# Patient Record
Sex: MALE | Race: WHITE | NOT HISPANIC OR LATINO | Employment: OTHER | URBAN - METROPOLITAN AREA
[De-identification: names, ages, dates, MRNs, and addresses within clinical notes are randomized per-mention and may not be internally consistent; named-entity substitution may affect disease eponyms.]

---

## 2018-02-26 PROBLEM — E78.2 MIXED HYPERLIPIDEMIA: Status: ACTIVE | Noted: 2018-02-26

## 2018-02-26 PROBLEM — K21.00 GASTROESOPHAGEAL REFLUX DISEASE WITH ESOPHAGITIS: Status: ACTIVE | Noted: 2018-02-26

## 2018-02-26 PROBLEM — R06.83 SNORING: Status: ACTIVE | Noted: 2018-02-26

## 2018-02-26 PROBLEM — M48.02 CERVICAL STENOSIS OF SPINE: Status: ACTIVE | Noted: 2018-02-26

## 2018-02-27 ENCOUNTER — OFFICE VISIT (OUTPATIENT)
Dept: FAMILY MEDICINE CLINIC | Facility: CLINIC | Age: 63
End: 2018-02-27
Payer: COMMERCIAL

## 2018-02-27 VITALS
RESPIRATION RATE: 16 BRPM | HEIGHT: 74 IN | HEART RATE: 72 BPM | SYSTOLIC BLOOD PRESSURE: 130 MMHG | TEMPERATURE: 98.6 F | WEIGHT: 234 LBS | DIASTOLIC BLOOD PRESSURE: 84 MMHG | BODY MASS INDEX: 30.03 KG/M2

## 2018-02-27 DIAGNOSIS — Z00.00 ENCOUNTER FOR ANNUAL PHYSICAL EXAM: Primary | ICD-10-CM

## 2018-02-27 DIAGNOSIS — Z12.11 COLON CANCER SCREENING: ICD-10-CM

## 2018-02-27 DIAGNOSIS — E78.2 MIXED HYPERLIPIDEMIA: Primary | ICD-10-CM

## 2018-02-27 DIAGNOSIS — Z12.5 PROSTATE CANCER SCREENING: ICD-10-CM

## 2018-02-27 PROBLEM — M65.20 CALCIFIC TENDINITIS: Status: ACTIVE | Noted: 2018-02-27

## 2018-02-27 PROCEDURE — 99386 PREV VISIT NEW AGE 40-64: CPT | Performed by: FAMILY MEDICINE

## 2018-02-27 RX ORDER — OMEPRAZOLE 20 MG/1
20 CAPSULE, DELAYED RELEASE ORAL EVERY MORNING
COMMUNITY
End: 2022-05-27 | Stop reason: ALTCHOICE

## 2018-02-27 RX ORDER — MULTIVITAMIN
1 TABLET ORAL EVERY MORNING
COMMUNITY

## 2018-02-27 NOTE — PROGRESS NOTES
Subjective:           Augusta Campos was seen today for physical exam     Diagnoses and all orders for this visit:    Encounter for annual physical exam            No orders of the defined types were placed in this encounter  Patient Instructions   Labs as ordered  Low salt High protein diet  Compression   Cardiology evaluation as discussed  Fabricio Dobson      HPI   Physical exam  Labs PHM    Vitals:    02/27/18 1115   BP: 130/84   Pulse: 72   Resp: 16   Temp: 98 6 °F (37 °C)       No Known Allergies    No current outpatient prescriptions on file prior to visit  No current facility-administered medications on file prior to visit  Past Medical History:   Diagnosis Date    Calcific tendinitis 2/27/2018       Past Surgical History:   Procedure Laterality Date    ULNAR NERVE TRANSPOSITION Right           reports that he has never smoked  He has never used smokeless tobacco  He reports that he drinks alcohol  He reports that he does not use drugs  reports that he has never smoked  He has never used smokeless tobacco     The following portions of the patient's history were reviewed and updated as appropriate: allergies, current medications, past family history, past medical history, past social history, past surgical history and problem list     Review of Systems   Constitutional: Negative for appetite change, diaphoresis and fever  HENT: Negative for congestion, ear pain, postnasal drip, sinus pressure, tinnitus and voice change  Eyes: Negative for discharge and itching  Respiratory: Negative for cough, chest tightness, shortness of breath and stridor  Snores   Cardiovascular: Negative for chest pain and palpitations  Gastrointestinal: Negative for abdominal distention, blood in stool, diarrhea and vomiting  Some GERD sx's   Endocrine: Negative for cold intolerance  Genitourinary: Negative for flank pain, genital sores and testicular pain     Musculoskeletal: Positive for arthralgias  Negative for joint swelling and myalgias  R shoulder comes and goes   Skin: Negative for rash  Neurological: Negative for tremors, speech difficulty and headaches  Hematological: Negative for adenopathy  Psychiatric/Behavioral: Negative for behavioral problems, dysphoric mood, hallucinations and suicidal ideas  Physical Exam   Constitutional: He is oriented to person, place, and time  He appears well-developed and well-nourished  HENT:   Head: Normocephalic and atraumatic  Right Ear: External ear normal    Left Ear: External ear normal    Mouth/Throat: No oropharyngeal exudate  Eyes: Conjunctivae and EOM are normal  Pupils are equal, round, and reactive to light  Right eye exhibits no discharge  Left eye exhibits no discharge  No scleral icterus  Neck: Normal range of motion  Neck supple  No tracheal deviation present  Cardiovascular: Normal rate and regular rhythm  Exam reveals no friction rub  No murmur heard  Trace sock line edema  Varicosities mild   Pulmonary/Chest: Effort normal and breath sounds normal  No stridor  No respiratory distress  He has no wheezes  He has no rales  Abdominal: Soft  Bowel sounds are normal  He exhibits no distension  There is no rebound and no guarding  A hernia is present  3cm reducible umb hernia  Genitourinary: Rectum normal and prostate normal    Musculoskeletal: Normal range of motion  He exhibits edema  He exhibits no deformity  Lymphadenopathy:     He has no cervical adenopathy  Neurological: He is alert and oriented to person, place, and time  No cranial nerve deficit  He exhibits normal muscle tone  Coordination normal    Skin: Skin is warm and dry  Capillary refill takes less than 2 seconds  No rash noted  Nevi back  Scars back R arm elbow  Psychiatric: He has a normal mood and affect  His behavior is normal  Judgment and thought content normal    Nursing note and vitals reviewed

## 2018-02-28 ENCOUNTER — TELEPHONE (OUTPATIENT)
Dept: GASTROENTEROLOGY | Facility: CLINIC | Age: 63
End: 2018-02-28

## 2018-02-28 ENCOUNTER — TELEPHONE (OUTPATIENT)
Dept: GASTROENTEROLOGY | Facility: MEDICAL CENTER | Age: 63
End: 2018-02-28

## 2018-02-28 PROBLEM — Z12.11 SCREEN FOR COLON CANCER: Status: ACTIVE | Noted: 2018-02-28

## 2018-02-28 NOTE — TELEPHONE ENCOUNTER
Reyna Brannon  1955  [address] [facility]  [phone]  Cell Phone     Screened by: Zulma Sage  ]    Referring Dr María Das:   Has patient been referred for a routine screening Colonoscopy? yes  Is the patient over 48years of age? yes    If the answer is YES to both questions, proceed to the medical questions  Do you have any of the following symptoms? Have you had a coronary or vascular stent within the last year? no    Have you had a heart attack or stroke in the last 6 months? no    Have you had intestinal surgery in the last 3 months? no    Do you have problems with:anesthesia  no    Do you use:  Oxygen no  CPAP/BiPAP no    Have you been hospitalized in the last Month? no    Have you been diagnosed with a bleeding disorder or anemia? no    Have you had chest pain (angina) or breathing problems  (COPD) in the last 3 months? no     Do you have any difficulty walking up a flight of stairs? no    Have you had Kidney failure or insufficiency? no    Have you had heart valve surgery? no    Are you confined to a wheelchair? Do you take Coumadin  no    Do you take insulin for Diabetes no  Name of medication:    : If patient answers NO to medical questions, then schedule procedure  If patient answers YES to medical questions, then schedule office appointment  Previous Colonoscopy yes   (if yes) Date and Facility of last colonoscopy? ABOUT 10 YRS    Patient scheduled for procedure:   Scheduled by:     Time:   Provider:   Location:     Insurance:   Referral Required? Were instructions Mailed? Were instructions sent to [employer]:   Was the prep sent to Pharmacy?      Comments: [PT PASSED OA AND CAN BE REACHED [phone] ]

## 2018-02-28 NOTE — TELEPHONE ENCOUNTER
I SCHEDULED PT FOR OPEN ACCESS COLONOSCOPY W/ DR Blanco Camargo AT [facility] 3/20/2018 INSTRUCTIONS MAILED  BOWEL PREP TASKED TO [facility]

## 2018-03-01 DIAGNOSIS — Z12.11 COLON CANCER SCREENING: Primary | ICD-10-CM

## 2018-03-05 ENCOUNTER — OFFICE VISIT (OUTPATIENT)
Dept: FAMILY MEDICINE CLINIC | Facility: CLINIC | Age: 63
End: 2018-03-05
Payer: COMMERCIAL

## 2018-03-05 ENCOUNTER — TELEPHONE (OUTPATIENT)
Dept: FAMILY MEDICINE CLINIC | Facility: CLINIC | Age: 63
End: 2018-03-05

## 2018-03-05 VITALS
WEIGHT: 238 LBS | HEART RATE: 72 BPM | HEIGHT: 74 IN | BODY MASS INDEX: 30.54 KG/M2 | TEMPERATURE: 97.3 F | DIASTOLIC BLOOD PRESSURE: 72 MMHG | RESPIRATION RATE: 18 BRPM | SYSTOLIC BLOOD PRESSURE: 124 MMHG

## 2018-03-05 DIAGNOSIS — M79.89 SWELLING OF LEFT LOWER EXTREMITY: Primary | ICD-10-CM

## 2018-03-05 DIAGNOSIS — S86.811A STRAIN OF CALF MUSCLE, RIGHT, INITIAL ENCOUNTER: ICD-10-CM

## 2018-03-05 PROCEDURE — 99214 OFFICE O/P EST MOD 30 MIN: CPT | Performed by: NURSE PRACTITIONER

## 2018-03-05 RX ORDER — MELOXICAM 15 MG/1
15 TABLET ORAL DAILY
Qty: 30 TABLET | Refills: 0 | Status: SHIPPED | OUTPATIENT
Start: 2018-03-05 | End: 2018-03-08 | Stop reason: HOSPADM

## 2018-03-05 NOTE — TELEPHONE ENCOUNTER
Pt called c/o worsening pain in his calf/back of knee  I spoke with Dr Stephania Bence and she advised he be seen today  Appt made with Rebeca

## 2018-03-05 NOTE — PROGRESS NOTES
Assessment/Plan:    Will rule out DVT with stat doppler to be done 3/6/18  If negative, recommend ortho eval      Diagnoses and all orders for this visit:    Swelling of left lower extremity  -     VAS lower limb venous duplex study, unilateral/limited; Future    Strain of calf muscle, right, initial encounter  -     meloxicam (MOBIC) 15 mg tablet; Take 1 tablet (15 mg total) by mouth daily  -     Ambulatory referral to Orthopedic Surgery; Future          Patient Instructions   Ultrasound to rule out DVT in the left leg  If this is negative, ortho eval recommended  Return after venous duplex  Subjective:      Patient ID: Jennifer Moon is a 63 y o  male  Chief Complaint   Patient presents with    Leg Pain     Left calf       Here today for evaluation of LLE pain and swelling that has been ongoing for the past 2 weeks  Started after he believed he pulled his  calf muscle  [place]  He has been icing and stretching, pain is getting worse  Swelling also worsening  Denies redness or warmth of skin  Has pain with flexion of the ankle  Taking Ibuprofen which isn't helping  History thrombus in RUE after underhoing rotator cuff repair, was given a blood thinner, unsure how long he was anticoagulated  Denies chest pain, SOB, or VAZQUEZ        The following portions of the patient's history were reviewed and updated as appropriate: allergies, current medications, past family history, past medical history, past social history, past surgical history and problem list     Review of Systems   Constitutional: Negative  Respiratory: Negative for cough, chest tightness and shortness of breath  Cardiovascular: Positive for leg swelling  Negative for chest pain and palpitations  Neurological: Negative for weakness and numbness           Current Outpatient Prescriptions   Medication Sig Dispense Refill    Multiple Vitamin (MULTIVITAMIN) tablet Take 1 tablet by mouth daily      omeprazole (PriLOSEC) 20 mg delayed release capsule Take 20 mg by mouth daily      meloxicam (MOBIC) 15 mg tablet Take 1 tablet (15 mg total) by mouth daily 30 tablet 0    Na Sulfate-K Sulfate-Mg Sulf 17 5-3 13-1 6 GM/180ML SOLN Take 1 kit by mouth once for 1 dose 2 Bottle 0     No current facility-administered medications for this visit  Objective:    /72   Pulse 72   Temp (!) 97 3 °F (36 3 °C)   Resp 18   Ht 6' 2" (1 88 m)   Wt 108 kg (238 lb)   BMI 30 56 kg/m²        Physical Exam   Constitutional: He appears well-developed and well-nourished  HENT:   Right Ear: Tympanic membrane, external ear and ear canal normal    Left Ear: Tympanic membrane, external ear and ear canal normal    Nose: No mucosal edema  Mouth/Throat: Oropharynx is clear and moist and mucous membranes are normal    Eyes: Conjunctivae are normal    Cardiovascular: Normal rate, regular rhythm, normal heart sounds and normal pulses  LUE w/ 1+ pitting edema  No redness or warmth  Fullness palpated in popliteal space left knee  Discomfort with full flexion of knee   Pulmonary/Chest: Effort normal and breath sounds normal    Abdominal: Bowel sounds are normal  He exhibits no distension  There is no splenomegaly or hepatomegaly  There is no tenderness  Lymphadenopathy:        Right cervical: No superficial cervical adenopathy present  Left cervical: No superficial cervical adenopathy present  Skin: No rash noted  Psychiatric: He has a normal mood and affect  Nursing note and vitals reviewed               Elena Bonilla

## 2018-03-06 ENCOUNTER — HOSPITAL ENCOUNTER (OUTPATIENT)
Dept: RADIOLOGY | Facility: HOSPITAL | Age: 63
Discharge: HOME | End: 2018-03-06
Payer: COMMERCIAL

## 2018-03-06 ENCOUNTER — TELEPHONE (OUTPATIENT)
Dept: FAMILY MEDICINE CLINIC | Facility: CLINIC | Age: 63
End: 2018-03-06

## 2018-03-06 ENCOUNTER — APPOINTMENT (EMERGENCY)
Dept: RADIOLOGY | Facility: HOSPITAL | Age: 63
DRG: 280 | End: 2018-03-06
Payer: COMMERCIAL

## 2018-03-06 ENCOUNTER — HOSPITAL ENCOUNTER (INPATIENT)
Facility: HOSPITAL | Age: 63
LOS: 2 days | Discharge: HOME | DRG: 280 | End: 2018-03-08
Attending: EMERGENCY MEDICINE | Admitting: INTERNAL MEDICINE
Payer: COMMERCIAL

## 2018-03-06 ENCOUNTER — APPOINTMENT (INPATIENT)
Dept: NON INVASIVE DIAGNOSTICS | Facility: HOSPITAL | Age: 63
DRG: 280 | End: 2018-03-06
Payer: COMMERCIAL

## 2018-03-06 DIAGNOSIS — M79.89 SWELLING OF LEFT LOWER EXTREMITY: ICD-10-CM

## 2018-03-06 DIAGNOSIS — I26.99 PE (PULMONARY THROMBOEMBOLISM) (HCC): Primary | ICD-10-CM

## 2018-03-06 DIAGNOSIS — I82.409 DVT (DEEP VENOUS THROMBOSIS) (HCC): ICD-10-CM

## 2018-03-06 DIAGNOSIS — I26.09 OTHER ACUTE PULMONARY EMBOLISM WITH ACUTE COR PULMONALE (HCC): ICD-10-CM

## 2018-03-06 DIAGNOSIS — I82.412 ACUTE DEEP VEIN THROMBOSIS (DVT) OF FEMORAL VEIN OF LEFT LOWER EXTREMITY (HCC): ICD-10-CM

## 2018-03-06 PROBLEM — I21.4 NSTEMI (NON-ST ELEVATED MYOCARDIAL INFARCTION) (HCC): Status: ACTIVE | Noted: 2018-03-06

## 2018-03-06 LAB
ALBUMIN SERPL BCP-MCNC: 3.4 G/DL (ref 3.5–5)
ALP SERPL-CCNC: 84 U/L (ref 46–116)
ALT SERPL W P-5'-P-CCNC: 25 U/L (ref 12–78)
ANION GAP SERPL CALCULATED.3IONS-SCNC: 8 MMOL/L (ref 4–13)
APTT PPP: 24 SECONDS (ref 23–35)
AST SERPL W P-5'-P-CCNC: 14 U/L (ref 5–45)
BASOPHILS # BLD AUTO: 0 THOUSANDS/ΜL (ref 0–0.1)
BASOPHILS NFR BLD AUTO: 1 % (ref 0–1)
BILIRUB SERPL-MCNC: 0.3 MG/DL (ref 0.2–1)
BUN SERPL-MCNC: 22 MG/DL (ref 5–25)
CALCIUM SERPL-MCNC: 8.8 MG/DL (ref 8.3–10.1)
CHLORIDE SERPL-SCNC: 105 MMOL/L (ref 100–108)
CO2 SERPL-SCNC: 29 MMOL/L (ref 21–32)
CREAT SERPL-MCNC: 0.98 MG/DL (ref 0.6–1.3)
EOSINOPHIL # BLD AUTO: 0.2 THOUSAND/ΜL (ref 0–0.61)
EOSINOPHIL NFR BLD AUTO: 3 % (ref 0–6)
ERYTHROCYTE [DISTWIDTH] IN BLOOD BY AUTOMATED COUNT: 15.9 % (ref 11.6–15.1)
GFR SERPL CREATININE-BSD FRML MDRD: 83 ML/MIN/1.73SQ M
GLUCOSE SERPL-MCNC: 113 MG/DL (ref 65–140)
HCT VFR BLD AUTO: 35.8 % (ref 42–52)
HGB BLD-MCNC: 11.5 G/DL (ref 14–18)
INR PPP: 1.05 (ref 0.86–1.16)
LYMPHOCYTES # BLD AUTO: 1.5 THOUSANDS/ΜL (ref 0.6–4.47)
LYMPHOCYTES NFR BLD AUTO: 22 % (ref 14–44)
MCH RBC QN AUTO: 25.9 PG (ref 27–31)
MCHC RBC AUTO-ENTMCNC: 32.2 G/DL (ref 31.4–37.4)
MCV RBC AUTO: 81 FL (ref 82–98)
MONOCYTES # BLD AUTO: 0.6 THOUSAND/ΜL (ref 0.17–1.22)
MONOCYTES NFR BLD AUTO: 9 % (ref 4–12)
NEUTROPHILS # BLD AUTO: 4.7 THOUSANDS/ΜL (ref 1.85–7.62)
NEUTS SEG NFR BLD AUTO: 66 % (ref 43–75)
NRBC BLD AUTO-RTO: 0 /100 WBCS
PLATELET # BLD AUTO: 236 THOUSANDS/UL (ref 130–400)
PMV BLD AUTO: 7.7 FL (ref 8.9–12.7)
POTASSIUM SERPL-SCNC: 3.7 MMOL/L (ref 3.5–5.3)
PROT SERPL-MCNC: 7.1 G/DL (ref 6.4–8.2)
PROTHROMBIN TIME: 11 SECONDS (ref 9.4–11.7)
RBC # BLD AUTO: 4.43 MILLION/UL (ref 4.7–6.1)
SODIUM SERPL-SCNC: 142 MMOL/L (ref 136–145)
TROPONIN I SERPL-MCNC: 0.08 NG/ML
TROPONIN I SERPL-MCNC: 0.1 NG/ML
WBC # BLD AUTO: 7.1 THOUSAND/UL (ref 4.8–10.8)

## 2018-03-06 PROCEDURE — 80053 COMPREHEN METABOLIC PANEL: CPT | Performed by: EMERGENCY MEDICINE

## 2018-03-06 PROCEDURE — 96360 HYDRATION IV INFUSION INIT: CPT

## 2018-03-06 PROCEDURE — 82728 ASSAY OF FERRITIN: CPT | Performed by: PHYSICIAN ASSISTANT

## 2018-03-06 PROCEDURE — 83550 IRON BINDING TEST: CPT | Performed by: PHYSICIAN ASSISTANT

## 2018-03-06 PROCEDURE — 83540 ASSAY OF IRON: CPT | Performed by: PHYSICIAN ASSISTANT

## 2018-03-06 PROCEDURE — 87081 CULTURE SCREEN ONLY: CPT | Performed by: INTERNAL MEDICINE

## 2018-03-06 PROCEDURE — 85025 COMPLETE CBC W/AUTO DIFF WBC: CPT | Performed by: EMERGENCY MEDICINE

## 2018-03-06 PROCEDURE — 85730 THROMBOPLASTIN TIME PARTIAL: CPT | Performed by: EMERGENCY MEDICINE

## 2018-03-06 PROCEDURE — 96361 HYDRATE IV INFUSION ADD-ON: CPT

## 2018-03-06 PROCEDURE — 71275 CT ANGIOGRAPHY CHEST: CPT

## 2018-03-06 PROCEDURE — 85610 PROTHROMBIN TIME: CPT | Performed by: EMERGENCY MEDICINE

## 2018-03-06 PROCEDURE — 93306 TTE W/DOPPLER COMPLETE: CPT

## 2018-03-06 PROCEDURE — 93970 EXTREMITY STUDY: CPT | Performed by: SURGERY

## 2018-03-06 PROCEDURE — 36415 COLL VENOUS BLD VENIPUNCTURE: CPT | Performed by: EMERGENCY MEDICINE

## 2018-03-06 PROCEDURE — 93970 EXTREMITY STUDY: CPT

## 2018-03-06 PROCEDURE — 93306 TTE W/DOPPLER COMPLETE: CPT | Performed by: INTERNAL MEDICINE

## 2018-03-06 PROCEDURE — 84484 ASSAY OF TROPONIN QUANT: CPT | Performed by: EMERGENCY MEDICINE

## 2018-03-06 PROCEDURE — 93005 ELECTROCARDIOGRAM TRACING: CPT | Performed by: EMERGENCY MEDICINE

## 2018-03-06 PROCEDURE — 84484 ASSAY OF TROPONIN QUANT: CPT | Performed by: NURSE PRACTITIONER

## 2018-03-06 PROCEDURE — 99285 EMERGENCY DEPT VISIT HI MDM: CPT

## 2018-03-06 PROCEDURE — 99223 1ST HOSP IP/OBS HIGH 75: CPT | Performed by: INTERNAL MEDICINE

## 2018-03-06 RX ORDER — HEPARIN SODIUM 1000 [USP'U]/ML
4200 INJECTION, SOLUTION INTRAVENOUS; SUBCUTANEOUS AS NEEDED
Status: DISCONTINUED | OUTPATIENT
Start: 2018-03-06 | End: 2018-03-08

## 2018-03-06 RX ORDER — CHLORHEXIDINE GLUCONATE 0.12 MG/ML
15 RINSE ORAL EVERY 12 HOURS SCHEDULED
Status: DISCONTINUED | OUTPATIENT
Start: 2018-03-06 | End: 2018-03-07

## 2018-03-06 RX ORDER — HEPARIN SODIUM 1000 [USP'U]/ML
8400 INJECTION, SOLUTION INTRAVENOUS; SUBCUTANEOUS ONCE
Status: COMPLETED | OUTPATIENT
Start: 2018-03-06 | End: 2018-03-06

## 2018-03-06 RX ORDER — PANTOPRAZOLE SODIUM 40 MG/1
40 TABLET, DELAYED RELEASE ORAL
Status: DISCONTINUED | OUTPATIENT
Start: 2018-03-07 | End: 2018-03-08 | Stop reason: HOSPADM

## 2018-03-06 RX ORDER — ASPIRIN 81 MG/1
324 TABLET, CHEWABLE ORAL ONCE
Status: COMPLETED | OUTPATIENT
Start: 2018-03-06 | End: 2018-03-06

## 2018-03-06 RX ORDER — HEPARIN SODIUM 1000 [USP'U]/ML
8400 INJECTION, SOLUTION INTRAVENOUS; SUBCUTANEOUS AS NEEDED
Status: DISCONTINUED | OUTPATIENT
Start: 2018-03-06 | End: 2018-03-08

## 2018-03-06 RX ORDER — HEPARIN SODIUM 10000 [USP'U]/100ML
3-30 INJECTION, SOLUTION INTRAVENOUS
Status: DISCONTINUED | OUTPATIENT
Start: 2018-03-06 | End: 2018-03-08

## 2018-03-06 RX ADMIN — HEPARIN SODIUM 8400 UNITS: 1000 INJECTION, SOLUTION INTRAVENOUS; SUBCUTANEOUS at 18:02

## 2018-03-06 RX ADMIN — SODIUM CHLORIDE 1000 ML: 0.9 INJECTION, SOLUTION INTRAVENOUS at 18:19

## 2018-03-06 RX ADMIN — HEPARIN SODIUM AND DEXTROSE 18 UNITS/KG/HR: 10000; 5 INJECTION INTRAVENOUS at 18:05

## 2018-03-06 RX ADMIN — ASPIRIN 81 MG 324 MG: 81 TABLET ORAL at 18:01

## 2018-03-06 RX ADMIN — SODIUM CHLORIDE 1000 ML: 0.9 INJECTION, SOLUTION INTRAVENOUS at 15:22

## 2018-03-06 RX ADMIN — IOHEXOL 85 ML: 350 INJECTION, SOLUTION INTRAVENOUS at 16:48

## 2018-03-06 NOTE — ED PROCEDURE NOTE
PROCEDURE  ECG 12 Lead Documentation  Date/Time: 3/6/2018 3:31 PM  Performed by: Jozef Grimm  Authorized by: Varun MARIE     ECG reviewed by me, the ED Provider: yes    Patient location:  ED  Interpretation:     Interpretation: normal    Rate:     ECG rate:  102    ECG rate assessment: tachycardic    Rhythm:     Rhythm: sinus rhythm    Ectopy:     Ectopy: none    QRS:     QRS axis:  Normal    QRS intervals:  Normal  Conduction:     Conduction: normal    ST segments:     ST segments:  Normal  T waves:     T waves: inverted      Inverted:  V2, V3 and V4         Harvey Hurtado DO  03/06/18 1532

## 2018-03-06 NOTE — TELEPHONE ENCOUNTER
Extensive occlusive DVT involving LLE  This is unprovoked  Spoke w/ pt, he is starting to feel more SOB  Denies chest pain  Advised pt he would need to be started on anticoagulation therapy and seen by hematology outpatient, but given SOB, recommend he go to the ED to r/o PE  He is agreeable  Triage RN notified

## 2018-03-07 PROBLEM — D50.9 MICROCYTIC ANEMIA: Status: ACTIVE | Noted: 2018-03-07

## 2018-03-07 LAB
ALBUMIN SERPL-MCNC: 4.2 G/DL (ref 3.6–4.8)
ALBUMIN/GLOB SERPL: 1.5 {RATIO} (ref 1.2–2.2)
ALP SERPL-CCNC: 85 IU/L (ref 39–117)
ALT SERPL-CCNC: 16 IU/L (ref 0–44)
AMBIG ABBREV DEFAULT: NORMAL
AMBIG ABBREV DEFAULT: NORMAL
ANION GAP SERPL CALCULATED.3IONS-SCNC: 10 MMOL/L (ref 4–13)
APTT PPP: 67 SECONDS (ref 24–33)
APTT PPP: 74 SECONDS (ref 23–35)
AST SERPL-CCNC: 17 IU/L (ref 0–40)
ATRIAL RATE: 102 BPM
BASOPHILS # BLD AUTO: 0 THOUSANDS/ΜL (ref 0–0.1)
BASOPHILS NFR BLD AUTO: 1 % (ref 0–1)
BILIRUB SERPL-MCNC: 0.3 MG/DL (ref 0–1.2)
BUN SERPL-MCNC: 19 MG/DL (ref 5–25)
BUN SERPL-MCNC: 20 MG/DL (ref 8–27)
BUN/CREAT SERPL: 20 (ref 10–24)
CALCIUM SERPL-MCNC: 8.1 MG/DL (ref 8.3–10.1)
CALCIUM SERPL-MCNC: 9.1 MG/DL (ref 8.6–10.2)
CHLORIDE SERPL-SCNC: 101 MMOL/L (ref 96–106)
CHLORIDE SERPL-SCNC: 108 MMOL/L (ref 100–108)
CHOLEST SERPL-MCNC: 191 MG/DL (ref 100–199)
CO2 SERPL-SCNC: 24 MMOL/L (ref 18–29)
CO2 SERPL-SCNC: 24 MMOL/L (ref 21–32)
CREAT SERPL-MCNC: 0.77 MG/DL (ref 0.6–1.3)
CREAT SERPL-MCNC: 1.02 MG/DL (ref 0.76–1.27)
DEPRECATED AT III PPP: 82 % OF NORMAL (ref 92–136)
EOSINOPHIL # BLD AUTO: 0.3 THOUSAND/ΜL (ref 0–0.61)
EOSINOPHIL NFR BLD AUTO: 5 % (ref 0–6)
ERYTHROCYTE [DISTWIDTH] IN BLOOD BY AUTOMATED COUNT: 15.6 % (ref 11.6–15.1)
GFR SERPL CREATININE-BSD FRML MDRD: 99 ML/MIN/1.73SQ M
GLOBULIN SER-MCNC: 2.8 G/DL (ref 1.5–4.5)
GLUCOSE SERPL-MCNC: 111 MG/DL (ref 65–140)
GLUCOSE SERPL-MCNC: 98 MG/DL (ref 65–99)
HCT VFR BLD AUTO: 32.1 % (ref 42–52)
HDLC SERPL-MCNC: 43 MG/DL
HGB BLD-MCNC: 10.4 G/DL (ref 14–18)
INR PPP: 1.07 (ref 0.86–1.16)
LDLC SERPL CALC-MCNC: 131 MG/DL (ref 0–99)
LYMPHOCYTES # BLD AUTO: 1.7 THOUSANDS/ΜL (ref 0.6–4.47)
LYMPHOCYTES NFR BLD AUTO: 31 % (ref 14–44)
MAGNESIUM SERPL-MCNC: 2.1 MG/DL (ref 1.6–2.6)
MCH RBC QN AUTO: 26.1 PG (ref 27–31)
MCHC RBC AUTO-ENTMCNC: 32.5 G/DL (ref 31.4–37.4)
MCV RBC AUTO: 80 FL (ref 82–98)
MONOCYTES # BLD AUTO: 0.5 THOUSAND/ΜL (ref 0.17–1.22)
MONOCYTES NFR BLD AUTO: 9 % (ref 4–12)
NEUTROPHILS # BLD AUTO: 2.9 THOUSANDS/ΜL (ref 1.85–7.62)
NEUTS SEG NFR BLD AUTO: 54 % (ref 43–75)
NRBC BLD AUTO-RTO: 0 /100 WBCS
NT-PROBNP SERPL-MCNC: 844 PG/ML
P AXIS: 71 DEGREES
PHOSPHATE SERPL-MCNC: 3.4 MG/DL (ref 2.3–4.1)
PLATELET # BLD AUTO: 176 THOUSANDS/UL (ref 130–400)
PMV BLD AUTO: 7.7 FL (ref 8.9–12.7)
POTASSIUM SERPL-SCNC: 3.9 MMOL/L (ref 3.5–5.3)
POTASSIUM SERPL-SCNC: 4.6 MMOL/L (ref 3.5–5.2)
PR INTERVAL: 174 MS
PROT SERPL-MCNC: 7 G/DL (ref 6–8.5)
PROTHROMBIN TIME: 11.2 SECONDS (ref 9.4–11.7)
PSA SERPL-MCNC: 0.7 NG/ML (ref 0–4)
QRS AXIS: 12 DEGREES
QRSD INTERVAL: 82 MS
QT INTERVAL: 338 MS
QTC INTERVAL: 440 MS
RBC # BLD AUTO: 3.99 MILLION/UL (ref 4.7–6.1)
SL AMB EGFR AFRICAN AMERICAN: 92 ML/MIN/1.73
SL AMB EGFR NON AFRICAN AMERICAN: 80 ML/MIN/1.73
SL AMB VLDL CHOLESTEROL CALC: 17 MG/DL (ref 5–40)
SODIUM SERPL-SCNC: 139 MMOL/L (ref 134–144)
SODIUM SERPL-SCNC: 142 MMOL/L (ref 136–145)
T WAVE AXIS: 29 DEGREES
TRIGL SERPL-MCNC: 86 MG/DL (ref 0–149)
VENTRICULAR RATE: 102 BPM
WBC # BLD AUTO: 5.4 THOUSAND/UL (ref 4.8–10.8)

## 2018-03-07 PROCEDURE — 85705 THROMBOPLASTIN INHIBITION: CPT | Performed by: PHYSICIAN ASSISTANT

## 2018-03-07 PROCEDURE — 85670 THROMBIN TIME PLASMA: CPT | Performed by: PHYSICIAN ASSISTANT

## 2018-03-07 PROCEDURE — 83880 ASSAY OF NATRIURETIC PEPTIDE: CPT | Performed by: INTERNAL MEDICINE

## 2018-03-07 PROCEDURE — 85613 RUSSELL VIPER VENOM DILUTED: CPT | Performed by: PHYSICIAN ASSISTANT

## 2018-03-07 PROCEDURE — 85730 THROMBOPLASTIN TIME PARTIAL: CPT | Performed by: INTERNAL MEDICINE

## 2018-03-07 PROCEDURE — 86146 BETA-2 GLYCOPROTEIN ANTIBODY: CPT | Performed by: PHYSICIAN ASSISTANT

## 2018-03-07 PROCEDURE — 99255 IP/OBS CONSLTJ NEW/EST HI 80: CPT | Performed by: PHYSICIAN ASSISTANT

## 2018-03-07 PROCEDURE — 85305 CLOT INHIBIT PROT S TOTAL: CPT | Performed by: PHYSICIAN ASSISTANT

## 2018-03-07 PROCEDURE — 82272 OCCULT BLD FECES 1-3 TESTS: CPT | Performed by: PHYSICIAN ASSISTANT

## 2018-03-07 PROCEDURE — 99233 SBSQ HOSP IP/OBS HIGH 50: CPT | Performed by: INTERNAL MEDICINE

## 2018-03-07 PROCEDURE — 81240 F2 GENE: CPT | Performed by: PHYSICIAN ASSISTANT

## 2018-03-07 PROCEDURE — 93010 ELECTROCARDIOGRAM REPORT: CPT | Performed by: INTERNAL MEDICINE

## 2018-03-07 PROCEDURE — 84100 ASSAY OF PHOSPHORUS: CPT | Performed by: NURSE PRACTITIONER

## 2018-03-07 PROCEDURE — 81241 F5 GENE: CPT | Performed by: PHYSICIAN ASSISTANT

## 2018-03-07 PROCEDURE — 85730 THROMBOPLASTIN TIME PARTIAL: CPT | Performed by: EMERGENCY MEDICINE

## 2018-03-07 PROCEDURE — 80048 BASIC METABOLIC PNL TOTAL CA: CPT | Performed by: NURSE PRACTITIONER

## 2018-03-07 PROCEDURE — 85025 COMPLETE CBC W/AUTO DIFF WBC: CPT | Performed by: NURSE PRACTITIONER

## 2018-03-07 PROCEDURE — 85306 CLOT INHIBIT PROT S FREE: CPT | Performed by: PHYSICIAN ASSISTANT

## 2018-03-07 PROCEDURE — 85732 THROMBOPLASTIN TIME PARTIAL: CPT | Performed by: PHYSICIAN ASSISTANT

## 2018-03-07 PROCEDURE — 86147 CARDIOLIPIN ANTIBODY EA IG: CPT | Performed by: PHYSICIAN ASSISTANT

## 2018-03-07 PROCEDURE — 85300 ANTITHROMBIN III ACTIVITY: CPT | Performed by: PHYSICIAN ASSISTANT

## 2018-03-07 PROCEDURE — 85610 PROTHROMBIN TIME: CPT | Performed by: NURSE PRACTITIONER

## 2018-03-07 PROCEDURE — 85303 CLOT INHIBIT PROT C ACTIVITY: CPT | Performed by: PHYSICIAN ASSISTANT

## 2018-03-07 PROCEDURE — 83735 ASSAY OF MAGNESIUM: CPT | Performed by: NURSE PRACTITIONER

## 2018-03-07 RX ADMIN — PANTOPRAZOLE SODIUM 40 MG: 40 TABLET, DELAYED RELEASE ORAL at 06:39

## 2018-03-07 RX ADMIN — HEPARIN SODIUM AND DEXTROSE 18 UNITS/KG/HR: 10000; 5 INJECTION INTRAVENOUS at 05:57

## 2018-03-07 RX ADMIN — CHLORHEXIDINE GLUCONATE 15 ML: 1.2 RINSE ORAL at 08:27

## 2018-03-07 RX ADMIN — HEPARIN SODIUM AND DEXTROSE 18 UNITS/KG/HR: 10000; 5 INJECTION INTRAVENOUS at 19:21

## 2018-03-07 NOTE — CONSULTS
Consult Note - Vascular Surgery   Mitch Herrera 63 y o  male MRN: [id number]  Unit/Bed#: ICU 10 Encounter: [id number]  Primary Care Provider: Bhavna Oakley MD   Date and time admitted to hospital: 3/6/2018  2:57 PM      Acute deep vein thrombosis (DVT) of femoral vein of left lower extremity (HCC)   Assessment & Plan    Extensive acute LLE DVT (over 2 weeks) with extensive bilateral pulmonary emboli / right heart strain and HX of upper extremity DVT after shoulder surgery about 6 years ago  Acute non-occlusive DVT involving proximal femoral, mid femoral, gastrocnemius vein, posterior tibial vein and peroneal vein  Acute occluded DVT distal femoral vein and popliteal vein  Evidence of superficial thrombophlebitis noted in the small saphenous vein  62M history of what sounds like axillary vein DVT after shoulder surgery about 6 years ago with a short course of Xarelto  The patient reports that 2 1/2 ago while golfing, he "strained" his left calf muscle  He noticed discomfort while walking [place]  Since then he has had mild to moderate swelling to the left leg with some difficulty in ambulating  He has continued to work but at times he needed to walk on his left heel due to pain  He has had varying degrees of swelling for which he has been trying heat/ice in the evenings after work  A couple of days ago, he developed mild dyspnea  Yesterday, he was sent for an outpatient venous duplex which was significant for extensive Left leg DVT for which he was sent to the Emergency department where he was found to have extensive bilateral pulmonary emboli with right heart strain  He did not undergo lytic therapy for pulmonary emboli  He was placed on a heparin continuous infusion  Vascular surgery is now asked to evaluate patient for treatment options for DVT  The patient feels better today  He has no chest pain or shortness of breath  His hemodynamics appear stable    He is lying flat in bed comfortable and he is not on oxygen  The the left lower extremity is mild to moderately larger than the right leg; perhaps up to 20% larger  The the patient expresses that he has enjoyed good health  He has had GERD and hypercholesterolemia  He is very active without any chest limiting symptoms at baseline  No history of myocardial infarction or stroke  No personal history of malignancy or bleeding  No known  family history of hypercoagulability, blood disorders or sudden death  - agree with heparin continuous infusion for PEs/deep vein thrombosis   - continue with close clinical/hemodynamic dynamic monitoring   - suggest hypercoagulable workup   - we will continue to monitor the patient with you and reassess in 24 hours for indications of venous lytic therapy  We had a detailed discussion regarding the indications, risks and benefits of venous lysis intervention  A directed procedure would be considered, if he should develop significant symptoms  At this point, with bilateral pulmonary emboli right heart strain, and in the absence of severe symptoms in the left leg, there would be no urgency to proceed with an intervention  Now on anticoagulation, as he starts to ambulate, should he develop moderate to severe symptoms (increased pain or swelling of LLE, or difficulty ambulating) we could reassess the risk/ benefit ratio and consider directed venous thrombolytic therapy  Case / thoughts and recommendations were discussed with critical care team              * Pulmonary embolism with acute cor pulmonale ([address] )   Assessment & Plan    Extensive bilateral pulmonary emboli with right heart strain  Moderately dilated RV size with moderately decreased RV function and mildly elevated Troponin       - heparin continuous infusion   - Followed by critical care           NSTEMI (non-ST elevated myocardial infarction) [facility])   Assessment & Plan    Secondary to PEs/RV dysfunction; normal LV function   - mgt per critical care          Requesting Service: Critical care  Chief Complaint:  Extensive bilateral pulmonary emboli and left lower extremity extensive deep vein thrombosis    HPI: Susan Sheehan is a 63 y o  male who presents with left lower extremity swelling x 2 1/2 weeks  62M history of what sounds like axillary vein DVT after shoulder surgery about 6 years ago with a short course of Xarelto  The patient reports that 2 1/2 ago while golfing, he "strained" his left calf muscle  He noticed discomfort while walking [place]  Since then he has had mild to moderate swelling to the left leg with some difficulty in ambulating  He has continued to work but at times he needed to walk on his left heel due to pain  He has had varying degrees of swelling for which he has been trying heat/ice in the evenings after work  A couple of days ago, he developed mild dyspnea  Yesterday, he was sent for an outpatient venous duplex which was significant for extensive Left leg DVT for which he was sent to the Emergency department where he was found to have extensive bilateral pulmonary emboli with right heart strain  He did not undergo lytic therapy for pulmonary emboli  He was placed on a heparin continuous infusion  Vascular surgery is now asked to evaluate patient for treatment options for DVT  The patient feels better today  He has no chest pain or shortness of breath  His hemodynamics appear stable  He is lying flat in bed comfortable and he is not on oxygen  The the left lower extremity is mild to moderately larger than the right leg; perhaps up to 20% larger  The patient expresses that he has enjoyed good health  He has had GERD and hypercholesterolemia  He is very active without any chest limiting symptoms at baseline  He was a cigarette smoker as a teen  No history of myocardial infarction or stroke  No personal history of malignancy or bleeding    No known  family history of hypercoagulability, blood disorders or sudden death  Imaging studies and case were reviewed with Dr Bruce Coffman  Review of Systems:  General: negative - no fevers, chills    Cardiovascular: no chest pain suggestive of angina  Respiratory: positive for - a sudden dyspnea  Gastrointestinal: no abdominal pain, change in bowel habits, or black or bloody stools  Genitourinary ROS: no dysuria, trouble voiding, or hematuria  Musculoskeletal ROS: negative  Neurological ROS: no TIA or stroke symptoms  Hematological and Lymphatic ROS: positive for - blood clots  Dermatological ROS: negative  Psychological ROS: negative  Ophthalmic ROS: negative  ENT ROS: negative    Past Medical History:  Past Medical History:   Diagnosis Date    Calcific tendinitis 2/27/2018       Past Surgical History:  Past Surgical History:   Procedure Laterality Date    ULNAR NERVE TRANSPOSITION Right        Social History:  History   Alcohol Use    Yes     Comment: social     History   Drug Use No     History   Smoking Status    Never Smoker   Smokeless Tobacco    Never Used       Family History:  Family History   Problem Relation Age of Onset    Diabetes Father     Cancer Maternal Grandmother     Cancer Maternal Grandfather        Allergies:  No Known Allergies    Medications:  Current Facility-Administered Medications   Medication Dose Route Frequency    chlorhexidine (PERIDEX) 0 12 % oral rinse 15 mL  15 mL Swish & Spit Q12H [address]    heparin (porcine) 25,000 units in 250 mL infusion (premix)  3-30 Units/kg/hr (Order-Specific) Intravenous Titrated    heparin (porcine) injection 4,200 Units  4,200 Units Intravenous PRN    heparin (porcine) injection 8,400 Units  8,400 Units Intravenous PRN    pantoprazole (PROTONIX) EC tablet 40 mg  40 mg Oral Early Morning       Vitals:  /79 (BP Location: Right arm)   Pulse 71   Temp 97 6 °F (36 4 °C) (Temporal)   Resp 16   Ht 6' 2" (1 88 m)   Wt 109 kg (239 lb 10 2 oz)   SpO2 95% BMI 30 77 kg/m²     I/Os:  I/O last 24 hours: In: 2524 3 [P O :300; I V :224 3; IV Piggyback:2000]  Out: 750 [Urine:750]    Lab Results and Cultures:   Lab Results   Component Value Date    WBC 5 40 03/07/2018    HGB 10 4 (L) 03/07/2018    HCT 32 1 (L) 03/07/2018    MCV 80 (L) 03/07/2018     03/07/2018     Lab Results   Component Value Date    GLUCOSE 111 03/07/2018    CALCIUM 8 1 (L) 03/07/2018     03/07/2018    K 3 9 03/07/2018    CO2 24 03/07/2018     03/07/2018    BUN 19 03/07/2018    CREATININE 0 77 03/07/2018     Lab Results   Component Value Date    INR 1 07 03/07/2018    INR 1 05 03/06/2018    PROTIME 11 2 03/07/2018    PROTIME 11 0 03/06/2018       Physical Exam:    General appearance: alert and oriented, in no acute distress  Skin: Skin color, texture, turgor normal  No rashes or lesions  Neurologic: Grossly normal  Head: Normocephalic, without obvious abnormality, atraumatic  Eyes: conjunctivae/corneas clear  PERRL, EOM's intact  Fundi benign  Throat: lips, mucosa, and tongue normal; teeth and gums normal  Neck: no adenopathy, no carotid bruit, no JVD, supple, symmetrical, trachea midline and thyroid not enlarged, symmetric, no tenderness/mass/nodules  Back: symmetric, no curvature  ROM normal  No CVA tenderness  Lungs: overall ctab  Chest wall: no tenderness  Heart: regular rate and rhythm, S1, S2 normal, no murmur, click, rub or gallop  Abdomen: soft, non-tender; bowel sounds normal; no masses,  no organomegaly  Extremities: Left calf swelling - increased size c/w right calf  + left calf discomfort on active motion  Motor-sensory intact  No ankle edema  Extremities all well perfused  Pulse exam:  Radial: Right: 2+ Left[de-identified] 2+  Femoral: Right: 2+ Left: 2+  DP: Right: 2+ Left: 2+  PT: Right: 2+ Left: 2+     Imaging:    Venous Du: 3/6/2018    [facility] REPORT  CLINICAL:  Indications: Other specified soft tissue disorders [M79 89]  Patient presents  with left lower extremity edema x 14 days  The patient has history of DVT in right arm  Clinical:     FINDINGS:     Segment         Right            Left                              Impression       Impression                GSV Prox Thigh                   Normal (Patent)           CFV             Normal (Patent)  Normal (Patent)           FV Prox                          Non Occlusive Thrombus    FV Mid                           Non Occlusive Thrombus    FV Dist                          Occlusive Subsegmental    PFV - Stent                      Non Occlusive Thrombus    Popliteal                        Occlusive Subsegmental    SSV Knee                         Occlusive Subsegmental    PostTibial                       Non Occlusive Thrombus    Peroneal                         Non Occlusive Thrombus    Gastrocnemius                    Non Occlusive Thrombus             CONCLUSION:  Impression:  RIGHT LOWER LIMB: Normal  No evidence of acute or chronic deep vein thrombosis  No evidence of superficial thrombophlebitis noted  Doppler evaluation shows a normal response to augmentation maneuvers  Popliteal, posterior tibial and anterior tibial arterial Doppler waveforms are  triphasic  LEFT LOWER LIMB: Abnormal  Acute non-occlusive deep vein thrombosis in the proximal femoral, mid femoral,  gastrocnemius vein, posterior tibial vein, and peroneal vein  Acute occluded deep vein thrombosis in the distal femoral vein, and popliteal  vein  Evidence of superficial thrombophlebitis noted in the small saphenous vein  Doppler evaluation shows a normal response to augmentation maneuvers  Popliteal, posterior tibial and anterior tibial arterial Doppler waveforms are  triphasic  Technical findings were given to [address] 116 14:36 Patient sent to ER       SIGNATURE:  Electronically Signed by: Lindajo Alpers, MD, RPVI on 2018-03-06 04:02:11 PM      CTA - CHEST WITH IV CONTRAST - PULMONARY ANGIOGRAM 3/6/2018     INDICATION: Chest pain, acute, PE suspected, high pretest prob     COMPARISON: None      TECHNIQUE: CTA examination of the chest was performed using angiographic technique according to a protocol specifically tailored to evaluate for pulmonary embolism  Axial, sagittal, and coronal 2D reformatted images were created from the source data and   submitted for interpretation  In addition, coronal 3D MIP postprocessing was performed on the acquisition scanner        Radiation dose length product (DLP) for this visit:  772 53 mGy-cm   This examination, like all CT scans performed in the [facility], was performed utilizing techniques to minimize radiation dose exposure, including the use of iterative   reconstruction and automated exposure control      IV Contrast:  85 mL of iohexol (OMNIPAQUE)  PE   FINDINGS:     PULMONARY ARTERIAL TREE:  Extensive left greater than right central pulmonary arterial filling defects extending distally consistent with     LUNGS:  Lungs are clear  There is no tracheal or endobronchial lesion      PLEURA:  Unremarkable      HEART/AORTA:  Cardiomegaly noted  Right ventricle measures 5 8 cm in left ventricle measures 4 7 cm corresponding to a ventricular ratio of approximately 1 2 and concerning for right ventricular strain      MEDIASTINUM AND ASHLEY:  Unremarkable      CHEST WALL AND LOWER NECK:   Unremarkable      VISUALIZED STRUCTURES IN THE UPPER ABDOMEN:  Hepatomegaly noted      OSSEOUS STRUCTURES:  No acute fracture or destructive osseous lesion      IMPRESSION:     1  Extensive bilateral pulmonary arterial filling defects consistent with PE      The calculated ratio of right ventricular to left ventricular diameter (RV/LV ratio) is 1 2  This is greater than 0 9, which is abnormal and indicates right heart strain  An abnormal RV/LV ratio has been shown to be associated with an increased risk of 30 day mortality in the setting of acute pulmonary embolism      2  Hepatomegaly          Daphnie Schneider PA-C  3/7/2018  [facility]

## 2018-03-07 NOTE — ED PROVIDER NOTES
History  Chief Complaint   Patient presents with    Leg Pain     Patient states that he went to see his PCP Dr Charles Garcia yesterday for left calf pain  She sent him to the vascular leb this morning, who sent him to the ER now, with reports of DVT to the left lower extremity  Patient presents for evaluation after outpatient US showed DVT in the left leg  Patient states he thought he pulled a muscle golfing 2 weeks ago but the swelling and pain were getting worse not better  Went to PMD and US ordered  No chest pain but does report dyspnea with exertion  History provided by:  Patient   used: No    Leg Pain       Prior to Admission Medications   Prescriptions Last Dose Informant Patient Reported? Taking? Multiple Vitamin (MULTIVITAMIN) tablet  Self Yes No   Sig: Take 1 tablet by mouth daily   Na Sulfate-K Sulfate-Mg Sulf 17 5-3 13-1 6 GM/180ML SOLN   No No   Sig: Take 1 kit by mouth once for 1 dose   meloxicam (MOBIC) 15 mg tablet   No No   Sig: Take 1 tablet (15 mg total) by mouth daily   omeprazole (PriLOSEC) 20 mg delayed release capsule  Self Yes No   Sig: Take 20 mg by mouth daily      Facility-Administered Medications: None       Past Medical History:   Diagnosis Date    Calcific tendinitis 2/27/2018       Past Surgical History:   Procedure Laterality Date    ULNAR NERVE TRANSPOSITION Right        Family History   Problem Relation Age of Onset    Diabetes Father     Cancer Maternal Grandmother     Cancer Maternal Grandfather      I have reviewed and agree with the history as documented  Social History   Substance Use Topics    Smoking status: Never Smoker    Smokeless tobacco: Never Used    Alcohol use Yes      Comment: social        Review of Systems   Respiratory: Positive for shortness of breath  Cardiovascular: Positive for leg swelling  All other systems reviewed and are negative        Physical Exam  ED Triage Vitals [03/06/18 1504]   Temperature Pulse Respirations Blood Pressure SpO2   98 2 °F (36 8 °C) 105 20 146/81 98 %      Temp Source Heart Rate Source Patient Position - Orthostatic VS BP Location FiO2 (%)   Oral Monitor Lying Left arm --      Pain Score       3           Orthostatic Vital Signs  Vitals:    03/06/18 2100 03/06/18 2200 03/06/18 2300 03/07/18 0000   BP: 117/75 112/68 109/66 95/64   Pulse: 90 83 81 76   Patient Position - Orthostatic VS: Lying          Physical Exam   Constitutional: He is oriented to person, place, and time  No distress  HENT:   Mouth/Throat: Oropharynx is clear and moist    Eyes: Pupils are equal, round, and reactive to light  Neck: Normal range of motion  Cardiovascular: Normal rate, regular rhythm and intact distal pulses  Pulmonary/Chest: Effort normal and breath sounds normal  No respiratory distress  Abdominal: Soft  There is no tenderness  Musculoskeletal: Normal range of motion  He exhibits edema  Pitting edema left leg distal neurovascularly intact   Neurological: He is alert and oriented to person, place, and time  Skin: Capillary refill takes less than 2 seconds  He is not diaphoretic  Nursing note and vitals reviewed        ED Medications  Medications   heparin (porcine) 25,000 units in 250 mL infusion (premix) (18 Units/kg/hr × 105 kg (Order-Specific) Intravenous New Bag 3/6/18 1805)   heparin (porcine) injection 8,400 Units (not administered)   heparin (porcine) injection 4,200 Units (not administered)   pantoprazole (PROTONIX) EC tablet 40 mg (not administered)   chlorhexidine (PERIDEX) 0 12 % oral rinse 15 mL (15 mL Swish & Spit Not Given 3/6/18 2256)   sodium chloride 0 9 % bolus 1,000 mL (0 mL Intravenous Stopped 3/6/18 1736)   iohexol (OMNIPAQUE) 350 MG/ML injection (MULTI-DOSE) 85 mL (85 mL Intravenous Given 3/6/18 1648)   aspirin chewable tablet 324 mg (324 mg Oral Given 3/6/18 1801)   sodium chloride 0 9 % bolus 1,000 mL (0 mL Intravenous Stopped 3/1957)   heparin (porcine) injection 8,400 Units (8,400 Units Intravenous Given 3/6/18 1802)       Diagnostic Studies  Results Reviewed     Procedure Component Value Units Date/Time    APTT [[id number]] Collected:  03/07/18 0009    Lab Status: In process Specimen:  Blood from Arm, Left Updated:  03/07/18 0011    Protime-INR [[id number]]  (Normal) Collected:  03/06/18 1523    Lab Status:  Final result Specimen:  Blood from Arm, Left Updated:  03/06/18 1557     Protime 11 0 seconds      INR 1 05    APTT [[id number]]  (Normal) Collected:  03/06/18 1523    Lab Status:  Final result Specimen:  Blood from Arm, Left Updated:  03/06/18 1557     PTT 24 seconds     Narrative: Therapeutic Heparin Range = 60-90 seconds    Troponin I [[id number]]  (Abnormal) Collected:  03/06/18 1523    Lab Status:  Final result Specimen:  Blood from Arm, Left Updated:  03/06/18 1557     Troponin I 0 10 (H) ng/mL     Narrative:         [employer] Chemistry analyzer 99% cutoff is > 0 04 ng/mL in network labs    o cTnI 99% cutoff is useful only when applied to patients in the clinical setting of myocardial ischemia  o cTnI 99% cutoff should be interpreted in the context of clinical history, ECG findings and possibly cardiac imaging to establish correct diagnosis  o cTnI 99% cutoff may be suggestive but clearly not indicative of a coronary event without the clinical setting of myocardial ischemia      Comprehensive metabolic panel [[id number]]  (Abnormal) Collected:  03/06/18 1523    Lab Status:  Final result Specimen:  Blood from Arm, Left Updated:  03/06/18 1552     Sodium 142 mmol/L      Potassium 3 7 mmol/L      Chloride 105 mmol/L      CO2 29 mmol/L      Anion Gap 8 mmol/L      BUN 22 mg/dL      Creatinine 0 98 mg/dL      Glucose 113 mg/dL      Calcium 8 8 mg/dL      AST 14 U/L      ALT 25 U/L      Alkaline Phosphatase 84 U/L      Total Protein 7 1 g/dL      Albumin 3 4 (L) g/dL      Total Bilirubin 0 30 mg/dL      eGFR 83 ml/min/1 73sq m     Narrative:         National Kidney Disease Education Program recommendations are as follows:  GFR calculation is accurate only with a steady state creatinine  Chronic Kidney disease less than 60 ml/min/1 73 sq  meters  Kidney failure less than 15 ml/min/1 73 sq  meters  CBC and differential [[id number]]  (Abnormal) Collected:  03/06/18 1523    Lab Status:  Final result Specimen:  Blood from Arm, Left Updated:  03/06/18 1537     WBC 7 10 Thousand/uL      RBC 4 43 (L) Million/uL      Hemoglobin 11 5 (L) g/dL      Hematocrit 35 8 (L) %      MCV 81 (L) fL      MCH 25 9 (L) pg      MCHC 32 2 g/dL      RDW 15 9 (H) %      MPV 7 7 (L) fL      Platelets 455 Thousands/uL      nRBC 0 /100 WBCs      Neutrophils Relative 66 %      Lymphocytes Relative 22 %      Monocytes Relative 9 %      Eosinophils Relative 3 %      Basophils Relative 1 %      Neutrophils Absolute 4 70 Thousands/µL      Lymphocytes Absolute 1 50 Thousands/µL      Monocytes Absolute 0 60 Thousand/µL      Eosinophils Absolute 0 20 Thousand/µL      Basophils Absolute 0 00 Thousands/µL                  CTA ED chest PE study   Final Result by Katya Wagner MD (03/06 1719)      1  Extensive bilateral pulmonary arterial filling defects consistent with PE  The calculated ratio of right ventricular to left ventricular diameter (RV/LV ratio) is 1 2  This is greater than 0 9, which is abnormal and indicates right heart strain  An abnormal RV/LV ratio has been shown to be associated with an increased risk of 30 day mortality in the setting of acute pulmonary embolism  2   Hepatomegaly               I personally discussed this study with Aric Sorenson on 3/6/2018 at 5:19 PM                Workstation performed: [id number]                    Procedures  Procedures       Phone Contacts  ED Phone Contact    ED Course  ED Course                                MDM  Number of Diagnoses or Management Options  DVT (deep venous thrombosis) ([facility] [address] ):   PE (pulmonary thromboembolism) ([facility] [address] ):   Diagnosis management comments: Pulse ox 94% on RA indicating adequate oxygenation    CT results discussed with patient  Dr Halie Rene contacted and case dicussed  TPa considered for the patient given the evidence of heart strain  Dr Halie Rene felt after reviewing the data the risks did not out weight the benefits  The patient was started on Heparin and Dr Frank Claudio contacted and case discussed for stat ECHO  ECHO showed RV strain mild  Amount and/or Complexity of Data Reviewed  Clinical lab tests: ordered and reviewed  Tests in the radiology section of CPT®: ordered and reviewed  Decide to obtain previous medical records or to obtain history from someone other than the patient: yes  Review and summarize past medical records: yes  Discuss the patient with other providers: yes  Independent visualization of images, tracings, or specimens: yes    Patient Progress  Patient progress: stable    The patient presented with a condition in which there was a high probability of imminent or life-threatening deterioration, and critical care services (excluding separately billable procedures) totalled 30-74 minutes  Disposition  Final diagnoses:   PE (pulmonary thromboembolism) ([facility] [address] )   DVT (deep venous thrombosis) ([facility] )     Time reflects when diagnosis was documented in both MDM as applicable and the Disposition within this note     Time User Action Codes Description Comment    3/6/2018  5:46 PM Kaelyn Books [I26 [id number]] PE (pulmonary thromboembolism) ([facility] [address] )     3/6/2018  5:46 PM Seger, Criselda Eisenmenger Add [I82 [id number]] DVT (deep venous thrombosis) [facility])       ED Disposition     ED Disposition Mary Rene contacted for admission to her service        Follow-up Information    None       Current Discharge Medication List      CONTINUE these medications which have NOT CHANGED    Details   meloxicam (MOBIC) 15 mg tablet Take 1 tablet (15 mg total) by mouth daily  Qty: 30 tablet, Refills: 0    Associated Diagnoses: Strain of calf muscle, right, initial encounter      Multiple Vitamin (MULTIVITAMIN) tablet Take 1 tablet by mouth daily      Na Sulfate-K Sulfate-Mg Sulf 17 5-3 13-1 6 GM/180ML SOLN Take 1 kit by mouth once for 1 dose  Qty: 2 Bottle, Refills: 0    Comments: suprep  Associated Diagnoses: Colon cancer screening      omeprazole (PriLOSEC) 20 mg delayed release capsule Take 20 mg by mouth daily           No discharge procedures on file      ED Provider  Electronically Signed by           Roberto Coulter DO  03/07/18 4373

## 2018-03-07 NOTE — ASSESSMENT & PLAN NOTE
Extensive bilateral pulmonary emboli with right heart strain  Moderately dilated RV size with moderately decreased RV function and mildly elevated Troponin       - heparin continuous infusion   - Followed by critical care

## 2018-03-07 NOTE — ASSESSMENT & PLAN NOTE
-Hg has been stable in the 10-11 range  -pending iron panel studies  -FOBT negative  -hematology follow up as outpt

## 2018-03-07 NOTE — PLAN OF CARE
Problem: DISCHARGE PLANNING - CARE MANAGEMENT  Goal: Discharge to post-acute care or home with appropriate resources  INTERVENTIONS:  - Conduct assessment to determine patient/family and health care team treatment goals, and need for post-acute services based on payer coverage, community resources, and patient preferences, and barriers to discharge  - Address psychosocial, clinical, and financial barriers to discharge as identified in assessment in conjunction with the patient/family and health care team  - Arrange appropriate level of post-acute services according to patient's   needs and preference and payer coverage in collaboration with the physician and health care team  - Communicate with and update the patient/family, physician, and health care team regarding progress on the discharge plan  - Arrange appropriate transportation to post-acute venues  [facility]  Outcome: Progressing

## 2018-03-07 NOTE — PROGRESS NOTES
Transfer/Progress Note - Critical Care   Rossy Rodríguez 63 y o  male MRN: [id number]  Unit/Bed#: ICU 10 Encounter: [id number]    Impression:  Principal Problem:    Pulmonary embolism with acute cor pulmonale (HCC)  Active Problems:    Mixed hyperlipidemia    Gastroesophageal reflux disease with esophagitis    Acute deep vein thrombosis (DVT) of femoral vein of left lower extremity (HCC)    NSTEMI (non-ST elevated myocardial infarction) ([facility])    NSTEMI (non-ST elevated myocardial infarction) ([facility] [address] )   Assessment & Plan    Likely secondary to RV strain from PE  Trend  Echo pending         Acute deep vein thrombosis (DVT) of femoral vein of left lower extremity (HCC)   Assessment & Plan    Extensive femoral/popliteal DVT  Heparin infusion as above  Avoid SCDs on that affected leg        Gastroesophageal reflux disease with esophagitis   Assessment & Plan    Continue Omeprazole        * Pulmonary embolism with acute cor pulmonale (HCC)   Assessment & Plan    CTPE with extensive bilateral pulmonary arterial filling defects consistent with PE with the calculated ratio of right ventricular to left ventricular diameter (RV/LV ratio) is 1 2  Heparin infusion currently  Had DVT of upper extremity? in past related to a shoulder surgery - patient believes he had a short course of Xarelto and stopped  This event is seemingly unprovoked - some recent mild inactivity due to what he thought was a calf strain, but given extensive DVT/PE, may need further coagulability studies to determine length of treatment  Echo pending to evaluate RV strain            Assessment / Plan :    Rossy Rodríguez is a 63 y o  male with PMHx of GERD, ?upper extremity DVT after shoulder surgery who presents from outpatient testing for LLE DVT  Patient states he felt he strained his calf and was recently somewhat less active  For the past few days was feeling more short of breath    Was seen by PCP who ordered ultrasound of lower extremity which was positive for extensive DVT  CTPE in ER was positive for PE with signs of RV strain  Started on heparin infusion and admitted to ICU for closer monitoring  No acute hemodynamic issues overnight  Vascular consulted  No acute intervention required at this time for LLE DVT given minimal symptomatology  Similarly, pt w/ R heart strain on echo but w/o hemodynamic instability or hypoxia therefore pt has not been otherwise managed with TPA  Vascular surgery is w/o indications for venous thrombolysis at this time  Pt presented initially w/ a microcytic anemia for which work up is being initiated and FOBTs being evaluated  No obvious bleeding  Pt is currently heparinized on heparin infusion with therapeutic PTTs  Hematology / Oncology was consulted  Neuro:  -CAM ICU negative:  Delirium Precautions  -Pain /Sedation:  None required  -Neuro Checks:  Routine  CV/Heme/VTE PPx:  -Hemoglobin :  Down trended to 10 4 /  Platelets:  530  / Coagulation:  Therapeutic on heparin  - Chemical VTE PPX :  Heparin  / SCDs  Pulm:  -I/S v   Flutter Valve / Pulm Toilet  GI/Endo:    -Glycemic Control:  Accuchecks :  SSI Alg  If  needed   -Bowel Reg:  Senna  / PRN Laxatives if required  -Stress Ulcer PPx :  None required  -Nutrition:  Advance diet to regular diet   /FEN:  -IVF :  Net Bal:  +1 7 / Goal :  Even to slightly positive  -Electrolytes:  goal K/M/PH :  4, 2, 3 :  Replete as necessary   -Rubio:  No /  day 0  ID:  -Trend Fever and WBC curve  -Current role for abx:   None current /  Abx Day 0  /  -Cultures:  0  MSK/Mobility/ PT OT/ Lines:  -MSK:  Routine Turning, Offloading, Skin care  -OOB / Ambulatory  as early as possible  -PTOT / CM and Rehab Assessment   -Access:  PIV:  Yes /  Central Access:  No /  day 0  /  arterial catheter:  Day 0  Consultants:  Vascular, Hematology  Dispo/Family:   / Patient and Family Updated:  Patient updated bedside  -pt stable for transfer and the case has been reviewed w/ medicine team   Elvira Hutchins  -discussed need for ongoing heparin a/c until definitive source for low Hb   -pt remains TPA able if necessary if acute changes occur; in that event RRT would need to intervene  -ambulate as able  -transition to definitive anticoagulation prior to d/c  -Possible d/c as early as the weekend    Treatment Team:  Critical care    Reason for Admission:  Leg swelling    HPI/24hr events:  No acute events overnight, placed on heparin infusion, hemodynamically stable, not hypoxic    Physical Exam:  General Appearance:  healthy, well developed, well nourished, cooperative and in no apparent distress    HENT: Normocephalic, without obvious abnormality, atraumatic    Eyes: No icterus    Cardiac: RRR 0 MRG, no cardiac heave    Pulmonary: CTAB 0 WRR / no secretions    Gastrointestinal: Soft, NT/ND + BS / satya PO /     : Rubio present: no             Musculoskeletal: +2 edema on L   +  DP and RAD pulses bilaterally  Neuro/Psych:  normal without focal findings, mental status, speech normal, alert and oriented x3 and SARAH    Skin: no rashes or jaundice    Vitals:   Vitals:    18 0500 18 0600 18 0700 18 0800   BP: 111/73 127/80 126/75 112/79   BP Location:    Right arm   Pulse: 73 77 77 71   Resp: 21 17 21 16   Temp: 97 9 °F (36 6 °C)  97 6 °F (36 4 °C)    TempSrc:   Temporal    SpO2: 94% 99% 99% 95%   Weight: 109 kg (239 lb 10 2 oz)      Height:                 Temperature: Temp (24hrs), Av 9 °F (36 6 °C), Min:97 2 °F (36 2 °C), Max:98 5 °F (36 9 °C)  Current: Temperature: 97 6 °F (36 4 °C)    Weights: IBW: 82 2 kg  Body mass index is 30 77 kg/m²  Respiratory:  RA    Intake and Outputs:  Intake/Output Summary (Last 24 hours) at 18 0848  Last data filed at 18 0601   Gross per 24 hour   Intake          2524 28 ml   Output              750 ml   Net          1774 28 ml     I/O last 24 hours: In: 2524 3 [P O :300;  I V :224 3; IV Piggyback:2000]  Out: 750 [Urine:750]    Stool: Nutrition:        Diet Orders            Start     Ordered    03/07/18 0800  Diet NPO  Diet effective now     Question Answer Comment   Diet Type NPO    RD to adjust diet per protocol? Yes        03/07/18 0759    03/07/18 0747  Room Service  Once     Question:  Type of Service  Answer:  Room Service - Appropriate with Assistance    03/07/18 0746          Labs:     Results from last 7 days  Lab Units 03/07/18  0551 03/06/18  1523   WBC Thousand/uL 5 40 7 10   HEMOGLOBIN g/dL 10 4* 11 5*   HEMATOCRIT % 32 1* 35 8*   PLATELETS Thousands/uL 176 236   NEUTROS PCT % 54 66   MONOS PCT % 9 9       Results from last 7 days  Lab Units 03/07/18  0551 03/06/18  1523   SODIUM mmol/L 142 142   POTASSIUM mmol/L 3 9 3 7   CHLORIDE mmol/L 108 105   CO2 mmol/L 24 29   BUN mg/dL 19 22   CREATININE mg/dL 0 77 0 98   CALCIUM mg/dL 8 1* 8 8   TOTAL PROTEIN g/dL  --  7 1   BILIRUBIN TOTAL mg/dL  --  0 30   ALK PHOS U/L  --  84   ALT U/L  --  25   AST U/L  --  14   GLUCOSE RANDOM mg/dL 111 113       Results from last 7 days  Lab Units 03/07/18  0551   MAGNESIUM mg/dL 2 1       Results from last 7 days  Lab Units 03/07/18  0551   PHOSPHORUS mg/dL 3 4        Results from last 7 days  Lab Units 03/07/18  0554 03/07/18  0009 03/06/18  1523   INR  1 07  --  1 05   PTT seconds 67* 74* 24           Results from last 7 days  Lab Units 03/06/18  2112 03/06/18  1523   TROPONIN I ng/mL 0 08* 0 10*                   Imaging:   3/6 ECHO: LEFT VENTRICLE:  Systolic function was normal  Ejection fraction was estimated in the range of 55 % to 60 %  There were no regional wall motion abnormalities  Wall thickness was mildly increased      VENTRICULAR SEPTUM:  No evidence of signifciant septal flattening to suggest severe RV pressure or volume overload  There was paradoxical motion  There was mild systolic flattening  These changes are consistent with RV pressure overload      RIGHT VENTRICLE:  The ventricle was moderately dilated    Systolic function was moderately reduced mid free wall and base with sparing of the apex  Wall thickness was increased      IVC, HEPATIC VEINS:  The inferior vena cava was dilated      HISTORY: PRIOR HISTORY: DVT     PROCEDURE: The procedure was performed in the emergency room  This was a stat study  The transthoracic approach was used  The study included complete 2D imaging, M-mode, complete spectral Doppler, and color Doppler  The heart rate was 81  bpm, at the start of the study      LEFT VENTRICLE: Size was normal  Systolic function was normal  Ejection fraction was estimated in the range of 55 % to 60 %  There were no regional wall motion abnormalities  Wall thickness was mildly increased  No evidence of apical  thrombus  DOPPLER: Left ventricular diastolic function parameters were normal for the patient's age      VENTRICULAR SEPTUM: No evidence of signifciant septal flattening to suggest severe RV pressure or volume overload There was paradoxical motion  There was mild systolic flattening  These changes are consistent with RV pressure overload      RIGHT VENTRICLE: The ventricle was moderately dilated  Systolic function was moderately reduced mid free wall and base with sparing of the apex  Wall thickness was increased      LEFT ATRIUM: Size was normal      RIGHT ATRIUM: Size was normal      MITRAL VALVE: Valve structure was normal  There was normal leaflet separation  DOPPLER: The transmitral velocity was within the normal range  There was no evidence for stenosis  There was no significant regurgitation      AORTIC VALVE: The valve was trileaflet  Leaflets exhibited normal thickness and normal cuspal separation  DOPPLER: Transaortic velocity was within the normal range  There was no evidence for stenosis  There was no significant  regurgitation      TRICUSPID VALVE: The valve structure was normal  There was normal leaflet separation  DOPPLER: The transtricuspid velocity was within the normal range   There was no evidence for stenosis  There was no significant regurgitation  The  tricuspid jet envelope definition was inadequate for estimation of RV systolic pressure      PULMONIC VALVE: Leaflets exhibited normal thickness, no calcification, and normal cuspal separation  DOPPLER: The transpulmonic velocity was within the normal range  There was no significant regurgitation      PERICARDIUM: There was no pericardial effusion  The pericardium was normal in appearance  I have personally reviewed pertinent reports  and I have personally reviewed pertinent films in PACS  3/6 CT: PULMONARY ARTERIAL TREE:  Extensive left greater than right central pulmonary arterial filling defects extending distally consistent with     LUNGS:  Lungs are clear  There is no tracheal or endobronchial lesion      PLEURA:  Unremarkable      HEART/AORTA:  Cardiomegaly noted  Right ventricle measures 5 8 cm in left ventricle measures 4 7 cm corresponding to a ventricular ratio of approximately 1 2 and concerning for right ventricular strain      MEDIASTINUM AND ASHLEY:  Unremarkable      CHEST WALL AND LOWER NECK:   Unremarkable      VISUALIZED STRUCTURES IN THE UPPER ABDOMEN:  Hepatomegaly noted      OSSEOUS STRUCTURES:  No acute fracture or destructive osseous lesion      IMPRESSION:     1  Extensive bilateral pulmonary arterial filling defects consistent with PE      The calculated ratio of right ventricular to left ventricular diameter (RV/LV ratio) is 1 2  This is greater than 0 9, which is abnormal and indicates right heart strain  An abnormal RV/LV ratio has been shown to be associated with an increased risk of 30 day mortality in the setting of acute pulmonary embolism      2  Hepatomegaly  I have personally reviewed pertinent reports     and I have personally reviewed pertinent films in PACS    Micro:   Blood Culture: No results found for: BLOODCX  Urine Culture: No results found for: URINECX  Sputum Culture: No components found for: SPUTUMCX  Wound Culure: No results found for: WOUNDCULT        Allergies: No Known Allergies    Medications:   Scheduled Meds:  Current Facility-Administered Medications:  chlorhexidine 15 mL Swish & Spit Q12H [facility] Nicholette Chesilhurst, CRNP    heparin (porcine) 3-30 Units/kg/hr (Order-Specific) Intravenous Titrated Selestine Coulter, DO Last Rate: 18 Units/kg/hr (03/07/18 0801)   heparin (porcine) 4,200 Units Intravenous PRN Selestine Coulter, DO    heparin (porcine) 8,400 Units Intravenous PRN Selestine Coulter, DO    pantoprazole 40 mg Oral Early Morning Nicholette Chesilhurst, CRNP      Continuous Infusions:  heparin (porcine) 3-30 Units/kg/hr (Order-Specific) Last Rate: 18 Units/kg/hr (03/07/18 0801)     PRN Meds:    heparin (porcine) 4,200 Units PRN   heparin (porcine) 8,400 Units PRN       Invasive lines and devices: Invasive Devices     Peripheral Intravenous Line            Peripheral IV 03/06/18 Left Antecubital less than 1 day    Peripheral IV 03/06/18 Left Wrist less than 1 day                Code Status: Level 1 - Full Code    Counseling / Coordination of Care  Total time spent today 35 minutes  Greater than 50% of total time was spent with the patient and / or family counseling and / or coordination of care  A description of the counseling / coordination of care: exam / data / plan /coordination    Portions of the record may have been created with voice recognition software  Occasional wrong word or "sound a like" substitutions may have occurred due to the inherent limitations of voice recognition software  Read the chart carefully and recognize, using context, where substitutions have occurred      SIGNATURE: Aidee Romeo PA-C  DATE: March 7, 2018  TIME: 8:48 AM

## 2018-03-07 NOTE — H&P
H&P- Katy Polanco 1955, 63 y o  male MRN: [id number]    Unit/Bed#: ED 01 Encounter: [id number]    Primary Care Provider: Krista Velazquez MD   Date and time admitted to hospital: 3/6/2018  2:57 PM        * Pulmonary embolism with acute cor pulmonale ([facility] [address] )   Assessment & Plan    CTPE with extensive bilateral pulmonary arterial filling defects consistent with PE with the calculated ratio of right ventricular to left ventricular diameter (RV/LV ratio) is 1 2  Heparin infusion currently  Had DVT of upper extremity? in past related to a shoulder surgery - patient believes he had a short course of Xarelto and stopped  This event is seemingly unprovoked - some recent mild inactivity due to what he thought was a calf strain, but given extensive DVT/PE, may need further coagulability studies to determine length of treatment  Echo pending to evaluate RV strain        Acute deep vein thrombosis (DVT) of femoral vein of left lower extremity ([facility] [address] )   Assessment & Plan    Extensive femoral/popliteal DVT  Heparin infusion as above  Avoid SCDs on that affected leg        NSTEMI (non-ST elevated myocardial infarction) [facility])   Assessment & Plan    Likely secondary to RV strain from PE  Trend  Echo pending         Gastroesophageal reflux disease with esophagitis   Assessment & Plan    Continue Omeprazole             Reason for Admission / Chief Complaint: leg pain and shortness of breath     History of Present Illness:  Katy Polanco is a 63 y o  male with PMHx of GERD, ?upper extremity DVT after shoulder surgery who presents from outpatient testing for LLE DVT  Patient states he felt he strained his calf and was recently somewhat less active  For the past few days was feeling more short of breath  Was seen by PCP who ordered ultrasound of lower extremity which was positive for extensive DVT  CTPE in ER was positive for PE with signs of RV strain  Started on heparin infusion and admitted to ICU for closer monitoring  History obtained from chart review and the patient  Past Medical History:  Past Medical History:   Diagnosis Date    Calcific tendinitis 2/27/2018        Past Surgical History:  Past Surgical History:   Procedure Laterality Date    ULNAR NERVE TRANSPOSITION Right         Past Family History:  Family History   Problem Relation Age of Onset    Diabetes Father     Cancer Maternal Grandmother     Cancer Maternal Grandfather         Social History:  History   Smoking Status    Never Smoker   Smokeless Tobacco    Never Used     History   Alcohol Use    Yes     Comment: social     History   Drug Use No     Marital Status: Unknown       Medications:  Current Facility-Administered Medications   Medication Dose Route Frequency    heparin (porcine) 25,000 units in 250 mL infusion (premix)  3-30 Units/kg/hr (Order-Specific) Intravenous Titrated    heparin (porcine) injection 4,200 Units  4,200 Units Intravenous PRN    heparin (porcine) injection 8,400 Units  8,400 Units Intravenous PRN     Home medications:  Prior to Admission medications    Medication Sig Start Date End Date Taking? Authorizing Provider   meloxicam (MOBIC) 15 mg tablet Take 1 tablet (15 mg total) by mouth daily 3/5/18   MARIA L Mitchell   Multiple Vitamin (MULTIVITAMIN) tablet Take 1 tablet by mouth daily    Historical Provider, MD   Na Sulfate-K Sulfate-Mg Sulf 17 5-3 13-1 6 GM/180ML SOLN Take 1 kit by mouth once for 1 dose 3/1/18 3/1/18  Blaine Lopez PA-C   omeprazole (PriLOSEC) 20 mg delayed release capsule Take 20 mg by mouth daily    Historical Provider, MD     Allergies:  No Known Allergies     ROS:   Review of Systems   Respiratory: Positive for shortness of breath  Negative for wheezing  Cardiovascular: Positive for leg swelling  Negative for chest pain and palpitations  All other systems reviewed and are negative         Vitals:  Vitals:    03/06/18 1730 03/06/18 1745 03/06/18 1800 03/06/18 1815   BP: 135/75 136/73 127/72 129/68   BP Location:       Pulse: 96 94 90 92   Resp:  (!) 27 (!) 25 21   Temp:       TempSrc:       SpO2: 98% 98% 97% 98%   Weight:       Height:         Temperature:   Temp (24hrs), Av 2 °F (36 8 °C), Min:98 2 °F (36 8 °C), Max:98 2 °F (36 8 °C)    Current: Temperature: 98 2 °F (36 8 °C)     Weights:   IBW: 82 2 kg  Body mass index is 30 17 kg/m²  Hemodynamic Monitoring:  N/A     Non-Invasive/Invasive Ventilation Settings:  Respiratory    Lab Data (Last 4 hours)    None         O2/Vent Data (Last 4 hours)    None              No results found for: PHART, CBV4KPN, PO2ART, [id number], [id number], BEART, SOURCE  SpO2: SpO2: 98 %, SpO2 Activity: SpO2 Activity: At Rest, SpO2 Device: O2 Device: None (Room air), Capnography:       Physical Exam:  Physical Exam   Constitutional: He is oriented to person, place, and time  He appears well-developed and well-nourished  No distress  Neck: No JVD present  Cardiovascular: Normal rate and regular rhythm  No murmur heard  Pulmonary/Chest: Effort normal and breath sounds normal  No respiratory distress  Abdominal: Soft  Bowel sounds are normal  He exhibits no distension  Musculoskeletal: He exhibits edema  Neurological: He is alert and oriented to person, place, and time  No cranial nerve deficit  Skin: Skin is warm and dry  Nursing note and vitals reviewed         Labs:    Results from last 7 days  Lab Units 18  1523   WBC Thousand/uL 7 10   HEMOGLOBIN g/dL 11 5*   HEMATOCRIT % 35 8*   PLATELETS Thousands/uL 236   NEUTROS PCT % 66   MONOS PCT % 9      Results from last 7 days  Lab Units 18  1523   SODIUM mmol/L 142   POTASSIUM mmol/L 3 7   CHLORIDE mmol/L 105   CO2 mmol/L 29   BUN mg/dL 22   CREATININE mg/dL 0 98   CALCIUM mg/dL 8 8   TOTAL PROTEIN g/dL 7 1   BILIRUBIN TOTAL mg/dL 0 30   ALK PHOS U/L 84   ALT U/L 25   AST U/L 14   GLUCOSE RANDOM mg/dL 113                Results from last 7 days  Lab Units 18  1523   INR  1 05   PTT seconds 24           0  Lab Value Date/Time   TROPONINI 0 10 (H) 03/06/2018 1523        Imaging: CTPE I have personally reviewed pertinent reports  and I have personally reviewed pertinent films in PACS  EKG: NSR This was personally reviewed by myself  Micro:  No results found for: Pillo Chandana, SPUTUMCULTUR       VTE Pharmacologic Prophylaxis: Sequential compression device (Venodyne)  and Heparin  VTE Mechanical Prophylaxis: sequential compression device     Invasive lines and devices: Invasive Devices     Peripheral Intravenous Line            Peripheral IV 03/06/18 Left Antecubital less than 1 day    Peripheral IV 03/06/18 Left Wrist less than 1 day                 Code Status: No Order  POA:    POLST:       Given critical illness, patient length of stay will require greater than two midnights  Counseling / Coordination of Care  Total time spent today 43 minutes  Greater than 50% of total time was spent with the patient and / or family counseling and / or coordination of care  A description of the counseling / coordination of care: reviewing the chart, discussing the care/treatment plan with patient and family members, reviewing medications     Portions of the record may have been created with voice recognition software  Occasional wrong word or "sound a like" substitutions may have occurred due to the inherent limitations of voice recognition software  Read the chart carefully and recognize, using context, where substitutions have occurred          River Pablo PA-C

## 2018-03-07 NOTE — CASE MANAGEMENT
Continued Stay Review    Date: 3/7/18    Vital Signs: /79   Pulse 82   Temp 97 6 °F (36 4 °C) (Temporal)   Resp (!) 23   Ht 6' 2" (1 88 m)   Wt 109 kg (239 lb 10 2 oz)   SpO2 98%   BMI 30 77 kg/m²         ICU  IRON PANEL THROBOSIS PANEL  CONSULT HEMATOLOGY  VASCULAR FOLLOWING  Medications:   Scheduled Meds:   Current Facility-Administered Medications:  heparin (porcine) 3-30 Units/kg/hr (Order-Specific) Intravenous Titrated Vilinda Gemma, DO Last Rate: 18 Units/kg/hr (03/07/18 0801)   heparin (porcine) 4,200 Units Intravenous PRN Vilinda Gemma, DO    heparin (porcine) 8,400 Units Intravenous PRN Vilinda Gemma, DO    pantoprazole 40 mg Oral Early Morning MARIA L Ascencio      Continuous Infusions:   heparin (porcine) 3-30 Units/kg/hr (Order-Specific) Last Rate: 18 Units/kg/hr (03/07/18 0801)     PRN Meds: heparin (porcine)    heparin (porcine)    Abnormal Labs/Diagnostic Results:   HGB 10 4 (L) 03/07/2018      HCT 32 1 (L) 03/07/2018     MCV 80 (L) 03/07/2018   DUPLEX LOWER EXTREMITY   LEFT LOWER LIMB: Abnormal  Acute non-occlusive deep vein thrombosis in the proximal femoral, mid femoral,  gastrocnemius vein, posterior tibial vein, and peroneal vein  Acute occluded deep vein thrombosis in the distal femoral vein, and popliteal  vein  Evidence of superficial thrombophlebitis noted in the small saphenous vein  Doppler evaluation shows a normal response to augmentation maneuvers  Popliteal, posterior tibial and anterior tibial arterial Doppler waveforms are  triphasic  Age/Sex: 63 y o  male     VASCULAR CONSULT  Extremities: Left calf swelling - increased size c/w right calf  + left calf discomfort on active motion  Motor-sensory intact    Acute deep vein thrombosis (DVT) of femoral vein of left lower extremity (HCC)   Assessment & Plan     Extensive acute LLE DVT (over 2 weeks) with extensive bilateral pulmonary emboli / right heart strain and HX of upper extremity DVT after shoulder surgery about 6 years ago                    Acute non-occlusive DVT involving proximal femoral, mid femoral, gastrocnemius vein, posterior tibial vein and peroneal vein  Acute occluded DVT distal femoral vein and popliteal vein  Evidence of superficial thrombophlebitis noted in the small saphenous vein                   62M history of what sounds like axillary vein DVT after shoulder surgery about 6 years ago with a short course of Xarelto  The patient reports that 2 1/2 ago while golfing, he "strained" his left calf muscle  He noticed discomfort while walking [place]  Since then he has had mild to moderate swelling to the left leg with some difficulty in ambulating  He has continued to work but at times he needed to walk on his left heel due to pain  He has had varying degrees of swelling for which he has been trying heat/ice in the evenings after work  A couple of days ago, he developed mild dyspnea  Yesterday, he was sent for an outpatient venous duplex which was significant for extensive Left leg DVT for which he was sent to the Emergency department where he was found to have extensive bilateral pulmonary emboli with right heart strain  He did not undergo lytic therapy for pulmonary emboli  He was placed on a heparin continuous infusion  Vascular surgery is now asked to evaluate patient for treatment options for DVT                    The the left lower extremity is mild to moderately larger than the right leg; perhaps up to 20% larger  The the patient expresses that he has enjoyed good health  He has had GERD and hypercholesterolemia  He is very active without any chest limiting symptoms at baseline  No history of myocardial infarction or stroke  No personal history of malignancy or bleeding    No known  family history of hypercoagulability, blood disorders or sudden death                   - agree with heparin continuous infusion for PEs/deep vein thrombosis - continue with close clinical/hemodynamic dynamic monitoring               - suggest hypercoagulable workup               - we will continue to monitor the patient with you and reassess in 24 hours for indications of venous lytic therapy  We had a detailed discussion regarding the indications, risks and benefits of venous lysis intervention  A directed procedure would be considered, if he should develop significant symptoms  At this point, with bilateral pulmonary emboli right heart strain, and in the absence of severe symptoms in the left leg, there would be no urgency to proceed with an intervention   Now on anticoagulation, as he starts to ambulate, should he develop moderate to severe symptoms (increased pain or swelling of LLE, or difficulty ambulating) we could reassess the risk/ benefit ratio and consider directed venous thrombolytic therapy          ICU ATTENDING  NSTEMI (non-ST elevated myocardial infarction) [facility])   Assessment & Plan     Likely secondary to RV strain from PE  Trend  Echo pending           Acute deep vein thrombosis (DVT) of femoral vein of left lower extremity (HCC)   Assessment & Plan     Extensive femoral/popliteal DVT  Heparin infusion as above  Avoid SCDs on that affected leg          Gastroesophageal reflux disease with esophagitis   Assessment & Plan     Continue Omeprazole          * Pulmonary embolism with acute cor pulmonale (HCC)   Assessment & Plan     CTPE with extensive bilateral pulmonary arterial filling defects consistent with PE with the calculated ratio of right ventricular to left ventricular diameter (RV/LV ratio) is 1 2  Heparin infusion currently  Had DVT of upper extremity? in past related to a shoulder surgery - patient believes he had a short course of Xarelto and stopped  This event is seemingly unprovoked - some recent mild inactivity due to what he thought was a calf strain, but given extensive DVT/PE, may need further coagulability studies to determine length of treatment  Echo pending to evaluate RV strain                Assessment / Plan :     Louis Thornton O'Such is a 63 y  o  male with PMHx of GERD, ?upper extremity DVT after shoulder surgery who presents from outpatient testing for LLE DVT   Patient states he felt he strained his calf and was recently somewhat less active   For the past few days was feeling more short of breath   Was seen by PCP who ordered ultrasound of lower extremity which was positive for extensive DVT   CTPE in ER was positive for PE with signs of RV strain   Started on heparin infusion and admitted to ICU for closer monitoring      No acute hemodynamic issues overnight  Vascular consulted  No acute intervention required at this time for LLE DVT given minimal symptomatology  Similarly, pt w/ R heart strain on echo but w/o hemodynamic instability or hypoxia therefore pt has not been otherwise managed with TPA  Vascular surgery is w/o indications for venous thrombolysis at this time  Pt presented initially w/ a microcytic anemia for which work up is being initiated and FOBTs being evaluated  No obvious bleeding  Pt is currently heparinized on heparin infusion with therapeutic PTTs  Hematology / Oncology was consulted  Neuro:  -CAM ICU negative:  Delirium Precautions  -Pain /Sedation:  None required  -Neuro Checks:  Routine  CV/Heme/VTE PPx:  -Hemoglobin :  Down trended to 10 4 /  Platelets:  342  / Coagulation:  Therapeutic on heparin  - Chemical VTE PPX :  Heparin  / SCDs  Pulm:  -I/S v   Flutter Valve / Pulm Toilet  GI/Endo:    -Glycemic Control:  Accuchecks :  SSI Alg  If  needed   -Bowel Reg:  Senna  / PRN Laxatives if required  -Stress Ulcer PPx :  None required  -Nutrition:  Advance diet to regular diet        /FEN:  -IVF :  Net Bal:  +1 7   / Goal :  Even to slightly positive  -Electrolytes:  goal K/M/PH :  4, 2, 3 :  Replete as necessary   -Rubio:  No /  day 0  ID:  -Trend Fever and WBC curve  -Current role for abx: None current /  Abx Day 0  /  -Cultures:  0  MSK/Mobility/ PT OT/ Lines:  -MSK:  Routine Turning, Offloading, Skin care  -Marilee Wade / Ambulatory  as early as possible  -PTOT / CM and Rehab Assessment   -Access:  PIV:  Yes /  Central Access:  No /  day 0  /  arterial catheter:  Day 0  Consultants:  Vascular, Hematology  Dispo/Family:   / Patient and Family Updated:  Patient updated bedside  -pt stable for transfer and the case has been reviewed w/ medicine team Dr Jake Morton  -discussed need for ongoing heparin a/c until definitive source for low Hb   -pt remains TPA able if necessary if acute changes occur; in that event RRT would need to intervene  -ambulate as able  -transition to definitive anticoagulation prior to d/c  -Possible d/c as early as the weekend  Treatment Team:  Critical care  Reason for Admission:  Leg swelling   HPI/24hr events:  No acute events overnight, placed on heparin infusion, hemodynamically stable, not hypoxic

## 2018-03-07 NOTE — ASSESSMENT & PLAN NOTE
CTPE with extensive bilateral pulmonary arterial filling defects consistent with PE with the calculated ratio of right ventricular to left ventricular diameter (RV/LV ratio) is 1 2  Heparin infusion currently  Had DVT of upper extremity? in past related to a shoulder surgery - patient believes he had a short course of Xarelto and stopped  This event is seemingly unprovoked - some recent mild inactivity due to what he thought was a calf strain, but given extensive DVT/PE, may need further coagulability studies to determine length of treatment  Echo pending to evaluate RV strain

## 2018-03-07 NOTE — ASSESSMENT & PLAN NOTE
Extensive acute LLE DVT (over 2 weeks) with extensive bilateral pulmonary emboli / right heart strain and HX of upper extremity DVT after shoulder surgery about 6 years ago  Acute non-occlusive DVT involving proximal femoral, mid femoral, gastrocnemius vein, posterior tibial vein and peroneal vein  Acute occluded DVT distal femoral vein and popliteal vein  Evidence of superficial thrombophlebitis noted in the small saphenous vein  62M history of what sounds like axillary vein DVT after shoulder surgery about 6 years ago with a short course of Xarelto  The patient reports that 2 1/2 ago while golfing, he "strained" his left calf muscle  He noticed discomfort while walking [place]  Since then he has had mild to moderate swelling to the left leg with some difficulty in ambulating  He has continued to work but at times he needed to walk on his left heel due to pain  He has had varying degrees of swelling for which he has been trying heat/ice in the evenings after work  A couple of days ago, he developed mild dyspnea  Yesterday, he was sent for an outpatient venous duplex which was significant for extensive Left leg DVT for which he was sent to the Emergency department where he was found to have extensive bilateral pulmonary emboli with right heart strain  He did not undergo lytic therapy for pulmonary emboli  He was placed on a heparin continuous infusion  Vascular surgery is now asked to evaluate patient for treatment options for DVT  The patient feels better today  He has no chest pain or shortness of breath  His hemodynamics appear stable  He is lying flat in bed comfortable and he is not on oxygen  The the left lower extremity is mild to moderately larger than the right leg; perhaps up to 20% larger  The the patient expresses that he has enjoyed good health  He has had GERD and hypercholesterolemia  He is very active without any chest limiting symptoms at baseline  No history of myocardial infarction or stroke  No personal history of malignancy or bleeding  No known  family history of hypercoagulability, blood disorders or sudden death  - agree with heparin continuous infusion for PEs/deep vein thrombosis   - continue with close clinical/hemodynamic dynamic monitoring   - suggest hypercoagulable workup   - we will continue to monitor the patient with you and reassess in 24 hours for indications of venous lytic therapy  We had a detailed discussion regarding the indications, risks and benefits of venous lysis intervention  A directed procedure would be considered, if he should develop significant symptoms  At this point, with bilateral pulmonary emboli right heart strain, and in the absence of severe symptoms in the left leg, there would be no urgency to proceed with an intervention  Now on anticoagulation, as he starts to ambulate, should he develop moderate to severe symptoms (increased pain or swelling of LLE, or difficulty ambulating) we could reassess the risk/ benefit ratio and consider directed venous thrombolytic therapy        Case / thoughts and recommendations were discussed with critical care team

## 2018-03-07 NOTE — SOCIAL WORK
DASH discussion completed  Discussed goals of making sure pt's  needs are met upon discharge, pt's preferences are taken into account, pt understands health condition, medications and symptoms to watch for after returning home and pt is aware of any follow up appointments recommended by hospital physician  PT LIVES INDEPENDENTLY WITH SPOUSE, NO DME OR HHC NEEDS, DCP IS TO HOME WHEN STABLE

## 2018-03-07 NOTE — ASSESSMENT & PLAN NOTE
-CTPE with extensive bilateral pulmonary arterial filling defects consistent with PE with the calculated ratio of right ventricular to left ventricular diameter (RV/LV ratio) is 1 2  -Heparin infusion currently / holding A/C until Heme Onc and FOBT evals finalized for reversibility purposes  -Hx of UE DVT s/p surgery   -considered unprovoked  -Echo w/ preserved LV SF but w/ severe RV strain

## 2018-03-07 NOTE — PLAN OF CARE
Problem: PAIN - ADULT  Goal: Verbalizes/displays adequate comfort level or baseline comfort level  Interventions:  - Encourage patient to monitor pain and request assistance  - Assess pain using appropriate pain scale  - Administer analgesics based on type and severity of pain and evaluate response  - Implement non-pharmacological measures as appropriate and evaluate response  - Consider cultural and social influences on pain and pain management  - Notify physician/advanced practitioner if interventions unsuccessful or patient reports new pain  Outcome: Progressing      Problem: SAFETY ADULT  Goal: Patient will remain free of falls  INTERVENTIONS:  - Assess patient frequently for physical needs  -  Identify cognitive and physical deficits and behaviors that affect risk of falls    -  [place] fall precautions as indicated by assessment   - Educate patient/family on patient safety including physical limitations  - Instruct patient to call for assistance with activity based on assessment  - Modify environment to reduce risk of injury  - Consider OT/PT consult to assist with strengthening/mobility  Outcome: Progressing    Goal: Maintain or return to baseline ADL function  INTERVENTIONS:  -  Assess patient's ability to carry out ADLs; assess patient's baseline for ADL function and identify physical deficits which impact ability to perform ADLs (bathing, care of mouth/teeth, toileting, grooming, dressing, etc )  - Assess/evaluate cause of self-care deficits   - Assess range of motion  - Assess patient's mobility; develop plan if impaired  - Assess patient's need for assistive devices and provide as appropriate  - Encourage maximum independence but intervene and supervise when necessary  ¯ Involve family in performance of ADLs  ¯ Assess for home care needs following discharge   ¯ Request OT consult to assist with ADL evaluation and planning for discharge  ¯ Provide patient education as appropriate  Outcome: Progressing    Goal: Maintain or return mobility status to optimal level  INTERVENTIONS:  - Assess patient's baseline mobility status (ambulation, transfers, stairs, etc )    - Identify cognitive and physical deficits and behaviors that affect mobility  - Identify mobility aids required to assist with transfers and/or ambulation (gait belt, sit-to-stand, lift, walker, cane, etc )  - [place] fall precautions as indicated by assessment  - Record patient progress and toleration of activity level on Mobility SBAR; progress patient to next Phase/Stage  - Instruct patient to call for assistance with activity based on assessment  - Request Rehabilitation consult to assist with strengthening/weightbearing, etc   Outcome: Progressing      Problem: DISCHARGE PLANNING  Goal: Discharge to home or other facility with appropriate resources  INTERVENTIONS:  - Identify barriers to discharge w/patient and caregiver  - Arrange for needed discharge resources and transportation as appropriate  - Identify discharge learning needs (meds, wound care, etc )  - Arrange for interpretive services to assist at discharge as needed  - Refer to Case Management Department for coordinating discharge planning if the patient needs post-hospital services based on physician/advanced practitioner order or complex needs related to functional status, cognitive ability, or social support system  Outcome: Progressing      Problem: Knowledge Deficit  Goal: Patient/family/caregiver demonstrates understanding of disease process, treatment plan, medications, and discharge instructions  Complete learning assessment and assess knowledge base    Interventions:  - Provide teaching at level of understanding  - Provide teaching via preferred learning methods  Outcome: Progressing      Problem: RESPIRATORY - ADULT  Goal: Achieves optimal ventilation and oxygenation  INTERVENTIONS:  - Assess for changes in respiratory status  - Assess for changes in mentation and behavior  - Position to facilitate oxygenation and minimize respiratory effort  - Oxygen administration by appropriate delivery method based on oxygen saturation (per order) or ABGs  - Initiate smoking cessation education as indicated  - Encourage broncho-pulmonary hygiene including cough, deep breathe, Incentive Spirometry  - Assess the need for suctioning and aspirate as needed  - Assess and instruct to report SOB or any respiratory difficulty  - Respiratory Therapy support as indicated  Outcome: Progressing

## 2018-03-07 NOTE — CONSULTS
Consult was performed earlier today and seen under consults  The consult is duplicated here for convenience as for unknown reason it did not link to request  Consult for DVT/PE  Joey Sanchez PA-C Physician Assistant [facility]eded Vascular Surgery  Consults Date of Service: 3/7/2018  9:23 AM      Expand All Collapse All    []Hide copied text        Consult Note - Vascular Surgery   Diana Dickinson 63 y o  male MRN: [id number]  Unit/Bed#: ICU 10 Encounter: [id number]  Primary Care Provider: Elizabeth Stewart MD   Date and time admitted to hospital: 3/6/2018  2:57 PM        Acute deep vein thrombosis (DVT) of femoral vein of left lower extremity (HCC)   Assessment & Plan     Extensive acute LLE DVT (over 2 weeks) with extensive bilateral pulmonary emboli / right heart strain and HX of upper extremity DVT after shoulder surgery about 6 years ago                    Acute non-occlusive DVT involving proximal femoral, mid femoral, gastrocnemius vein, posterior tibial vein and peroneal vein  Acute occluded DVT distal femoral vein and popliteal vein  Evidence of superficial thrombophlebitis noted in the small saphenous vein                   62M history of what sounds like axillary vein DVT after shoulder surgery about 6 years ago with a short course of Xarelto  The patient reports that 2 1/2 ago while golfing, he "strained" his left calf muscle  He noticed discomfort while walking [place]  Since then he has had mild to moderate swelling to the left leg with some difficulty in ambulating  He has continued to work but at times he needed to walk on his left heel due to pain  He has had varying degrees of swelling for which he has been trying heat/ice in the evenings after work  A couple of days ago, he developed mild dyspnea   Yesterday, he was sent for an outpatient venous duplex which was significant for extensive Left leg DVT for which he was sent to the Emergency department where he was found to have extensive bilateral pulmonary emboli with right heart strain  He did not undergo lytic therapy for pulmonary emboli  He was placed on a heparin continuous infusion  Vascular surgery is now asked to evaluate patient for treatment options for DVT                  The patient feels better today  He has no chest pain or shortness of breath  His hemodynamics appear stable  He is lying flat in bed comfortable and he is not on oxygen  The the left lower extremity is mild to moderately larger than the right leg; perhaps up to 20% larger  The the patient expresses that he has enjoyed good health  He has had GERD and hypercholesterolemia  He is very active without any chest limiting symptoms at baseline  No history of myocardial infarction or stroke  No personal history of malignancy or bleeding  No known  family history of hypercoagulability, blood disorders or sudden death                   - agree with heparin continuous infusion for PEs/deep vein thrombosis               - continue with close clinical/hemodynamic dynamic monitoring               - suggest hypercoagulable workup               - we will continue to monitor the patient with you and reassess in 24 hours for indications of venous lytic therapy  We had a detailed discussion regarding the indications, risks and benefits of venous lysis intervention  A directed procedure would be considered, if he should develop significant symptoms  At this point, with bilateral pulmonary emboli right heart strain, and in the absence of severe symptoms in the left leg, there would be no urgency to proceed with an intervention   Now on anticoagulation, as he starts to ambulate, should he develop moderate to severe symptoms (increased pain or swelling of LLE, or difficulty ambulating) we could reassess the risk/ benefit ratio and consider directed venous thrombolytic therapy                    Case / thoughts and recommendations were discussed with critical care team                  * Pulmonary embolism with acute cor pulmonale (HCC)   Assessment & Plan     Extensive bilateral pulmonary emboli with right heart strain  Moderately dilated RV size with moderately decreased RV function and mildly elevated Troponin                    - heparin continuous infusion               - Followed by critical care            NSTEMI (non-ST elevated myocardial infarction) ([facility] [address] )   Assessment & Plan     Secondary to PEs/RV dysfunction; normal LV function               - mgt per critical care             Requesting Service: Critical care  Chief Complaint:  Extensive bilateral pulmonary emboli and left lower extremity extensive deep vein thrombosis     HPI: Pranav Reyes is a 63 y o  male who presents with left lower extremity swelling x 2 1/2 weeks                  62M history of what sounds like axillary vein DVT after shoulder surgery about 6 years ago with a short course of Xarelto  The patient reports that 2 1/2 ago while golfing, he "strained" his left calf muscle  He noticed discomfort while walking [place] the [place]  Since then he has had mild to moderate swelling to the left leg with some difficulty in ambulating  He has continued to work but at times he needed to walk on his left heel due to pain  He has had varying degrees of swelling for which he has been trying heat/ice in the evenings after work  A couple of days ago, he developed mild dyspnea  Yesterday, he was sent for an outpatient venous duplex which was significant for extensive Left leg DVT for which he was sent to the Emergency department where he was found to have extensive bilateral pulmonary emboli with right heart strain  He did not undergo lytic therapy for pulmonary emboli  He was placed on a heparin continuous infusion  Vascular surgery is now asked to evaluate patient for treatment options for DVT                The patient feels better today    He has no chest pain or shortness of breath  His hemodynamics appear stable  He is lying flat in bed comfortable and he is not on oxygen  The the left lower extremity is mild to moderately larger than the right leg; perhaps up to 20% larger  The patient expresses that he has enjoyed good health  He has had GERD and hypercholesterolemia  He is very active without any chest limiting symptoms at baseline  He was a cigarette smoker as a teen  No history of myocardial infarction or stroke  No personal history of malignancy or bleeding  No known  family history of hypercoagulability, blood disorders or sudden death                 Imaging studies and case were reviewed with Dr Ciro Rivera       Review of Systems:  General: negative - no fevers, chills    Cardiovascular: no chest pain suggestive of angina  Respiratory: positive for - a sudden dyspnea  Gastrointestinal: no abdominal pain, change in bowel habits, or black or bloody stools  Genitourinary ROS: no dysuria, trouble voiding, or hematuria  Musculoskeletal ROS: negative  Neurological ROS: no TIA or stroke symptoms  Hematological and Lymphatic ROS: positive for - blood clots  Dermatological ROS: negative  Psychological ROS: negative  Ophthalmic ROS: negative  ENT ROS: negative     Past Medical History:  Medical History        Past Medical History:   Diagnosis Date    Calcific tendinitis 2/27/2018            Past Surgical History:  Surgical History         Past Surgical History:   Procedure Laterality Date    ULNAR NERVE TRANSPOSITION Right              Social History:       History   Alcohol Use    Yes       Comment: social          History   Drug Use No          History   Smoking Status    Never Smoker   Smokeless Tobacco    Never Used         Family History:        Family History   Problem Relation Age of Onset    Diabetes Father      Cancer Maternal Grandmother      Cancer Maternal Grandfather           Allergies:  No Known Allergies     Medications:  Current Medications Current Facility-Administered Medications   Medication Dose Route Frequency    chlorhexidine (PERIDEX) 0 12 % oral rinse 15 mL  15 mL Swish & Spit Q12H [facility]    heparin (porcine) 25,000 units in 250 mL infusion (premix)  3-30 Units/kg/hr (Order-Specific) Intravenous Titrated    heparin (porcine) injection 4,200 Units  4,200 Units Intravenous PRN    heparin (porcine) injection 8,400 Units  8,400 Units Intravenous PRN    pantoprazole (PROTONIX) EC tablet 40 mg  40 mg Oral Early Morning            Vitals:  /79 (BP Location: Right arm)   Pulse 71   Temp 97 6 °F (36 4 °C) (Temporal)   Resp 16   Ht 6' 2" (1 88 m)   Wt 109 kg (239 lb 10 2 oz)   SpO2 95%   BMI 30 77 kg/m²      I/Os:  I/O last 24 hours: In: 2524 3 [P O :300; I V :224 3; IV Piggyback:2000]  Out: 750 [Urine:750]     Lab Results and Cultures:         Lab Results   Component Value Date     WBC 5 40 03/07/2018     HGB 10 4 (L) 03/07/2018     HCT 32 1 (L) 03/07/2018     MCV 80 (L) 03/07/2018      03/07/2018            Lab Results   Component Value Date     GLUCOSE 111 03/07/2018     CALCIUM 8 1 (L) 03/07/2018      03/07/2018     K 3 9 03/07/2018     CO2 24 03/07/2018      03/07/2018     BUN 19 03/07/2018     CREATININE 0 77 03/07/2018            Lab Results   Component Value Date     INR 1 07 03/07/2018     INR 1 05 03/06/2018     PROTIME 11 2 03/07/2018     PROTIME 11 0 03/06/2018         Physical Exam:     General appearance: alert and oriented, in no acute distress  Skin: Skin color, texture, turgor normal  No rashes or lesions  Neurologic: Grossly normal  Head: Normocephalic, without obvious abnormality, atraumatic  Eyes: conjunctivae/corneas clear  PERRL, EOM's intact  Fundi benign    Throat: lips, mucosa, and tongue normal; teeth and gums normal  Neck: no adenopathy, no carotid bruit, no JVD, supple, symmetrical, trachea midline and thyroid not enlarged, symmetric, no tenderness/mass/nodules  Back: symmetric, no curvature  ROM normal  No CVA tenderness  Lungs: overall ctab  Chest wall: no tenderness  Heart: regular rate and rhythm, S1, S2 normal, no murmur, click, rub or gallop  Abdomen: soft, non-tender; bowel sounds normal; no masses,  no organomegaly  Extremities: Left calf swelling - increased size c/w right calf  + left calf discomfort on active motion  Motor-sensory intact  No ankle edema  Extremities all well perfused         Pulse exam:  Radial: Right: 2+ Left[de-identified] 2+  Femoral: Right: 2+ Left: 2+  DP: Right: 2+ Left: 2+  PT: Right: 2+ Left: 2+      Imaging:     Venous Du: 3/6/2018     [facility] REPORT  CLINICAL:  Indications: Other specified soft tissue disorders [M79 89]  Patient presents  with left lower extremity edema x 14 days  The patient has history of DVT in right arm  Clinical:     FINDINGS:     Segment         Right            Left                              Impression       Impression                GSV Prox Thigh                   Normal (Patent)           CFV             Normal (Patent)  Normal (Patent)           FV Prox                          GLF Occlusive Thrombus    FV Mid                           Non Occlusive Thrombus    FV Dist                          Occlusive Subsegmental    PFV - Stent                      Non Occlusive Thrombus    Popliteal                        Occlusive Subsegmental    SSV Knee                         Occlusive Subsegmental    PostTibial                       Non Occlusive Thrombus    Peroneal                         Non Occlusive Thrombus    Gastrocnemius                    Non Occlusive Thrombus             CONCLUSION:  Impression:  RIGHT LOWER LIMB: Normal  No evidence of acute or chronic deep vein thrombosis  No evidence of superficial thrombophlebitis noted  Doppler evaluation shows a normal response to augmentation maneuvers    Popliteal, posterior tibial and anterior tibial arterial Doppler waveforms are  triphasic      LEFT LOWER LIMB: Abnormal  Acute non-occlusive deep vein thrombosis in the proximal femoral, mid femoral,  gastrocnemius vein, posterior tibial vein, and peroneal vein  Acute occluded deep vein thrombosis in the distal femoral vein, and popliteal  vein  Evidence of superficial thrombophlebitis noted in the small saphenous vein  Doppler evaluation shows a normal response to augmentation maneuvers    Popliteal, posterior tibial and anterior tibial arterial Doppler waveforms are  triphasic      Technical findings were given to [place] 116 14:36 Patient sent to ER      SIGNATURE:  Electronically Signed by: Cl Ojeda MD, RPVI on 2018-03-06 04:02:11 PM        CTA - CHEST WITH IV CONTRAST - PULMONARY ANGIOGRAM 3/6/2018     INDICATION: Chest pain, acute, PE suspected, high pretest prob     COMPARISON: None      TECHNIQUE: CTA examination of the chest was performed using angiographic technique according to a protocol specifically tailored to evaluate for pulmonary embolism   Axial, sagittal, and coronal 2D reformatted images were created from the source data and   submitted for interpretation   In addition, coronal 3D MIP postprocessing was performed on the acquisition scanner        Radiation dose length product (DLP) for this visit:  772 53 mGy-cm    This examination, like all CT scans performed in the [facility], was performed utilizing techniques to minimize radiation dose exposure, including the use of iterative   reconstruction and automated exposure control      IV Contrast:  85 mL of iohexol (OMNIPAQUE)  PE   FINDINGS:     PULMONARY ARTERIAL TREE:  Extensive left greater than right central pulmonary arterial filling defects extending distally consistent with     LUNGS:  Lungs are clear   There is no tracheal or endobronchial lesion      PLEURA:  Unremarkable      HEART/AORTA:  Cardiomegaly noted   Right ventricle measures 5 8 cm in left ventricle measures 4 7 cm corresponding to a ventricular ratio of approximately 1 2 and concerning for right ventricular strain      MEDIASTINUM AND ASHLEY:  Unremarkable      CHEST WALL AND LOWER NECK:   Unremarkable      VISUALIZED STRUCTURES IN THE UPPER ABDOMEN:  Hepatomegaly noted      OSSEOUS STRUCTURES:  No acute fracture or destructive osseous lesion      IMPRESSION:     1   Extensive bilateral pulmonary arterial filling defects consistent with PE      The calculated ratio of right ventricular to left ventricular diameter (RV/LV ratio) is 1 2  This is greater than 0 9, which is abnormal and indicates right heart strain   An abnormal RV/LV ratio has been shown to be associated with an increased risk of 30 day mortality in the setting of acute pulmonary embolism      2   Hepatomegaly            Jolene Stokes PA-C  3/7/2018  [facility]

## 2018-03-08 VITALS
WEIGHT: 235.89 LBS | RESPIRATION RATE: 20 BRPM | HEART RATE: 74 BPM | OXYGEN SATURATION: 96 % | TEMPERATURE: 98 F | HEIGHT: 74 IN | SYSTOLIC BLOOD PRESSURE: 139 MMHG | BODY MASS INDEX: 30.27 KG/M2 | DIASTOLIC BLOOD PRESSURE: 74 MMHG

## 2018-03-08 DIAGNOSIS — I82.412 ACUTE DEEP VEIN THROMBOSIS (DVT) OF FEMORAL VEIN OF LEFT LOWER EXTREMITY (HCC): Primary | ICD-10-CM

## 2018-03-08 PROBLEM — Z00.00 ENCOUNTER FOR ANNUAL PHYSICAL EXAM: Status: RESOLVED | Noted: 2018-02-27 | Resolved: 2018-03-08

## 2018-03-08 PROBLEM — Z12.11 SCREEN FOR COLON CANCER: Status: RESOLVED | Noted: 2018-02-28 | Resolved: 2018-03-08

## 2018-03-08 LAB
ANION GAP SERPL CALCULATED.3IONS-SCNC: 7 MMOL/L (ref 4–13)
APTT PPP: 63 SECONDS (ref 24–33)
BASOPHILS # BLD AUTO: 0 THOUSANDS/ΜL (ref 0–0.1)
BASOPHILS NFR BLD AUTO: 1 % (ref 0–1)
BUN SERPL-MCNC: 14 MG/DL (ref 5–25)
CALCIUM SERPL-MCNC: 8.4 MG/DL (ref 8.3–10.1)
CHLORIDE SERPL-SCNC: 106 MMOL/L (ref 100–108)
CO2 SERPL-SCNC: 26 MMOL/L (ref 21–32)
CREAT SERPL-MCNC: 0.93 MG/DL (ref 0.6–1.3)
EOSINOPHIL # BLD AUTO: 0.3 THOUSAND/ΜL (ref 0–0.61)
EOSINOPHIL NFR BLD AUTO: 5 % (ref 0–6)
ERYTHROCYTE [DISTWIDTH] IN BLOOD BY AUTOMATED COUNT: 14.1 % (ref 11.6–15.1)
FERRITIN SERPL-MCNC: 17 NG/ML (ref 8–388)
GFR SERPL CREATININE-BSD FRML MDRD: 89 ML/MIN/1.73SQ M
GLUCOSE SERPL-MCNC: 109 MG/DL (ref 65–140)
HCT VFR BLD AUTO: 31.4 % (ref 42–52)
HGB BLD-MCNC: 10.3 G/DL (ref 14–18)
IRON SATN MFR SERPL: 6 %
IRON SERPL-MCNC: 22 UG/DL (ref 65–175)
LYMPHOCYTES # BLD AUTO: 1.9 THOUSANDS/ΜL (ref 0.6–4.47)
LYMPHOCYTES NFR BLD AUTO: 30 % (ref 14–44)
MCH RBC QN AUTO: 26.4 PG (ref 27–31)
MCHC RBC AUTO-ENTMCNC: 32.7 G/DL (ref 31.4–37.4)
MCV RBC AUTO: 81 FL (ref 82–98)
MONOCYTES # BLD AUTO: 0.6 THOUSAND/ΜL (ref 0.17–1.22)
MONOCYTES NFR BLD AUTO: 10 % (ref 4–12)
MRSA NOSE QL CULT: NORMAL
NEUTROPHILS # BLD AUTO: 3.4 THOUSANDS/ΜL (ref 1.85–7.62)
NEUTS SEG NFR BLD AUTO: 55 % (ref 43–75)
PLATELET # BLD AUTO: 221 THOUSANDS/UL (ref 130–400)
PMV BLD AUTO: 7.3 FL (ref 8.9–12.7)
POTASSIUM SERPL-SCNC: 3.8 MMOL/L (ref 3.5–5.3)
PROT C AG ACT/NOR PPP IA: 118 % OF NORMAL (ref 60–150)
RBC # BLD AUTO: 3.91 MILLION/UL (ref 4.7–6.1)
RETICS # AUTO: NORMAL 10*3/UL (ref 14356–105094)
RETICS # CALC: 1.27 % (ref 0.37–1.87)
SODIUM SERPL-SCNC: 139 MMOL/L (ref 136–145)
TIBC SERPL-MCNC: 359 UG/DL (ref 250–450)
WBC # BLD AUTO: 6.2 THOUSAND/UL (ref 4.8–10.8)

## 2018-03-08 PROCEDURE — 85045 AUTOMATED RETICULOCYTE COUNT: CPT | Performed by: PHYSICIAN ASSISTANT

## 2018-03-08 PROCEDURE — 99232 SBSQ HOSP IP/OBS MODERATE 35: CPT | Performed by: PHYSICIAN ASSISTANT

## 2018-03-08 PROCEDURE — 99239 HOSP IP/OBS DSCHRG MGMT >30: CPT | Performed by: STUDENT IN AN ORGANIZED HEALTH CARE EDUCATION/TRAINING PROGRAM

## 2018-03-08 PROCEDURE — 85730 THROMBOPLASTIN TIME PARTIAL: CPT | Performed by: PHYSICIAN ASSISTANT

## 2018-03-08 PROCEDURE — 85025 COMPLETE CBC W/AUTO DIFF WBC: CPT | Performed by: PHYSICIAN ASSISTANT

## 2018-03-08 PROCEDURE — 99223 1ST HOSP IP/OBS HIGH 75: CPT | Performed by: INTERNAL MEDICINE

## 2018-03-08 PROCEDURE — 80048 BASIC METABOLIC PNL TOTAL CA: CPT | Performed by: PHYSICIAN ASSISTANT

## 2018-03-08 RX ADMIN — HEPARIN SODIUM AND DEXTROSE 18 UNITS/KG/HR: 10000; 5 INJECTION INTRAVENOUS at 10:17

## 2018-03-08 RX ADMIN — APIXABAN 5 MG: 5 TABLET, FILM COATED ORAL at 13:47

## 2018-03-08 RX ADMIN — PANTOPRAZOLE SODIUM 40 MG: 40 TABLET, DELAYED RELEASE ORAL at 05:45

## 2018-03-08 NOTE — NURSING NOTE
Patient discharged via walking accompanied by Rn  IV's D/C and intact  Discharge instructions given  No further questions at this time  Patient did not meet criteria for pneumonia vaccine  Non-smoker

## 2018-03-08 NOTE — PROGRESS NOTES
Progress Note - Luis Confer 1955, 63 y o  male MRN: [id number]    Unit/Bed#: [facility] Encounter: [id number]    Primary Care Provider: Erma Kong MD   Date and time admitted to hospital: 3/6/2018  2:57 PM        Acute deep vein thrombosis (DVT) of femoral vein of left lower extremity ([facility] [address] )   Assessment & Plan    -extensive LLE femoral/popliteal DVT, possibly unprovoked or may be 2/2 golf injury  -currently on heparin gtt, plan to transition to NOAC pending check on incurance coverage   -vascular surgery consulted, recommended compression and LLE elevation for edema control and outpatient follow-up in 3 months with repeat LEVD  -hematology consulted, will need outpt workup per their recs        * Pulmonary embolism with acute cor pulmonale ([facility] )   Assessment & Plan    -CTPE with extensive bilateral pulmonary arterial filling defects consistent with PE with the calculated ratio of right ventricular to left ventricular diameter (RV/LV ratio) is 1 2  -admitted to the ICU and started on heparin gtt  -Had DVT of upper extremity? in past related to a shoulder surgery - patient believes he had a short course of Xarelto and stopped  -This event is seemingly unprovoked - some recent mild inactivity due to what he thought was a calf strain, but given extensive DVT/PE, may need further coagulability studies to determine length of treatment  -hematology consulted, will need hypercoagulable workup up as outpt           NSTEMI (non-ST elevated myocardial infarction) ([facility] )   Assessment & Plan    -type II from RV strain from massive PE  -serial troponin trend peaked at 0 10  -2D echo shows EF 55-60% with no severe RV pressure or volume overload         Gastroesophageal reflux disease with esophagitis   Assessment & Plan    -cont with PPI  -avoid NSAIDS given esophagitis and now being on anticoagulation              VTE Pharmacologic Prophylaxis:   Pharmacologic: Heparin Drip  Mechanical VTE Prophylaxis in Place: Yes    Patient Centered Rounds: I have performed bedside rounds with nursing staff today  Discussions with Specialists or Other Care Team Provider: Yes vascular surgery and Hematology    Education and Discussions with Family / Patient:Yes    Time Spent for Care: 45 minutes  More than 50% of total time spent on counseling and coordination of care as described above  Current Length of Stay: 2 day(s)    Current Patient Status: Inpatient     Discharge Plan:  Home once medically stable    Code Status: Level 1 - Full Code      Subjective:   Francis Darby denies any shortness of breath, palpitations, or chest pain  He still has some mild tenderness in the left calf but improved since arrival   Left leg still appears swollen compared to the right to him  Objective:       Vitals:   Temp (24hrs), Av 9 °F (36 6 °C), Min:96 8 °F (36 °C), Max:98 9 °F (37 2 °C)    HR:  [74-87] 74  Resp:  [18-22] 20  BP: (117-149)/(68-74) 139/74  SpO2:  [95 %-100 %] 96 %  Body mass index is 30 29 kg/m²  Input and Output Summary (last 24 hours): Intake/Output Summary (Last 24 hours) at 18 1310  Last data filed at 18 0708   Gross per 24 hour   Intake           783 66 ml   Output              600 ml   Net           183 66 ml       Physical Exam:     Physical Exam   Constitutional: He is oriented to person, place, and time  He appears well-developed  No distress  HENT:   Head: Normocephalic and atraumatic  Cardiovascular: Normal rate, regular rhythm and normal heart sounds  No murmur heard  Pulmonary/Chest: Effort normal and breath sounds normal  No respiratory distress  He has no wheezes  He has no rales  Abdominal: Soft  Bowel sounds are normal  He exhibits no distension  There is no tenderness  There is no rebound and no guarding     Musculoskeletal: He exhibits edema (Nonpitting left lower extremity edema from ankle to knee with increased circumference of the calf compared to the right) and tenderness (Left calf)  He exhibits no deformity  Neurological: He is alert and oriented to person, place, and time  Skin: Skin is warm and dry  Psychiatric: He has a normal mood and affect  His behavior is normal    Nursing note and vitals reviewed  Additional Data:     Labs:      Results from last 7 days  Lab Units 03/08/18  0518   WBC Thousand/uL 6 20   HEMOGLOBIN g/dL 10 3*   HEMATOCRIT % 31 4*   PLATELETS Thousands/uL 221   NEUTROS PCT % 55   LYMPHS PCT % 30   MONOS PCT % 10   EOS PCT % 5       Results from last 7 days  Lab Units 03/08/18  0518  03/06/18  1523   SODIUM mmol/L 139  < > 142   POTASSIUM mmol/L 3 8  < > 3 7   CHLORIDE mmol/L 106  < > 105   CO2 mmol/L 26  < > 29   BUN mg/dL 14  < > 22   CREATININE mg/dL 0 93  < > 0 98   CALCIUM mg/dL 8 4  < > 8 8   TOTAL PROTEIN g/dL  --   --  7 1   BILIRUBIN TOTAL mg/dL  --   --  0 30   ALK PHOS U/L  --   --  84   ALT U/L  --   --  25   AST U/L  --   --  14   GLUCOSE RANDOM mg/dL 109  < > 113   < > = values in this interval not displayed  Results from last 7 days  Lab Units 03/07/18  0554   INR  1 07       * I Have Reviewed All Lab Data Listed Above  * Additional Pertinent Lab Tests Reviewed: Jayson [facility] Admission Reviewed    Imaging:  Cta Ed Chest Pe Study    Result Date: 3/6/2018  Narrative: CTA - CHEST WITH IV CONTRAST - PULMONARY ANGIOGRAM INDICATION: Chest pain, acute, PE suspected, high pretest prob COMPARISON: None  TECHNIQUE: CTA examination of the chest was performed using angiographic technique according to a protocol specifically tailored to evaluate for pulmonary embolism  Axial, sagittal, and coronal 2D reformatted images were created from the source data and  submitted for interpretation  In addition, coronal 3D MIP postprocessing was performed on the acquisition scanner  Radiation dose length product (DLP) for this visit:  772 53 mGy-cm     This examination, like all CT scans performed in the [facility], was performed utilizing techniques to minimize radiation dose exposure, including the use of iterative  reconstruction and automated exposure control  IV Contrast:  85 mL of iohexol (OMNIPAQUE) PE  FINDINGS: PULMONARY ARTERIAL TREE:  Extensive left greater than right central pulmonary arterial filling defects extending distally consistent with LUNGS:  Lungs are clear  There is no tracheal or endobronchial lesion  PLEURA:  Unremarkable  HEART/AORTA:  Cardiomegaly noted  Right ventricle measures 5 8 cm in left ventricle measures 4 7 cm corresponding to a ventricular ratio of approximately 1 2 and concerning for right ventricular strain  MEDIASTINUM AND ASHLEY:  Unremarkable  CHEST WALL AND LOWER NECK:   Unremarkable  VISUALIZED STRUCTURES IN THE UPPER ABDOMEN:  Hepatomegaly noted  OSSEOUS STRUCTURES:  No acute fracture or destructive osseous lesion  Impression: 1  Extensive bilateral pulmonary arterial filling defects consistent with PE  The calculated ratio of right ventricular to left ventricular diameter (RV/LV ratio) is 1 2  This is greater than 0 9, which is abnormal and indicates right heart strain  An abnormal RV/LV ratio has been shown to be associated with an increased risk of 30 day mortality in the setting of acute pulmonary embolism  2   Hepatomegaly  I personally discussed this study with Jayashree Sanchez on 3/6/2018 at 5:19 PM  Workstation performed: [id number]     Vas Lower Limb Venous Duplex Study, Complete Bilateral    Result Date: 3/6/2018  Narrative:  [facility] REPORT CLINICAL: Indications: Other specified soft tissue disorders [M79 89]  Patient presents with left lower extremity edema x 14 days  The patient has history of DVT in right arm   Clinical:  FINDINGS:  Segment         Right            Left                            Impression       Impression              GSV Prox Thigh                   Normal (Patent)         CFV             Normal (Patent)  Normal (Patent)         FV Prox Non Occlusive Thrombus  FV Mid                           Non Occlusive Thrombus  FV Dist                          Occlusive Subsegmental  PFV - Stent                      Non Occlusive Thrombus  Popliteal                        Occlusive Subsegmental  SSV Knee                         Occlusive Subsegmental  PostTibial                       Non Occlusive Thrombus  Peroneal                         Non Occlusive Thrombus  Gastrocnemius                    Non Occlusive Thrombus     CONCLUSION: Impression: RIGHT LOWER LIMB: Normal No evidence of acute or chronic deep vein thrombosis  No evidence of superficial thrombophlebitis noted  Doppler evaluation shows a normal response to augmentation maneuvers  Popliteal, posterior tibial and anterior tibial arterial Doppler waveforms are triphasic  LEFT LOWER LIMB: Abnormal Acute non-occlusive deep vein thrombosis in the proximal femoral, mid femoral, gastrocnemius vein, posterior tibial vein, and peroneal vein  Acute occluded deep vein thrombosis in the distal femoral vein, and popliteal vein  Evidence of superficial thrombophlebitis noted in the small saphenous vein  Doppler evaluation shows a normal response to augmentation maneuvers  Popliteal, posterior tibial and anterior tibial arterial Doppler waveforms are triphasic  Technical findings were given to Delta 116 14:36 Patient sent to ER    SIGNATURE: Electronically Signed by: Aneta Hong MD, [address] on 2018-03-06 04:02:11 PM    Imaging Reports Reviewed by myself    Cultures:   Blood Culture: No results found for: Melisa Kinney  Urine Culture: No results found for: URINECX  Sputum Culture: No components found for: SPUTUMCX  Wound Culture: No results found for: WOUNDCULT    Last 24 Hours Medication List:     Current Facility-Administered Medications:  heparin (porcine) 3-30 Units/kg/hr (Order-Specific) Intravenous Titrated Bennett Everett DO Last Rate: 18 Units/kg/hr (03/08/18 1017)   heparin (porcine) 4,200 Units Intravenous PRN Bonilla , DO    heparin (porcine) 8,400 Units Intravenous PRN Bonilla , DO    pantoprazole 40 mg Oral Early Morning MARIA L Willingham         Today, Patient Was Seen By: Memory Canavan, MD    ** Please Note: Dragon 360 Dictation voice to text software may have been used in the creation of this document   **

## 2018-03-08 NOTE — PROGRESS NOTES
Progress Note King Hardy O'Such 1955, 63 y o  male MRN: [id number]    Unit/Bed#: [facility] Encounter: [id number]    Primary Care Provider: Torrie Cuello MD   Date and time admitted to hospital: 3/6/2018  2:57 PM        Acute deep vein thrombosis (DVT) of femoral vein of left lower extremity (HCC)   Assessment & Plan    Unprovoked acute extensive nonocclusive/segmental occlusive LLE DVT (proximal FV-->gastroc) w/bilateral large central PEs  --no evidence of phlegmasia or severe LLE edema  --no pain with ambulation  --no vascular intervention recommended  --continue anticoagulation with heparin drip with conversion to oral agent  --hematology consult appreciated  hypercoagulable workup per Hematology  --oral anticoagulation agent at the recommendation of Hematology  --compression and LLE elevation for edema control  --outpatient follow-up in 3 months with repeat LEVD  --will d/w Dr Halley Duarte  --d/w primary service             Mixed hyperlipidemia   Assessment & Plan    --continue statin therapy        * Pulmonary embolism with acute cor pulmonale (HCC)   Assessment & Plan    Large bilateral pulmonary emboli with right heart strain  --hemodynamics stable and presently asymptomatic  --continue heparin drip  --management per primary service                Subjective:  Patient awake, alert without complaints  Denies shortness of breath, chest pain, orthopnea, PND  Ambulating in room without dyspnea or lower extremity pain  Mild left lower extremity edema without paresthesias, numbness, pallor or pain  Hemodynamics stable  Remains on heparin drip    Vitals:  /73 (BP Location: Left arm)   Pulse 86   Temp 98 9 °F (37 2 °C) (Oral)   Resp 18   Ht 6' 2" (1 88 m)   Wt 107 kg (235 lb 14 3 oz)   SpO2 95%   BMI 30 29 kg/m²     I/Os:  I/O last 3 completed shifts: In: 2547 9 [P O :1020; I V :527 9;  IV Piggyback:1000]  Out: 2100 [Urine:2100]  I/O this shift:  In: 240 [P O :240]  Out: -     Lab Results and Cultures:   Lab Results   Component Value Date    WBC 6 20 03/08/2018    HGB 10 3 (L) 03/08/2018    HCT 31 4 (L) 03/08/2018    MCV 81 (L) 03/08/2018     03/08/2018     Lab Results   Component Value Date    GLUCOSE 109 03/08/2018    CALCIUM 8 4 03/08/2018     03/08/2018    K 3 8 03/08/2018    CO2 26 03/08/2018     03/08/2018    BUN 14 03/08/2018    CREATININE 0 93 03/08/2018     Lab Results   Component Value Date    INR 1 07 03/07/2018    INR 1 05 03/06/2018    PROTIME 11 2 03/07/2018    PROTIME 11 0 03/06/2018        Blood Culture: No results found for: BLOODCX,   Urinalysis: No results found for: COLORU, CLARITYU, SPECGRAV, PHUR, LEUKOCYTESUR, NITRITE, PROTEINUA, GLUCOSEU, KETONESU, BILIRUBINUR, BLOODU,   Urine Culture: No results found for: URINECX,   Wound Culure: No results found for: WOUNDCULT    Medications:  Current Facility-Administered Medications   Medication Dose Route Frequency    heparin (porcine) 25,000 units in 250 mL infusion (premix)  3-30 Units/kg/hr (Order-Specific) Intravenous Titrated    heparin (porcine) injection 4,200 Units  4,200 Units Intravenous PRN    heparin (porcine) injection 8,400 Units  8,400 Units Intravenous PRN    pantoprazole (PROTONIX) EC tablet 40 mg  40 mg Oral Early Morning       Imaging:  No new imaging studies for review    Physical Exam:    General appearance: alert and oriented, in no acute distress  Neurologic: Grossly normal  Neck: no adenopathy, no carotid bruit, no JVD, supple, symmetrical, trachea midline and thyroid not enlarged, symmetric, no tenderness/mass/nodules  Lungs: clear to auscultation bilaterally  Heart: regular rate and rhythm, S1, S2 normal, no murmur, click, rub or gallop  Abdomen: soft, non-tender; bowel sounds normal; no masses,  no organomegaly and Nondistended  No abdominal bruits  Extremities: no ulcers, gangrene or trophic changes and Bilateral lower extremities warm, pink and well perfused    Bilateral lower extremities motor and sensory intact  Moderate edema left calf only  No pallor, cyanosis or tissue loss        Pulse exam:  Radial: Right: 2+ Left[de-identified] 2+  Femoral: Right: 2+ Left: 2+  Popliteal: Right: 1+ Left: 1+  DP: Right: 2+ Left: 2+  PT: Right: 2+ Left: 2+      Kathy Saavedra PA-C  3/8/2018  [facility], [phone]

## 2018-03-08 NOTE — PROGRESS NOTES
Pt  left unit via wheelchair, in no apparent distress   Verified heparin drip along with receiving RN

## 2018-03-08 NOTE — ASSESSMENT & PLAN NOTE
-type II from RV strain from massive PE  -serial troponin trend peaked at 0 10  -2D echo shows EF 55-60% with no severe RV pressure or volume overload

## 2018-03-08 NOTE — CASE MANAGEMENT
Notification of Discharge  This is a Notification of Discharge from our facility [address]  Please be advised that this patient has been discharge from our facility  Below you will find the admission and discharge date and time including the patients disposition  PRESENTATION DATE: 3/6/2018  2:57 PM  IP ADMISSION DATE: 3/6/18 1747  DISCHARGE DATE: No discharge date for patient encounter    DISPOSITION: Final discharge disposition not confirmed    ATTENDING  Acute deep vein thrombosis (DVT) of femoral vein of left lower extremity (HCC)   Assessment & Plan     -extensive LLE femoral/popliteal DVT, possibly unprovoked or may be 2/2 golf injury  -currently on heparin gtt, plan to transition to NOAC pending check on incurance coverage   -vascular surgery consulted, recommended compression and LLE elevation for edema control and outpatient follow-up in 3 months with repeat LEVD  -hematology consulted, will need outpt workup per their recs          * Pulmonary embolism with acute cor pulmonale ([facility] [address] )   Assessment & Plan     -CTPE with extensive bilateral pulmonary arterial filling defects consistent with PE with the calculated ratio of right ventricular to left ventricular diameter (RV/LV ratio) is 1 2  -admitted to the ICU and started on heparin gtt  -Had DVT of upper extremity? in past related to a shoulder surgery - patient believes he had a short course of Xarelto and stopped  -This event is seemingly unprovoked - some recent mild inactivity due to what he thought was a calf strain, but given extensive DVT/PE, may need further coagulability studies to determine length of treatment  -hematology consulted, will need hypercoagulable workup up as outpt             NSTEMI (non-ST elevated myocardial infarction) ([facility] [address] )   Assessment & Plan     -type II from RV strain from massive PE  -serial troponin trend peaked at 0 10  -2D echo shows EF 55-60% with no severe RV pressure or volume overload         Gastroesophageal reflux disease with esophagitis   Assessment & Plan     -cont with PPI  -avoid NSAIDS given esophagitis and now being on anticoagulation            DISCHARGED HOME F/U OUTPATIENT   [address] [facility] in the [facility] by Vicente Walsh for 2017  Network Utilization Review Department  Phone: [phone]; Fax [phone]  ATTENTION: The Network Utilization Review Department is now centralized for our 7 Facilities  Make a note that we have a new phone and fax numbers for our Department  Please call with any questions or concerns to [phone] and carefully follow the prompts so that you are directed to the right person  All voicemails are confidential  Fax any determinations, approvals, denials, and requests for initial or continue stay review clinical to [phone]  Due to HIGH CALL volume, it would be easier if you could please send faxed requests to expedite your requests and in part, help us provide discharge notifications faster

## 2018-03-08 NOTE — ASSESSMENT & PLAN NOTE
Unprovoked acute extensive nonocclusive/segmental occlusive LLE DVT (proximal FV-->gastroc) w/bilateral large central PEs  --no evidence of phlegmasia or severe LLE edema  --no pain with ambulation  --no vascular intervention recommended  --continue anticoagulation with heparin drip with conversion to oral agent  --hematology consult appreciated   hypercoagulable workup per Hematology  --oral anticoagulation agent at the recommendation of Hematology  --compression and LLE elevation for edema control  --outpatient follow-up in 3 months with repeat LEVD  --will d/w Dr Dianna Valentin  --d/w primary service

## 2018-03-08 NOTE — CONSULTS
Hematology Oncology Consult Report    Alverto Corey, 1955, [id number]  [address]    Impression and plan:    3  46-year-old male recently found to have a left lower extremity DVT and bilateral PE  Presently patient states feeling well and clinically there are no concerning signs  From a hematology standpoint, heparin can be switched to 1 of the oral anticoagulation agents  Patient can then be discharged  I discussed with Mr Brannon what to monitor for in regard to excessive bruising/bleeding, worsening lower extremity swelling or pain, acute pulmonary problems etc     This is the 2nd incident; does not seem to be any provoking reason for the DVT and PE  The 1st DVT was likely associated with immobility after shoulder surgery  Likely patient will need to continue with anticoagulation for life unless he develops contraindications/serious side effects  Patient will be brought back to the office in a few weeks; at that time, a thrombophilia evaluation will be performed  2   Non STEMI  This may be associated with the RV strain from the PE  Patient is to follow up with Pulmonary; at this moment is not clear if patient has been seen by Cardiology  3   Anemia with a low normal MCV and elevated RDW (? iron deficiency anemia?)  Etiology for the anemia is presently not clear  Anemia workup will before performed as an outpatient  The above was discussed with the patient; all questions were answered  Please do not hesitate to contact the Hematology service if you have any questions or concerns  Thank you for this consult     ______________________________________________________________________________________________________________________________________________________________________________    Chief complaint/reason for admission:  Left calf pain, Shortness of breath    History of present illness: This is a 46-year-old male referred for the above    Mr Lonny Elam states having progressive shortness of breath and dyspnea on exertion for a few days prior to admission  Patient also had left calf tightness  Activities have been slightly decreased  No fevers, chills or sweats  No cough, sputum or hemoptysis  Appetite has been okay, no GI or  issues  No problems with excessive bruising or bleeding  Patient had shoulder surgery approximately 6 years ago and subsequently developed an upper extremity DVT (same side)  Patient was treated with anticoagulation for approximately 3 months; medication was subsequently discontinued  Presently patient states feeling okay, better than before  No shortness of breath or dyspnea on exertion  No problems with excessive bruising or bleeding  No chest pain or pressure  No GI issues  Patient states that his activities have improved  Mr Yasemin Lewis would like to be discharged  Past medical history:   Past Medical History:   Diagnosis Date    Calcific tendinitis 2/27/2018     Past surgical history:   Past Surgical History:   Procedure Laterality Date    ULNAR NERVE TRANSPOSITION Right      Allergies: No Known Allergies    Home medications:   No prescriptions prior to admission  Hospital medications: No current facility-administered medications for this encounter       Current Outpatient Prescriptions:     apixaban (ELIQUIS) 5 mg, Take 1 tablet (5 mg total) by mouth 2 (two) times a day for 90 days, Disp: 180 tablet, Rfl: 0    Multiple Vitamin (MULTIVITAMIN) tablet, Take 1 tablet by mouth daily, Disp: , Rfl:     Na Sulfate-K Sulfate-Mg Sulf 17 5-3 13-1 6 GM/180ML SOLN, Take 1 kit by mouth once for 1 dose, Disp: 2 Bottle, Rfl: 0    omeprazole (PriLOSEC) 20 mg delayed release capsule, Take 20 mg by mouth daily, Disp: , Rfl:     Social history:  No tobacco or drug abuse, social alcohol, no toxic exposure,     Family history:   Family History   Problem Relation Age of Onset    Diabetes Father     Cancer Maternal Grandmother     Cancer Maternal Grandfather      Review of systems: No blurred vision or double vision, no tinnitus or trouble hearing, no headaches, dizziness or body aches, no chest pain or pressure, no shortness of breath or dyspnea on exertion, no cough, sputum or hemoptysis, no nausea, vomiting, diarrhea or constipation, no abdominal pain, no blood in the stools, no weight loss, no urinary incontinence, frequency or dribbling, no hematuria, no yellowing of the skin, no problems with excessive bruising or bleeding from the skin, no numbness, tingling, seizures or syncopal episodes    Physical exam  Vitals:    03/08/18 1132   BP: 139/74   Pulse: 74   Resp: 20   Temp: 98 °F (36 7 °C)   SpO2: 96%   Constitutional:  Middle-aged male, no respiratory distress  Eyes:  PERRL, conjunctiva pink, anicteric   HENT:  Atraumatic, external ears normal, nose normal,   Oropharynx: moist, no pharyngeal exudates, no thrush, pink  Neck: No adenopathy, good range of motion  Respiratory:  Distant breath sounds bilaterally, clear  Cardiovascular:  Normal rate, normal rhythm, no murmurs, no gallops, no rubs   GI:  Soft, obese, cannot palpate liver or spleen, nontender, no rigidity or rebound, +bowel sounds  :  No costovertebral angle tenderness, normal reproductive organs, no discharge  Musculoskeletal: No pain or tenderness with palpation of joints, muscles or bones  Integument:  Good color, no petechiae, few ecchymoses, no active bleeding  Lymphatic:  No lymphadenopathy in the neck, supraclavicular region, infraclavicular region, axilla and groin bilaterally  Extremities:  No right lower extremity edema, 0-1 +left lower extremity edema, no cords, pulses are 1+ bilaterally  Neurologic:  Alert & oriented x 3, CN 2-12 normal, normal motor function, normal sensory function, no focal deficits noted  Psychiatric:  Speech and behavior appropriate, response time appropriate  Rectal: Deferred    Laboratory test results    Results for Kristofer Jason (MRN [id number]) as of 3/8/2018 17:53   Ref  Range 3/7/2018 05:51   WBC Latest Ref Range: 4 80 - 10 80 Thousand/uL 5 40   RBC Latest Ref Range: 4 70 - 6 10 Million/uL 3 99 (L)   Hemoglobin Latest Ref Range: 14 0 - 18 0 g/dL 10 4 (L)   Hematocrit Latest Ref Range: 42 0 - 52 0 % 32 1 (L)   MCV Latest Ref Range: 82 - 98 fL 80 (L)   MCH Latest Ref Range: 27 0 - 31 0 pg 26 1 (L)   MCHC Latest Ref Range: 31 4 - 37 4 g/dL 32 5   RDW Latest Ref Range: 11 6 - 15 1 % 15 6 (H)   Platelets Latest Ref Range: 130 - 400 Thousands/uL 176   MPV Latest Ref Range: 8 9 - 12 7 fL 7 7 (L)   nRBC Latest Units: /100 WBCs 0   Neutrophils Relative Latest Ref Range: 43 - 75 % 54   Lymphocytes Relative Latest Ref Range: 14 - 44 % 31   Monocytes Relative Latest Ref Range: 4 - 12 % 9   Eosinophils Relative Latest Ref Range: 0 - 6 % 5     Results for Todd Arthur (MRN [id number]) as of 3/8/2018 17:53   Ref  Range 3/7/2018 05:51   Sodium Latest Ref Range: 136 - 145 mmol/L 142   Potassium Latest Ref Range: 3 5 - 5 3 mmol/L 3 9   Chloride Latest Ref Range: 100 - 108 mmol/L 108   CO2 Latest Ref Range: 21 - 32 mmol/L 24   Anion Gap Latest Ref Range: 4 - 13 mmol/L 10   BUN Latest Ref Range: 5 - 25 mg/dL 19   Creatinine Latest Ref Range: 0 60 - 1 30 mg/dL 0 77   Glucose Latest Ref Range: 65 - 140 mg/dL 111   Calcium Latest Ref Range: 8 3 - 10 1 mg/dL 8 1 (L)   eGFR Latest Units: ml/min/1 73sq m 99   Phosphorus Latest Ref Range: 2 3 - 4 1 mg/dL 3 4   Magnesium Latest Ref Range: 1 6 - 2 6 mg/dL 2 1       Radiology reports    03/06/2018 CTA chest PE study    1  Extensive bilateral pulmonary arterial filling defects consistent with PE  The calculated ratio of right ventricular to left ventricular diameter (RV/LV ratio) is 1 2  This is greater than 0 9, which is abnormal and indicates right heart strain  An abnormal RV/LV ratio has been shown to be associated with an increased risk of 30 day mortality in the setting of acute pulmonary embolism    2  Hepatomegaly      03/06/2018 vascular bilateral lower limb duplex study    RIGHT LOWER LIMB: Normal  No evidence of acute or chronic deep vein thrombosis  No evidence of superficial thrombophlebitis noted  Doppler evaluation shows a normal response to augmentation maneuvers  Popliteal, posterior tibial and anterior tibial arterial Doppler waveforms are  triphasic  LEFT LOWER LIMB: Abnormal  Acute non-occlusive deep vein thrombosis in the proximal femoral, mid femoral,  gastrocnemius vein, posterior tibial vein, and peroneal vein  Acute occluded deep vein thrombosis in the distal femoral vein, and popliteal  vein  Evidence of superficial thrombophlebitis noted in the small saphenous vein  Doppler evaluation shows a normal response to augmentation maneuvers  Popliteal, posterior tibial and anterior tibial arterial Doppler waveforms are  triphasic

## 2018-03-08 NOTE — DISCHARGE INSTRUCTIONS
Deep Venous Thrombosis   WHAT YOU NEED TO KNOW:   Deep venous thrombosis (DVT) is a blood clot that forms in a deep vein of the body  The deep veins in the legs, thighs, and hips are the most common sites for DVT  DVT can also occur in a deep vein within your arms  The clot prevents the normal flow of blood in the vein  The blood backs up and causes pain and swelling  The DVT can break into smaller pieces and travel to your lungs and cause a blockage called a pulmonary embolism  A pulmonary embolism can become life-threatening  DISCHARGE INSTRUCTIONS:   Call 911 for any of the following:   · You feel lightheaded, short of breath, and have chest pain  · You cough up blood  Seek care immediately if:   · Your symptoms get worse  · You develop new DVT symptoms in another leg or arm  Contact your healthcare provider if:   · Your gums or nose bleed  · You see blood in your urine or bowel movements  · Your bowel movements are black or darker than normal     · You have questions or concerns about your conditions or care  Medicines:   · Blood thinners  help prevent the DVT from getting bigger and prevent new clots from forming  Examples of blood thinners include heparin, rivaroxaban, apixiban, and warfarin  The following are general safety guidelines to follow while you are taking a blood thinner:     ¨ Watch for bleeding and bruising  Watch for bleeding from your gums or nose  Watch for blood in your urine and bowel movements  Use a soft washcloth on your skin, and a soft toothbrush to brush your teeth  This can keep your skin and gums from bleeding  If you shave, use an electric shaver  Do not play contact sports  ¨ Tell your dentist and other healthcare providers that you take a blood thinner  Wear a bracelet or necklace that says you take this medicine  ¨ Do not start or stop any medicines unless your healthcare provider tells you to   Many medicines cannot be used with blood thinners  ¨ Tell your healthcare provider right away if you forget to take the blood thinner , or if you take too much  ¨ Warfarin  is a blood thinner that you may need to take  The following are additional things you should be aware of if you take warfarin:    § Foods and medicines can affect the amount of warfarin in your blood  Do not make major changes to your diet  Warfarin works best when you eat about the same amount of vitamin K every day  Vitamin K is found in green leafy vegetables and certain other foods  Ask for more information about what to eat or not to eat  § You will need to see your healthcare provider for follow-up visits  You will need regular blood tests to decide how much warfarin you need  · Take your medicine as directed  Contact your healthcare provider if you think your medicine is not helping or if you have side effects  Tell him or her if you are allergic to any medicine  Keep a list of the medicines, vitamins, and herbs you take  Include the amounts, and when and why you take them  Bring the list or the pill bottles to follow-up visits  Carry your medicine list with you in case of an emergency  Manage your DVT:   · Wear pressure stockings  The stockings are tight and put pressure on your legs  This improves blood flow and helps prevent clots  Wear the stockings during the day  Do not wear them when you sleep  · Elevate your legs  above the level of your heart as often as you can  This will help decrease swelling and pain  Prop your legs on pillows or blankets to keep them elevated comfortably  · Exercise regularly  to help increase your blood flow  Walking is a good low-impact exercise  Talk to your healthcare provider about the best exercise plan for you  · Change body positions often  If you travel by car or work at a desk, move and stretch in your seat several times each hour  In an airplane, get up and walk every hour   If you are bedridden, ask for help to change your position every 1 to 2 hours  · Maintain a healthy weight  Ask your healthcare provider how much you should weigh  Ask him to help you create a weight loss plan if you are overweight  · Do not smoke  Nicotine and other chemicals in cigarettes and cigars can damage blood vessels and make it more difficult to manage your DVT  Ask your healthcare provider for information if you currently smoke and need help to quit  E-cigarettes or smokeless tobacco still contain nicotine  Talk to your healthcare provider before you use these products  Follow up with your healthcare provider as directed:  Write down your questions so you remember to ask them during your visits  © 2017 [employer] Information is for End User's use only and may not be sold, redistributed or otherwise used for commercial purposes  All illustrations and images included in CareNotes® are the copyrighted property of [employer]  or [employer]  The above information is an  only  It is not intended as medical advice for individual conditions or treatments  Talk to your doctor, nurse or pharmacist before following any medical regimen to see if it is safe and effective for you  Pulmonary Embolism   WHAT YOU NEED TO KNOW:   A pulmonary embolism (PE) is the sudden blockage of a blood vessel in the lungs by an embolus  An embolus is a small piece of blood clot, fat, air, or tumor cells  The embolus cuts off the blood supply to your lungs  A pulmonary embolism can become life-threatening  DISCHARGE INSTRUCTIONS:   Call 911 for any of the following:   · You feel lightheaded, short of breath, and have chest pain  · You cough up blood  · You have a seizure  · You have slurred speech, increased sleepiness, or problems seeing, talking, or thinking  · You have weakness or cannot move your arm or leg on one side of your body    Seek care immediately if:   · You feel faint     · You have a severe headache  · Your heart is beating faster than normal   Contact your healthcare provider if:   · The skin on any part of your legs or hips turns purple  · Your gums or nose bleed  · You see blood in your urine or bowel movements  · Your bowel movements are black or darker than normal     · You have questions or concerns about your condition or care  Medicines:   · Blood thinners  help treat the PE and prevent new clots from forming  Examples of blood thinners include heparin, rivaroxaban, apixiban, and warfarin  The following are general safety guidelines to follow while you are taking a blood thinner:     ¨ Watch for bleeding and bruising  Watch for bleeding from your gums or nose  Watch for blood in your urine and bowel movements  Use a soft washcloth on your skin, and a soft toothbrush to brush your teeth  This can keep your skin and gums from bleeding  If you shave, use an electric shaver  Do not play contact sports  ¨ Tell your dentist and other healthcare providers that you take a blood thinner  Wear a bracelet or necklace that says you take this medicine  ¨ Do not start or stop any medicines unless your healthcare provider tells you to  Many medicines cannot be used with blood thinners  ¨ Tell your healthcare provider right away if you forget to take the blood thinner , or if you take too much  ¨ Warfarin  is a blood thinner that you may need to take  The following are additional things you should be aware of if you take warfarin:    § Foods and medicines can affect the amount of warfarin in your blood  Do not make major changes to your diet  Warfarin works best when you eat about the same amount of vitamin K every day  Vitamin K is found in green leafy vegetables and certain other foods  Ask for more information about what to eat or not to eat  § You will need to see your healthcare provider for follow-up visits   You will need regular blood tests to decide how much warfarin you need  · Take your medicine as directed  Contact your healthcare provider if you think your medicine is not helping or if you have side effects  Tell him or her if you are allergic to any medicine  Keep a list of the medicines, vitamins, and herbs you take  Include the amounts, and when and why you take them  Bring the list or the pill bottles to follow-up visits  Carry your medicine list with you in case of an emergency  Prevent another PE:   · Wear pressure stockings  The stockings are tight and put pressure on your legs  This improves blood flow and helps prevent clots  Wear the stockings during the day  Do not wear them when you sleep  · Exercise regularly  Ask about the best exercise plan for you  When you travel by car or work at a desk, take breaks to stand up and move around as much as possible  Rotate your feet in circles often if you sit for a long period of time  · Maintain a healthy weight  Ask your healthcare provider how much you should weigh  Ask him to help you create a weight loss plan if you are overweight  · Do not smoke  Nicotine and other chemicals in cigarettes and cigars can damage blood vessels and increase your risk for another PE  Ask your healthcare provider for information if you currently smoke and need help to quit  E-cigarettes or smokeless tobacco still contain nicotine  Talk to your healthcare provider before you use these products  Follow up with your healthcare provider as directed:  Write down your questions so you remember to ask them during your visits  © 2017 [employer] Information is for End User's use only and may not be sold, redistributed or otherwise used for commercial purposes  All illustrations and images included in CareNotes® are the copyrighted property of [employer]  or [employer]  The above information is an  only   It is not intended as medical advice for individual conditions or treatments  Talk to your doctor, nurse or pharmacist before following any medical regimen to see if it is safe and effective for you  Hypercoagulation   WHAT YOU NEED TO KNOW:   Hypercoagulation is a condition that causes your blood to clot more easily than normal  Hypercoagulation can be an acquired or inherited condition  Acquired hypercoagulation is caused by a disease or other condition  Examples include obesity, pregnancy, use of birth control pills, or cancer  Inherited coagulation is caused by genes that have been passed to you from a parent  These genes cause problems with how your blood clots  You may have no signs or symptoms of hypercoagulation until you get a blood clot  DISCHARGE INSTRUCTIONS:   Call 911 or have someone else call for any of the following:   · You have any of the following signs of a stroke:      ¨ Numbness or drooping on one side of your face     ¨ Weakness in an arm or leg    ¨ Confusion or difficulty speaking    ¨ Dizziness, a severe headache, or vision loss    · You have any of the following signs of a heart attack:      ¨ Squeezing, pressure, or pain in your chest that lasts longer than 5 minutes or returns    ¨ Discomfort or pain in your back, neck, jaw, stomach, or arm     ¨ Trouble breathing    ¨ Nausea or vomiting    ¨ Lightheadedness or a sudden cold sweat, especially with chest pain or trouble breathing  Seek care immediately if:   · Your arm or leg feels warm, tender, and painful  It may look swollen and red  Contact your healthcare provider if:   · You have a fever or chills  · You have hard, bulging veins on your arms or legs  · You have skin changes, such as wounds or reddish blue spots  · You have questions or concerns about your condition or care  Medicines: You may  need any of the following:  · Blood thinners    help prevent blood clots  Examples of blood thinners include heparin and warfarin   Clots can cause strokes, heart attacks, and death  The following are general safety guidelines to follow while you are taking a blood thinner:    ¨ Watch for bleeding and bruising while you take blood thinners  Watch for bleeding from your gums or nose  Watch for blood in your urine and bowel movements  Use a soft washcloth on your skin, and a soft toothbrush to brush your teeth  This can keep your skin and gums from bleeding  If you shave, use an electric shaver  Do not play contact sports  ¨ Tell your dentist and other healthcare providers that you take anticoagulants  Wear a bracelet or necklace that says you take this medicine  ¨ Do not start or stop any medicines unless your healthcare provider tells you to  Many medicines cannot be used with blood thinners  ¨ Tell your healthcare provider right away if you forget to take the medicine, or if you take too much  ¨ Warfarin  is a blood thinner that you may need to take  The following are things you should be aware of if you take warfarin  § Foods and medicines can affect the amount of warfarin in your blood  Do not make major changes to your diet while you take warfarin  Warfarin works best when you eat about the same amount of vitamin K every day  Vitamin K is found in green leafy vegetables and certain other foods  Ask for more information about what to eat when you are taking warfarin  § You will need to see your healthcare provider for follow-up visits when you are on warfarin  You will need regular blood tests  These tests are used to decide how much medicine you need  · Antiplatelets , such as aspirin, help prevent blood clots  Take your antiplatelet medicine exactly as directed  These medicines make it more likely for you to bleed or bruise  If you are told to take aspirin, do not take acetaminophen or ibuprofen instead  · Take your medicine as directed    Contact your healthcare provider if you think your medicine is not helping or if you have side effects  Tell him or her if you are allergic to any medicine  Keep a list of the medicines, vitamins, and herbs you take  Include the amounts, and when and why you take them  Bring the list or the pill bottles to follow-up visits  Carry your medicine list with you in case of an emergency  Prevent a blood clot:   · Change your body position often during travel and at work  If you travel by airplane, get up and walk around once every hour  Move and stretch in your seat several times each hour  Do leg exercises at work and during travel to increase blood flow through your legs    ¨ Point your toes toward your shin  Then point them toward the ground  Do this 10 times every hour  ¨ Pull each knee to your chest and hold it for 15 seconds  Then straighten your leg  Do this 10 times every hour  · Change your position every 2 hours in bed  If you need help to move, ask other family members to help you change your position  · Eat a variety of healthy foods  Healthy foods can help you manage your weight and other health problems  Examples include fruits, vegetables, whole-grain breads, low-fat dairy products, beans, lean meats, and fish  Ask if you need to be on a special diet  · Drink plenty of liquids  Liquids can help prevent dehydration  Ask how much liquid to drink each day and which liquids are best for you  You should drink extra liquids before plane rides and long car rides  · Get plenty of exercise  Talk to your healthcare provider about the best exercise plan for you  Exercise can help you lose weight, keep your heart healthy, and help manage other conditions  · Do not smoke  Nicotine and other chemicals in cigarettes and cigars can heart and lung damage  Ask your healthcare provider for information if you currently smoke and need help to quit  E-cigarettes or smokeless tobacco still contain nicotine  Talk to your healthcare provider before you use these products       · Wear pressure stockings as directed  These are tight elastic stockings that squeeze the lower part of your legs to improve circulation  This helps keep blood clots from forming  Ask your healthcare provider where to buy these products  · Talk to your healthcare provider before you get pregnant  You may need to take medicine to prevent blood clots during your pregnancy  Follow up with your healthcare provider as directed:  Write down your questions so you remember to ask them during your visits  © 2017 [facility] [facility] Information is for End User's use only and may not be sold, redistributed or otherwise used for commercial purposes  All illustrations and images included in CareNotes® are the copyrighted property of [employer]  or Vicente Walsh  The above information is an  only  It is not intended as medical advice for individual conditions or treatments  Talk to your doctor, nurse or pharmacist before following any medical regimen to see if it is safe and effective for you

## 2018-03-08 NOTE — ASSESSMENT & PLAN NOTE
-extensive LLE femoral/popliteal DVT, possibly unprovoked or may be 2/2 golf injury  -currently on heparin gtt, plan to transition to NOAC pending check on incurance coverage   -vascular surgery consulted, recommended compression and LLE elevation for edema control and outpatient follow-up in 3 months with repeat LEVD  -hematology consulted, will need outpt workup per their recs

## 2018-03-08 NOTE — PLAN OF CARE
DISCHARGE PLANNING     Discharge to home or other facility with appropriate resources Progressing        DISCHARGE PLANNING - CARE MANAGEMENT     Discharge to post-acute care or home with appropriate resources Progressing        Knowledge Deficit     Patient/family/caregiver demonstrates understanding of disease process, treatment plan, medications, and discharge instructions Progressing        PAIN - ADULT     Verbalizes/displays adequate comfort level or baseline comfort level Progressing        Potential for Falls     Patient will remain free of falls Progressing        RESPIRATORY - ADULT     Achieves optimal ventilation and oxygenation Progressing        SAFETY ADULT     Patient will remain free of falls Progressing     Maintain or return to baseline ADL function Progressing     Maintain or return mobility status to optimal level Progressing

## 2018-03-08 NOTE — ASSESSMENT & PLAN NOTE
Large bilateral pulmonary emboli with right heart strain  --hemodynamics stable and presently asymptomatic  --continue heparin drip  --management per primary service

## 2018-03-08 NOTE — ASSESSMENT & PLAN NOTE
-CTPE with extensive bilateral pulmonary arterial filling defects consistent with PE with the calculated ratio of right ventricular to left ventricular diameter (RV/LV ratio) is 1 2  -admitted to the ICU and started on heparin gtt  -Had DVT of upper extremity? in past related to a shoulder surgery - patient believes he had a short course of Xarelto and stopped  -This event is seemingly unprovoked - some recent mild inactivity due to what he thought was a calf strain, but given extensive DVT/PE, may need further coagulability studies to determine length of treatment  -hematology consulted, will need hypercoagulable workup up as outpt

## 2018-03-09 LAB
CARDIOLIPIN IGA SER IA-ACNC: <9 APL U/ML (ref 0–11)
CARDIOLIPIN IGG SER IA-ACNC: <9 GPL U/ML (ref 0–14)
CARDIOLIPIN IGM SER IA-ACNC: <9 MPL U/ML (ref 0–12)
PROT S ACT/NOR PPP: 118 % (ref 63–140)
PROT S ACT/NOR PPP: 145 % (ref 57–157)
PROT S PPP-ACNC: 84 % (ref 60–150)

## 2018-03-09 NOTE — DISCHARGE SUMMARY
Discharge- Kimmy Tenorio 1955, 63 y o  male MRN: [id number]    Unit/Bed#: 207 Jani Kirkpatrick Encounter: [id number]    Primary Care Provider: Tami Weinstein MD   Date and time admitted to hospital: 3/6/2018  2:57 PM        Acute deep vein thrombosis (DVT) of femoral vein of left lower extremity ([facility] )   Assessment & Plan    -extensive LLE femoral/popliteal DVT, possibly unprovoked or may be 2/2 golf injury  -placed on heparin drip and transitioned to Eliquis  -vascular surgery consulted, recommended compression and LLE elevation for edema control and outpatient follow-up in 3 months with repeat LEVD  -hematology consulted, will need outpt workup per their recs        * Pulmonary embolism with acute cor pulmonale ([facility] )   Assessment & Plan    -CTPE with extensive bilateral pulmonary arterial filling defects consistent with PE with the calculated ratio of right ventricular to left ventricular diameter (RV/LV ratio) is 1 2  -admitted to the ICU and started on heparin gtt  -Had DVT of upper extremity? in past related to a shoulder surgery - patient believes he had a short course of Xarelto and stopped  -This event is seemingly unprovoked - some recent mild inactivity due to what he thought was a calf strain, but given extensive DVT/PE, may need further coagulability studies to determine length of treatment  -hematology consulted, will need hypercoagulable workup up as outpt  -transitioned from heparin drip to Eliquis and discharged home with follow-up as an outpatient with Hematology           Microcytic anemia   Assessment & Plan    -patient has GI follow-up in the next week for colonoscopy  -given instructions to monitor closely for evidence of GI bleeding is now that he is on anticoagulation  -follow up with PCP        NSTEMI (non-ST elevated myocardial infarction) ([facility] )   Assessment & Plan    -type II from RV strain from massive PE  -serial troponin trend peaked at 0 10  -2D echo shows EF 55-60% with no severe RV pressure or volume overload         Gastroesophageal reflux disease with esophagitis   Assessment & Plan    -cont with PPI  -avoid NSAIDS given esophagitis and now being on anticoagulation  -patient has an upcoming GI follow-up for colonoscopy            Discharging Physician / Practitioner: Mariia Muniz MD  PCP: Starla Knight MD  Admission Date: 3/6/2018  Discharge Date: 03/08/18    Reason for Admission: Leg Pain (Patient states that he went to see his PCP Dr Anjel Galloway yesterday for left calf pain  She sent him to the vascular leb this morning, who sent him to the ER now, with reports of DVT to the left lower extremity  )        Resolved Problems  Date Reviewed: 3/8/2018    None          Consultations During Hospital Stay:  [facility] consult Hematology    Procedures Performed:     ·     Significant Findings / Test Results:     · Serial troponin:  0 10, 0 08  · Cholesterol 191, triglycerides 86  · Iron 22, ferritin 17, iron saturation 6, TIBC 359  · Alkaline phosphatase isoenzyme 85, serum albumin 4 2  · Total globulin 2 8  · Antithrombin 3 activity elevated at 82  · Protein C activity 118  · FOBT negative    Cta Ed Chest Pe Study  Result Date: 3/6/2018  Impression: 1  Extensive bilateral pulmonary arterial filling defects consistent with PE  The calculated ratio of right ventricular to left ventricular diameter (RV/LV ratio) is 1 2  This is greater than 0 9, which is abnormal and indicates right heart strain  An abnormal RV/LV ratio has been shown to be associated with an increased risk of 30 day mortality in the setting of acute pulmonary embolism  2   Hepatomegaly  I personally discussed this study with Eduardo Tolentino on 3/6/2018 at 5:19 PM  Workstation performed: [id number]     Vas Lower Limb Venous Duplex Study, Complete Bilateral  Result Date: 3/6/2018  CONCLUSION: Impression: RIGHT LOWER LIMB: Normal No evidence of acute or chronic deep vein thrombosis   No evidence of superficial thrombophlebitis noted  Doppler evaluation shows a normal response to augmentation maneuvers  Popliteal, posterior tibial and anterior tibial arterial Doppler waveforms are triphasic  LEFT LOWER LIMB: Abnormal Acute non-occlusive deep vein thrombosis in the proximal femoral, mid femoral, gastrocnemius vein, posterior tibial vein, and peroneal vein  Acute occluded deep vein thrombosis in the distal femoral vein, and popliteal vein  Evidence of superficial thrombophlebitis noted in the small saphenous vein  Doppler evaluation shows a normal response to augmentation maneuvers  Popliteal, posterior tibial and anterior tibial arterial Doppler waveforms are triphasic  Technical findings were given to Delta 116 14:36 Patient sent to ER  SIGNATURE: Electronically Signed by: Farhan Jean MD, RPVI on 2018-03-06 04:02:11 PM        Incidental Findings:   ·      Test Results Pending at Discharge (will require follow up):   ·      Outpatient Tests Requested:  ·     Complications:  none    Reason for Admission: +DVT on outpt US which was done for left pain and swelling    Hospital Course:     No Landers is a 63 y o  male patient with a PMH of GERD, UE DVT after shoulder surgery who originally presented to the hospital on 3/6/2018 from outpatient testing due to acute left lower extremity DVT  Patient thought he strained his left calf a week ago and for the past several days was also feeling short of breath  He was seen by his PCP who ordered an ultrasound of the lower extremity which was positive for extensive DVT  He was advised to present to the ER for further evaluation  CT PE protocol was positive for PE was signs of RV strain  He was started on heparin infusion admitted to the ICU for closer monitoring  Please see above list of diagnoses and related plan for additional information  Condition at Discharge: good     Discharge Day Visit / Exam:     Subjective:  Codhruv Watershortencia feels good    He no longer has shortness of breath  His left calf is still more swollen than his right naris still some tenderness in the mid calf but significantly improved from arrival  Wants to go home  Vitals: Blood Pressure: 139/74 (03/08/18 1132)  Pulse: 74 (03/08/18 1132)  Temperature: 98 °F (36 7 °C) (03/08/18 1132)  Temp Source: Oral (03/08/18 1132)  Respirations: 20 (03/08/18 1132)  Height: 6' 2" (188 cm) (03/06/18 2100)  Weight - Scale: 107 kg (235 lb 14 3 oz) (03/08/18 0600)  SpO2: 96 % (03/08/18 1132)  Exam:   Physical Exam  See progress note    Discharge instructions/Information to patient and family:   See after visit summary for information provided to patient and family  Provisions for Follow-Up Care:  See after visit summary for information related to follow-up care and any pertinent home health orders  Disposition:     Home    Planned Readmission: no     Discharge Statement:  I spent >30 minutes discharging the patient  This time was spent on the day of discharge  I had direct contact with the patient on the day of discharge  Greater than 50% of the total time was spent examining patient, answering all patient questions, arranging and discussing plan of care with patient as well as directly providing post-discharge instructions  Additional time then spent on discharge activities  Discharge Medications:  See after visit summary for reconciled discharge medications provided to patient and family        ** Please Note: This note has been constructed using a voice recognition system **

## 2018-03-09 NOTE — ASSESSMENT & PLAN NOTE
-cont with PPI  -avoid NSAIDS given esophagitis and now being on anticoagulation  -patient has an upcoming GI follow-up for colonoscopy

## 2018-03-09 NOTE — ASSESSMENT & PLAN NOTE
-extensive LLE femoral/popliteal DVT, possibly unprovoked or may be 2/2 golf injury  -placed on heparin drip and transitioned to Eliquis  -vascular surgery consulted, recommended compression and LLE elevation for edema control and outpatient follow-up in 3 months with repeat LEVD  -hematology consulted, will need outpt workup per their recs

## 2018-03-09 NOTE — ASSESSMENT & PLAN NOTE
-patient has GI follow-up in the next week for colonoscopy  -given instructions to monitor closely for evidence of GI bleeding is now that he is on anticoagulation  -follow up with PCP

## 2018-03-10 ENCOUNTER — TELEPHONE (OUTPATIENT)
Dept: FAMILY MEDICINE CLINIC | Facility: CLINIC | Age: 63
End: 2018-03-10

## 2018-03-10 ENCOUNTER — TRANSITIONAL CARE MANAGEMENT (OUTPATIENT)
Dept: FAMILY MEDICINE CLINIC | Facility: CLINIC | Age: 63
End: 2018-03-10

## 2018-03-10 LAB
APTT SCREEN TO CONFIRM RATIO: 0.89 RATIO (ref 0–1.4)
B2 GLYCOPROT1 IGA SER-ACNC: <9 GPI IGA UNITS (ref 0–25)
B2 GLYCOPROT1 IGG SER-ACNC: <9 GPI IGG UNITS (ref 0–20)
B2 GLYCOPROT1 IGM SER-ACNC: <9 GPI IGM UNITS (ref 0–32)
CONFIRM APTT/NORMAL: 45.4 SEC (ref 0–55)
HEMOCCULT STL QL: NEGATIVE
LA PPP-IMP: ABNORMAL
SCREEN APTT: 39.8 SEC (ref 0–51.9)
SCREEN DRVVT: 39.6 SEC (ref 0–47)
THROMBIN TIME: >150 SEC (ref 0–23)
TT IMM NP PPP: >150 SEC (ref 0–23)
TT P HPASE PPP: 22.2 SEC (ref 0–23)

## 2018-03-12 LAB
COMMENT: NORMAL
F5 GENE MUT ANL BLD/T: NORMAL

## 2018-03-13 ENCOUNTER — OFFICE VISIT (OUTPATIENT)
Dept: FAMILY MEDICINE CLINIC | Facility: CLINIC | Age: 63
End: 2018-03-13
Payer: COMMERCIAL

## 2018-03-13 VITALS
HEART RATE: 80 BPM | WEIGHT: 235 LBS | DIASTOLIC BLOOD PRESSURE: 70 MMHG | RESPIRATION RATE: 16 BRPM | TEMPERATURE: 98.8 F | SYSTOLIC BLOOD PRESSURE: 114 MMHG | BODY MASS INDEX: 30.17 KG/M2

## 2018-03-13 DIAGNOSIS — I82.412 ACUTE DEEP VEIN THROMBOSIS (DVT) OF FEMORAL VEIN OF LEFT LOWER EXTREMITY (HCC): Primary | ICD-10-CM

## 2018-03-13 DIAGNOSIS — I26.09 OTHER ACUTE PULMONARY EMBOLISM WITH ACUTE COR PULMONALE (HCC): ICD-10-CM

## 2018-03-13 DIAGNOSIS — D50.9 IRON DEFICIENCY ANEMIA, UNSPECIFIED IRON DEFICIENCY ANEMIA TYPE: ICD-10-CM

## 2018-03-13 LAB
F2 GENE MUT ANL BLD/T: NORMAL
Lab: NORMAL

## 2018-03-13 PROCEDURE — 99495 TRANSJ CARE MGMT MOD F2F 14D: CPT | Performed by: FAMILY MEDICINE

## 2018-03-13 NOTE — PATIENT INSTRUCTIONS
Continue medications  Hematology follow up , Vascular and GI for evaluation given low Fe anemia and need for colonoscopy  Report any new symptoms  May need repeat Echo     Call with any questions or change in status discussed

## 2018-03-13 NOTE — PROGRESS NOTES
Subjective:           Redd Ba was seen today for transition of care management  Diagnoses and all orders for this visit:    Acute deep vein thrombosis (DVT) of femoral vein of left lower extremity (HCC) continue anticoagulat    Other acute pulmonary embolism with acute cor pulmonale (HCC)   f/u Hematology/ Pulmonary opinion  Iron deficiency anemia, unspecified iron deficiency anemia type  F/U GI MVI iron  Orders Placed This Encounter   Procedures    Ambulatory referral to Pulmonology     Standing Status:   Future     Standing Expiration Date:   9/13/2018     Referral Priority:   Routine     Referral Type:   Consult - AMB     Referral Reason:   Specialty Services Required     Referred to Provider:   Todd Lovett MD     Requested Specialty:   Pulmonary Disease     Number of Visits Requested:   1     Expiration Date:   3/13/2019       Patient Instructions   Continue medications  Hematology follow up , Vascular and GI for evaluation given low Fe anemia and need for colonoscopy  Report any new symptoms  May need repeat Echo  Call with any questions or change in status discussed      Walter ESQUIVEL  Redd Ba is in for follow-up post hospital evaluation for left lower leg pain and dyspnea  Patient later stated that he had a history of DVT upper extremity after a PICC line  And had a potential twisting injury approximately 2 weeks prior while golfing to the lower extremity  He denied prolonged sitting or travel prior  He had a recent physical exam which showed some mild edema at the sock line on the left side and mild to the right with no significant calf pain or swelling at that time    He had called the office with c/o L lower leg pain and dyspnea and was sent to the emergency room for immediate evaluation after an office evaluation at [facility] where an ultrasound/Doppler was performed finding DVT and superficial phlebitis left lower extremity with a large clot burden and with  his dyspnea he was sent to the emergency room  At that time was evaluated by CT scan which showed bilateral pulmonary emboli  There was evidence of right heart strain which he was admitted and observed stabilized and discharged on oral anticoagulant  He has pending follow-up with Hematology and vascular  His initial labs showed a decreased antithrombin III level  He is feeling better with less dyspnea but still some with walking or ascending stairs, no chest pain and less leg discomfort  Vitals:    03/13/18 1521   BP: 114/70   Pulse: 80   Resp: 16   Temp: 98 8 °F (37 1 °C)       No Known Allergies    Current Outpatient Prescriptions on File Prior to Visit   Medication Sig Dispense Refill    apixaban (ELIQUIS) 5 mg Take 1 tablet (5 mg total) by mouth 2 (two) times a day for 90 days 180 tablet 0    Multiple Vitamin (MULTIVITAMIN) tablet Take 1 tablet by mouth daily      omeprazole (PriLOSEC) 20 mg delayed release capsule Take 20 mg by mouth daily      Na Sulfate-K Sulfate-Mg Sulf 17 5-3 13-1 6 GM/180ML SOLN Take 1 kit by mouth once for 1 dose 2 Bottle 0     No current facility-administered medications on file prior to visit  Past Medical History:   Diagnosis Date    Calcific tendinitis 2/27/2018    DVT of upper extremity (deep vein thrombosis) (HCC)        Past Surgical History:   Procedure Laterality Date    ULNAR NERVE TRANSPOSITION Right           reports that he has never smoked  He has never used smokeless tobacco  He reports that he drinks alcohol  He reports that he does not use drugs  reports that he has never smoked  He has never used smokeless tobacco     The following portions of the patient's history were reviewed and updated as appropriate: allergies, current medications, past family history, past medical history, past social history, past surgical history and problem list     Review of Systems   Constitutional: Positive for activity change and fatigue   Negative for appetite change, chills, diaphoresis and fever  HENT: Negative for congestion, drooling, postnasal drip, sore throat and trouble swallowing  Eyes: Negative for discharge  Respiratory: Positive for shortness of breath  Negative for chest tightness, wheezing and stridor  Cardiovascular: Positive for leg swelling  Negative for chest pain and palpitations  Gastrointestinal: Negative for abdominal distention  Endocrine: Negative for cold intolerance and heat intolerance  Musculoskeletal: Negative for arthralgias, joint swelling and neck stiffness  Skin: Negative for color change, pallor and rash  Neurological: Negative for dizziness and weakness  Psychiatric/Behavioral: Negative for agitation and behavioral problems  The patient is not nervous/anxious  Physical Exam   Constitutional: He is oriented to person, place, and time  He appears well-developed and well-nourished  HENT:   Head: Normocephalic  Eyes: EOM are normal  Pupils are equal, round, and reactive to light  Neck: Normal range of motion  Neck supple  Cardiovascular: Normal rate, regular rhythm and normal heart sounds  Pulmonary/Chest: Effort normal and breath sounds normal  No stridor  He has no rales  He exhibits no tenderness  Abdominal: Soft  Musculoskeletal: Normal range of motion  He exhibits edema  He exhibits no tenderness  Girth 1cm > L   mild discomfort to squeeze mid calf   Some discomfort dorsiflexion  "tight" homans   plus 2 edema  Visible fullness Varicosities L>R   Neurological: He is alert and oriented to person, place, and time  Skin: Skin is warm  Capillary refill takes less than 2 seconds  Psychiatric: He has a normal mood and affect   His behavior is normal  Judgment and thought content normal        Recent Results (from the past 1008 hour(s))   Comprehensive metabolic panel    Collection Time: 03/06/18 10:36 AM   Result Value Ref Range    SL AMB GLUCOSE 98 65 - 99 mg/dL    BUN 20 8 - 27 mg/dL    Creatinine, Serum 1 02 0 76 - 1 27 mg/dL    eGFR Non African American 80 >59 mL/min/1 73    SL AMB EGFR AFRICAN AMERICAN 92 >59 mL/min/1 73    SL AMB BUN/CREATININE RATIO 20 10 - 24    SL AMB SODIUM 139 134 - 144 mmol/L    SL AMB POTASSIUM 4 6 3 5 - 5 2 mmol/L    SL AMB CHLORIDE 101 96 - 106 mmol/L    SL AMB CARBON DIOXIDE 24 18 - 29 mmol/L    CALCIUM 9 1 8 6 - 10 2 mg/dL    SL AMB PROTEIN, TOTAL 7 0 6 0 - 8 5 g/dL    Serum Albumin 4 2 3 6 - 4 8 g/dL    Globulin, Total 2 8 1 5 - 4 5 g/dL    SL AMB ALBUMIN/GLOBULIN RATIO 1 5 1 2 - 2 2    SL AMB BILIRUBIN, TOTAL 0 3 0 0 - 1 2 mg/dL    Alk Phos Isoenzymes 85 39 - 117 IU/L    SL AMB AST 17 0 - 40 IU/L    SL AMB ALT 16 0 - 44 IU/L   Lipid panel    Collection Time: 03/06/18 10:36 AM   Result Value Ref Range    Cholesterol, Total 191 100 - 199 mg/dL    Triglycerides 86 0 - 149 mg/dL    SL AMB HDL CHOLESTEROL 43 >39 mg/dL    SL AMB VLDL CHOLESTEROL CALC 17 5 - 40 mg/dL    SL AMB LDL-CHOLESTEROL 131 (H) 0 - 99 mg/dL   PSA Total, Diagnostic    Collection Time: 03/06/18 10:36 AM   Result Value Ref Range    Prostate Specific Antigen Total 0 7 0 0 - 4 0 ng/mL   Ambig Abbrev Default    Collection Time: 03/06/18 10:36 AM   Result Value Ref Range    AMBIG ABBREV DEFAULT Comment    Ambig Abbrev Default    Collection Time: 03/06/18 10:36 AM   Result Value Ref Range    AMBIG ABBREV DEFAULT Comment    ECG 12 lead    Collection Time: 03/06/18  3:22 PM   Result Value Ref Range    Ventricular Rate 102 BPM    Atrial Rate 102 BPM    KS Interval 174 ms    QRSD Interval 82 ms    QT Interval 338 ms    QTC Interval 440 ms    P Axis 71 degrees    QRS Axis 12 degrees    T Wave Axis 29 degrees   CBC and differential    Collection Time: 03/06/18  3:23 PM   Result Value Ref Range    WBC 7 10 4 80 - 10 80 Thousand/uL    RBC 4 43 (L) 4 70 - 6 10 Million/uL    Hemoglobin 11 5 (L) 14 0 - 18 0 g/dL    Hematocrit 35 8 (L) 42 0 - 52 0 %    MCV 81 (L) 82 - 98 fL    MCH 25 9 (L) 27 0 - 31 0 pg    MCHC 32 2 31 4 - 37 4 g/dL    RDW 15 9 (H) 11 6 - 15 1 %    MPV 7 7 (L) 8 9 - 12 7 fL    Platelets 320 798 - 790 Thousands/uL    nRBC 0 /100 WBCs    Neutrophils Relative 66 43 - 75 %    Lymphocytes Relative 22 14 - 44 %    Monocytes Relative 9 4 - 12 %    Eosinophils Relative 3 0 - 6 %    Basophils Relative 1 0 - 1 %    Neutrophils Absolute 4 70 1 85 - 7 62 Thousands/µL    Lymphocytes Absolute 1 50 0 60 - 4 47 Thousands/µL    Monocytes Absolute 0 60 0 17 - 1 22 Thousand/µL    Eosinophils Absolute 0 20 0 00 - 0 61 Thousand/µL    Basophils Absolute 0 00 0 00 - 0 10 Thousands/µL   Protime-INR    Collection Time: 03/06/18  3:23 PM   Result Value Ref Range    Protime 11 0 9 4 - 11 7 seconds    INR 1 05 0 86 - 1 16   APTT    Collection Time: 03/06/18  3:23 PM   Result Value Ref Range    PTT 24 23 - 35 seconds   Comprehensive metabolic panel    Collection Time: 03/06/18  3:23 PM   Result Value Ref Range    Sodium 142 136 - 145 mmol/L    Potassium 3 7 3 5 - 5 3 mmol/L    Chloride 105 100 - 108 mmol/L    CO2 29 21 - 32 mmol/L    Anion Gap 8 4 - 13 mmol/L    BUN 22 5 - 25 mg/dL    Creatinine 0 98 0 60 - 1 30 mg/dL    Glucose 113 65 - 140 mg/dL    Calcium 8 8 8 3 - 10 1 mg/dL    AST 14 5 - 45 U/L    ALT 25 12 - 78 U/L    Alkaline Phosphatase 84 46 - 116 U/L    Total Protein 7 1 6 4 - 8 2 g/dL    Albumin 3 4 (L) 3 5 - 5 0 g/dL    Total Bilirubin 0 30 0 20 - 1 00 mg/dL    eGFR 83 ml/min/1 73sq m   Troponin I    Collection Time: 03/06/18  3:23 PM   Result Value Ref Range    Troponin I 0 10 (H) <=0 04 ng/mL   Iron Saturation %    Collection Time: 03/06/18  3:23 PM   Result Value Ref Range    Iron Saturation 6 %    TIBC 359 250 - 450 ug/dL    Iron 22 (L) 65 - 175 ug/dL   Ferritin    Collection Time: 03/06/18  3:23 PM   Result Value Ref Range    Ferritin 17 8 - 388 ng/mL   Troponin I    Collection Time: 03/06/18  9:12 PM   Result Value Ref Range    Troponin I 0 08 (H) <=0 04 ng/mL   MRSA culture    Collection Time: 03/06/18  9:49 PM   Result Value Ref Range    MRSA Culture Only       No Methicillin Resistant Staphlyococcus aureus (MRSA) isolated   APTT    Collection Time: 03/07/18 12:09 AM   Result Value Ref Range    PTT 74 (H) 23 - 35 seconds   CBC with differential    Collection Time: 03/07/18  5:51 AM   Result Value Ref Range    WBC 5 40 4 80 - 10 80 Thousand/uL    RBC 3 99 (L) 4 70 - 6 10 Million/uL    Hemoglobin 10 4 (L) 14 0 - 18 0 g/dL    Hematocrit 32 1 (L) 42 0 - 52 0 %    MCV 80 (L) 82 - 98 fL    MCH 26 1 (L) 27 0 - 31 0 pg    MCHC 32 5 31 4 - 37 4 g/dL    RDW 15 6 (H) 11 6 - 15 1 %    MPV 7 7 (L) 8 9 - 12 7 fL    Platelets 893 737 - 627 Thousands/uL    nRBC 0 /100 WBCs    Neutrophils Relative 54 43 - 75 %    Lymphocytes Relative 31 14 - 44 %    Monocytes Relative 9 4 - 12 %    Eosinophils Relative 5 0 - 6 %    Basophils Relative 1 0 - 1 %    Neutrophils Absolute 2 90 1 85 - 7 62 Thousands/µL    Lymphocytes Absolute 1 70 0 60 - 4 47 Thousands/µL    Monocytes Absolute 0 50 0 17 - 1 22 Thousand/µL    Eosinophils Absolute 0 30 0 00 - 0 61 Thousand/µL    Basophils Absolute 0 00 0 00 - 0 10 Thousands/µL   Basic metabolic panel    Collection Time: 03/07/18  5:51 AM   Result Value Ref Range    Sodium 142 136 - 145 mmol/L    Potassium 3 9 3 5 - 5 3 mmol/L    Chloride 108 100 - 108 mmol/L    CO2 24 21 - 32 mmol/L    Anion Gap 10 4 - 13 mmol/L    BUN 19 5 - 25 mg/dL    Creatinine 0 77 0 60 - 1 30 mg/dL    Glucose 111 65 - 140 mg/dL    Calcium 8 1 (L) 8 3 - 10 1 mg/dL    eGFR 99 ml/min/1 73sq m   Magnesium    Collection Time: 03/07/18  5:51 AM   Result Value Ref Range    Magnesium 2 1 1 6 - 2 6 mg/dL   Phosphorus    Collection Time: 03/07/18  5:51 AM   Result Value Ref Range    Phosphorus 3 4 2 3 - 4 1 mg/dL   NT-BNP PRO    Collection Time: 03/07/18  5:51 AM   Result Value Ref Range    NT-proBNP 844 (H) <125 pg/mL   Protime-INR    Collection Time: 03/07/18  5:54 AM   Result Value Ref Range    Protime 11 2 9 4 - 11 7 seconds    INR 1 07 0 86 - 1 16   APTT Collection Time: 03/07/18  5:54 AM   Result Value Ref Range    PTT 67 (H) 24 - 33 seconds   Antithrombin III Activity    Collection Time: 03/07/18 10:51 AM   Result Value Ref Range    AntiThrombIN III Activity 82 (L) 92 - 136 % of Normal   Cardiolipin antibody    Collection Time: 03/07/18 10:51 AM   Result Value Ref Range    Anticardiolipin IgA <9 0 - 11 APL U/mL    Anticardiolipin IgG <9 0 - 14 GPL U/mL    Anticardiolipin IgM <9 0 - 12 MPL U/mL   Factor 5 leiden    Collection Time: 03/07/18 10:51 AM   Result Value Ref Range    Factor V Leiden Comment     Comment Comment    Lupus anticoagulant    Collection Time: 03/07/18 10:51 AM   Result Value Ref Range    PTT Lupus Anticoagulant 39 8 0 0 - 51 9 sec    Dilute Viper Venom Time 39 6 0 0 - 47 0 sec    DILUTE PROTHROMBIN TIME(DPT) 45 4 0 0 - 55 0 sec    THROMBIN TIME (DRVW) >150 0 (H) 0 0 - 23 0 sec    DPT CONFIRM RATIO 0 89 0 00 - 1 40 Ratio    LUPUS REFLEX INTERPRETATION Comment:    Protein C activity    Collection Time: 03/07/18 10:51 AM   Result Value Ref Range    Protein C Activity 118 60 - 150 % of Normal   Protein S activity    Collection Time: 03/07/18 10:51 AM   Result Value Ref Range    Protein S Activity 118 63 - 140 %   Protein S Antigen, Total & Free    Collection Time: 03/07/18 10:51 AM   Result Value Ref Range    Protein S Ag, Total 84 60 - 150 %    Protein S Ag, Free 145 57 - 157 %   Beta-2 glycoprotein antibodies    Collection Time: 03/07/18 10:51 AM   Result Value Ref Range    Beta-2 Glyco 1 IgG <9 0 - 20 GPI IgG units    Beta-2 Glyco 1 IgA <9 0 - 25 GPI IgA units    Beta-2 Glyco 1 IgM <9 0 - 32 GPI IgM units   Thrombin Time Mix + TT Neutralization    Collection Time: 03/07/18 10:51 AM   Result Value Ref Range    THROMBIN TIME MIX-LA >150 0 (H) 0 0 - 23 0 sec    THROMBIN NEUTRALIZATION-LA 22 2 0 0 - 23 0 sec   Occult blood 1-3, stool    Collection Time: 03/07/18  9:06 PM   Result Value Ref Range    Fecal Occult Blood Diagnostic Negative Negative Fecal Occult Blood Diagnostic 2  Negative    Fecal Occult Blood Diagnostic 3  Negative   Basic metabolic panel    Collection Time: 03/08/18  5:18 AM   Result Value Ref Range    Sodium 139 136 - 145 mmol/L    Potassium 3 8 3 5 - 5 3 mmol/L    Chloride 106 100 - 108 mmol/L    CO2 26 21 - 32 mmol/L    Anion Gap 7 4 - 13 mmol/L    BUN 14 5 - 25 mg/dL    Creatinine 0 93 0 60 - 1 30 mg/dL    Glucose 109 65 - 140 mg/dL    Calcium 8 4 8 3 - 10 1 mg/dL    eGFR 89 ml/min/1 73sq m   CBC and differential    Collection Time: 03/08/18  5:18 AM   Result Value Ref Range    WBC 6 20 4 80 - 10 80 Thousand/uL    RBC 3 91 (L) 4 70 - 6 10 Million/uL    Hemoglobin 10 3 (L) 14 0 - 18 0 g/dL    Hematocrit 31 4 (L) 42 0 - 52 0 %    MCV 81 (L) 82 - 98 fL    MCH 26 4 (L) 27 0 - 31 0 pg    MCHC 32 7 31 4 - 37 4 g/dL    RDW 14 1 11 6 - 15 1 %    MPV 7 3 (L) 8 9 - 12 7 fL    Platelets 158 928 - 631 Thousands/uL    Neutrophils Relative 55 43 - 75 %    Lymphocytes Relative 30 14 - 44 %    Monocytes Relative 10 4 - 12 %    Eosinophils Relative 5 0 - 6 %    Basophils Relative 1 0 - 1 %    Neutrophils Absolute 3 40 1 85 - 7 62 Thousands/µL    Lymphocytes Absolute 1 90 0 60 - 4 47 Thousands/µL    Monocytes Absolute 0 60 0 17 - 1 22 Thousand/µL    Eosinophils Absolute 0 30 0 00 - 0 61 Thousand/µL    Basophils Absolute 0 00 0 00 - 0 10 Thousands/µL   Retic Count    Collection Time: 03/08/18  5:18 AM   Result Value Ref Range    Retic Ct Abs 49,600 14,356 - 105,094    Retic Ct Pct 1 27 0 37 - 1 87 %   APTT    Collection Time: 03/08/18  5:18 AM   Result Value Ref Range    PTT 63 (H) 24 - 33 seconds

## 2018-03-26 ENCOUNTER — OFFICE VISIT (OUTPATIENT)
Dept: HEMATOLOGY ONCOLOGY | Facility: MEDICAL CENTER | Age: 63
End: 2018-03-26
Payer: COMMERCIAL

## 2018-03-26 VITALS
RESPIRATION RATE: 18 BRPM | HEART RATE: 89 BPM | OXYGEN SATURATION: 97 % | WEIGHT: 234.6 LBS | HEIGHT: 74 IN | BODY MASS INDEX: 30.11 KG/M2 | TEMPERATURE: 99 F | SYSTOLIC BLOOD PRESSURE: 110 MMHG | DIASTOLIC BLOOD PRESSURE: 72 MMHG

## 2018-03-26 DIAGNOSIS — I82.412 ACUTE DEEP VEIN THROMBOSIS (DVT) OF FEMORAL VEIN OF LEFT LOWER EXTREMITY (HCC): Primary | ICD-10-CM

## 2018-03-26 PROCEDURE — 99214 OFFICE O/P EST MOD 30 MIN: CPT | Performed by: INTERNAL MEDICINE

## 2018-03-26 NOTE — PROGRESS NOTES
Louis Thornton [facility]  1955  [address] [facility] [place] [address] [id number]    DISCUSSION  SUMMARY:       68-year-old male recently found to have a left lower extremity DVT and bilateral PE  Presently patient states feeling well and clinically there are no concerning signs  Issues    1  DVT/ BL PE  Patient is to continue the eliquis and monitor for excessive bruising/bleeding  We also discussed what to monitor for in regard to acute pulmonary symptoms or lower extremity swelling  Patient is to continue with anticoagulation and undergo a thrombophilia evaluation in 6 weeks  [facility] will return here in 8 weeks with results      This is the 2nd incident; does not seem to be any provoking reason for the recent DVT and PE  The 1st DVT was likely associated with immobility after shoulder surgery  At this time, it is not clear if patient will need life long anticoagulation - the thrombophilia of results may help determine whether not patient requires lifelong anticoagulation      2  Non STEMI  This may have been associated with the RV strain from the PE  Patient is to follow up with Pulmonary and Cardiology      3  Anemia with a low normal MCV and elevated RDW while in the hospital   Patient routinely gives blood and had given blood approximately 3 weeks before the recent hospitalization  Clinically patient looks much better today    [facility] feels well and has no symptoms in regard to shortness of breath, dyspnea on exertion or fatigue etc   Part of the thrombophilia evaluation will include a CBC  At this time, and anemia workup is not necessary  This may change with the next CBC results  Patient is to return in 2 months  Patient knows to call the hematology/oncology office if there are any other questions or concerns  Carefully review your medication list and verify that the list is accurate and up-to-date   Please call the hematology/oncology office if there are medications missing from the list, medications on the list that you are not currently taking or if there is a dosage or instruction that is different from how you're taking that medication  Patient goals and areas of care: continue with anticoagulation  Patient is able to self-care   _____________________________________________________________________________________    Chief Complaint   Patient presents with    Follow-up     Shortness of breath, anemia, recent diagnosis of lower extremity DVT and PE, recent hospital patient here for follow-up     History of Present Illness:    82-year-old male recently seen in the hospital for the above  Mr Roman Ragland states having progressive shortness of breath and dyspnea on exertion for a few days prior to admission  Patient also had left calf tightness  Activities had been slightly decreased  Patient denied any fevers, chills or sweats, cough, sputum or hemoptysis  Appetite had been okay, no GI or  issues  workup demonstrated a left lower extremity DVT and PE   Mr Roman Ragland is presently on Eliquis      Patient had shoulder surgery approximately 6 years ago and subsequently developed an upper extremity DVT (same side)  Patient was treated with anticoagulation for approximately 3 months; medication was subsequently discontinued      Presently patient states feeling okay, better than before  No shortness of breath or dyspnea on exertion  No problems with excessive bruising or bleeding  No chest pain or pressure  No GI issues  Patient states that his activities have improved  Mr Roman Ragland states that the left lower extremity swelling is less/better but still not gone  Review of Systems   Constitutional: Negative  HENT: Negative  Eyes: Negative  Respiratory: Negative  Cardiovascular: Positive for leg swelling  Gastrointestinal: Negative  Endocrine: Negative  Genitourinary: Negative      Musculoskeletal: Negative  Skin: Negative  Allergic/Immunologic: Negative  Neurological: Negative  Hematological: Negative  Psychiatric/Behavioral: Negative  All other systems reviewed and are negative  Patient Active Problem List   Diagnosis    Mixed hyperlipidemia    Snoring    Gastroesophageal reflux disease with esophagitis    Cervical stenosis of spine    Calcific tendinitis    Pulmonary embolism with acute cor pulmonale (HCC)    Acute deep vein thrombosis (DVT) of femoral vein of left lower extremity (HCC)    NSTEMI (non-ST elevated myocardial infarction) (HCC)    Microcytic anemia    Iron deficiency anemia     Past Medical History:   Diagnosis Date    Calcific tendinitis 2/27/2018    [facility] upper extremity [facility] thrombosis[facility]      Past Surgical History:   Procedure Laterality Date    ULNAR NERVE TRANSPOSITION Right      Family History   Problem Relation Age of Onset    Diabetes Father     Cancer Maternal Grandmother     Cancer Maternal Grandfather      Social History     Social History    Marital status: Unknown     Spouse name: N/A    Number of children: N/A    Years of education: N/A     Occupational History    Not on file       Social History Main Topics    Smoking status: Never Smoker    Smokeless tobacco: Never Used    Alcohol use Yes      Comment: social    Drug use: No    Sexual activity: Yes     Other Topics Concern    Not on file     Social History Narrative    No narrative on file       Current Outpatient Prescriptions:     apixaban (ELIQUIS) 5 mg, Take 1 tablet (5 mg total) by mouth 2 (two) times a day for 90 days, Disp: 180 tablet, Rfl: 0    Multiple Vitamin (MULTIVITAMIN) tablet, Take 1 tablet by mouth daily, Disp: , Rfl:     omeprazole (PriLOSEC) 20 mg delayed release capsule, Take 20 mg by mouth daily, Disp: , Rfl:     No Known Allergies    Vitals:    03/26/18 1504   BP: 110/72   Pulse: 89   Resp: 18   Temp: 99 °F (37 2 °C)   SpO2: 97%     Physical Exam   Constitutional: He is oriented to person, place, and time  He appears well-developed and well-nourished  HENT:   Head: Normocephalic and atraumatic  Right Ear: External ear normal    Left Ear: External ear normal    Nose: Nose normal    Mouth/Throat: Oropharynx is clear and moist    Eyes: Conjunctivae and EOM are normal  Pupils are equal, round, and reactive to light  Neck: Normal range of motion  Neck supple  Cardiovascular: Normal rate, regular rhythm, normal heart sounds and intact distal pulses  Pulmonary/Chest: Effort normal and breath sounds normal    Good air entry bilaterally   Abdominal: Soft  Bowel sounds are normal    Musculoskeletal: Normal range of motion  Neurological: He is alert and oriented to person, place, and time  He has normal reflexes  Skin: Skin is warm  Skin with good color, good turgor, no petechiae or ecchymoses   Psychiatric: He has a normal mood and affect  His behavior is normal  Judgment and thought content normal    Extremities:  1+ left lower extremity edema (see below), no cords, pulses are 1+  Lymphatics:  No adenopathy in the neck, supraclavicular region, axilla and groin bilaterally          Labs:    03/08/2015 WBC = 6 2 hemoglobin = 10 3 hematocrit = 31 4 MCV = 81 platelet = 978 neutrophil = 55% reticulocyte = 1 27% BUN = 14 creatinine = 0 93  03/06/2018 calcium = 8 8 ALT = 25 AST = 14 alkaline phosphatase = 84 total protein = 7 1 total bilirubin = 0 30    Imaging     03/06/2018 CTA chest PE study     1   Extensive bilateral pulmonary arterial filling defects consistent with PE  The calculated ratio of right ventricular to left ventricular diameter (RV/LV ratio) is 1 2  This is greater than 0 9, which is abnormal and indicates right heart strain  An abnormal RV/LV ratio has been shown to be associated with an increased risk of 30 day mortality in the setting of acute pulmonary embolism    2   Hepatomegaly      03/06/2018 vascular bilateral lower limb duplex study     RIGHT LOWER LIMB: Normal  No evidence of acute or chronic deep vein thrombosis  No evidence of superficial thrombophlebitis noted  Doppler evaluation shows a normal response to augmentation maneuvers  Popliteal, posterior tibial and anterior tibial arterial Doppler waveforms are  triphasic      LEFT LOWER LIMB: Abnormal  Acute non-occlusive deep vein thrombosis in the proximal femoral, mid femoral,  gastrocnemius vein, posterior tibial vein, and peroneal vein  Acute occluded deep vein thrombosis in the distal femoral vein, and popliteal  vein  Evidence of superficial thrombophlebitis noted in the small saphenous vein  Doppler evaluation shows a normal response to augmentation maneuvers  Popliteal, posterior tibial and anterior tibial arterial Doppler waveforms are  triphasic      Pathology    03/07/2018 fecal occult blood negative x 3

## 2018-04-10 ENCOUNTER — OFFICE VISIT (OUTPATIENT)
Dept: GASTROENTEROLOGY | Facility: CLINIC | Age: 63
End: 2018-04-10
Payer: COMMERCIAL

## 2018-04-10 VITALS
DIASTOLIC BLOOD PRESSURE: 72 MMHG | BODY MASS INDEX: 30.16 KG/M2 | TEMPERATURE: 98.3 F | HEART RATE: 86 BPM | WEIGHT: 235 LBS | HEIGHT: 74 IN | SYSTOLIC BLOOD PRESSURE: 114 MMHG

## 2018-04-10 DIAGNOSIS — D50.0 IRON DEFICIENCY ANEMIA DUE TO CHRONIC BLOOD LOSS: Primary | ICD-10-CM

## 2018-04-10 DIAGNOSIS — Z12.11 COLON CANCER SCREENING: ICD-10-CM

## 2018-04-10 DIAGNOSIS — K21.00 GASTROESOPHAGEAL REFLUX DISEASE WITH ESOPHAGITIS: ICD-10-CM

## 2018-04-10 PROCEDURE — 99204 OFFICE O/P NEW MOD 45 MIN: CPT | Performed by: INTERNAL MEDICINE

## 2018-04-10 NOTE — LETTER
April 10, 2018     [address], [address] [facility] [address]    Patient: Colton Conroy   YOB: 1955   Date of Visit: 4/10/2018       Dear Dr Samuel Gaston:    Thank you for referring Colton Conroy to me for evaluation  Below are my notes for this consultation  If you have questions, please do not hesitate to call me  I look forward to following your patient along with you  Sincerely,        Unique Mckeon MD        CC: No Recipients  Unique Mckeon MD  4/10/2018  5:54 PM  Sign at close encounter  Consultation - [address] [facility]  Colton Conroy 66 y o  male MRN: [id number]          Assessment & Plan:    Of pleasant 30-year-old gentleman recently diagnosed with DVT/PE, this is his 2nd DVT, he was started on anticoagulation about 1 month ago  He is following with Hematology for evaluation of hypercoagulable workup  The patient is due for screening colonoscopy  He also has a longstanding history of acid reflux which has been well controlled with daily PPI therapy  1   Screening colonoscopy: The patient is due for screening examination however he has no significant GI symptoms  -we will defer endoscopic evaluation until his hematological evaluation has been completed and he can safely stop his anticoagulation therapy    2  Anemia: This was seen on his laboratory studies, patient reports frequent phlebotomy, this likely cause of his anemia  -would recommend repeating outpatient CBC, if he has any significant drop in his hemoglobin while on anticoagulation would expedite his endoscopic evaluation    3    Acid reflux[de-identified]  Currently well controlled on daily PPI therapy  -given his longstanding history of reflux  -we will plan to proceed with upper endoscopy the same time of his colonoscopy        _____________________________________________________________        CC: screening colonoscopy    HPI:  Colton Conroy is a 66 y o male who was referred for colon cancer surveillance colonoscopy  Patient reports that his last colonoscopy was about 10 years ago  He is asymptomatic at this time  Patient denies abdominal pain, change in bowel habits, change in stool caliber, melena, hematochezia, rectal bleeding, tenesmus, and weight loss  He also denies loss of appetite, nausea, vomiting, diarrhea, and dysphagia  He has been taking Omeprazole for 10 years for reflux  He had an EGD at the time of his last colonoscopy  He is taking Eliquis 5mg for his DVT and pulmonary embolism  The blood clots were diagnosed on 03/06/2018 during an ED visit  He had a blood clot in his shoulder after having surgery done about 5 years ago  He sees Dr Cintia Aguayo for this issue  His last blood work showed that he is slightly anemic  He donates blood regularly every 6 months, he takes an iron supplement  He previously complained of shortness of breath on exertion, which has improved significantly  He has no family history of blood clots or colon cancer  He has about 10 drinks a week  He worked at [employer] and he just recently retired  ROS:  The remainder of the ROS was negative except for the pertinent positives mentioned in HPI  Allergies: Patient has no known allergies      Medications:   Current Outpatient Prescriptions:     apixaban (ELIQUIS) 5 mg, Take 1 tablet (5 mg total) by mouth 2 (two) times a day for 90 days, Disp: 180 tablet, Rfl: 0    Multiple Vitamin (MULTIVITAMIN) tablet, Take 1 tablet by mouth daily, Disp: , Rfl:     omeprazole (PriLOSEC) 20 mg delayed release capsule, Take 20 mg by mouth daily, Disp: , Rfl: '    Past Medical History:   Diagnosis Date    Calcific tendinitis 2/27/2018    DVT of upper extremity (deep vein thrombosis) (HCC)        Past Surgical History:   Procedure Laterality Date    COLONOSCOPY      ULNAR NERVE TRANSPOSITION Right        Family History   Problem Relation Age of Onset    Diabetes Father     Cancer Maternal Grandmother     Cancer Maternal Grandfather     Colon cancer Neg Hx     Liver disease Neg Hx         reports that he has never smoked  He has never used smokeless tobacco  He reports that he drinks alcohol  He reports that he does not use drugs  Physical Exam:     /72 (BP Location: Left arm, Patient Position: Sitting, Cuff Size: Standard)   Pulse 86   Temp 98 3 °F (36 8 °C)   Ht 6' 2" (1 88 m)   Wt 107 kg (235 lb)   BMI 30 17 kg/m²      Gen: wn/wd, NAD  HEENT: anicteric, MMM, no cervical LAD  CVS: RRR, no m/r/g  CHEST: CTA b/l  ABD: +BS, soft, NT,ND, no hepatosplenomegaly  EXT:  1+ left lower extremity edema  NEURO: aaox3  SKIN: NO rashes    Attestation:   By signing my name below, Ellis Galloway attmontez that this documentation has been prepared under the direction and in the presence of Lorin Hart MD  Electronically Signed: Mariah Jones  04/10/2018  I, Lorin Hart, personally performed the services described in this documentation  All medical record entries made by the brentibtam were at my direction and in my presence  I have reviewed the chart and discharge instructions and agree that the record reflects my personal performance and is accurate and complete  Lorin Hart MD  04/10/2018

## 2018-05-15 ENCOUNTER — APPOINTMENT (OUTPATIENT)
Dept: LAB | Facility: HOSPITAL | Age: 63
End: 2018-05-15
Attending: INTERNAL MEDICINE
Payer: COMMERCIAL

## 2018-05-15 ENCOUNTER — TRANSCRIBE ORDERS (OUTPATIENT)
Dept: ADMINISTRATIVE | Facility: HOSPITAL | Age: 63
End: 2018-05-15

## 2018-05-15 DIAGNOSIS — I82.412 THROMBOSIS OF LEFT FEMORAL VEIN (HCC): ICD-10-CM

## 2018-05-15 DIAGNOSIS — I26.09 ACUTE COR PULMONALE (HCC): ICD-10-CM

## 2018-05-15 DIAGNOSIS — I82.412 THROMBOSIS OF LEFT FEMORAL VEIN (HCC): Primary | ICD-10-CM

## 2018-05-15 PROCEDURE — 36415 COLL VENOUS BLD VENIPUNCTURE: CPT

## 2018-05-29 ENCOUNTER — OFFICE VISIT (OUTPATIENT)
Dept: HEMATOLOGY ONCOLOGY | Facility: MEDICAL CENTER | Age: 63
End: 2018-05-29
Payer: COMMERCIAL

## 2018-05-29 VITALS
SYSTOLIC BLOOD PRESSURE: 118 MMHG | WEIGHT: 234 LBS | OXYGEN SATURATION: 99 % | TEMPERATURE: 97.8 F | BODY MASS INDEX: 30.03 KG/M2 | HEIGHT: 74 IN | DIASTOLIC BLOOD PRESSURE: 72 MMHG | HEART RATE: 78 BPM | RESPIRATION RATE: 18 BRPM

## 2018-05-29 DIAGNOSIS — I82.412 ACUTE DEEP VEIN THROMBOSIS (DVT) OF FEMORAL VEIN OF LEFT LOWER EXTREMITY (HCC): Primary | ICD-10-CM

## 2018-05-29 PROCEDURE — 99214 OFFICE O/P EST MOD 30 MIN: CPT | Performed by: INTERNAL MEDICINE

## 2018-05-29 NOTE — PROGRESS NOTES
[facility]  1955  [address] [facility] [place] [facility] [id number]    DISCUSSION  SUMMARY:       60-year-old male recently found to have a left lower extremity DVT and bilateral PE  Presently patient states feeling well and clinically there are no concerning signs  Issues    1  DVT/ BL PE  Patient is to continue the eliquis and monitor for excessive bruising/bleeding  We also discussed what to monitor for in regard to acute pulmonary symptoms or lower extremity swelling  Patient is to continue with anticoagulation for the time being      This is the 2nd incident; does not seem to be any provoking reason for the recent DVT and PE  The 1st DVT was likely associated with immobility after right shoulder surgery  The thrombophilia evaluation only demonstrated an elevated factor 8 activity level as a prothrombotic risk factor  That being said, patient has now suffered two episodes and the 2nd episode was complicated by bilateral PE and heart strain  Mr  [facility] has no issues/complications with the Eliquis  Patient is to continue for the time being  We discussed the fact that the most important hematology goal is to prevent any additional thromboses  That would mean continue with anticoagulation  Patient is concerned about the financial aspect  Keeping in mind the importance of the financial issue, I have counseled the patient to try in think about what is best for him medically      2  Non STEMI  This may have been associated with the RV strain from the PE  Patient is to follow up with his PCP      3  Anemia with a low normal MCV and elevated RDW while in the hospital   Patient routinely gives blood and had given blood approximately 3 weeks before the recent hospitalization  Color today is good/WNL  Patient has refrained from donating any additional blood recently    We discussed what to monitor for in regard to progressive anemia  CBC can eventually be rechecked  Patient is to return in 6 months  Patient knows to call the hematology/oncology office if there are any other questions or concerns  Carefully review your medication list and verify that the list is accurate and up-to-date  Please call the hematology/oncology office if there are medications missing from the list, medications on the list that you are not currently taking or if there is a dosage or instruction that is different from how you're taking that medication  Patient goals and areas of care: continue with anticoagulation  Patient is able to self-care   _____________________________________________________________________________________    Chief Complaint   Patient presents with    Follow-up     DVT, bilateral PE on Eliquis     History of Present Illness:    72-year-old male recently seen in the hospital for the above  Mr Mirtha Mills states having progressive shortness of breath and dyspnea on exertion for a few days prior to admission  Patient also had left calf tightness  Activities had been slightly decreased  Patient denied any fevers, chills or sweats, cough, sputum or hemoptysis  Appetite had been okay, no GI or  issues  workup demonstrated a left lower extremity DVT and PE   Mr Mirtha Mills is presently on Eliquis      Patient had shoulder surgery approximately 6 years ago and subsequently developed an upper extremity DVT (same side)  Patient was treated with anticoagulation for approximately 3 months; medication was subsequently discontinued      Presently patient states feeling okay, baseline  No shortness of breath or dyspnea on exertion  No problems with excessive bruising or bleeding  No chest pain or pressure  No GI issues  Patient states that his activities are back to baseline  Patient is playing slow pitch softball  Mr Mirtha Mills states that the left lower extremity swelling is gone  Review of Systems   Constitutional: Negative  HENT: Negative  Eyes: Negative  Respiratory: Negative  Cardiovascular: Negative for leg swelling  Gastrointestinal: Negative  Endocrine: Negative  Genitourinary: Negative  Musculoskeletal: Negative  Skin: Negative  Allergic/Immunologic: Negative  Neurological: Negative  Hematological: Negative  Psychiatric/Behavioral: Negative  All other systems reviewed and are negative  Patient Active Problem List   Diagnosis    Mixed hyperlipidemia    Snoring    Gastroesophageal reflux disease with esophagitis    Cervical stenosis of spine    Calcific tendinitis    Colon cancer screening    Pulmonary embolism with acute cor pulmonale (HCC)    Acute deep vein thrombosis (DVT) of femoral vein of left lower extremity (HCC)    NSTEMI (non-ST elevated myocardial infarction) (HCC)    Microcytic anemia    Iron deficiency anemia     Past Medical History:   Diagnosis Date    Calcific tendinitis 2/27/2018    DVT of upper extremity (deep vein thrombosis) (HCC)      Past Surgical History:   Procedure Laterality Date    COLONOSCOPY      ULNAR NERVE TRANSPOSITION Right      Family History   Problem Relation Age of Onset    Diabetes Father     Cancer Maternal Grandmother     Cancer Maternal Grandfather     Colon cancer Neg Hx     Liver disease Neg Hx      Social History     Social History    Marital status: Unknown     Spouse name: N/A    Number of children: N/A    Years of education: N/A     Occupational History    Not on file       Social History Main Topics    Smoking status: Never Smoker    Smokeless tobacco: Never Used    Alcohol use Yes      Comment: social    Drug use: No    Sexual activity: Yes     Other Topics Concern    Not on file     Social History Narrative    No narrative on file       Current Outpatient Prescriptions:     apixaban (ELIQUIS) 5 mg, Take 1 tablet (5 mg total) by mouth 2 (two) times a day for 90 days, Disp: 180 tablet, Rfl: 0    Multiple Vitamin (MULTIVITAMIN) tablet, Take 1 tablet by mouth daily, Disp: , Rfl:     omeprazole (PriLOSEC) 20 mg delayed release capsule, Take 20 mg by mouth daily, Disp: , Rfl:     No Known Allergies    Vitals:    05/29/18 0735   BP: 118/72   Pulse: 78   Resp: 18   Temp: 97 8 °F (36 6 °C)   SpO2: 99%     Physical Exam   Constitutional: He is oriented to person, place, and time  He appears well-developed and well-nourished  HENT:   Head: Normocephalic and atraumatic  Right Ear: External ear normal    Left Ear: External ear normal    Nose: Nose normal    Mouth/Throat: Oropharynx is clear and moist    Eyes: Conjunctivae and EOM are normal  Pupils are equal, round, and reactive to light  Neck: Normal range of motion  Neck supple  Cardiovascular: Normal rate, regular rhythm, normal heart sounds and intact distal pulses  Pulmonary/Chest: Effort normal and breath sounds normal    Good air entry bilaterally   Abdominal: Soft  Bowel sounds are normal    Musculoskeletal: Normal range of motion  Neurological: He is alert and oriented to person, place, and time  He has normal reflexes  Skin: Skin is warm  Skin with good color, good turgor, no petechiae or ecchymoses   Psychiatric: He has a normal mood and affect  His behavior is normal  Judgment and thought content normal    Extremities:  no lower extremity edema (see below), no cords, pulses are 1+  Lymphatics:  No adenopathy in the neck, supraclavicular region, axilla and groin bilaterally    Labs:    03/08/2015 WBC = 6 2 hemoglobin = 10 3 hematocrit = 31 4 MCV = 81 platelet = 432 neutrophil = 55% reticulocyte = 1 27% BUN = 14 creatinine = 0 93  03/06/2018 calcium = 8 8 ALT = 25 AST = 14 alkaline phosphatase = 84 total protein = 7 1 total bilirubin = 0 30    Imaging     03/06/2018 CTA chest PE study     1   Extensive bilateral pulmonary arterial filling defects consistent with PE   The calculated ratio of right ventricular to left ventricular diameter (RV/LV ratio) is 1 2  This is greater than 0 9, which is abnormal and indicates right heart strain  An abnormal RV/LV ratio has been shown to be associated with an increased risk of 30 day mortality in the setting of acute pulmonary embolism  2   Hepatomegaly      03/06/2018 vascular bilateral lower limb duplex study     RIGHT LOWER LIMB: Normal  No evidence of acute or chronic deep vein thrombosis  No evidence of superficial thrombophlebitis noted  Doppler evaluation shows a normal response to augmentation maneuvers  Popliteal, posterior tibial and anterior tibial arterial Doppler waveforms are  triphasic      LEFT LOWER LIMB: Abnormal  Acute non-occlusive deep vein thrombosis in the proximal femoral, mid femoral,  gastrocnemius vein, posterior tibial vein, and peroneal vein  Acute occluded deep vein thrombosis in the distal femoral vein, and popliteal  vein  Evidence of superficial thrombophlebitis noted in the small saphenous vein  Doppler evaluation shows a normal response to augmentation maneuvers  Popliteal, posterior tibial and anterior tibial arterial Doppler waveforms are  triphasic  Pathology    05/15/2018 GP hemostasis assessment stated the only prothrombotic risk factor was 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

## 2018-05-31 LAB — MISCELLANEOUS LAB TEST RESULT: NORMAL

## 2018-06-12 ENCOUNTER — HOSPITAL ENCOUNTER (OUTPATIENT)
Dept: RADIOLOGY | Facility: HOSPITAL | Age: 63
Discharge: HOME | End: 2018-06-12
Payer: COMMERCIAL

## 2018-06-12 DIAGNOSIS — I82.412 ACUTE DEEP VEIN THROMBOSIS (DVT) OF FEMORAL VEIN OF LEFT LOWER EXTREMITY (HCC): ICD-10-CM

## 2018-06-12 PROCEDURE — 93970 EXTREMITY STUDY: CPT | Performed by: SURGERY

## 2018-06-12 PROCEDURE — 93970 EXTREMITY STUDY: CPT

## 2018-06-13 DIAGNOSIS — I26.09 OTHER ACUTE PULMONARY EMBOLISM WITH ACUTE COR PULMONALE (HCC): ICD-10-CM

## 2018-06-13 DIAGNOSIS — I82.412 ACUTE DEEP VEIN THROMBOSIS (DVT) OF FEMORAL VEIN OF LEFT LOWER EXTREMITY (HCC): ICD-10-CM

## 2018-06-26 ENCOUNTER — OFFICE VISIT (OUTPATIENT)
Dept: VASCULAR SURGERY | Facility: CLINIC | Age: 63
End: 2018-06-26
Payer: COMMERCIAL

## 2018-06-26 VITALS
SYSTOLIC BLOOD PRESSURE: 122 MMHG | RESPIRATION RATE: 16 BRPM | HEART RATE: 72 BPM | TEMPERATURE: 97.2 F | WEIGHT: 241 LBS | DIASTOLIC BLOOD PRESSURE: 72 MMHG | HEIGHT: 74 IN | BODY MASS INDEX: 30.93 KG/M2

## 2018-06-26 DIAGNOSIS — I82.512 CHRONIC DEEP VEIN THROMBOSIS (DVT) OF LEFT FEMORAL VEIN (HCC): Primary | Chronic | ICD-10-CM

## 2018-06-26 DIAGNOSIS — I26.09 OTHER ACUTE PULMONARY EMBOLISM WITH ACUTE COR PULMONALE (HCC): ICD-10-CM

## 2018-06-26 PROBLEM — Z12.11 SCREEN FOR COLON CANCER: Status: ACTIVE | Noted: 2018-06-26

## 2018-06-26 PROCEDURE — 99204 OFFICE O/P NEW MOD 45 MIN: CPT | Performed by: SURGERY

## 2018-06-26 NOTE — PATIENT INSTRUCTIONS
Chronic deep vein thrombosis (DVT) of left femoral vein (HCC)  Extensive left lower extremity DVT and extensive pulmonary embolus diagnosed in February of 2008  This appears to have been an unprovoked DVT and in fact his 2nd event  We discussed the pathophysiology of venous thrombo embolus in specifically post phlebitic syndrome with its associated long-term sequela  We discussed the importance of long-term conservative management to include compressive therapy, elevation, exercise and maintenance of a healthy weight  He has been given a prescription for appropriate compressive stockings and will follow up on an as-needed basis    In regards to his thrombophilia he will continue follow-up with his hematologist

## 2018-06-26 NOTE — PROGRESS NOTES
Assessment/Plan:    Chronic deep vein thrombosis (DVT) of left femoral vein (HCC)  Extensive left lower extremity DVT and extensive pulmonary embolus diagnosed in February of 2008  This appears to have been an unprovoked DVT and in fact his 2nd event  We discussed the pathophysiology of venous thrombo embolus in specifically post phlebitic syndrome with its associated long-term sequela  We discussed the importance of long-term conservative management to include compressive therapy, elevation, exercise and maintenance of a healthy weight  He has been given a prescription for appropriate compressive stockings and will follow up on an as-needed basis  In regards to his thrombophilia he will continue follow-up with his hematologist        Diagnoses and all orders for this visit:    Chronic deep vein thrombosis (DVT) of left femoral vein (HCC)  -     Compression Stocking    Other acute pulmonary embolism with acute cor pulmonale (HCC)  -     Compression Stocking          Subjective:      Patient ID: Colton Conroy is a 66 y o  male  Patient is new to the practice  Patient is being treated for LLE DVT and bilat PE  He had LEV on 6/12  He is taking Eliquis without difficulty  He denies LLE pain  He has swelling in the LLE that he feels improved  He has compression stockings but is not using them  He denies SOB  66year-old noticed left calf discomfort and swelling in February of 2018  He states this was unprovoked without any obvious inciting event  He noticed the swelling continued to worsen and he also noticed some increasing shortness of breath in which he could not even walk up a flight of stairs  He subsequently was evaluated by his primary care physician who obtained a duplex evaluation diagnosing and extensive left lower extremity DVT  Upon hospitalization he was also found to have an extensive pulmonary embolus  He has been maintained on Eliquis therapy since that time      On evaluation today he has no discomfort in his left leg  He does have some residual swelling  He remains very active and in fact plays on a softball team   He denies residual shortness of breath  He does not utilize compressive stockings  He denies a family history of deep or superficial venous thrombosis of though his father does take blood thinners for an unknown reason  Duplex evaluation 06/12/2018 with left chronic deep venous thrombosis of the common femoral, femoral and popliteal veins with resolution of tibial and short saphenous vein thrombus as compared to previous study on 03/06/2018  The following portions of the patient's history were reviewed and updated as appropriate: allergies, current medications, past family history, past medical history, past social history, past surgical history and problem list     Review of Systems   Constitutional: Negative  HENT: Positive for congestion and sneezing  Eyes: Negative  Respiratory: Negative  Cardiovascular: Negative  Gastrointestinal: Negative  Endocrine: Negative  Genitourinary: Negative  Musculoskeletal: Negative  Skin: Negative  Allergic/Immunologic: Negative  Neurological: Negative  Hematological: Negative  Psychiatric/Behavioral: Negative  Objective:      /72 (BP Location: Left arm, Patient Position: Sitting, Cuff Size: Adult)   Pulse 72   Temp (!) 97 2 °F (36 2 °C) (Tympanic)   Resp 16   Ht 6' 2" (1 88 m)   Wt 109 kg (241 lb)   BMI 30 94 kg/m²          Physical Exam   Constitutional: He is oriented to person, place, and time  He appears well-developed and well-nourished  HENT:   Head: Normocephalic and atraumatic  Eyes: Pupils are equal, round, and reactive to light  Neck: Normal range of motion  Cardiovascular: Normal rate  Pulses:       Dorsalis pedis pulses are 2+ on the right side, and 2+ on the left side  Musculoskeletal: Normal range of motion   He exhibits edema (Bilateral lower extremity swelling more severe on the left as compared to the right)  He exhibits no tenderness  Neurological: He is alert and oriented to person, place, and time  Skin: Skin is warm  Psychiatric: He has a normal mood and affect

## 2018-06-26 NOTE — ASSESSMENT & PLAN NOTE
Extensive left lower extremity DVT and extensive pulmonary embolus diagnosed in February of 2008  This appears to have been an unprovoked DVT and in fact his 2nd event  We discussed the pathophysiology of venous thrombo embolus in specifically post phlebitic syndrome with its associated long-term sequela  We discussed the importance of long-term conservative management to include compressive therapy, elevation, exercise and maintenance of a healthy weight  He has been given a prescription for appropriate compressive stockings and will follow up on an as-needed basis    In regards to his thrombophilia he will continue follow-up with his hematologist

## 2018-06-26 NOTE — LETTER
June 26, 2018     Shine Martinez MD  [address]  [id number]    Patient: Gigi Leahy   YOB: 1955   Date of Visit: 6/26/2018       Dear Dr Higinio Antonio:    Thank you for referring Gigi Leahy to me for evaluation  Below are the relevant portions of my assessment and plan of care  Chronic deep vein thrombosis (DVT) of left femoral vein (HCC)  Extensive left lower extremity DVT and extensive pulmonary embolus diagnosed in February of 2008  This appears to have been an unprovoked DVT and in fact his 2nd event  We discussed the pathophysiology of venous thrombo embolus in specifically post phlebitic syndrome with its associated long-term sequela  We discussed the importance of long-term conservative management to include compressive therapy, elevation, exercise and maintenance of a healthy weight  He has been given a prescription for appropriate compressive stockings and will follow up on an as-needed basis  In regards to his thrombophilia he will continue follow-up with his hematologist       If you have questions, please do not hesitate to call me  I look forward to following Remigio Love along with you           Sincerely,        Pietro Hammond MD        CC: Alexandra Harris MD

## 2018-07-20 ENCOUNTER — OFFICE VISIT (OUTPATIENT)
Dept: GASTROENTEROLOGY | Facility: CLINIC | Age: 63
End: 2018-07-20
Payer: COMMERCIAL

## 2018-07-20 VITALS
DIASTOLIC BLOOD PRESSURE: 68 MMHG | SYSTOLIC BLOOD PRESSURE: 130 MMHG | TEMPERATURE: 96.5 F | HEIGHT: 74 IN | BODY MASS INDEX: 30.29 KG/M2 | HEART RATE: 75 BPM | WEIGHT: 236 LBS

## 2018-07-20 DIAGNOSIS — K21.00 GASTROESOPHAGEAL REFLUX DISEASE WITH ESOPHAGITIS: ICD-10-CM

## 2018-07-20 DIAGNOSIS — Z12.11 COLON CANCER SCREENING: Primary | ICD-10-CM

## 2018-07-20 DIAGNOSIS — D50.9 MICROCYTIC ANEMIA: ICD-10-CM

## 2018-07-20 PROCEDURE — 99213 OFFICE O/P EST LOW 20 MIN: CPT | Performed by: INTERNAL MEDICINE

## 2018-07-20 NOTE — LETTER
July 20, 2018     MD [address]  [address]  [id number]    Patient: Cassy Hylton   YOB: 1955   Date of Visit: 7/20/2018       Dear Dr Carlos Stone Recipients: Thank you for referring Cassy Hylton to me for evaluation  Below are my notes for this consultation  If you have questions, please do not hesitate to call me  I look forward to following your patient along with you  Sincerely,        Herbie Horne MD        CC: No Recipients  Herbie Horne MD  7/20/2018 12:15 PM  Sign at close encounter  [facility]  Cassy Hylton 66 y o  male MRN: [id number]            Assessment & Plan:    Pleasant 60-year-old gentleman recently diagnosed with DVT/PE in April, currently doing well on anticoagulation here for evaluation for screening colonoscopy  His last examination was 10 years ago  The patient also has a longstanding history of acid reflux and has been on PPI therapy for over 10 years  1   Screening colonoscopy:  -patient is overdue for his examination, especially in light of his recent anemia  -given that he has been doing well with DVT/PE, will get clearance from Oncology to hold his anticoagulation for the procedures    2  History of chronic reflux: Will proceed with an upper endoscopy the same time as colonoscopy to evaluate for Hall's esophagus    I discussed with him the risks of the procedures including bleeding, surgery, perforation, missed polyp detection rate  Will get clearance from Hematology/Oncology with regards to anticoagulation therapy and bridging       _____________________________________________________________        CC:  Evaluation for colonoscopy    HPI:  Cassy Hylton is a 66 y o male who is here for follow-up for colonoscopy    As you know this is a 60-year-old gentleman we had seen a few months ago when he presented for evaluation for colonoscopy, at that time he was just diagnosed with a new DVT and PE, is currently on anticoagulation therapy  We deferred his procedure due to his recent blood clot  Patient is not doing well with no further shortness of breath or lower extremity edema  Denies any GI symptoms  Was found to have anemia in the past with this is in the setting of recurrent phlebotomy  He has had genetic testing but unable to identify any hereditary causes of his coagulopathy  ROS:  The remainder of the ROS was negative except for the pertinent positives mentioned in HPI  Allergies: Patient has no known allergies  Medications:   Current Outpatient Prescriptions:     apixaban (ELIQUIS) 5 mg, Take 1 tablet (5 mg total) by mouth 2 (two) times a day for 90 days, Disp: 180 tablet, Rfl: 0    Multiple Vitamin (MULTIVITAMIN) tablet, Take 1 tablet by mouth daily, Disp: , Rfl:     omeprazole (PriLOSEC) 20 mg delayed release capsule, Take 20 mg by mouth daily, Disp: , Rfl:     Past Medical History:   Diagnosis Date    Calcific tendinitis 2/27/2018    DVT of upper extremity (deep vein thrombosis) (HCC)        Past Surgical History:   Procedure Laterality Date    COLONOSCOPY      ULNAR NERVE TRANSPOSITION Right        Family History   Problem Relation Age of Onset    Diabetes Father     Cancer Maternal Grandmother     Cancer Maternal Grandfather     Colon cancer Neg Hx     Liver disease Neg Hx         reports that he has never smoked  He has never used smokeless tobacco  He reports that he drinks alcohol  He reports that he does not use drugs        Physical Exam:    /68 (BP Location: Left arm, Patient Position: Sitting, Cuff Size: Standard)   Pulse 75   Temp (!) 96 5 °F (35 8 °C)   Ht 6' 2" (1 88 m)   Wt 107 kg (236 lb)   BMI 30 30 kg/m²      Gen: wn/wd, NAD  HEENT: anicteric, MMM, no cervical LAD  CVS: RRR, no m/r/g  CHEST: CTA b/l  ABD: +BS, soft, NT,ND, no hepatosplenomegaly  EXT:  Trace edema right greater than left  NEURO: aaox3  SKIN: NO rashes

## 2018-07-20 NOTE — PROGRESS NOTES
[facility]  Sadaf Oliva 66 y o  male MRN: [id number]            Assessment & Plan:    Pleasant 68-year-old gentleman recently diagnosed with DVT/PE in April, currently doing well on anticoagulation here for evaluation for screening colonoscopy  His last examination was 10 years ago  The patient also has a longstanding history of acid reflux and has been on PPI therapy for over 10 years  1   Screening colonoscopy:  -patient is overdue for his examination, especially in light of his recent anemia  -given that he has been doing well with DVT/PE, will get clearance from Oncology to hold his anticoagulation for the procedures    2  History of chronic reflux: Will proceed with an upper endoscopy the same time as colonoscopy to evaluate for Hall's esophagus    I discussed with him the risks of the procedures including bleeding, surgery, perforation, missed polyp detection rate  Will get clearance from Hematology/Oncology with regards to anticoagulation therapy and bridging       _____________________________________________________________        CC:  Evaluation for colonoscopy    HPI:  Sadaf Oliva is a 66 y o male who is here for follow-up for colonoscopy  As you know this is a 68-year-old gentleman we had seen a few months ago when he presented for evaluation for colonoscopy, at that time he was just diagnosed with a new DVT and PE, is currently on anticoagulation therapy  We deferred his procedure due to his recent blood clot  Patient is not doing well with no further shortness of breath or lower extremity edema  Denies any GI symptoms  Was found to have anemia in the past with this is in the setting of recurrent phlebotomy  He has had genetic testing but unable to identify any hereditary causes of his coagulopathy  ROS:  The remainder of the ROS was negative except for the pertinent positives mentioned in HPI        Allergies: Patient has no known allergies  Medications:   Current Outpatient Prescriptions:     apixaban (ELIQUIS) 5 mg, Take 1 tablet (5 mg total) by mouth 2 (two) times a day for 90 days, Disp: 180 tablet, Rfl: 0    Multiple Vitamin (MULTIVITAMIN) tablet, Take 1 tablet by mouth daily, Disp: , Rfl:     omeprazole (PriLOSEC) 20 mg delayed release capsule, Take 20 mg by mouth daily, Disp: , Rfl:     Past Medical History:   Diagnosis Date    Calcific tendinitis 2/27/2018    DVT of upper extremity (deep vein thrombosis) (HCC)        Past Surgical History:   Procedure Laterality Date    COLONOSCOPY      ULNAR NERVE TRANSPOSITION Right        Family History   Problem Relation Age of Onset    Diabetes Father     Cancer Maternal Grandmother     Cancer Maternal Grandfather     Colon cancer Neg Hx     Liver disease Neg Hx         reports that he has never smoked  He has never used smokeless tobacco  He reports that he drinks alcohol  He reports that he does not use drugs        Physical Exam:    /68 (BP Location: Left arm, Patient Position: Sitting, Cuff Size: Standard)   Pulse 75   Temp (!) 96 5 °F (35 8 °C)   Ht 6' 2" (1 88 m)   Wt 107 kg (236 lb)   BMI 30 30 kg/m²     Gen: wn/wd, NAD  HEENT: anicteric, MMM, no cervical LAD  CVS: RRR, no m/r/g  CHEST: CTA b/l  ABD: +BS, soft, NT,ND, no hepatosplenomegaly  EXT:  Trace edema right greater than left  NEURO: aaox3  SKIN: NO rashes

## 2018-07-27 ENCOUNTER — TELEPHONE (OUTPATIENT)
Dept: GASTROENTEROLOGY | Facility: CLINIC | Age: 63
End: 2018-07-27

## 2018-07-27 NOTE — TELEPHONE ENCOUNTER
Dr Pedro Cevallos pt    Pt is scheduled on 7/31 Pilar Deleon from Dr Prieto Carrillo office called to advise it is ok for the patient to hold his eliquis for 2 days, the clearance form was also faxed

## 2018-07-31 ENCOUNTER — ANESTHESIA (OUTPATIENT)
Dept: GASTROENTEROLOGY | Facility: AMBULARY SURGERY CENTER | Age: 63
End: 2018-07-31
Payer: COMMERCIAL

## 2018-07-31 ENCOUNTER — HOSPITAL ENCOUNTER (OUTPATIENT)
Facility: AMBULARY SURGERY CENTER | Age: 63
Setting detail: OUTPATIENT SURGERY
Discharge: HOME | End: 2018-07-31
Attending: INTERNAL MEDICINE | Admitting: INTERNAL MEDICINE
Payer: COMMERCIAL

## 2018-07-31 VITALS
SYSTOLIC BLOOD PRESSURE: 128 MMHG | TEMPERATURE: 97.3 F | OXYGEN SATURATION: 98 % | RESPIRATION RATE: 16 BRPM | DIASTOLIC BLOOD PRESSURE: 81 MMHG | HEART RATE: 59 BPM

## 2018-07-31 DIAGNOSIS — Z12.11 SCREEN FOR COLON CANCER: ICD-10-CM

## 2018-07-31 PROCEDURE — 88342 IMHCHEM/IMCYTCHM 1ST ANTB: CPT | Performed by: PATHOLOGY

## 2018-07-31 PROCEDURE — 88305 TISSUE EXAM BY PATHOLOGIST: CPT | Performed by: PATHOLOGY

## 2018-07-31 PROCEDURE — 43239 EGD BIOPSY SINGLE/MULTIPLE: CPT | Performed by: INTERNAL MEDICINE

## 2018-07-31 PROCEDURE — G0121 COLON CA SCRN NOT HI RSK IND: HCPCS | Performed by: INTERNAL MEDICINE

## 2018-07-31 RX ORDER — SODIUM CHLORIDE 9 MG/ML
75 INJECTION, SOLUTION INTRAVENOUS CONTINUOUS
Status: DISCONTINUED | OUTPATIENT
Start: 2018-07-31 | End: 2018-07-31 | Stop reason: HOSPADM

## 2018-07-31 RX ORDER — FENTANYL CITRATE 50 UG/ML
INJECTION, SOLUTION INTRAMUSCULAR; INTRAVENOUS AS NEEDED
Status: DISCONTINUED | OUTPATIENT
Start: 2018-07-31 | End: 2018-07-31 | Stop reason: SURG

## 2018-07-31 RX ORDER — PROPOFOL 10 MG/ML
INJECTION, EMULSION INTRAVENOUS AS NEEDED
Status: DISCONTINUED | OUTPATIENT
Start: 2018-07-31 | End: 2018-07-31 | Stop reason: SURG

## 2018-07-31 RX ORDER — PROPOFOL 10 MG/ML
INJECTION, EMULSION INTRAVENOUS CONTINUOUS PRN
Status: DISCONTINUED | OUTPATIENT
Start: 2018-07-31 | End: 2018-07-31 | Stop reason: SURG

## 2018-07-31 RX ADMIN — PROPOFOL 30 MG: 10 INJECTION, EMULSION INTRAVENOUS at 09:27

## 2018-07-31 RX ADMIN — FENTANYL CITRATE 50 MCG: 50 INJECTION, SOLUTION INTRAMUSCULAR; INTRAVENOUS at 09:22

## 2018-07-31 RX ADMIN — LIDOCAINE HYDROCHLORIDE 60 MG: 20 INJECTION, SOLUTION INTRAVENOUS at 09:24

## 2018-07-31 RX ADMIN — FENTANYL CITRATE 50 MCG: 50 INJECTION, SOLUTION INTRAMUSCULAR; INTRAVENOUS at 09:24

## 2018-07-31 RX ADMIN — PROPOFOL 120 MCG/KG/MIN: 10 INJECTION, EMULSION INTRAVENOUS at 09:32

## 2018-07-31 RX ADMIN — PROPOFOL 100 MG: 10 INJECTION, EMULSION INTRAVENOUS at 09:24

## 2018-07-31 RX ADMIN — SODIUM CHLORIDE 75 ML/HR: 0.9 INJECTION, SOLUTION INTRAVENOUS at 09:18

## 2018-07-31 NOTE — H&P
History and Physical -  [facility]  Dat Cordon 66 y o  male MRN: [id number]    HPI: Dat Cordon is a 66y o  year old male who presents with hx of chronic GERD, screening colonoscopy  Review of Systems    Historical Information   Past Medical History:   Diagnosis Date    Calcific tendinitis 2/27/2018    DVT of upper extremity (deep vein thrombosis) (HCC)      Past Surgical History:   Procedure Laterality Date    COLONOSCOPY      ULNAR NERVE TRANSPOSITION Right      Social History   History   Alcohol Use    Yes     Comment: social     History   Drug Use No     History   Smoking Status    Never Smoker   Smokeless Tobacco    Never Used     Family History   Problem Relation Age of Onset    Diabetes Father     Cancer Maternal Grandmother     Cancer Maternal Grandfather     Colon cancer Neg Hx     Liver disease Neg Hx        Meds/Allergies     Prescriptions Prior to Admission   Medication    omeprazole (PriLOSEC) 20 mg delayed release capsule    apixaban (ELIQUIS) 5 mg    Multiple Vitamin (MULTIVITAMIN) tablet       No Known Allergies    Objective     Blood pressure 138/74, pulse 67, temperature (!) 97 3 °F (36 3 °C), temperature source Tympanic, resp  rate 18, SpO2 99 %  PHYSICAL EXAM    Gen: NAD  CV: RRR  CHEST: Clear  ABD: soft, NT/ND  EXT: no edema  Neuro: AAO      ASSESSMENT/PLAN:  This is a 66y o  year old male here for chronic GERD, screening colonoscopy           PLAN:   Procedure: egd/colonoscopy

## 2018-07-31 NOTE — OP NOTE
Colonoscopy Procedure Note    Procedure: Colonoscopy    Sedation: Monitored anesthesia care, check anesthesia records      ASA Class: 2    INDICATIONS:  Screening colonoscopy    POST-OP DIAGNOSIS: See the impression below    Procedure Details     Prior colonoscopy: No prior colonoscopy  Informed consent was obtained for the procedure, including sedation  Risks of perforation, hemorrhage, adverse drug reaction and aspiration were discussed  The patient was placed in the left lateral decubitus position  Based on the pre-procedure assessment, including review of the patient's medical history, medications, allergies, and review of systems, he had been deemed to be an appropriate candidate for conscious sedation; he was therefore sedated with the medications listed below  The patient was monitored continuously with telemetry, pulse oximetry, blood pressure monitoring, and direct observations  A rectal examination was performed  The variable-stiffness pediatric colonoscope was inserted into the rectum and advanced under direct vision to the cecum, which was identified by the ileocecal valve and appendiceal orifice  The quality of the colonic preparation was fair  A careful inspection was made as the colonoscope was withdrawn, including a retroflexed view of the rectum; findings and interventions are described below  Findings:    Normal colonoscopy however there was several segments with the prep was fair, despite extensive attempted irrigation and flushing there was solid fecal residue which could not be suctioned through the scope  normal retroflexion  No mass lesions or concerning malignancies were seen  Small polyps could have been missed           Complications: None; patient tolerated the procedure well      Impression:      Normal colonoscopy within the limits of a fair prep with several segments of the colon which could not be    Recommendations:  Repeat colonoscopy in 1 years or sooner if clinically indicated  Repeat colonoscopy is being recommended at an interval of less than 10 years, this is because of an inadequate bowel preparation      Discharge home  Resume regular diet  Resume home medications  Repeat colonoscopy within a year due to fair prep  Recommend 2 day bowel prep  Call with any abdominal pain, bleeding, fevers

## 2018-07-31 NOTE — ANESTHESIA PREPROCEDURE EVALUATION
Review of Systems/Medical History      No history of anesthetic complications     Cardiovascular  Exercise tolerance (METS): >4,  Hyperlipidemia, Past MI , No angina , No VAZQUEZ,   Comment: Transthoracic Echocardiogram  2D, M-mode, Doppler, and Color Doppler     Study date:  06-Mar-2018     Patient: Tapan Chavez  MR number: [id number]  Account number: [de-identified]  : 1955  Age: 63 years  Gender: Male  Status: Stat  Location: Emergency room  Height: 74 in  Weight: 234 5 lb  BP: 134/ 77 mmHg     Indications: DVT     Diagnoses: 673 81 - PULMON EMBOL NEC-DELIVER     Sonographer:  Liza Dancer, RCS  Primary Physician:  Angelia Other  Group:  [facility]  Interpreting Physician:  Candi Phipps MD     SUMMARY     LEFT VENTRICLE:  Systolic function was normal  Ejection fraction was estimated in the range of 55 % to 60 %  There were no regional wall motion abnormalities  Wall thickness was mildly increased      VENTRICULAR SEPTUM:  No evidence of signifciant septal flattening to suggest severe RV pressure or volume overload  There was paradoxical motion  There was mild systolic flattening  These changes are consistent with RV pressure overload      RIGHT VENTRICLE:  The ventricle was moderately dilated  Systolic function was moderately reduced mid free wall and base with sparing of the apex    Wall thickness was increased      IVC, HEPATIC VEINS:  The inferior vena cava was dilated ,  Pulmonary  No shortness of breath, No recent URI , No sleep apnea ,        GI/Hepatic    No GERD ,             Endo/Other     GYN       Hematology  Anemia ,     Musculoskeletal       Neurology   Psychology           Physical Exam    Airway    Mallampati score: III  TM Distance: >3 FB  Neck ROM: full     Dental       Cardiovascular  Cardiovascular exam normal    Pulmonary  Breath sounds clear to auscultation,     Other Findings        Anesthesia Plan  ASA Score- 3     Anesthesia Type- IV sedation with anesthesia with ASA Monitors  Additional Monitors:   Airway Plan:         Plan Factors-    Induction- intravenous  Postoperative Plan-     Informed Consent- Anesthetic plan and risks discussed with patient  I personally reviewed this patient with the CRNA  Discussed and agreed on the Anesthesia Plan with the CRNA  Alfred Obregon

## 2018-07-31 NOTE — DISCHARGE INSTRUCTIONS
Discharge home  Resume regular diet  Resume home medications  Follow up biopsy results  Repeat colonoscopy within a year due to fair prep  Recommend 2 day bowel prep  Call with any abdominal pain, bleeding, fevers

## 2018-08-07 ENCOUNTER — TELEPHONE (OUTPATIENT)
Dept: GASTROENTEROLOGY | Facility: CLINIC | Age: 63
End: 2018-08-07

## 2018-08-07 NOTE — OP NOTE
ESOPHAGOGASTRODUODENOSCOPY    PROCEDURE: EGD    SEDATION: Monitored anesthesia care, check anesthesia records    ASA Class: 2    INDICATIONS:  Chronic GERD    CONSENT:  Informed consent was obtained for the procedure, including sedation after explaining the risks and benefits of the procedure  Risks including but not limited to bleeding, perforation, infection, and missed lesion  PREPARATION:   Telemetry, pulse oximetry, blood pressure were monitored throughout the procedure  Patient was identified by myself both verbally and by visual inspection of ID band  DESCRIPTION:   Patient was placed in the left lateral decubitus position and was sedated with the above medication  The gastroscope was introduced in to the oropharynx and the esophagus was intubated under direct visualization  Scope was passed down the esophagus up to 2nd part of the duodenum  A careful inspection was made as the gastroscope was withdrawn, including a retroflexed view of the stomach; findings and interventions are described below  FINDINGS:    #1  Esophagus- irregular Z-line with approximately 1 cm of columnar appearing mucosa, biopsies were taken for evaluation of Hall's esophagus    #2  Stomach- mild antral gastritis, small gastric polyps were noted  Polyps were biopsied as well as antrum for H pylori  Normal retroflexion    #3  Duodenum- normal duodenum         IMPRESSIONS:      Gastritis with gastric polyps biopsies taken separately  Irregular Z-line, biopsies taken    RECOMMENDATIONS:     Discharge home  Resume regular diet  Resume home medications  Follow up biopsy results    COMPLICATIONS:  None; patient tolerated the procedure well            DISPOSITION: PACU           CONDITION: Stable

## 2018-08-07 NOTE — TELEPHONE ENCOUNTER
----- Message from James Duvall MD sent at 8/7/2018 10:09 AM EDT -----  Biopsies from stomach were negative for H pylori, the small polyps in her stomach were benign fundic gland polyps  The biopsies from his esophagus do confirm a small area of Hall's esophagus, there is no dysplasia and no cancer  I would recommended 1 year that we repeat both the upper endoscopy and colonoscopy with a 2 day bowel prep due to his poor prep  Please have the patient call with any questions or concerns in follow-up in the office as needed  I would have him continue taking his PPI therapy until his next endoscopy

## 2018-08-29 ENCOUNTER — TELEPHONE (OUTPATIENT)
Dept: GASTROENTEROLOGY | Facility: CLINIC | Age: 63
End: 2018-08-29

## 2018-08-29 NOTE — TELEPHONE ENCOUNTER
----- Message from Augustine Sanders PA-C sent at 8/27/2018  4:28 PM EDT -----  Regarding: FW:Reminder for Upcoming Procedure  Contact: [phone]  Please advise patient he can hold Eliquis for 48 hours prior to his procedure, as this was authorized by his hematologist   Thank you    ----- Message -----  From: Mekhi Polo MD  Sent: 8/27/2018   9:39 AM  To: Augustine Sanders PA-C  Subject: FW:Reminder for Upcoming Procedure               Yes, patient is cleared for colonoscopy with 48 hours off of Eliquis  , thank you, Danyel Funk      ----- Message -----  From: Augustine Sanders PA-C  Sent: 7/25/2018  10:48 AM  To: Mekhi Polo MD  Subject: FW:Reminder for Upcoming Procedure               Please advise if patient is cleared to hold Eliquis for 48 hours prior to colonoscopy, thank you very much     ----- Message -----  From: Albert Longo MA  Sent: 7/25/2018   7:53 AM  To: [facility]  Subject: FW:Reminder for Upcoming Procedure               Please advise      ----- Message -----  From: Bonny Villegas  Sent: 7/24/2018   7:03 PM  To: [facility]  Subject: RE:Reminder for Upcoming Procedure               I havent heard anything back about changing Eliquis prior to colonoscopy  Was expecting to hear by now   procedure is this coming  Tuesday July 31st  ----- Message -----  From: Nathan Alegria MD  Sent: 7/24/2018  8:00 AM EDT  To: Bonny Villegas  Subject: Reminder for Upcoming Procedure  [address]  No street address found    07/24/18    Dear Bonny Villegas,    This is a reminder for your upcoming procedure with Nathan Alegria MD on [unfilled]  We will contact you again before the day of your procedure with your scheduled arrival time      If you have questions or scheduling concerns, you can contact your physicians office:  Nathan Alegria MD  Phone Number: [phone]    Sincerely,  Patient Services

## 2018-09-18 ENCOUNTER — OFFICE VISIT (OUTPATIENT)
Dept: FAMILY MEDICINE CLINIC | Facility: CLINIC | Age: 63
End: 2018-09-18
Payer: COMMERCIAL

## 2018-09-18 VITALS
WEIGHT: 236 LBS | SYSTOLIC BLOOD PRESSURE: 126 MMHG | TEMPERATURE: 99 F | RESPIRATION RATE: 18 BRPM | DIASTOLIC BLOOD PRESSURE: 74 MMHG | BODY MASS INDEX: 30.3 KG/M2 | HEART RATE: 84 BPM

## 2018-09-18 DIAGNOSIS — M79.662 PAIN IN BOTH LOWER LEGS: Primary | ICD-10-CM

## 2018-09-18 DIAGNOSIS — K21.00 GASTROESOPHAGEAL REFLUX DISEASE WITH ESOPHAGITIS: ICD-10-CM

## 2018-09-18 DIAGNOSIS — M79.661 PAIN IN BOTH LOWER LEGS: Primary | ICD-10-CM

## 2018-09-18 DIAGNOSIS — Z23 NEED FOR INFLUENZA VACCINATION: ICD-10-CM

## 2018-09-18 DIAGNOSIS — R25.2 CRAMPS OF LOWER EXTREMITY: Primary | ICD-10-CM

## 2018-09-18 DIAGNOSIS — D50.9 MICROCYTIC ANEMIA: ICD-10-CM

## 2018-09-18 DIAGNOSIS — I82.512 CHRONIC DEEP VEIN THROMBOSIS (DVT) OF LEFT FEMORAL VEIN (HCC): Chronic | ICD-10-CM

## 2018-09-18 DIAGNOSIS — E78.2 MIXED HYPERLIPIDEMIA: ICD-10-CM

## 2018-09-18 PROCEDURE — 90682 RIV4 VACC RECOMBINANT DNA IM: CPT

## 2018-09-18 PROCEDURE — 90471 IMMUNIZATION ADMIN: CPT

## 2018-09-18 PROCEDURE — 99213 OFFICE O/P EST LOW 20 MIN: CPT | Performed by: FAMILY MEDICINE

## 2018-09-18 PROCEDURE — 1036F TOBACCO NON-USER: CPT | Performed by: FAMILY MEDICINE

## 2018-09-18 RX ORDER — KETOCONAZOLE 2 G/100G
AEROSOL, FOAM TOPICAL AS NEEDED
COMMUNITY
Start: 2018-09-11 | End: 2020-03-09

## 2018-09-18 RX ORDER — CICLOPIROX 1 G/100ML
SHAMPOO TOPICAL AS NEEDED
COMMUNITY
Start: 2018-09-11 | End: 2020-03-09

## 2018-09-18 NOTE — PROGRESS NOTES
Subjective:           Problem List Items Addressed This Visit     Mixed hyperlipidemia    Relevant Orders    Comprehensive metabolic panel    Lipid panel    Gastroesophageal reflux disease with esophagitis stable    Chronic deep vein thrombosis (DVT) of left femoral vein (HCC) (Chronic)    Microcytic anemia  MVI with Fe  Labs as ordered  Relevant Orders    Iron    Cramps of lower extremity - Primary  Mag supplement as discussed  F/u if persists or worsens    Relevant Orders    Comprehensive metabolic panel      Other Visit Diagnoses     Need for influenza vaccination        Relevant Orders    influenza vaccine, 3254-4247, quadrivalent, recombinant, PF, 0 5 mL, for patients 18 yr+ (FLUBLOK) (Completed)              Orders Placed This Encounter   Procedures    influenza vaccine, 3875-6189, quadrivalent, recombinant, PF, 0 5 mL, for patients 18 yr+ (FLUBLOK)    Comprehensive metabolic panel     This is a patient instruction: Patient fasting for 8 hours or longer recommended  Standing Status:   Future     Standing Expiration Date:   9/18/2019    Lipid panel     This is a patient instruction: This test requires patient fasting for 10-12 hours or longer  Drinking of black coffee or black tea is acceptable  Standing Status:   Future     Standing Expiration Date:   9/18/2019    Iron     Standing Status:   Future     Standing Expiration Date:   9/18/2019    Magnesium     Standing Status:   Future     Standing Expiration Date:   9/18/2019       Patient Instructions     magnesium 200mg daily   Labs as ordered  Epsom salt soaks  Iron 325mg  Follow up vascular  compression   ED if acute Chest pain shortness of breath  Labs as ordded  DASH Eating Plan   WHAT YOU NEED TO KNOW:   The DASH (Dietary Approaches to Stop Hypertension) Eating Plan is designed to help prevent or lower high blood pressure  It can also help to lower LDL (bad) cholesterol and decrease your risk of heart disease   The plan is low in sodium, sugar, unhealthy fats, and total fat  It is high in potassium, calcium, magnesium, and fiber  These nutrients are added when you eat more fruits, vegetables, and whole grains  DISCHARGE INSTRUCTIONS:   Your sodium limit each day: Your dietitian will tell you how much sodium is safe for you to have each day  People with high blood pressure should have no more than 1,500 to 2,300 mg of sodium in a day  A teaspoon (tsp) of salt has 2,300 mg of sodium  This may seem like a difficult goal, but small changes to the foods you eat can make a big difference  Your healthcare provider or dietitian can help you create a meal plan that follows your sodium limit  How to limit sodium:   · Read food labels  Food labels can help you choose foods that are low in sodium  The amount of sodium is listed in milligrams (mg)  The % Daily Value (DV) column tells you how much of your daily needs are met by 1 serving of the food for each nutrient listed  Choose foods that have less than 5% of the DV of sodium  These foods are considered low in sodium  Foods that have 20% or more of the DV of sodium are considered high in sodium  Avoid foods that have more than 300 mg of sodium in each serving  Choose foods that say low-sodium, reduced-sodium, or no salt added on the food label  · Avoid salt  Do not salt food at the table, and add very little salt to foods during cooking  Use herbs and spices, such as onions, garlic, and salt-free seasonings to add flavor to foods  Try lemon or lime juice or vinegar to give foods a tart flavor  Use hot peppers or a small amount of hot pepper sauce to add a spicy flavor to foods  · Ask about salt substitutes  Ask your healthcare provider if you may use salt substitutes  Some salt substitutes have ingredients that can be harmful if you have certain health conditions  · Choose foods carefully at restaurants    Meals from restaurants, especially fast food restaurants, are often high in sodium  Some restaurants have nutrition information that tells you the amount of sodium in their foods  Ask to have your food prepared with less, or no salt  What you need to know about fats:   · Include healthy fats  Examples are unsaturated fats and omega-3 fatty acids  Unsaturated fats are found in soybean, canola, olive, or sunflower oil, and liquid and soft tub margarines  Omega-3 fatty acids are found in fatty fish, such as salmon, tuna, mackerel, and sardines  It is also found in flaxseed oil and ground flaxseed  · Avoid unhealthy fats  Do not eat unhealthy fats, such as saturated fats and trans fats  Saturated fats are found in foods that contain fat from animals  Examples are fatty meats, whole milk, butter, cream, and other dairy foods  It is also found in shortening, stick margarine, palm oil, and coconut oil  Trans fats are found in fried foods, crackers, chips, and baked goods made with margarine or shortening  Foods to include: With the DASH eating plan, you need to eat a certain number of servings from each food group  This will help you get enough of certain nutrients and limit others  The amount of servings you should eat depends on how many calories you need  Your dietitian can tell you how many calories you need  The number of servings listed next to the food groups below are for people who need about 2,000 calories each day    · Grains:  6 to 8 servings (3 of these servings should be whole-grain foods)    ¨ 1 slice of whole-grain bread     ¨ 1 ounce of dry cereal    ¨ ½ cup of cooked cereal, pasta, or brown rice    · Vegetables and fruits:  4 to 5 servings of fruits and 4 to 5 servings of vegetables    ¨ 1 medium fruit    ¨ ½ cup of frozen, canned (no added salt), or chopped fresh vegetables     ¨ ½ cup of fresh, frozen, dried, or canned fruit (canned in light syrup or fruit juice)    ¨ ½ cup of vegetable or fruit juice    · Dairy:  2 to 3 servings    ¨ 1 cup of nonfat (skim) or 1% milk    ¨ 1½ ounces of fat-free or low-fat cheese    ¨ 6 ounces of nonfat or low-fat yogurt    · Lean meat, poultry, and fish:  6 ounces or less    [employer] (chicken, turkey) with no skin    ¨ Fish (especially fatty fish, such as salmon, fresh tuna, or mackerel)    ¨ Lean beef and pork (loin, round, extra lean hamburger)    ¨ Egg whites and egg substitutes    · Nuts, seeds, and legumes:  4 to 5 servings each week    ¨ ½ cup of cooked beans and peas    ¨ 1½ ounces of unsalted nuts    ¨ 2 tablespoons of peanut butter or seeds    · Sweets and added sugars:  5 or less each week    ¨ 1 tablespoon of sugar, jelly, or jam    ¨ ½ cup of sorbet or gelatin    ¨ 1 cup of lemonade    · Fats:  2 to 3 servings each week    ¨ 1 teaspoon of soft margarine or vegetable oil    ¨ 1 tablespoon of mayonnaise    ¨ 2 tablespoons of salad dressing  Foods to avoid:   · Grains:      [employer], such as doughnuts, pastries, cookies, and biscuits (high in fat and sugar)    ¨ Mixes for cornbread and biscuits, packaged foods, such as bread stuffing, rice and pasta mixes, macaroni and cheese, and instant cereals (high in sodium)    · Fruits and vegetables:      ¨ Regular, canned vegetables (high in sodium)    ¨ Sauerkraut, pickled vegetables, and other foods prepared in brine (high in sodium)    ¨ Fried vegetables or vegetables in butter or high-fat sauces    ¨ Fruit in cream or butter sauce (high in fat)    · Dairy:      ¨ Whole milk, 2% milk, and cream (high in fat)    ¨ Regular cheese and processed cheese (high in fat and sodium)    · Meats and protein foods:      ¨ Smoked or cured meat, such as corned beef, quinonez, ham, hot dogs, and sausage (high in fat and sodium)    ¨ Canned beans and canned meats or spreads, such as potted meats, sardines, anchovies, and imitation seafood (high in sodium)    ¨ Deli or lunch meats, such as bologna, ham, turkey, and roast beef (high in sodium)    ¨ High-fat meat (T-bone steak, regular hamburger, and ribs)    ¨ Whole eggs and egg yolks (high in fat)    · Other:      ¨ Seasonings made with salt, such as garlic salt, celery salt, onion salt, seasoned salt, meat tenderizers, and monosodium glutamate (MSG)    ¨ Miso soup and canned or dried soup mixes (high in sodium)    ¨ Regular soy sauce, barbecue sauce, teriyaki sauce, steak sauce, Worcestershire sauce, and most flavored vinegars (high in sodium)    ¨ Regular condiments, such as mustard, ketchup, and salad dressings (high in sodium)    ¨ Gravy and sauces, such as Jl or cheese sauces (high in sodium and fat)    ¨ Drinks high in sugar, such as soda or fruit drinks    [employer] foods, such as salted chips, popcorn, pretzels, pork rinds, salted crackers, and salted nuts    ¨ Frozen foods, such as dinners, entrees, vegetables with sauces, and breaded meats (high in sodium)  Other guidelines to follow:   · Maintain a healthy weight  Your risk for heart disease is higher if you are overweight  Your healthcare provider may suggest that you lose weight if you are overweight  You can lose weight by eating fewer calories and foods that have added sugars and fat  The DASH meal plan can help you do this  Decrease calories by eating smaller portions at each meal and fewer snacks  Ask your healthcare provider for more information about how to lose weight  · Exercise regularly  Regular exercise can help you reach or maintain a healthy weight  Regular exercise can also help decrease your blood pressure and improve your cholesterol levels  Get 30 minutes or more of moderate exercise each day of the week  To lose weight, get at least 60 minutes of exercise  Talk to your healthcare provider about the best exercise program for you  · Limit alcohol  Women should limit alcohol to 1 drink a day  Men should limit alcohol to 2 drinks a day  A drink of alcohol is 12 ounces of beer, 5 ounces of wine, or 1½ ounces of liquor    © 2017 [address] [employer] Information is for End User's use only and may not be sold, redistributed or otherwise used for commercial purposes  All illustrations and images included in CareNotes® are the copyrighted property of A [employer]  or Vicente Walsh  The above information is an  only  It is not intended as medical advice for individual conditions or treatments  Talk to your doctor, nurse or pharmacist before following any medical regimen to see if it is safe and effective for you  Jesse urbina is in for evaluation follow-up for c/o  6 months on and off leg cramps bilateral calfs am mostly  He is active  No new L calf lower leg swelling no new trauma history of L DVT PE on Eliquis  His last vascular evaluation approximately 3 months ago revealed some resolution but still with chronic/ subacute thrombosis  This swelling is not worsening  He denies chest pain, palpitations or shortness of breath at rest or wiyh exertion today  Vitals:    09/18/18 1321   BP: 126/74   Pulse: 84   Resp: 18   Temp: 99 °F (37 2 °C)       No Known Allergies    Current Outpatient Prescriptions on File Prior to Visit   Medication Sig Dispense Refill    apixaban (ELIQUIS) 5 mg Take 1 tablet (5 mg total) by mouth 2 (two) times a day for 90 days 180 tablet 0    Multiple Vitamin (MULTIVITAMIN) tablet Take 1 tablet by mouth daily      omeprazole (PriLOSEC) 20 mg delayed release capsule Take 20 mg by mouth daily       No current facility-administered medications on file prior to visit  Past Medical History:   Diagnosis Date    Calcific tendinitis 2/27/2018    DVT of upper extremity (deep vein thrombosis) [facility])        Past Surgical History:   Procedure Laterality Date    COLONOSCOPY      NC COLONOSCOPY FLX DX W/COLLJ SPEC WHEN PFRMD N/A 7/31/2018    Procedure: COLONOSCOPY;  Surgeon: James Duvall MD;  Location: [facility];   Service: Gastroenterology    ULNAR NERVE TRANSPOSITION Right           reports that he has never smoked  He has never used smokeless tobacco  He reports that he drinks alcohol  He reports that he does not use drugs  reports that he has never smoked  He has never used smokeless tobacco     The following portions of the patient's history were reviewed and updated as appropriate: allergies, current medications, past family history, past medical history, past social history, past surgical history and problem list     Review of Systems   Constitutional: Positive for activity change and fatigue  Negative for appetite change, chills, diaphoresis and fever  HENT: Negative for congestion, drooling, postnasal drip, sore throat and trouble swallowing  Eyes: Negative for discharge  Respiratory: Positive for shortness of breath  Negative for chest tightness, wheezing and stridor  Cardiovascular: Positive for leg swelling  Negative for chest pain and palpitations  L calf   Gastrointestinal: Negative for abdominal distention  Endocrine: Negative for cold intolerance and heat intolerance  Musculoskeletal: Negative for arthralgias, joint swelling and neck stiffness  Skin: Negative for color change, pallor and rash  Neurological: Negative for dizziness and weakness  Psychiatric/Behavioral: Negative for agitation and behavioral problems  The patient is not nervous/anxious  Physical Exam   Constitutional: He is oriented to person, place, and time  He appears well-developed and well-nourished  HENT:   Head: Normocephalic  Eyes: EOM are normal  Pupils are equal, round, and reactive to light  Neck: Normal range of motion  Neck supple  Cardiovascular: Normal rate, regular rhythm and normal heart sounds  Pulmonary/Chest: Effort normal and breath sounds normal  No stridor  He has no rales  He exhibits no tenderness  Abdominal: Soft  Musculoskeletal: Normal range of motion  He exhibits no tenderness     Girth 1cm > L   mild discomfort to squeeze mid calf   no discomfort dorsiflexion  Homans neg   plus 2  Pitting L>R edema  Visible fullness Varicosities L>R   Neurological: He is alert and oriented to person, place, and time  Skin: Skin is warm  Capillary refill takes less than 2 seconds  Psychiatric: He has a normal mood and affect   His behavior is normal  Judgment and thought content normal

## 2018-09-18 NOTE — PATIENT INSTRUCTIONS
magnesium 200mg daily   Labs as ordered  Epsom salt soaks  Iron 325mg  Follow up vascular  compression   ED if acute Chest pain shortness of breath  Labs as ordded  DASH Eating Plan   WHAT YOU NEED TO KNOW:   The DASH (Dietary Approaches to Stop Hypertension) Eating Plan is designed to help prevent or lower high blood pressure  It can also help to lower LDL (bad) cholesterol and decrease your risk of heart disease  The plan is low in sodium, sugar, unhealthy fats, and total fat  It is high in potassium, calcium, magnesium, and fiber  These nutrients are added when you eat more fruits, vegetables, and whole grains  DISCHARGE INSTRUCTIONS:   Your sodium limit each day: Your dietitian will tell you how much sodium is safe for you to have each day  People with high blood pressure should have no more than 1,500 to 2,300 mg of sodium in a day  A teaspoon (tsp) of salt has 2,300 mg of sodium  This may seem like a difficult goal, but small changes to the foods you eat can make a big difference  Your healthcare provider or dietitian can help you create a meal plan that follows your sodium limit  How to limit sodium:   · Read food labels  Food labels can help you choose foods that are low in sodium  The amount of sodium is listed in milligrams (mg)  The % Daily Value (DV) column tells you how much of your daily needs are met by 1 serving of the food for each nutrient listed  Choose foods that have less than 5% of the DV of sodium  These foods are considered low in sodium  Foods that have 20% or more of the DV of sodium are considered high in sodium  Avoid foods that have more than 300 mg of sodium in each serving  Choose foods that say low-sodium, reduced-sodium, or no salt added on the food label  · Avoid salt  Do not salt food at the table, and add very little salt to foods during cooking  Use herbs and spices, such as onions, garlic, and salt-free seasonings to add flavor to foods   Try lemon or lime juice or vinegar to give foods a tart flavor  Use hot peppers or a small amount of hot pepper sauce to add a spicy flavor to foods  · Ask about salt substitutes  Ask your healthcare provider if you may use salt substitutes  Some salt substitutes have ingredients that can be harmful if you have certain health conditions  · Choose foods carefully at restaurants  Meals from restaurants, especially fast food restaurants, are often high in sodium  Some restaurants have nutrition information that tells you the amount of sodium in their foods  Ask to have your food prepared with less, or no salt  What you need to know about fats:   · Include healthy fats  Examples are unsaturated fats and omega-3 fatty acids  Unsaturated fats are found in soybean, canola, olive, or sunflower oil, and liquid and soft tub margarines  Omega-3 fatty acids are found in fatty fish, such as salmon, tuna, mackerel, and sardines  It is also found in flaxseed oil and ground flaxseed  · Avoid unhealthy fats  Do not eat unhealthy fats, such as saturated fats and trans fats  Saturated fats are found in foods that contain fat from animals  Examples are fatty meats, whole milk, butter, cream, and other dairy foods  It is also found in shortening, stick margarine, palm oil, and coconut oil  Trans fats are found in fried foods, crackers, chips, and baked goods made with margarine or shortening  Foods to include: With the DASH eating plan, you need to eat a certain number of servings from each food group  This will help you get enough of certain nutrients and limit others  The amount of servings you should eat depends on how many calories you need  Your dietitian can tell you how many calories you need  The number of servings listed next to the food groups below are for people who need about 2,000 calories each day    · Grains:  6 to 8 servings (3 of these servings should be whole-grain foods)    ¨ 1 slice of whole-grain bread     ¨ 1 ounce of dry cereal    ¨ ½ cup of cooked cereal, pasta, or brown rice    · Vegetables and fruits:  4 to 5 servings of fruits and 4 to 5 servings of vegetables    ¨ 1 medium fruit    ¨ ½ cup of frozen, canned (no added salt), or chopped fresh vegetables     ¨ ½ cup of fresh, frozen, dried, or canned fruit (canned in light syrup or fruit juice)    ¨ ½ cup of vegetable or fruit juice    · Dairy:  2 to 3 servings    ¨ 1 cup of nonfat (skim) or 1% milk    ¨ 1½ ounces of fat-free or low-fat cheese    ¨ 6 ounces of nonfat or low-fat yogurt    · Lean meat, poultry, and fish:  6 ounces or less    [employer] (chicken, turkey) with no skin    ¨ Fish (especially fatty fish, such as salmon, fresh tuna, or mackerel)    ¨ Lean beef and pork (loin, round, extra lean hamburger)    ¨ Egg whites and egg substitutes    · Nuts, seeds, and legumes:  4 to 5 servings each week    ¨ ½ cup of cooked beans and peas    ¨ 1½ ounces of unsalted nuts    ¨ 2 tablespoons of peanut butter or seeds    · Sweets and added sugars:  5 or less each week    ¨ 1 tablespoon of sugar, jelly, or jam    ¨ ½ cup of sorbet or gelatin    ¨ 1 cup of lemonade    · Fats:  2 to 3 servings each week    ¨ 1 teaspoon of soft margarine or vegetable oil    ¨ 1 tablespoon of mayonnaise    ¨ 2 tablespoons of salad dressing  Foods to avoid:   · Grains:      [employer], such as doughnuts, pastries, cookies, and biscuits (high in fat and sugar)    ¨ Mixes for cornbread and biscuits, packaged foods, such as bread stuffing, rice and pasta mixes, macaroni and cheese, and instant cereals (high in sodium)    · Fruits and vegetables:      ¨ Regular, canned vegetables (high in sodium)    ¨ Sauerkraut, pickled vegetables, and other foods prepared in brine (high in sodium)    ¨ Fried vegetables or vegetables in butter or high-fat sauces    ¨ Fruit in cream or butter sauce (high in fat)    · Dairy:      ¨ Whole milk, 2% milk, and cream (high in fat)    ¨ Regular cheese and processed cheese (high in fat and sodium)    · Meats and protein foods:      ¨ Smoked or cured meat, such as corned beef, quinonez, ham, hot dogs, and sausage (high in fat and sodium)    ¨ Canned beans and canned meats or spreads, such as potted meats, sardines, anchovies, and imitation seafood (high in sodium)    ¨ Deli or lunch meats, such as bologna, ham, turkey, and roast beef (high in sodium)    ¨ High-fat meat (T-bone steak, regular hamburger, and ribs)    ¨ Whole eggs and egg yolks (high in fat)    · Other:      ¨ Seasonings made with salt, such as garlic salt, celery salt, onion salt, seasoned salt, meat tenderizers, and monosodium glutamate (MSG)    ¨ Miso soup and canned or dried soup mixes (high in sodium)    ¨ Regular soy sauce, barbecue sauce, teriyaki sauce, steak sauce, Worcestershire sauce, and most flavored vinegars (high in sodium)    ¨ Regular condiments, such as mustard, ketchup, and salad dressings (high in sodium)    ¨ Gravy and sauces, such as Jl or cheese sauces (high in sodium and fat)    ¨ Drinks high in sugar, such as soda or fruit drinks    [employer] foods, such as salted chips, popcorn, pretzels, pork rinds, salted crackers, and salted nuts    ¨ Frozen foods, such as dinners, entrees, vegetables with sauces, and breaded meats (high in sodium)  Other guidelines to follow:   · Maintain a healthy weight  Your risk for heart disease is higher if you are overweight  Your healthcare provider may suggest that you lose weight if you are overweight  You can lose weight by eating fewer calories and foods that have added sugars and fat  The DASH meal plan can help you do this  Decrease calories by eating smaller portions at each meal and fewer snacks  Ask your healthcare provider for more information about how to lose weight  · Exercise regularly  Regular exercise can help you reach or maintain a healthy weight  Regular exercise can also help decrease your blood pressure and improve your cholesterol levels  Get 30 minutes or more of moderate exercise each day of the week  To lose weight, get at least 60 minutes of exercise  Talk to your healthcare provider about the best exercise program for you  · Limit alcohol  Women should limit alcohol to 1 drink a day  Men should limit alcohol to 2 drinks a day  A drink of alcohol is 12 ounces of beer, 5 ounces of wine, or 1½ ounces of liquor  © 2017 [address]  Information is for End User's use only and may not be sold, redistributed or otherwise used for commercial purposes  All illustrations and images included in CareNotes® are the copyrighted property of A D A M , Inc  or Vicente Walsh  The above information is an  only  It is not intended as medical advice for individual conditions or treatments  Talk to your doctor, nurse or pharmacist before following any medical regimen to see if it is safe and effective for you

## 2018-09-24 ENCOUNTER — TELEPHONE (OUTPATIENT)
Dept: FAMILY MEDICINE CLINIC | Facility: CLINIC | Age: 63
End: 2018-09-24

## 2018-09-24 DIAGNOSIS — I26.09 OTHER ACUTE PULMONARY EMBOLISM WITH ACUTE COR PULMONALE (HCC): ICD-10-CM

## 2018-09-24 DIAGNOSIS — I82.412 ACUTE DEEP VEIN THROMBOSIS (DVT) OF FEMORAL VEIN OF LEFT LOWER EXTREMITY (HCC): ICD-10-CM

## 2018-09-24 NOTE — TELEPHONE ENCOUNTER
[facility] a message requesting a call back  to confirm medical record request was received and sent to [facility] in [place]  Would like a call back [phone]     Case # [id number]  af/bret

## 2018-09-26 NOTE — TELEPHONE ENCOUNTER
Faxed  Received from [address]  [facility] with representative [facility]    Request faxed to [phone]  Confirmation received  Placed in scanning     no further action is required  af/rma

## 2018-11-08 ENCOUNTER — TELEPHONE (OUTPATIENT)
Dept: FAMILY MEDICINE CLINIC | Facility: CLINIC | Age: 63
End: 2018-11-08

## 2018-11-08 NOTE — TELEPHONE ENCOUNTER
That was prior to his  Hospital evaluation        He had cardiology evaluation in Hospital    Would only need again if any chest pain, shortness or other cardiovascular symptoms were to develop

## 2018-11-08 NOTE — TELEPHONE ENCOUNTER
[place] from [address] [facility] states that Dr LUGO said, back in 02/2018 that patient needed a cardio evaluation  Patient is stating this was never said to him  [facility] [employer] states that they need clarification weather this was discussed with the patient  If so, he will need to go see a cardiologist  I do see in office note from 02/27/2018 that cardiology eval was discussed  Please advise   Nathalie Maya, [address] [facility]

## 2018-11-29 ENCOUNTER — OFFICE VISIT (OUTPATIENT)
Dept: HEMATOLOGY ONCOLOGY | Facility: MEDICAL CENTER | Age: 63
End: 2018-11-29
Payer: COMMERCIAL

## 2018-11-29 VITALS
BODY MASS INDEX: 30.93 KG/M2 | WEIGHT: 241 LBS | HEIGHT: 74 IN | OXYGEN SATURATION: 95 % | RESPIRATION RATE: 16 BRPM | DIASTOLIC BLOOD PRESSURE: 82 MMHG | TEMPERATURE: 97.7 F | SYSTOLIC BLOOD PRESSURE: 130 MMHG | HEART RATE: 75 BPM

## 2018-11-29 DIAGNOSIS — I26.09 OTHER ACUTE PULMONARY EMBOLISM WITH ACUTE COR PULMONALE (HCC): Primary | ICD-10-CM

## 2018-11-29 PROCEDURE — 99213 OFFICE O/P EST LOW 20 MIN: CPT | Performed by: INTERNAL MEDICINE

## 2018-11-29 NOTE — PROGRESS NOTES
[facility]  1955  [address] [facility] [place] [facility] [id number]    DISCUSSION  SUMMARY:       72-year-old male recently found to have a left lower extremity DVT and bilateral PE  Presently patient states feeling well and clinically there are no concerning signs  Issues    1  DVT/ BL PE  At this time, patient is to continue the Eliquis  Patient will monitor for excessive bruising/bleeding  We also discussed what to monitor for in regard to acute pulmonary so findings/symptoms      The most recent DVT/PE was the 2nd incident; does not seem to be any provoking reason for the recent DVT and PE  A prior DVT was likely associated with immobility after shoulder surgery  That being said, many people have shoulder surgery and do not have thrombotic complications  The thrombophilia evaluation only demonstrated an elevated factor 8 activity level as a prothrombotic risk factor  Patient has no issues/complications with the Eliquis other than financial     Unfortunately Mr Brannon states that he may need to make a decision about discontinuing Eliquis because of the co-pay (500 dollars a month)  Patient's insurance is changing next year; patient will spend time researching Co pays with the new insurance company  Another option is switching to 1 of the other anticoagulants  At this time, patient is to return in 4 months      2  Non STEMI  This may have been associated with the RV strain from the PE  Patient has never had a cardiac catheterization demonstrating a blockage  Patient will follow up with his PCP regarding this      3  Anemia with a low normal MCV and elevated RDW while in the hospital   Presently patient states feeling well and clinically is color is quite good  A repeat CBC has been requested for before the next office visit  Patient is to return in 4 months    Patient knows to call the hematology/oncology office if there are any other questions or concerns  Carefully review your medication list and verify that the list is accurate and up-to-date  Please call the hematology/oncology office if there are medications missing from the list, medications on the list that you are not currently taking or if there is a dosage or instruction that is different from how you're taking that medication  Patient goals and areas of care: continue with anticoagulation  Patient is able to self-care   _____________________________________________________________________________________    Chief Complaint   Patient presents with    Follow-up     Multiple thromboses     History of Present Illness:    71-year-old male recently seen in the hospital for the above  Mr Pineda Brennan states having progressive shortness of breath and dyspnea on exertion for a few days prior to admission  Patient also had left calf tightness  Activities had been slightly decreased  Patient denied any fevers, chills or sweats, cough, sputum or hemoptysis  Appetite had been okay, no GI or  issues  Workup demonstrated a left lower extremity DVT and PE   Mr Pineda Brennan is presently on Eliquis      Patient had shoulder surgery approximately 6 5 years ago and subsequently developed an upper extremity DVT (same side)  Patient was treated with anticoagulation for approximately 3 months; medication was subsequently discontinued  Patient is now on Eliquis 5 mg twice a day      Presently patient states feeling well, baseline  No shortness of breath or dyspnea on exertion  No chest pain or pressure  No cough, sputum or hemoptysis  Appetite is good, weight is stable  No other GI or  issues  No problems with excessive bruising or bleeding  No lower extremity swelling or pain, no cords  Patient's biggest complaint is the monthly 500 dollar co-pay for the Eliquis  Review of Systems   Constitutional: Negative  HENT: Negative  Eyes: Negative  Respiratory: Negative  Cardiovascular: Negative for leg swelling  Gastrointestinal: Negative  Endocrine: Negative  Genitourinary: Negative  Musculoskeletal: Negative  Skin: Negative  Allergic/Immunologic: Negative  Neurological: Negative  Hematological: Negative  Psychiatric/Behavioral: Negative  All other systems reviewed and are negative  Patient Active Problem List   Diagnosis    Mixed hyperlipidemia    Snoring    Gastroesophageal reflux disease with esophagitis    Cervical stenosis of spine    Calcific tendinitis    Colon cancer screening    Pulmonary embolism with acute cor pulmonale (HCC)    Chronic deep vein thrombosis (DVT) of left femoral vein (HCC)    NSTEMI (non-ST elevated myocardial infarction) (HCC)    Microcytic anemia    Iron deficiency anemia    Screen for colon cancer    Cramps of lower extremity     Past Medical History:   Diagnosis Date    Calcific tendinitis 2/27/2018    DVT of upper extremity (deep vein thrombosis) (HCC)      Past Surgical History:   Procedure Laterality Date    COLONOSCOPY      MD COLONOSCOPY FLX DX W/COLLJ SPEC WHEN PFRMD N/A 7/31/2018    Procedure: COLONOSCOPY;  Surgeon: Topher Hutchison MD;  Location: [facility]; Service: Gastroenterology    ULNAR NERVE TRANSPOSITION Right      Family History   Problem Relation Age of Onset    Diabetes Father     Cancer Maternal Grandmother     Cancer Maternal Grandfather     Colon cancer Neg Hx     Liver disease Neg Hx      Social History     Social History    Marital status: Unknown     Spouse name: N/A    Number of children: N/A    Years of education: N/A     Occupational History    Not on file       Social History Main Topics    Smoking status: Never Smoker    Smokeless tobacco: Never Used    Alcohol use Yes      Comment: social    Drug use: No    Sexual activity: Yes     Other Topics Concern    Not on file     Social History Narrative    No narrative on file       Current Outpatient Prescriptions:     apixaban (ELIQUIS) 5 mg, Take 1 tablet (5 mg total) by mouth 2 (two) times a day for 90 days, Disp: 180 tablet, Rfl: 0    Ciclopirox 1 % shampoo, , Disp: , Rfl:     ketoconazole (EXTINA) 2 % foam, , Disp: , Rfl:     Multiple Vitamin (MULTIVITAMIN) tablet, Take 1 tablet by mouth daily, Disp: , Rfl:     omeprazole (PriLOSEC) 20 mg delayed release capsule, Take 20 mg by mouth daily, Disp: , Rfl:     No Known Allergies    Vitals:    11/29/18 0837   BP: 130/82   Pulse: 75   Resp: 16   Temp: 97 7 °F (36 5 °C)   SpO2: 95%     Physical Exam   Constitutional: He is oriented to person, place, and time  He appears well-developed and well-nourished  Well-nourished male, no respiratory distress   HENT:   Head: Normocephalic and atraumatic  Right Ear: External ear normal    Left Ear: External ear normal    Nose: Nose normal    Mouth/Throat: Oropharynx is clear and moist    Eyes: Pupils are equal, round, and reactive to light  Conjunctivae and EOM are normal    Neck: Normal range of motion  Neck supple  Cardiovascular: Normal rate, regular rhythm, normal heart sounds and intact distal pulses  Pulmonary/Chest: Effort normal and breath sounds normal    Clear bilaterally, good air entry bilaterally   Abdominal: Soft  Bowel sounds are normal    Musculoskeletal: Normal range of motion  Neurological: He is alert and oriented to person, place, and time  He has normal reflexes  Skin: Skin is warm  Good color, no petechiae or ecchymoses, no active bleeding   Psychiatric: He has a normal mood and affect   His behavior is normal  Judgment and thought content normal    Extremities:  no lower extremity edema, no cords, pulses are 1+  Lymphatics:  No adenopathy in the neck, supraclavicular region, axilla and groin bilaterally    Labs:    03/08/2015 WBC = 6 2 hemoglobin = 10 3 hematocrit = 31 4 MCV = 81 platelet = 640 neutrophil = 55% reticulocyte = 1 27% BUN = 14 creatinine = 0 93  03/06/2018 calcium = 8 8 ALT = 25 AST = 14 alkaline phosphatase = 84 total protein = 7 1 total bilirubin = 0 30    Imaging     03/06/2018 CTA chest PE study     1   Extensive bilateral pulmonary arterial filling defects consistent with PE  The calculated ratio of right ventricular to left ventricular diameter (RV/LV ratio) is 1 2  This is greater than 0 9, which is abnormal and indicates right heart strain  An abnormal RV/LV ratio has been shown to be associated with an increased risk of 30 day mortality in the setting of acute pulmonary embolism  2   Hepatomegaly      03/06/2018 vascular bilateral lower limb duplex study     RIGHT LOWER LIMB: Normal  No evidence of acute or chronic deep vein thrombosis  No evidence of superficial thrombophlebitis noted  Doppler evaluation shows a normal response to augmentation maneuvers  Popliteal, posterior tibial and anterior tibial arterial Doppler waveforms are  triphasic      LEFT LOWER LIMB: Abnormal  Acute non-occlusive deep vein thrombosis in the proximal femoral, mid femoral,  gastrocnemius vein, posterior tibial vein, and peroneal vein  Acute occluded deep vein thrombosis in the distal femoral vein, and popliteal  vein  Evidence of superficial thrombophlebitis noted in the small saphenous vein  Doppler evaluation shows a normal response to augmentation maneuvers  Popliteal, posterior tibial and anterior tibial arterial Doppler waveforms are  triphasic  Pathology    05/15/2018 GP hemostasis assessment stated the only prothrombotic risk factor was 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

## 2018-12-04 LAB
ALBUMIN SERPL-MCNC: 4.3 G/DL (ref 3.6–4.8)
ALBUMIN/GLOB SERPL: 1.8 {RATIO} (ref 1.2–2.2)
ALP SERPL-CCNC: 64 IU/L (ref 39–117)
ALT SERPL-CCNC: 33 IU/L (ref 0–44)
AST SERPL-CCNC: 21 IU/L (ref 0–40)
B BURGDOR IGG+IGM SER-ACNC: <0.91 ISR (ref 0–0.9)
B BURGDOR IGM SER IA-ACNC: <0.8 INDEX (ref 0–0.79)
BILIRUB SERPL-MCNC: 0.4 MG/DL (ref 0–1.2)
BUN SERPL-MCNC: 16 MG/DL (ref 8–27)
BUN/CREAT SERPL: 15 (ref 10–24)
CALCIUM SERPL-MCNC: 9.2 MG/DL (ref 8.6–10.2)
CHLORIDE SERPL-SCNC: 103 MMOL/L (ref 96–106)
CHOLEST SERPL-MCNC: 245 MG/DL (ref 100–199)
CO2 SERPL-SCNC: 22 MMOL/L (ref 20–29)
CREAT SERPL-MCNC: 1.07 MG/DL (ref 0.76–1.27)
GLOBULIN SER-MCNC: 2.4 G/DL (ref 1.5–4.5)
GLUCOSE SERPL-MCNC: 99 MG/DL (ref 65–99)
HDLC SERPL-MCNC: 57 MG/DL
IRON SERPL-MCNC: 203 UG/DL (ref 38–169)
LDLC SERPL CALC-MCNC: 168 MG/DL (ref 0–99)
POTASSIUM SERPL-SCNC: 4.8 MMOL/L (ref 3.5–5.2)
PROT SERPL-MCNC: 6.7 G/DL (ref 6–8.5)
SL AMB EGFR AFRICAN AMERICAN: 86 ML/MIN/1.73
SL AMB EGFR NON AFRICAN AMERICAN: 75 ML/MIN/1.73
SL AMB VLDL CHOLESTEROL CALC: 20 MG/DL (ref 5–40)
SODIUM SERPL-SCNC: 142 MMOL/L (ref 134–144)
TRIGL SERPL-MCNC: 102 MG/DL (ref 0–149)

## 2018-12-27 ENCOUNTER — TELEPHONE (OUTPATIENT)
Dept: HEMATOLOGY ONCOLOGY | Facility: MEDICAL CENTER | Age: 63
End: 2018-12-27

## 2018-12-27 DIAGNOSIS — I82.412 ACUTE DEEP VEIN THROMBOSIS (DVT) OF FEMORAL VEIN OF LEFT LOWER EXTREMITY (HCC): ICD-10-CM

## 2018-12-27 DIAGNOSIS — I26.09 OTHER ACUTE PULMONARY EMBOLISM WITH ACUTE COR PULMONALE (HCC): ICD-10-CM

## 2019-01-11 ENCOUNTER — TELEPHONE (OUTPATIENT)
Dept: HEMATOLOGY ONCOLOGY | Facility: MEDICAL CENTER | Age: 64
End: 2019-01-11

## 2019-01-11 NOTE — TELEPHONE ENCOUNTER
Leroy Pizarro called and wanted us to update his insurance information  He now has [employer] and want to start using the mail order thru [employer]  Please send new refills to [facility]  Any questions please call [phone]  Humairacasey Pizarro needs a refill on Eluiquis 5 mg

## 2019-01-11 NOTE — TELEPHONE ENCOUNTER
There are multiple [facility] mail order pharmacies, none with the number provided  Please let patient know that we can print a prescription and he can mail it in himself, or if you can find out exactly which [address] this goes to  Thanks

## 2019-01-14 DIAGNOSIS — I26.09 OTHER ACUTE PULMONARY EMBOLISM WITH ACUTE COR PULMONALE (HCC): ICD-10-CM

## 2019-01-14 DIAGNOSIS — I82.412 ACUTE DEEP VEIN THROMBOSIS (DVT) OF FEMORAL VEIN OF LEFT LOWER EXTREMITY (HCC): ICD-10-CM

## 2019-02-26 ENCOUNTER — OFFICE VISIT (OUTPATIENT)
Dept: FAMILY MEDICINE CLINIC | Facility: CLINIC | Age: 64
End: 2019-02-26
Payer: COMMERCIAL

## 2019-02-26 VITALS
OXYGEN SATURATION: 95 % | BODY MASS INDEX: 30.11 KG/M2 | HEART RATE: 90 BPM | TEMPERATURE: 98.4 F | WEIGHT: 234.6 LBS | SYSTOLIC BLOOD PRESSURE: 102 MMHG | RESPIRATION RATE: 16 BRPM | DIASTOLIC BLOOD PRESSURE: 64 MMHG | HEIGHT: 74 IN

## 2019-02-26 DIAGNOSIS — K21.00 GASTROESOPHAGEAL REFLUX DISEASE WITH ESOPHAGITIS: ICD-10-CM

## 2019-02-26 DIAGNOSIS — Z12.5 PROSTATE CANCER SCREENING: ICD-10-CM

## 2019-02-26 DIAGNOSIS — E78.2 MIXED HYPERLIPIDEMIA: ICD-10-CM

## 2019-02-26 DIAGNOSIS — I82.512 CHRONIC DEEP VEIN THROMBOSIS (DVT) OF LEFT FEMORAL VEIN (HCC): Primary | Chronic | ICD-10-CM

## 2019-02-26 DIAGNOSIS — M48.02 CERVICAL STENOSIS OF SPINE: ICD-10-CM

## 2019-02-26 PROCEDURE — 1036F TOBACCO NON-USER: CPT | Performed by: FAMILY MEDICINE

## 2019-02-26 PROCEDURE — 3008F BODY MASS INDEX DOCD: CPT | Performed by: FAMILY MEDICINE

## 2019-02-26 PROCEDURE — 99214 OFFICE O/P EST MOD 30 MIN: CPT | Performed by: FAMILY MEDICINE

## 2019-02-26 NOTE — PATIENT INSTRUCTIONS
DASH Eating Plan   WHAT YOU NEED TO KNOW:   The DASH (Dietary Approaches to Stop Hypertension) Eating Plan is designed to help prevent or lower high blood pressure  It can also help to lower LDL (bad) cholesterol and decrease your risk of heart disease  The plan is low in sodium, sugar, unhealthy fats, and total fat  It is high in potassium, calcium, magnesium, and fiber  These nutrients are added when you eat more fruits, vegetables, and whole grains  DISCHARGE INSTRUCTIONS:   Your sodium limit each day: Your dietitian will tell you how much sodium is safe for you to have each day  People with high blood pressure should have no more than 1,500 to 2,300 mg of sodium in a day  A teaspoon (tsp) of salt has 2,300 mg of sodium  This may seem like a difficult goal, but small changes to the foods you eat can make a big difference  Your healthcare provider or dietitian can help you create a meal plan that follows your sodium limit  How to limit sodium:   · Read food labels  Food labels can help you choose foods that are low in sodium  The amount of sodium is listed in milligrams (mg)  The % Daily Value (DV) column tells you how much of your daily needs are met by 1 serving of the food for each nutrient listed  Choose foods that have less than 5% of the DV of sodium  These foods are considered low in sodium  Foods that have 20% or more of the DV of sodium are considered high in sodium  Avoid foods that have more than 300 mg of sodium in each serving  Choose foods that say low-sodium, reduced-sodium, or no salt added on the food label  · Avoid salt  Do not salt food at the table, and add very little salt to foods during cooking  Use herbs and spices, such as onions, garlic, and salt-free seasonings to add flavor to foods  Try lemon or lime juice or vinegar to give foods a tart flavor  Use hot peppers or a small amount of hot pepper sauce to add a spicy flavor to foods  · Ask about salt substitutes    Ask your healthcare provider if you may use salt substitutes  Some salt substitutes have ingredients that can be harmful if you have certain health conditions  · Choose foods carefully at restaurants  Meals from restaurants, especially fast food restaurants, are often high in sodium  Some restaurants have nutrition information that tells you the amount of sodium in their foods  Ask to have your food prepared with less, or no salt  What you need to know about fats:   · Include healthy fats  Examples are unsaturated fats and omega-3 fatty acids  Unsaturated fats are found in soybean, canola, olive, or sunflower oil, and liquid and soft tub margarines  Omega-3 fatty acids are found in fatty fish, such as salmon, tuna, mackerel, and sardines  It is also found in flaxseed oil and ground flaxseed  · Avoid unhealthy fats  Do not eat unhealthy fats, such as saturated fats and trans fats  Saturated fats are found in foods that contain fat from animals  Examples are fatty meats, whole milk, butter, cream, and other dairy foods  It is also found in shortening, stick margarine, palm oil, and coconut oil  Trans fats are found in fried foods, crackers, chips, and baked goods made with margarine or shortening  Foods to include: With the DASH eating plan, you need to eat a certain number of servings from each food group  This will help you get enough of certain nutrients and limit others  The amount of servings you should eat depends on how many calories you need  Your dietitian can tell you how many calories you need  The number of servings listed next to the food groups below are for people who need about 2,000 calories each day    · Grains:  6 to 8 servings (3 of these servings should be whole-grain foods)    ¨ 1 slice of whole-grain bread     ¨ 1 ounce of dry cereal    ¨ ½ cup of cooked cereal, pasta, or brown rice    · Vegetables and fruits:  4 to 5 servings of fruits and 4 to 5 servings of vegetables    ¨ 1 medium fruit    ¨ ½ cup of frozen, canned (no added salt), or chopped fresh vegetables     ¨ ½ cup of fresh, frozen, dried, or canned fruit (canned in light syrup or fruit juice)    ¨ ½ cup of vegetable or fruit juice    · Dairy:  2 to 3 servings    ¨ 1 cup of nonfat (skim) or 1% milk    ¨ 1½ ounces of fat-free or low-fat cheese    ¨ 6 ounces of nonfat or low-fat yogurt    · Lean meat, poultry, and fish:  6 ounces or less    [employer] (chicken, turkey) with no skin    ¨ Fish (especially fatty fish, such as salmon, fresh tuna, or mackerel)    ¨ Lean beef and pork (loin, round, extra lean hamburger)    ¨ Egg whites and egg substitutes    · Nuts, seeds, and legumes:  4 to 5 servings each week    ¨ ½ cup of cooked beans and peas    ¨ 1½ ounces of unsalted nuts    ¨ 2 tablespoons of peanut butter or seeds    · Sweets and added sugars:  5 or less each week    ¨ 1 tablespoon of sugar, jelly, or jam    ¨ ½ cup of sorbet or gelatin    ¨ 1 cup of lemonade    · Fats:  2 to 3 servings each week    ¨ 1 teaspoon of soft margarine or vegetable oil    ¨ 1 tablespoon of mayonnaise    ¨ 2 tablespoons of salad dressing  Foods to avoid:   · Grains:      [employer], such as doughnuts, pastries, cookies, and biscuits (high in fat and sugar)    ¨ Mixes for cornbread and biscuits, packaged foods, such as bread stuffing, rice and pasta mixes, macaroni and cheese, and instant cereals (high in sodium)    · Fruits and vegetables:      ¨ Regular, canned vegetables (high in sodium)    ¨ Sauerkraut, pickled vegetables, and other foods prepared in brine (high in sodium)    ¨ Fried vegetables or vegetables in butter or high-fat sauces    ¨ Fruit in cream or butter sauce (high in fat)    · Dairy:      ¨ Whole milk, 2% milk, and cream (high in fat)    ¨ Regular cheese and processed cheese (high in fat and sodium)    · Meats and protein foods:      ¨ Smoked or cured meat, such as corned beef, quinonez, ham, hot dogs, and sausage (high in fat and sodium)    ¨ Canned beans and canned meats or spreads, such as potted meats, sardines, anchovies, and imitation seafood (high in sodium)    ¨ Deli or lunch meats, such as bologna, ham, turkey, and roast beef (high in sodium)    ¨ High-fat meat (T-bone steak, regular hamburger, and ribs)    ¨ Whole eggs and egg yolks (high in fat)    · Other:      ¨ Seasonings made with salt, such as garlic salt, celery salt, onion salt, seasoned salt, meat tenderizers, and monosodium glutamate (MSG)    ¨ Miso soup and canned or dried soup mixes (high in sodium)    ¨ Regular soy sauce, barbecue sauce, teriyaki sauce, steak sauce, Worcestershire sauce, and most flavored vinegars (high in sodium)    ¨ Regular condiments, such as mustard, ketchup, and salad dressings (high in sodium)    ¨ Gravy and sauces, such as Jl or cheese sauces (high in sodium and fat)    ¨ Drinks high in sugar, such as soda or fruit drinks    [employer] foods, such as salted chips, popcorn, pretzels, pork rinds, salted crackers, and salted nuts    ¨ Frozen foods, such as dinners, entrees, vegetables with sauces, and breaded meats (high in sodium)  Other guidelines to follow:   · Maintain a healthy weight  Your risk for heart disease is higher if you are overweight  Your healthcare provider may suggest that you lose weight if you are overweight  You can lose weight by eating fewer calories and foods that have added sugars and fat  The DASH meal plan can help you do this  Decrease calories by eating smaller portions at each meal and fewer snacks  Ask your healthcare provider for more information about how to lose weight  · Exercise regularly  Regular exercise can help you reach or maintain a healthy weight  Regular exercise can also help decrease your blood pressure and improve your cholesterol levels  Get 30 minutes or more of moderate exercise each day of the week  To lose weight, get at least 60 minutes of exercise   Talk to your healthcare provider about the best exercise program for you  · Limit alcohol  Women should limit alcohol to 1 drink a day  Men should limit alcohol to 2 drinks a day  A drink of alcohol is 12 ounces of beer, 5 ounces of wine, or 1½ ounces of liquor  © 2017 [address] [employer] Information is for End User's use only and may not be sold, redistributed or otherwise used for commercial purposes  All illustrations and images included in CareNotes® are the copyrighted property of A D A M [employer]  or Vicente Walsh  The above information is an  only  It is not intended as medical advice for individual conditions or treatments  Talk to your doctor, nurse or pharmacist before following any medical regimen to see if it is safe and effective for you    continue medications

## 2019-02-26 NOTE — PROGRESS NOTES
Subjective:         Chief Complaint   Patient presents with    Follow-up     af/rma        Problem List Items Addressed This Visit        Digestive    Gastroesophageal reflux disease with esophagitis       Cardiovascular and Mediastinum    Chronic deep vein thrombosis (DVT) of left femoral vein (HCC) - Primary (Chronic)       Other    Mixed hyperlipidemia    Relevant Orders    Comprehensive metabolic panel    Lipid panel    Cervical stenosis of spine      Other Visit Diagnoses     Prostate cancer screening        Relevant Orders    PSA, Total Screen              Orders Placed This Encounter   Procedures    Comprehensive metabolic panel     This is a patient instruction: Patient fasting for 8 hours or longer recommended  Standing Status:   Future     Standing Expiration Date:   2/26/2020    Lipid panel     This is a patient instruction: This test requires patient fasting for 10-12 hours or longer  Drinking of black coffee or black tea is acceptable  Standing Status:   Future     Standing Expiration Date:   2/26/2020    PSA, Total Screen     This is a patient instruction: This test is non-fasting  Please drink two glasses of water morning of bloodwork  Standing Status:   Future     Standing Expiration Date:   2/26/2020       Patient Instructions     DASH Eating Plan   WHAT YOU NEED TO KNOW:   The DASH (Dietary Approaches to Stop Hypertension) Eating Plan is designed to help prevent or lower high blood pressure  It can also help to lower LDL (bad) cholesterol and decrease your risk of heart disease  The plan is low in sodium, sugar, unhealthy fats, and total fat  It is high in potassium, calcium, magnesium, and fiber  These nutrients are added when you eat more fruits, vegetables, and whole grains  DISCHARGE INSTRUCTIONS:   Your sodium limit each day: Your dietitian will tell you how much sodium is safe for you to have each day   People with high blood pressure should have no more than 1,500 to 2,300 mg of sodium in a day  A teaspoon (tsp) of salt has 2,300 mg of sodium  This may seem like a difficult goal, but small changes to the foods you eat can make a big difference  Your healthcare provider or dietitian can help you create a meal plan that follows your sodium limit  How to limit sodium:   · Read food labels  Food labels can help you choose foods that are low in sodium  The amount of sodium is listed in milligrams (mg)  The % Daily Value (DV) column tells you how much of your daily needs are met by 1 serving of the food for each nutrient listed  Choose foods that have less than 5% of the DV of sodium  These foods are considered low in sodium  Foods that have 20% or more of the DV of sodium are considered high in sodium  Avoid foods that have more than 300 mg of sodium in each serving  Choose foods that say low-sodium, reduced-sodium, or no salt added on the food label  · Avoid salt  Do not salt food at the table, and add very little salt to foods during cooking  Use herbs and spices, such as onions, garlic, and salt-free seasonings to add flavor to foods  Try lemon or lime juice or vinegar to give foods a tart flavor  Use hot peppers or a small amount of hot pepper sauce to add a spicy flavor to foods  · Ask about salt substitutes  Ask your healthcare provider if you may use salt substitutes  Some salt substitutes have ingredients that can be harmful if you have certain health conditions  · Choose foods carefully at restaurants  Meals from restaurants, especially fast food restaurants, are often high in sodium  Some restaurants have nutrition information that tells you the amount of sodium in their foods  Ask to have your food prepared with less, or no salt  What you need to know about fats:   · Include healthy fats  Examples are unsaturated fats and omega-3 fatty acids  Unsaturated fats are found in soybean, canola, olive, or sunflower oil, and liquid and soft tub margarines  Omega-3 fatty acids are found in fatty fish, such as salmon, tuna, mackerel, and sardines  It is also found in flaxseed oil and ground flaxseed  · Avoid unhealthy fats  Do not eat unhealthy fats, such as saturated fats and trans fats  Saturated fats are found in foods that contain fat from animals  Examples are fatty meats, whole milk, butter, cream, and other dairy foods  It is also found in shortening, stick margarine, palm oil, and coconut oil  Trans fats are found in fried foods, crackers, chips, and baked goods made with margarine or shortening  Foods to include: With the DASH eating plan, you need to eat a certain number of servings from each food group  This will help you get enough of certain nutrients and limit others  The amount of servings you should eat depends on how many calories you need  Your dietitian can tell you how many calories you need  The number of servings listed next to the food groups below are for people who need about 2,000 calories each day    · Grains:  6 to 8 servings (3 of these servings should be whole-grain foods)    ¨ 1 slice of whole-grain bread     ¨ 1 ounce of dry cereal    ¨ ½ cup of cooked cereal, pasta, or brown rice    · Vegetables and fruits:  4 to 5 servings of fruits and 4 to 5 servings of vegetables    ¨ 1 medium fruit    ¨ ½ cup of frozen, canned (no added salt), or chopped fresh vegetables     ¨ ½ cup of fresh, frozen, dried, or canned fruit (canned in light syrup or fruit juice)    ¨ ½ cup of vegetable or fruit juice    · Dairy:  2 to 3 servings    ¨ 1 cup of nonfat (skim) or 1% milk    ¨ 1½ ounces of fat-free or low-fat cheese    ¨ 6 ounces of nonfat or low-fat yogurt    · Lean meat, poultry, and fish:  6 ounces or less    [employer] (chicken, turkey) with no skin    ¨ Fish (especially fatty fish, such as salmon, fresh tuna, or mackerel)    ¨ Lean beef and pork (loin, round, extra lean hamburger)    ¨ Egg whites and egg substitutes    · Nuts, seeds, and legumes:  4 to 5 servings each week    ¨ ½ cup of cooked beans and peas    ¨ 1½ ounces of unsalted nuts    ¨ 2 tablespoons of peanut butter or seeds    · Sweets and added sugars:  5 or less each week    ¨ 1 tablespoon of sugar, jelly, or jam    ¨ ½ cup of sorbet or gelatin    ¨ 1 cup of lemonade    · Fats:  2 to 3 servings each week    ¨ 1 teaspoon of soft margarine or vegetable oil    ¨ 1 tablespoon of mayonnaise    ¨ 2 tablespoons of salad dressing  Foods to avoid:   · Grains:      [employer], such as doughnuts, pastries, cookies, and biscuits (high in fat and sugar)    ¨ Mixes for cornbread and biscuits, packaged foods, such as bread stuffing, rice and pasta mixes, macaroni and cheese, and instant cereals (high in sodium)    · Fruits and vegetables:      ¨ Regular, canned vegetables (high in sodium)    ¨ Sauerkraut, pickled vegetables, and other foods prepared in brine (high in sodium)    ¨ Fried vegetables or vegetables in butter or high-fat sauces    ¨ Fruit in cream or butter sauce (high in fat)    · Dairy:      ¨ Whole milk, 2% milk, and cream (high in fat)    ¨ Regular cheese and processed cheese (high in fat and sodium)    · Meats and protein foods:      ¨ Smoked or cured meat, such as corned beef, quinonez, ham, hot dogs, and sausage (high in fat and sodium)    ¨ Canned beans and canned meats or spreads, such as potted meats, sardines, anchovies, and imitation seafood (high in sodium)    ¨ Deli or lunch meats, such as bologna, ham, turkey, and roast beef (high in sodium)    ¨ High-fat meat (T-bone steak, regular hamburger, and ribs)    ¨ Whole eggs and egg yolks (high in fat)    · Other:      ¨ Seasonings made with salt, such as garlic salt, celery salt, onion salt, seasoned salt, meat tenderizers, and monosodium glutamate (MSG)    ¨ Miso soup and canned or dried soup mixes (high in sodium)    ¨ Regular soy sauce, barbecue sauce, teriyaki sauce, steak sauce, Worcestershire sauce, and most flavored vinegars (high in sodium)    ¨ Regular condiments, such as mustard, ketchup, and salad dressings (high in sodium)    ¨ Gravy and sauces, such as Jl or cheese sauces (high in sodium and fat)    ¨ Drinks high in sugar, such as soda or fruit drinks    [employer] foods, such as salted chips, popcorn, pretzels, pork rinds, salted crackers, and salted nuts    ¨ Frozen foods, such as dinners, entrees, vegetables with sauces, and breaded meats (high in sodium)  Other guidelines to follow:   · Maintain a healthy weight  Your risk for heart disease is higher if you are overweight  Your healthcare provider may suggest that you lose weight if you are overweight  You can lose weight by eating fewer calories and foods that have added sugars and fat  The DASH meal plan can help you do this  Decrease calories by eating smaller portions at each meal and fewer snacks  Ask your healthcare provider for more information about how to lose weight  · Exercise regularly  Regular exercise can help you reach or maintain a healthy weight  Regular exercise can also help decrease your blood pressure and improve your cholesterol levels  Get 30 minutes or more of moderate exercise each day of the week  To lose weight, get at least 60 minutes of exercise  Talk to your healthcare provider about the best exercise program for you  · Limit alcohol  Women should limit alcohol to 1 drink a day  Men should limit alcohol to 2 drinks a day  A drink of alcohol is 12 ounces of beer, 5 ounces of wine, or 1½ ounces of liquor  © 2017 [address] [facility] Information is for End User's use only and may not be sold, redistributed or otherwise used for commercial purposes  All illustrations and images included in CareNotes® are the copyrighted property of [employer] M [employer]  or Vicente Walsh  The above information is an  only  It is not intended as medical advice for individual conditions or treatments   Talk to your doctor, nurse or pharmacist before following any medical regimen to see if it is safe and effective for you    continue medications  Ave Cumber      ALVIN urbina is in for evaluation follow-up  History of DVT PE and upper extremity DVT now on Eliquis  He has had history of recurrent DVT postoperatively follows with Hematology    Vitals:    02/26/19 1024   BP: 102/64   Pulse: 90   Resp: 16   Temp: 98 4 °F (36 9 °C)   SpO2: 95%       No Known Allergies    Current Outpatient Medications on File Prior to Visit   Medication Sig Dispense Refill    apixaban (ELIQUIS) 5 mg Take 1 tablet (5 mg total) by mouth 2 (two) times a day for 90 days 180 tablet 0    Ciclopirox 1 % shampoo as needed       ketoconazole (EXTINA) 2 % foam as needed       Multiple Vitamin (MULTIVITAMIN) tablet Take 1 tablet by mouth daily      omeprazole (PriLOSEC) 20 mg delayed release capsule Take 20 mg by mouth daily       No current facility-administered medications on file prior to visit  Past Medical History:   Diagnosis Date    Calcific tendinitis 2/27/2018    DVT of upper extremity (deep vein thrombosis) [facility])        Past Surgical History:   Procedure Laterality Date    COLONOSCOPY      [place] COLONOSCOPY FLX DX W/COLLJ SPEC WHEN PFRMD N/A 7/31/2018    Procedure: COLONOSCOPY;  Surgeon: Matt Blount MD;  Location: [facility]; Service: Gastroenterology    ULNAR NERVE TRANSPOSITION Right           reports that he has never smoked  He has never used smokeless tobacco  He reports that he drinks alcohol  He reports that he does not use drugs  reports that he has never smoked   He has never used smokeless tobacco     The following portions of the patient's history were reviewed and updated as appropriate: allergies, current medications, past family history, past medical history, past social history, past surgical history and problem list     Review of Systems   Constitutional: Negative for activity change, appetite change, chills, diaphoresis, fatigue and fever  HENT: Negative for congestion, drooling, postnasal drip, sore throat and trouble swallowing  Eyes: Negative for discharge  Respiratory: Negative for chest tightness, shortness of breath, wheezing and stridor  Cardiovascular: Negative for chest pain, palpitations and leg swelling  L calf   Gastrointestinal: Negative for abdominal distention  Endocrine: Negative for cold intolerance and heat intolerance  Musculoskeletal: Negative for arthralgias, joint swelling and neck stiffness  Skin: Negative for color change, pallor and rash  Neurological: Negative for dizziness and weakness  Psychiatric/Behavioral: Negative for agitation and behavioral problems  The patient is not nervous/anxious  Physical Exam   Constitutional: He is oriented to person, place, and time  He appears well-developed and well-nourished  HENT:   Head: Normocephalic  Eyes: Pupils are equal, round, and reactive to light  EOM are normal    Neck: Normal range of motion  Neck supple  Cardiovascular: Normal rate, regular rhythm and normal heart sounds  Pulmonary/Chest: Effort normal and breath sounds normal  No stridor  He has no rales  He exhibits no tenderness  Abdominal: Soft  Genitourinary: Prostate normal    Genitourinary Comments: No nodules   Musculoskeletal: Normal range of motion  He exhibits no tenderness  Girth 1cm > L          Homans neg edema  Visible fullness Varicosities L>R   Neurological: He is alert and oriented to person, place, and time  Skin: Skin is warm  Capillary refill takes less than 2 seconds  Psychiatric: He has a normal mood and affect  His behavior is normal  Judgment and thought content normal    Nursing note and vitals reviewed

## 2019-04-11 LAB
ALBUMIN SERPL-MCNC: 4.3 G/DL (ref 3.6–4.8)
ALBUMIN/GLOB SERPL: 1.8 {RATIO} (ref 1.2–2.2)
ALP SERPL-CCNC: 56 IU/L (ref 39–117)
ALT SERPL-CCNC: 21 IU/L (ref 0–44)
AST SERPL-CCNC: 22 IU/L (ref 0–40)
BASOPHILS # BLD AUTO: 0 X10E3/UL (ref 0–0.2)
BASOPHILS NFR BLD AUTO: 0 %
BILIRUB SERPL-MCNC: 0.5 MG/DL (ref 0–1.2)
BUN SERPL-MCNC: 16 MG/DL (ref 8–27)
BUN/CREAT SERPL: 15 (ref 10–24)
CALCIUM SERPL-MCNC: 9.5 MG/DL (ref 8.6–10.2)
CHLORIDE SERPL-SCNC: 103 MMOL/L (ref 96–106)
CHOLEST SERPL-MCNC: 203 MG/DL (ref 100–199)
CO2 SERPL-SCNC: 25 MMOL/L (ref 20–29)
CREAT SERPL-MCNC: 1.09 MG/DL (ref 0.76–1.27)
EOSINOPHIL # BLD AUTO: 0.1 X10E3/UL (ref 0–0.4)
EOSINOPHIL NFR BLD AUTO: 3 %
ERYTHROCYTE [DISTWIDTH] IN BLOOD BY AUTOMATED COUNT: 15.2 % (ref 12.3–15.4)
GLOBULIN SER-MCNC: 2.4 G/DL (ref 1.5–4.5)
GLUCOSE SERPL-MCNC: 100 MG/DL (ref 65–99)
HCT VFR BLD AUTO: 42.2 % (ref 37.5–51)
HDLC SERPL-MCNC: 64 MG/DL
HGB BLD-MCNC: 14 G/DL (ref 13–17.7)
IMM GRANULOCYTES # BLD: 0 X10E3/UL (ref 0–0.1)
IMM GRANULOCYTES NFR BLD: 0 %
LDLC SERPL CALC-MCNC: 127 MG/DL (ref 0–99)
LYMPHOCYTES # BLD AUTO: 1.5 X10E3/UL (ref 0.7–3.1)
LYMPHOCYTES NFR BLD AUTO: 31 %
MCH RBC QN AUTO: 29.2 PG (ref 26.6–33)
MCHC RBC AUTO-ENTMCNC: 33.2 G/DL (ref 31.5–35.7)
MCV RBC AUTO: 88 FL (ref 79–97)
MONOCYTES # BLD AUTO: 0.5 X10E3/UL (ref 0.1–0.9)
MONOCYTES NFR BLD AUTO: 11 %
NEUTROPHILS # BLD AUTO: 2.6 X10E3/UL (ref 1.4–7)
NEUTROPHILS NFR BLD AUTO: 55 %
PLATELET # BLD AUTO: 238 X10E3/UL (ref 150–379)
POTASSIUM SERPL-SCNC: 4.6 MMOL/L (ref 3.5–5.2)
PROT SERPL-MCNC: 6.7 G/DL (ref 6–8.5)
PSA SERPL-MCNC: 0.6 NG/ML (ref 0–4)
RBC # BLD AUTO: 4.79 X10E6/UL (ref 4.14–5.8)
SL AMB EGFR AFRICAN AMERICAN: 84 ML/MIN/1.73
SL AMB EGFR NON AFRICAN AMERICAN: 72 ML/MIN/1.73
SL AMB VLDL CHOLESTEROL CALC: 12 MG/DL (ref 5–40)
SODIUM SERPL-SCNC: 143 MMOL/L (ref 134–144)
TRIGL SERPL-MCNC: 60 MG/DL (ref 0–149)
WBC # BLD AUTO: 4.8 X10E3/UL (ref 3.4–10.8)

## 2019-04-18 ENCOUNTER — OFFICE VISIT (OUTPATIENT)
Dept: HEMATOLOGY ONCOLOGY | Facility: MEDICAL CENTER | Age: 64
End: 2019-04-18
Payer: COMMERCIAL

## 2019-04-18 VITALS
BODY MASS INDEX: 29.13 KG/M2 | DIASTOLIC BLOOD PRESSURE: 80 MMHG | RESPIRATION RATE: 18 BRPM | TEMPERATURE: 97.9 F | HEART RATE: 77 BPM | WEIGHT: 227 LBS | OXYGEN SATURATION: 95 % | SYSTOLIC BLOOD PRESSURE: 118 MMHG | HEIGHT: 74 IN

## 2019-04-18 DIAGNOSIS — I82.412 ACUTE DEEP VEIN THROMBOSIS (DVT) OF FEMORAL VEIN OF LEFT LOWER EXTREMITY (HCC): ICD-10-CM

## 2019-04-18 DIAGNOSIS — I26.09 OTHER ACUTE PULMONARY EMBOLISM WITH ACUTE COR PULMONALE (HCC): ICD-10-CM

## 2019-04-18 DIAGNOSIS — I26.09 OTHER ACUTE PULMONARY EMBOLISM WITH ACUTE COR PULMONALE (HCC): Primary | ICD-10-CM

## 2019-04-18 PROCEDURE — 99213 OFFICE O/P EST LOW 20 MIN: CPT | Performed by: INTERNAL MEDICINE

## 2019-05-03 ENCOUNTER — OFFICE VISIT (OUTPATIENT)
Dept: FAMILY MEDICINE CLINIC | Facility: CLINIC | Age: 64
End: 2019-05-03
Payer: COMMERCIAL

## 2019-05-03 VITALS
WEIGHT: 231 LBS | HEART RATE: 72 BPM | SYSTOLIC BLOOD PRESSURE: 120 MMHG | HEIGHT: 74 IN | TEMPERATURE: 98.6 F | BODY MASS INDEX: 29.65 KG/M2 | DIASTOLIC BLOOD PRESSURE: 70 MMHG | RESPIRATION RATE: 16 BRPM
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
## 2019-05-03 PROCEDURE — 99213 OFFICE O/P EST LOW 20 MIN: CPT | Performed by: NURSE PRACTITIONER

## 2019-05-03 PROCEDURE — 1036F TOBACCO NON-USER: CPT | Performed by: NURSE PRACTITIONER

## 2019-05-03 PROCEDURE — 3008F BODY MASS INDEX DOCD: CPT | Performed by: NURSE PRACTITIONER

## 2019-07-25 ENCOUNTER — OFFICE VISIT (OUTPATIENT)
Dept: FAMILY MEDICINE CLINIC | Facility: CLINIC | Age: 64
End: 2019-07-25
Payer: COMMERCIAL

## 2019-07-25 VITALS
HEART RATE: 93 BPM | SYSTOLIC BLOOD PRESSURE: 108 MMHG | BODY MASS INDEX: 29.11 KG/M2 | TEMPERATURE: 98.7 F | RESPIRATION RATE: 16 BRPM | DIASTOLIC BLOOD PRESSURE: 78 MMHG | WEIGHT: 226.8 LBS | HEIGHT: 74 IN | OXYGEN SATURATION: 96 %

## 2019-07-25 DIAGNOSIS — R20.0 LT ARM NUMBNESS: ICD-10-CM

## 2019-07-25 DIAGNOSIS — M65.20 CALCIFIC TENDINITIS: ICD-10-CM

## 2019-07-25 DIAGNOSIS — M48.02 CERVICAL STENOSIS OF SPINE: Primary | ICD-10-CM

## 2019-07-25 PROCEDURE — 99213 OFFICE O/P EST LOW 20 MIN: CPT | Performed by: FAMILY MEDICINE

## 2019-07-25 PROCEDURE — 3008F BODY MASS INDEX DOCD: CPT | Performed by: FAMILY MEDICINE

## 2019-07-25 RX ORDER — METHYLPREDNISOLONE 4 MG/1
TABLET ORAL
Qty: 21 EACH | Refills: 0 | Status: SHIPPED | OUTPATIENT
Start: 2019-07-25 | End: 2019-09-13 | Stop reason: ALTCHOICE

## 2019-07-25 NOTE — PROGRESS NOTES
Subjective:           Problem List Items Addressed This Visit        Musculoskeletal and Integument    Calcific tendinitis       Other    Cervical stenosis of spine - Primary    Relevant Medications    methylPREDNISolone 4 MG tablet therapy pack    Other Relevant Orders    Ambulatory referral to Orthopedic Surgery    XR spine lumbar minimum 4 views non injury      Other Visit Diagnoses     Lt arm numbness        Relevant Orders    XR spine lumbar minimum 4 views non injury              Orders Placed This Encounter   Procedures    XR spine lumbar minimum 4 views non injury     L neuritis  F/o Ant spinal fusion     Standing Status:   Future     Standing Expiration Date:   7/25/2023     Scheduling Instructions:      Bring along any outside films relating to this procedure   Ambulatory referral to Orthopedic Surgery     Standing Status:   Future     Standing Expiration Date:   1/25/2020     Referral Priority:   Routine     Referral Type:   Consult - AMB     Referral Reason:   Specialty Services Required     Referred to Provider:   Sergio Urban DO     Requested Specialty:   Orthopedic Surgery     Number of Visits Requested:   1     Expiration Date:   7/25/2020       Patient Instructions   Ortho spine evaluation  MRI  Hematology f/u for update    Continue medications  BMI Counseling: Body mass index is 29 12 kg/m²  Discussed the patient's BMI with him  The BMI is above average  BMI counseling and education was provided to the patient  Nutrition recommendations include increasing intake of lean protein and reducing intake of saturated fat and trans fat  he is currently on apixaban    Aleta Crumb    Chief Complaint   Patient presents with    Pain in neck/shoulder     numbness/tingling radiating down left arm   gee urbina is having left-sided neck and arm symptoms  Intermittent tingling numbness to the area he has a history of right-sided ulnar transposition calcific tendinitis bursitis  He has calcific tendinitis bursitis also found in the right shoulder with some bicipital tendinitis he has history of DVT in left lower extremity and bilateral pulmonary emboli  He has a history    /78   Pulse 93   Temp 98 7 °F (37 1 °C)   Resp 16   Ht 6' 2" (1 88 m)   Wt 103 kg (226 lb 12 8 oz)   SpO2 96%   BMI 29 12 kg/m²       No Known Allergies    Current Outpatient Medications on File Prior to Visit   Medication Sig Dispense Refill    apixaban (ELIQUIS) 5 mg Take 1 tablet (5 mg total) by mouth 2 (two) times a day for 90 days 180 tablet 0    Ciclopirox 1 % shampoo as needed       ketoconazole (EXTINA) 2 % foam as needed       Multiple Vitamin (MULTIVITAMIN) tablet Take 1 tablet by mouth daily      omeprazole (PriLOSEC) 20 mg delayed release capsule Take 20 mg by mouth daily       No current facility-administered medications on file prior to visit  Past Medical History:   Diagnosis Date    Calcific tendinitis 2/27/2018    DVT of upper extremity (deep vein thrombosis) (HCC)        Past Surgical History:   Procedure Laterality Date    ANTERIOR CERVICAL DISCECTOMY W/ FUSION Right     COLONOSCOPY      LA COLONOSCOPY FLX DX W/COLLJ SPEC WHEN PFRMD N/A 7/31/2018    Procedure: COLONOSCOPY;  Surgeon: Malik Rivera MD;  Location: [facility]; Service: Gastroenterology    SHOULDER SURGERY Right     ULNAR NERVE TRANSPOSITION Right           reports that he has never smoked  He has never used smokeless tobacco  He reports that he drinks alcohol  He reports that he does not use drugs  reports that he has never smoked  He has never used smokeless tobacco         Review of Systems   Constitutional: Negative for activity change, appetite change, chills, diaphoresis, fatigue and fever  HENT: Negative for congestion, drooling, postnasal drip, sore throat and trouble swallowing  Eyes: Negative for discharge     Respiratory: Negative for chest tightness, shortness of breath, wheezing and stridor  Cardiovascular: Negative for chest pain, palpitations and leg swelling  L calf   Gastrointestinal: Negative for abdominal distention  Endocrine: Negative for cold intolerance and heat intolerance  Musculoskeletal: Positive for arthralgias, neck pain and neck stiffness  Radiating    L limited ROM neck   Skin: Negative for color change, pallor and rash  Neurological: Positive for numbness  Negative for dizziness, tremors and weakness  Tingling L UE      L lower neck to hand   Hematological: Negative for adenopathy  Psychiatric/Behavioral: Negative for agitation and behavioral problems  The patient is not nervous/anxious  Physical Exam   Constitutional: He is oriented to person, place, and time  He appears well-developed and well-nourished  HENT:   Head: Normocephalic  Eyes: Pupils are equal, round, and reactive to light  EOM are normal    Neck: Normal range of motion  Neck supple  Cardiovascular: Normal rate, regular rhythm and normal heart sounds  Pulmonary/Chest: Effort normal and breath sounds normal  No stridor  He has no rales  He exhibits no tenderness  Abdominal: Soft  Bowel sounds are normal    Genitourinary: Prostate normal    Genitourinary Comments: No nodules   Musculoskeletal: Normal range of motion  He exhibits no tenderness  Girth 1cm > L          Homans neg edema  Visible fullness Varicosities L>R   Neurological: He is alert and oriented to person, place, and time  He displays normal reflexes  No cranial nerve deficit or sensory deficit  He exhibits normal muscle tone  Coordination normal    Tingling intermittent SONYA Limited L  Neck ROM  Skin: Skin is warm  Capillary refill takes less than 2 seconds  Psychiatric: He has a normal mood and affect  His behavior is normal  Judgment and thought content normal    Nursing note and vitals reviewed

## 2019-08-02 ENCOUNTER — APPOINTMENT (OUTPATIENT)
Dept: RADIOLOGY | Facility: CLINIC | Age: 64
End: 2019-08-02
Payer: COMMERCIAL

## 2019-08-02 ENCOUNTER — CONSULT (OUTPATIENT)
Dept: OBGYN CLINIC | Facility: CLINIC | Age: 64
End: 2019-08-02
Payer: COMMERCIAL

## 2019-08-02 VITALS
HEART RATE: 79 BPM | HEIGHT: 74 IN | WEIGHT: 225 LBS | SYSTOLIC BLOOD PRESSURE: 119 MMHG | DIASTOLIC BLOOD PRESSURE: 80 MMHG | BODY MASS INDEX: 28.88 KG/M2

## 2019-08-02 DIAGNOSIS — M48.02 CERVICAL STENOSIS OF SPINE: ICD-10-CM

## 2019-08-02 DIAGNOSIS — Z98.1 S/P CERVICAL SPINAL FUSION: ICD-10-CM

## 2019-08-02 DIAGNOSIS — M54.12 LEFT CERVICAL RADICULOPATHY: Primary | ICD-10-CM

## 2019-08-02 PROCEDURE — 99243 OFF/OP CNSLTJ NEW/EST LOW 30: CPT | Performed by: ORTHOPAEDIC SURGERY

## 2019-08-02 PROCEDURE — 72040 X-RAY EXAM NECK SPINE 2-3 VW: CPT

## 2019-08-02 NOTE — PROGRESS NOTES
Assessment/Plan:  1  Left cervical radiculopathy  Ambulatory referral to Physical Therapy   2  Cervical stenosis of spine  Ambulatory referral to Orthopedic Surgery    XR spine cervical 2 or 3 vw injury    Ambulatory referral to Physical Therapy   3  S/P cervical spinal fusion  Ambulatory referral to Physical Therapy     Bharat Fitzgerald appears to have increased symptoms of cervical radiculopathy down the left arm  He does not demonstrate weakness or numbness on examination today but he does have some reproduction of neurologic symptoms down the left arm  His cervical spine x-ray does show stable hardware and all only minimal degenerative changes in his cervical spine  I would like for him to begin with conservative treatment of physical therapy particularly with cervical traction to see if this diminishes some of his symptoms  If it has persistence with radicular pain down his left arm we could proceed with an MRI with Maic protocol  Subjective:   Roxann Baker is a 60 y o  male who presents to the office for evaluation for left-sided arm numbness and tingling  He states 3 weeks ago he started developing increased neck discomfort radiating down the left arm into the left forearm  He does have a history of anterior cervical disc fusion over 14 years ago  He states after the surgery he did not have any radiculopathy or pain continued until recently  He denies any new injury or previous aggravation of the radiculopathy that he is experiencing today  He saw his primary care physician, Dr Chaparro Duran who started him on a Medrol Dosepak which she states helped decrease some of his symptoms but did not fully diminished them  He denies any weakness in his left upper extremity  He has continued to stay active and has tried playing golf as well  Review of Systems   Constitutional: Negative for chills, fever and unexpected weight change  HENT: Negative for hearing loss, nosebleeds and sore throat      Eyes: Negative for pain, redness and visual disturbance  Respiratory: Negative for cough, shortness of breath and wheezing  Cardiovascular: Negative for chest pain, palpitations and leg swelling  Gastrointestinal: Negative for abdominal pain, nausea and vomiting  Endocrine: Negative for polyphagia and polyuria  Genitourinary: Negative for dysuria and hematuria  Musculoskeletal:        See HPI   Skin: Negative for rash and wound  Neurological: Positive for numbness  Negative for dizziness and headaches  Psychiatric/Behavioral: Negative for decreased concentration and suicidal ideas  The patient is not nervous/anxious  Past Medical History:   Diagnosis Date    Calcific tendinitis 2/27/2018    DVT of upper extremity (deep vein thrombosis) (HCC)        Past Surgical History:   Procedure Laterality Date    ANTERIOR CERVICAL DISCECTOMY W/ FUSION Right     COLONOSCOPY      DE COLONOSCOPY FLX DX W/COLLJ SPEC WHEN PFRMD N/A 7/31/2018    Procedure: COLONOSCOPY;  Surgeon: Obed Baxter MD;  Location: [facility];   Service: Gastroenterology    SHOULDER SURGERY Right     ULNAR NERVE TRANSPOSITION Right        Family History   Problem Relation Age of Onset    Diabetes Father     Cancer Maternal Grandmother     Cancer Maternal Grandfather     Colon cancer Neg Hx     Liver disease Neg Hx        Social History     Occupational History    Not on file   Tobacco Use    Smoking status: Never Smoker    Smokeless tobacco: Never Used   Substance and Sexual Activity    Alcohol use: Yes     Comment: social    Drug use: No    Sexual activity: Yes         Current Outpatient Medications:     Apixaban (ELIQUIS PO), Take by mouth, Disp: , Rfl:     Ciclopirox 1 % shampoo, as needed , Disp: , Rfl:     ketoconazole (EXTINA) 2 % foam, as needed , Disp: , Rfl:     Multiple Vitamin (MULTIVITAMIN) tablet, Take 1 tablet by mouth daily, Disp: , Rfl:     omeprazole (PriLOSEC) 20 mg delayed release capsule, Take 20 mg by mouth daily, Disp: , Rfl:     apixaban (ELIQUIS) 5 mg, Take 1 tablet (5 mg total) by mouth 2 (two) times a day for 90 days, Disp: 180 tablet, Rfl: 0    methylPREDNISolone 4 MG tablet therapy pack, Use as directed on package (Patient not taking: Reported on 8/2/2019), Disp: 21 each, Rfl: 0    No Known Allergies    Objective:  Vitals:    08/02/19 0908   BP: 119/80   Pulse: 79       Ortho Exam    Physical Exam   Constitutional: He is oriented to person, place, and time  He appears well-developed and well-nourished  HENT:   Head: Normocephalic and atraumatic  Eyes: Pupils are equal, round, and reactive to light  Conjunctivae are normal    Neck: Muscular tenderness present  No spinous process tenderness present  Neck rigidity present  Decreased range of motion present  Positive Spurling's maneuver on the left    Strength and sensation within normal limits of left upper extremity compared to right  Cardiovascular: Normal rate and intact distal pulses  Pulmonary/Chest: Effort normal  No respiratory distress  Musculoskeletal:   As noted in HPI   Neurological: He is alert and oriented to person, place, and time  Skin: Skin is warm and dry  Psychiatric: He has a normal mood and affect  His behavior is normal    Vitals reviewed  I have personally reviewed pertinent films in PACS and my interpretation is as follows: Three-view x-ray of the cervical spine demonstrates stable appearing anterior cervical disc fusion hardware  No visible fracture  Minimal degenerative changes at the facet joint

## 2019-08-05 ENCOUNTER — TELEPHONE (OUTPATIENT)
Dept: FAMILY MEDICINE CLINIC | Facility: CLINIC | Age: 64
End: 2019-08-05

## 2019-08-05 NOTE — TELEPHONE ENCOUNTER
I do not see any immunization records in his chart  Zostavax is a 1 shot series, Shingrix is newer, and a 2 shot series    Chryl Sizer

## 2019-08-05 NOTE — TELEPHONE ENCOUNTER
Parish Tom had a Zostavax Vaccine  He said he thought he needed to have another one?   Please let him know    Dr Laura Vizcaino patient

## 2019-08-05 NOTE — TELEPHONE ENCOUNTER
Patient informed and will talk to Dr Padmini Preciado at his appt    No further action   Sending to Dr Padmini Preciado for Wendi Palm MA

## 2019-08-06 ENCOUNTER — TELEPHONE (OUTPATIENT)
Dept: GASTROENTEROLOGY | Facility: CLINIC | Age: 64
End: 2019-08-06

## 2019-08-06 NOTE — TELEPHONE ENCOUNTER
Spoke with pt, he was not able to talk at the time  He was aware he needed to schedule colonoscopy   He said he would call office back to get scheduled

## 2019-08-06 NOTE — TELEPHONE ENCOUNTER
----- Message from Edyta Gary sent at 7/30/2019  9:46 AM EDT -----  Regarding: FYI - RECALL INFO  Pt due for 1 year egd and colonoscopy  With a 2 day prep  Patient had polyps       Recall Letter was sent to pt

## 2019-08-07 ENCOUNTER — EVALUATION (OUTPATIENT)
Dept: PHYSICAL THERAPY | Facility: CLINIC | Age: 64
End: 2019-08-07
Payer: COMMERCIAL

## 2019-08-07 DIAGNOSIS — M54.12 LEFT CERVICAL RADICULOPATHY: Primary | ICD-10-CM

## 2019-08-07 DIAGNOSIS — M48.02 CERVICAL STENOSIS OF SPINE: ICD-10-CM

## 2019-08-07 DIAGNOSIS — Z98.1 S/P CERVICAL SPINAL FUSION: ICD-10-CM

## 2019-08-07 PROCEDURE — 97161 PT EVAL LOW COMPLEX 20 MIN: CPT

## 2019-08-07 NOTE — PROGRESS NOTES
PT Evaluation     Today's date: 2019  Patient name: Seven Najera  : 1955  MRN: [id number]  Referring provider: Ollie Ayon DO  Dx:   Encounter Diagnosis     ICD-10-CM    1  Left cervical radiculopathy M54 12 Ambulatory referral to Physical Therapy   2  Cervical stenosis of spine M48 02 Ambulatory referral to Physical Therapy   3  S/P cervical spinal fusion Z98 1 Ambulatory referral to Physical Therapy                  Assessment  Assessment details: Seven Najera is a 60 y o  male who presents to physical therapy with pain, decreased UE range of motion, decreased UE strength, decreased cervical range of motion , impaired function, decreased activity tolerance, poor posture and poor body mechanics  Patient's clinical presentation is consistent with their referring diagnosis of Left cervical radiculopathy  (primary encounter diagnosis), Cervical stenosis of spine, S/P cervical spinal fusion  The pt presents with functional limitations of ADLs, recreational activities, performing household chores and reaching  Pt would benefit from physical therapy services to address these limitations and maximize function  Pt was instructed and educated on home exercise program and good sitting posture today and demonstrates understanding     Impairments: abnormal muscle tone, abnormal or restricted ROM, abnormal movement, activity intolerance, impaired physical strength, lacks appropriate home exercise program, pain with function, scapular dyskinesis and poor body mechanics    Symptom irritability: moderateUnderstanding of Dx/Px/POC: good   Prognosis: good    Goals  Short Term Goals (4 weeks)  Pt will be independent in basic Home Exercise program   Pt will demonstrate improved postural awareness with no cueing required from PT  Pt will be able to perform all ADLs with pain no more than 3/10    Long Term Goals (6-8 weeks)  Pt will be independent in comprehensive HEP  Pt will demonstrate full and pain free ROM in cervical spine  Pt will be able to resume gym activities with no restriction  Pt will demonstrate improved cervical ROM to minimal limitations    Plan  Patient would benefit from: skilled PT  Referral necessary: No  Planned modality interventions: cryotherapy and thermotherapy: hydrocollator packs  Planned therapy interventions: ADL retraining, body mechanics training, functional ROM exercises, home exercise program, graded exercise, joint mobilization, manual therapy, massage, neuromuscular re-education, patient education, postural training, strengthening, stretching, therapeutic activities, therapeutic exercise and therapeutic training  Frequency: 2x week  Duration in weeks: 6  Treatment plan discussed with: patient        Subjective Evaluation    History of Present Illness  Mechanism of injury: Pt reports he started getting numbness and tingling in left arm about 3 weeks ago  Pt has a history of an anterior cervical fusion 15 years ago  States he never had any discomfort or troubles with his neck since the surgery  About 3 weeks ago he started waking up with numbness and tingling in left arm and is currently waking up 2-3 times a night  Symptoms will increase with looking up, turning head, and reaching out in front  Denies any headaches  Neck feels stiff on the left side especially in the morning  Pain can increase at the gym especially while bend pressing so he has been trying to decrease resistance  Pt had a consult with Dr Nora Zheng and Britni Brandt were taken with no significant findings  Pt was referred to PT     Pain  Current pain rating: 3  At best pain ratin  At worst pain ratin  Location: left side of neck   Quality: tight and needle-like  Relieving factors: change in position and rest  Aggravating factors: overhead activity and lifting    Social Support    Employment status: not working (retired)  Hand dominance: right  Exercise history: gym- cardio and [employer]      Diagnostic Tests  X-ray: normal  Treatments  Previous treatment: physical therapy  Patient Goals  Patient goals for therapy: decreased pain, increased motion and return to sport/leisure activities          Objective     Concurrent Complaints  Positive for disturbed sleep  Negative for dizziness and headaches    Postural Observations  Seated posture: poor  Standing posture: fair  Correction of posture: makes symptoms better    Additional Postural Observation Details  Forward head, GHs IR'ed    Palpation   Left   No palpable tenderness to the cervical paraspinals, pectoralis minor, suboccipitals and upper trapezius  Hypertonic in the pectoralis minor  Right   No palpable tenderness to the cervical paraspinals, pectoralis minor, suboccipitals and upper trapezius  Hypertonic in the pectoralis minor and scalenes  Tenderness   Cervical Spine   No tenderness in the spinous process and right  ribs        Neurological Testing     Sensation   Cervical/Thoracic   Left   Intact: light touch    Right   Intact: light touch    Active Range of Motion   Cervical/Thoracic Spine       Cervical    Flexion: 100 (%) degrees   Extension: 50 (%) degrees     with pain  Left lateral flexion: 50 (%) degrees      Right lateral flexion: 50 (%) degrees     with pain  Left rotation: 75 (%) degrees  Right rotation: 75 (%) degrees    with pain    Thoracic    Extension:  Restriction level: moderate  Left rotation:  Restriction level: moderate  Right rotation:  Restriction level: moderate    Joint Play     Hypomobile: C4, C5, C6 and C7   Mechanical Assessment    Cervical    Seated retraction: repeated movements   Pain location: peripheralized  Pain level: abolished  Seated Flexion:  repeated movements  Pain location: centralized  Pain level: decreased  Seated Extension: repeated movements  Pain location: peripheralized  Pain intensity: worse  Pain level: produced    Thoracic      Lumbar      Strength/Myotome Testing     Left Shoulder     Planes of Motion Flexion: 4   Abduction: 4   External rotation at 0°: 4+     Right Shoulder     Planes of Motion   Flexion: 5   Abduction: 4+   External rotation at 0°: 4+     Left Elbow   Flexion: 5  Extension: 5    Right Elbow   Flexion: 5  Extension: 5    Left Wrist/Hand   Wrist extension: 5  Wrist flexion: 5    Right Wrist/Hand   Wrist extension: 5  Wrist flexion: 5    Tests   Cervical   Positive vertical compression, lumbar distraction test and neck flexor muscle endurance test     Left Shoulder   Negative ULTT1, ULTT2, ULTT3 and ULTT4  Right Shoulder   Negative ULTT1, ULTT2, ULTT3 and ULTT4  Lumbar   Positive vertical compression   Additional Tests Details  Decreased radicular symptoms with chin tucks in supine and sitting    General Comments:    Upper quarter screen   Shoulder: unremarkable    Shoulder Comments   Functional right IR limited 25% compared to left  Neuro Exam:     Headaches   Patient reports headaches: No        Flowsheet Rows      Most Recent Value   PT/OT G-Codes   Current Score  61   Projected Score  73   FOTO information reviewed  Yes          Pt was given initial Home Exercise Program today and demonstrates understanding   [id number] access code: [email or web address]    Precautions standard    Specialty Daily Treatment Diary       Manual         Suboccipital release        STM/  accupressure        UT stretch                        Exercise Diary         Chin tucks        CS AROM rotation        UT stretch        scap squeezes        Tband rows        Tband extension                                        Modalities        MHP

## 2019-08-12 ENCOUNTER — OFFICE VISIT (OUTPATIENT)
Dept: PHYSICAL THERAPY | Facility: CLINIC | Age: 64
End: 2019-08-12
Payer: COMMERCIAL

## 2019-08-12 DIAGNOSIS — M48.02 CERVICAL STENOSIS OF SPINE: ICD-10-CM

## 2019-08-12 DIAGNOSIS — M54.12 LEFT CERVICAL RADICULOPATHY: Primary | ICD-10-CM

## 2019-08-12 DIAGNOSIS — Z98.1 S/P CERVICAL SPINAL FUSION: ICD-10-CM

## 2019-08-12 PROCEDURE — 97140 MANUAL THERAPY 1/> REGIONS: CPT

## 2019-08-12 PROCEDURE — 97110 THERAPEUTIC EXERCISES: CPT

## 2019-08-12 PROCEDURE — 97112 NEUROMUSCULAR REEDUCATION: CPT

## 2019-08-12 NOTE — PROGRESS NOTES
Daily Note     Today's date: 2019  Patient name: Sandra Kinney  : 1955  MRN: [id number]  Referring provider: Sedrick Homans, DO  Dx:   Encounter Diagnoses   Name Primary?  Left cervical radiculopathy Yes    Cervical stenosis of spine     S/P cervical spinal fusion                   Subjective: Pt reports on and off numbness and tingling in left arm  States that he is able to        Objective: See treatment diary below    Precautions standard    Specialty Daily Treatment Diary       Manual         Suboccipital release 3 min       STM/  accupressure Left UT       Nerve glide x10 ea                       Exercise Diary         Chin tucks Supine, Hold 5, 2x10        CS AROM rotation        UT stretch        scap squeezes Hold 5, 2x10        Tband rows Blue, Hold 5, 2x10        Tband extension Yellow, Hold 5, 2x10        CS rotation isometrics Hold 5, 1x10 ea       Horizontal abd Red, Hold 5, 2x10                        Modalities        MHP                            Assessment: Pt tolerated treatment well with minimal complaints of pain  Pt with significant relief with manual cervical distraction  Reviewed initial HEP with patient and demonstrates independence  Pt will be away the rest of the week  Plan: Continue with plan of care to decrease pain, improve mobility, strength, and function

## 2019-08-19 ENCOUNTER — APPOINTMENT (OUTPATIENT)
Dept: PHYSICAL THERAPY | Facility: CLINIC | Age: 64
End: 2019-08-19
Payer: COMMERCIAL

## 2019-08-21 ENCOUNTER — OFFICE VISIT (OUTPATIENT)
Dept: PHYSICAL THERAPY | Facility: CLINIC | Age: 64
End: 2019-08-21
Payer: COMMERCIAL

## 2019-08-21 DIAGNOSIS — M48.02 CERVICAL STENOSIS OF SPINE: ICD-10-CM

## 2019-08-21 DIAGNOSIS — M54.12 LEFT CERVICAL RADICULOPATHY: Primary | ICD-10-CM

## 2019-08-21 DIAGNOSIS — Z98.1 S/P CERVICAL SPINAL FUSION: ICD-10-CM

## 2019-08-21 PROCEDURE — 97110 THERAPEUTIC EXERCISES: CPT

## 2019-08-21 PROCEDURE — 97140 MANUAL THERAPY 1/> REGIONS: CPT

## 2019-08-21 NOTE — PROGRESS NOTES
Daily Note     Today's date: 2019  Patient name: Sadaf Oliva  : 1955  MRN: [id number]  Referring provider: Rocky Chairez DO  Dx:   Encounter Diagnoses   Name Primary?  Left cervical radiculopathy Yes    Cervical stenosis of spine     S/P cervical spinal fusion                   Subjective: Pt reports the symptoms into his arm persists  States that he has shoulder soreness when reaching overhead and it will occasionally bring on symptoms especially when he looks up and reaches  Pain currently rated 2/10  Objective: See treatment diary below    Precautions standard    Specialty Daily Treatment Diary       Manual        Suboccipital release 3 min 3 min      STM/  accupressure Left UT Left UT      Nerve glide x10 ea X 10 ea                      Exercise Diary         Chin tucks Supine, Hold 5, 2x10  Supine, Hold 5, 2x10       TS rotation  Hold 5, 2x10 ea      Cane ER  Hold 5, 2x10 left      scap squeezes Hold 5, 2x10        Tband rows Blue, Hold 5, 2x10  Blue, Hold 5, 2x10      Tband extension Yellow, Hold 5, 2x10  Yellow, Hold 5, 2x10      CS rotation isometrics Hold 5, 1x10 ea Hold 5, 1x10 ea      Horizontal abd Red, Hold 5, 2x10  Red, Hold 5, 2x10       Corner stretch  Hold 10, 1x10               Modalities        MHP                            Assessment: Progressed pt's program per treatment diary and demonstrates good tolerance  Significant restrictions present into left thoracic rotation, improves with stretching  Tingling presents with pec stretching  Pt's HEP was updated on [address] and demonstrates understanding  Plan: Continue with plan of care to decrease pain, improve mobility, strength, and function

## 2019-08-29 ENCOUNTER — OFFICE VISIT (OUTPATIENT)
Dept: PHYSICAL THERAPY | Facility: CLINIC | Age: 64
End: 2019-08-29
Payer: COMMERCIAL

## 2019-08-29 DIAGNOSIS — M48.02 CERVICAL STENOSIS OF SPINE: ICD-10-CM

## 2019-08-29 DIAGNOSIS — M54.12 LEFT CERVICAL RADICULOPATHY: Primary | ICD-10-CM

## 2019-08-29 DIAGNOSIS — Z98.1 S/P CERVICAL SPINAL FUSION: ICD-10-CM

## 2019-08-29 PROCEDURE — 97140 MANUAL THERAPY 1/> REGIONS: CPT

## 2019-08-29 PROCEDURE — 97110 THERAPEUTIC EXERCISES: CPT

## 2019-08-29 PROCEDURE — 97112 NEUROMUSCULAR REEDUCATION: CPT

## 2019-08-29 NOTE — PROGRESS NOTES
Daily Note     Today's date: 2019  Patient name: Caroline Medrano  : 1955  MRN: [id number]  Referring provider: Kenya Hutchins DO  Dx:   Encounter Diagnoses   Name Primary?  Left cervical radiculopathy Yes    Cervical stenosis of spine     S/P cervical spinal fusion                   Subjective: Pt reports he has been feeling better  Decreased frequency of tingling  Has been avoiding aggravating positions  Pain is currently rated 0/10  Objective: See treatment diary below    Precautions standard    Specialty Daily Treatment Diary       Manual       Suboccipital release 3 min 3 min 3 min     STM/  accupressure Left UT Left UT Left UT     Nerve glide x10 ea X 10 ea X 10 ea     PROM   Left shoulder flexion and ER             Exercise Diary         Chin tucks Supine, Hold 5, 2x10  Supine, Hold 5, 2x10  Supine, Hold 5, 2x10      TS rotation  Hold 5, 2x10 ea Hold 5, 2x10 ea     Cane ER  Hold 5, 2x10 left      scap squeezes Hold 5, 2x10        Tband rows Blue, Hold 5, 2x10  Blue, Hold 5, 2x10 Lisa 4 0 Hold 5, 2x10 ea     Tband extension Yellow, Hold 5, 2x10  Yellow, Hold 5, 2x10 Lisa 3 5 Hold 5, 2x10 ea     CS rotation isometrics Hold 5, 1x10 ea Hold 5, 1x10 ea Hold 5, 1x10 ea     Horizontal abd Red, Hold 5, 2x10  Red, Hold 5, 2x10  Green, Hold 5, 2x10      Corner stretch  Hold 10, 1x10  Hold 10, 1x10      BL ER   Red, Hold 5, 2x10      Modalities        MHP                            Assessment: Progressed pt's program per treatment diary and demonstrates good tolerance  Pt demonstrating improved postural awareness throughout session  Weakness and fatigue demonstrated with neck flexor strengthening  Plan: Continue with plan of care to decrease pain, improve mobility, strength, and function

## 2019-09-03 ENCOUNTER — OFFICE VISIT (OUTPATIENT)
Dept: PHYSICAL THERAPY | Facility: CLINIC | Age: 64
End: 2019-09-03
Payer: COMMERCIAL

## 2019-09-03 DIAGNOSIS — M54.12 LEFT CERVICAL RADICULOPATHY: Primary | ICD-10-CM

## 2019-09-03 DIAGNOSIS — Z98.1 S/P CERVICAL SPINAL FUSION: ICD-10-CM

## 2019-09-03 DIAGNOSIS — M48.02 CERVICAL STENOSIS OF SPINE: ICD-10-CM

## 2019-09-03 PROCEDURE — 97110 THERAPEUTIC EXERCISES: CPT

## 2019-09-03 PROCEDURE — 97140 MANUAL THERAPY 1/> REGIONS: CPT

## 2019-09-03 NOTE — PROGRESS NOTES
Daily Note     Today's date: 9/3/2019  Patient name: Sena Franklin  : 1955  MRN: [id number]  Referring provider: Toño Alas DO  Dx:   Encounter Diagnoses   Name Primary?  Left cervical radiculopathy Yes    Cervical stenosis of spine     S/P cervical spinal fusion        Start Time: 1315  Stop Time: 1400  Total time in clinic (min): 45 minutes    Subjective: Pt reports symptoms remain the same, L radicular symptoms with neck extension  Objective: See treatment diary below    Precautions standard    Specialty Daily Treatment Diary       Manual  8/12 8/21 8/29 9/3    Suboccipital release 3 min 3 min 3 min 3 min    STM/  accupressure Left UT Left UT Left UT L upper quarter    Nerve glide x10 ea X 10 ea X 10 ea 10x    PROM   Left shoulder flexion and ER L shoulder all direcitons            Exercise Diary         Chin tucks Supine, Hold 5, 2x10  Supine, Hold 5, 2x10  Supine, Hold 5, 2x10  2x10 w/ neck flexion supine and standing    TS rotation  Hold 5, 2x10 ea Hold 5, 2x10 ea     Cane ER  Hold 5, 2x10 left      scap squeezes Hold 5, 2x10        Tband rows Blue, Hold 5, 2x10  Blue, Hold 5, 2x10 Lisa 4 0 Hold 5, 2x10 ea     Tband extension Yellow, Hold 5, 2x10  Yellow, Hold 5, 2x10 [place] 3 5 Hold 5, 2x10 ea Lisa 3 5 Hold 5, 2x10 ea    CS rotation isometrics Hold 5, 1x10 ea Hold 5, 1x10 ea Hold 5, 1x10 ea     Horizontal abd Red, Hold 5, 2x10  Red, Hold 5, 2x10  Green, Hold 5, 2x10      Corner stretch  Hold 10, 1x10  Hold 10, 1x10      BL ER   Red, Hold 5, 2x10      Prone Scap retraction    20x5"                                Assessment: Pt tolerated treatment well with minimal complaints of pain  Pt requires verbal/tactile cues for c/s retraction, unable to attain neutral c/s posture without onset of L radicular symptoms  Plan: Continue with plan of care to decrease pain, improve mobility, strength, and function

## 2019-09-09 ENCOUNTER — OFFICE VISIT (OUTPATIENT)
Dept: PHYSICAL THERAPY | Facility: CLINIC | Age: 64
End: 2019-09-09
Payer: COMMERCIAL

## 2019-09-09 DIAGNOSIS — M54.12 LEFT CERVICAL RADICULOPATHY: Primary | ICD-10-CM

## 2019-09-09 DIAGNOSIS — Z98.1 S/P CERVICAL SPINAL FUSION: ICD-10-CM

## 2019-09-09 DIAGNOSIS — M48.02 CERVICAL STENOSIS OF SPINE: ICD-10-CM

## 2019-09-09 PROCEDURE — 97140 MANUAL THERAPY 1/> REGIONS: CPT

## 2019-09-09 PROCEDURE — 97110 THERAPEUTIC EXERCISES: CPT

## 2019-09-09 NOTE — PROGRESS NOTES
Daily Note     Today's date: 2019  Patient name: Aleta Nieves  : 1955  MRN: [id number]  Referring provider: Frieda Gross DO  Dx:   Encounter Diagnoses   Name Primary?  Left cervical radiculopathy Yes    Cervical stenosis of spine     S/P cervical spinal fusion        Start Time: 1300  Stop Time: 1345  Total time in clinic (min): 45 minutes    Subjective: Pt reports he may have some improvement in symptoms, possibly less frequent, reports he is sleeping better, has his neck elevated and flexed with 2 pillows  Objective: See treatment diary below    Precautions standard    Specialty Daily Treatment Diary       Manual  8/12 8/21 8/29 9/3 9/9   Suboccipital release 3 min 3 min 3 min 3 min 3 min   STM/  accupressure Left UT Left UT Left UT L upper quarter Upper quarter   Nerve glide x10 ea X 10 ea X 10 ea 10x    PROM   Left shoulder flexion and ER L shoulder all direcitons            Exercise Diary         Chin tucks Supine, Hold 5, 2x10  Supine, Hold 5, 2x10  Supine, Hold 5, 2x10  2x10 w/ neck flexion supine and standing 2x10 w/ neck flexion supine and standing   TS rotation  Hold 5, 2x10 ea Hold 5, 2x10 ea  Hold 5, 2x10 ea   Cane ER  Hold 5, 2x10 left      scap squeezes Hold 5, 2x10        Tband rows Blue, Hold 5, 2x10  Blue, Hold 5, 2x10 [place] 4 0 Hold 5, 2x10 ea  SA w/ rotationKeiser 4 0 Hold 5, 2x10 ea   Tband extension Yellow, Hold 5, 2x10  Yellow, Hold 5, 2x10 [place] 3 5 Hold 5, 2x10 ea [place] 3 5 Hold 5, 2x10 ea    CS rotation isometrics Hold 5, 1x10 ea Hold 5, 1x10 ea Hold 5, 1x10 ea  Hold 5, 1x10 ea   Horizontal abd Red, Hold 5, 2x10  Red, Hold 5, 2x10  Green, Hold 5, 2x10   Green, Hold 5, 2x10    Corner stretch  Hold 10, 1x10  Hold 10, 1x10   Hold 10, 1x10    BL ER   Red, Hold 5, 2x10      Prone Scap retraction    20x5" 20x5"                               Assessment: Pt tolerated treatment well with minimal complaints of pain  Given b/l ER and corner stretch for HEP           Plan: Continue with plan of care to decrease pain, improve mobility, strength, and function

## 2019-09-13 ENCOUNTER — OFFICE VISIT (OUTPATIENT)
Dept: OBGYN CLINIC | Facility: CLINIC | Age: 64
End: 2019-09-13
Payer: COMMERCIAL

## 2019-09-13 ENCOUNTER — TELEPHONE (OUTPATIENT)
Dept: OBGYN CLINIC | Facility: HOSPITAL | Age: 64
End: 2019-09-13

## 2019-09-13 VITALS
BODY MASS INDEX: 28.88 KG/M2 | WEIGHT: 225 LBS | DIASTOLIC BLOOD PRESSURE: 77 MMHG | HEART RATE: 74 BPM | SYSTOLIC BLOOD PRESSURE: 120 MMHG | HEIGHT: 74 IN

## 2019-09-13 DIAGNOSIS — M48.02 CERVICAL STENOSIS OF SPINE: ICD-10-CM

## 2019-09-13 DIAGNOSIS — M75.52 ACUTE BURSITIS OF LEFT SHOULDER: ICD-10-CM

## 2019-09-13 DIAGNOSIS — M54.12 LEFT CERVICAL RADICULOPATHY: Primary | ICD-10-CM

## 2019-09-13 DIAGNOSIS — Z98.1 S/P CERVICAL SPINAL FUSION: ICD-10-CM

## 2019-09-13 PROCEDURE — 99213 OFFICE O/P EST LOW 20 MIN: CPT | Performed by: ORTHOPAEDIC SURGERY

## 2019-09-13 NOTE — TELEPHONE ENCOUNTER
Patient called in at 8:25 and said that he would be about 10 minutes late  Placed on hold and called the office, spoke to Maricruz Agrawal and gave her the information and she said ok, repeated he would be about 10 minutes late

## 2019-09-13 NOTE — PROGRESS NOTES
Assessment/Plan:  1  Left cervical radiculopathy  MRI cervical spine wo contrast   2  S/P cervical spinal fusion  MRI cervical spine wo contrast   3  Cervical stenosis of spine  MRI cervical spine wo contrast   4  Acute bursitis of left shoulder         Nedra Younger continues to have neck pain with left cervical radiculopathy  He has not responded thus far to conservative measures of physical therapy alone  His physical exam today is concerning for a nerve impingement cervical spine  I would like an MRI of cervical spine to evaluate for nerve impingement or disc herniation  I will call him with results of the MRI  He also has left-sided shoulder pain consistent with shoulder impingement subacromial bursitis  I did print out home exercises for him to begin as well as advised him to take anti-inflammatories and avoid overhead activities  We could consider formal physical therapy or cortisone injection in the future if his shoulder pain persists  Subjective:   Ruthie Noland is a 60 y o  male who presents to the office for follow-up for left cervical radiculopathy  I last saw him 6 weeks ago with increased discomfort in his neck and intermittent symptoms of left cervical radiculopathy  He has completed the past 6 weeks of physical therapy and states he feels only slight improvement in his discomfort in his neck but this has not changed the radiculopathy down his arm  He still feels intermittent pain and numbness down the left arm with certain motion  He does have history of cervical spinal fusion  He has also been experiencing increased discomfort in his left shoulder  He describes intermittent aching throbbing pain in the left shoulder that can become sharp with overhead movement  He denies any weakness in the shoulder  He denies any history of shoulder surgery or pain in the past   He does work out in [employer] on a regular basis      Review of Systems   Constitutional: Negative for chills, fever and unexpected weight change  HENT: Negative for hearing loss, nosebleeds and sore throat  Eyes: Negative for pain, redness and visual disturbance  Respiratory: Negative for cough, shortness of breath and wheezing  Cardiovascular: Negative for chest pain, palpitations and leg swelling  Gastrointestinal: Negative for abdominal pain, nausea and vomiting  Endocrine: Negative for polyphagia and polyuria  Genitourinary: Negative for dysuria and hematuria  Musculoskeletal:        See HPI   Skin: Negative for rash and wound  Neurological: Positive for numbness  Negative for dizziness and headaches  Psychiatric/Behavioral: Negative for decreased concentration and suicidal ideas  The patient is not nervous/anxious  Past Medical History:   Diagnosis Date    Calcific tendinitis 2/27/2018    DVT of upper extremity (deep vein thrombosis) (HCC)        Past Surgical History:   Procedure Laterality Date    ANTERIOR CERVICAL DISCECTOMY W/ FUSION Right     COLONOSCOPY      [place] COLONOSCOPY FLX DX W/COLLJ SPEC WHEN PFRMD N/A 7/31/2018    Procedure: COLONOSCOPY;  Surgeon: Unique Mckeon MD;  Location: [facility];   Service: Gastroenterology    SHOULDER SURGERY Right     ULNAR NERVE TRANSPOSITION Right        Family History   Problem Relation Age of Onset    Diabetes Father     Cancer Maternal Grandmother     Cancer Maternal Grandfather     Colon cancer Neg Hx     Liver disease Neg Hx        Social History     Occupational History    Not on file   Tobacco Use    Smoking status: Never Smoker    Smokeless tobacco: Never Used   Substance and Sexual Activity    Alcohol use: Yes     Comment: social    Drug use: No    Sexual activity: Yes         Current Outpatient Medications:     Apixaban (ELIQUIS PO), Take by mouth, Disp: , Rfl:     Ciclopirox 1 % shampoo, as needed , Disp: , Rfl:     ketoconazole (EXTINA) 2 % foam, as needed , Disp: , Rfl:     Multiple Vitamin (MULTIVITAMIN) tablet, Take 1 tablet by mouth daily, Disp: , Rfl:     omeprazole (PriLOSEC) 20 mg delayed release capsule, Take 20 mg by mouth daily, Disp: , Rfl:     apixaban (ELIQUIS) 5 mg, Take 1 tablet (5 mg total) by mouth 2 (two) times a day for 90 days, Disp: 180 tablet, Rfl: 0    No Known Allergies    Objective:  Vitals:    09/13/19 0840   BP: 120/77   Pulse: 74       Left Shoulder Exam     Tenderness   The patient is experiencing no tenderness  Range of Motion   Active abduction: normal   Passive abduction: normal   Extension: normal   External rotation: normal   Forward flexion: normal   Internal rotation 0 degrees: normal     Muscle Strength   Abduction: 5/5   Internal rotation: 5/5   External rotation: 5/5   Supraspinatus: 5/5   Subscapularis: 5/5   Biceps: 5/5     Tests   Silvestre test: positive  Impingement: positive  Drop arm: negative    Other   Erythema: absent  Sensation: normal  Pulse: present             Physical Exam   Constitutional: He is oriented to person, place, and time  He appears well-developed and well-nourished  HENT:   Head: Normocephalic and atraumatic  Eyes: Pupils are equal, round, and reactive to light  Conjunctivae are normal    Neck: Neck supple  No spinous process tenderness and no muscular tenderness present  Decreased range of motion present  Positive Spurling's maneuver on the left    No significant weakness or numbness in left upper extremity compared to right  Cardiovascular: Normal rate and intact distal pulses  Pulmonary/Chest: Effort normal  No respiratory distress  Musculoskeletal:   As noted in HPI   Neurological: He is alert and oriented to person, place, and time  Skin: Skin is warm and dry  Psychiatric: He has a normal mood and affect  His behavior is normal    Vitals reviewed

## 2019-09-17 ENCOUNTER — APPOINTMENT (OUTPATIENT)
Dept: PHYSICAL THERAPY | Facility: CLINIC | Age: 64
End: 2019-09-17
Payer: COMMERCIAL

## 2019-09-17 NOTE — PROGRESS NOTES
Daily Note     Today's date: 2019  Patient name: Adair Guevara  : 1955  MRN: [id number]  Referring provider: Addy Zhao DO  Dx:   Encounter Diagnoses   Name Primary?  Left cervical radiculopathy Yes    Cervical stenosis of spine     S/P cervical spinal fusion                   Subjective: Pt reports       Objective: See treatment diary below    Precautions standard    Specialty Daily Treatment Diary       Manual  8/12 8/21 8/29 9/3 9/9   Suboccipital release 3 min 3 min 3 min 3 min 3 min   STM/  accupressure Left UT Left UT Left UT L upper quarter Upper quarter   Nerve glide x10 ea X 10 ea X 10 ea 10x    PROM   Left shoulder flexion and ER L shoulder all direcitons            Exercise Diary         Chin tucks Supine, Hold 5, 2x10  Supine, Hold 5, 2x10  Supine, Hold 5, 2x10  2x10 w/ neck flexion supine and standing 2x10 w/ neck flexion supine and standing   TS rotation  Hold 5, 2x10 ea Hold 5, 2x10 ea  Hold 5, 2x10 ea   Cane ER  Hold 5, 2x10 left      scap squeezes Hold 5, 2x10        Tband rows Blue, Hold 5, 2x10  Blue, Hold 5, 2x10 Lisa 4 0 Hold 5, 2x10 ea  SA w/ rotationKeiser 4 0 Hold 5, 2x10 ea   Tband extension Yellow, Hold 5, 2x10  Yellow, Hold 5, 2x10 Lisa 3 5 Hold 5, 2x10 ea [place] 3 5 Hold 5, 2x10 ea    CS rotation isometrics Hold 5, 1x10 ea Hold 5, 1x10 ea Hold 5, 1x10 ea  Hold 5, 1x10 ea   Horizontal abd Red, Hold 5, 2x10  Red, Hold 5, 2x10  Green, Hold 5, 2x10   Green, Hold 5, 2x10    Corner stretch  Hold 10, 1x10  Hold 10, 1x10   Hold 10, 1x10    BL ER   Red, Hold 5, 2x10      Prone Scap retraction    20x5" 20x5"                               Assessment: {ASSESSMENT:30818}        Plan: Continue with plan of care to decrease pain, improve mobility, strength, and function

## 2019-10-03 DIAGNOSIS — I82.412 ACUTE DEEP VEIN THROMBOSIS (DVT) OF FEMORAL VEIN OF LEFT LOWER EXTREMITY (HCC): ICD-10-CM

## 2019-10-03 DIAGNOSIS — I26.09 OTHER ACUTE PULMONARY EMBOLISM WITH ACUTE COR PULMONALE (HCC): ICD-10-CM

## 2019-10-03 RX ORDER — APIXABAN 5 MG/1
TABLET, FILM COATED ORAL
Qty: 180 TABLET | Refills: 3 | Status: SHIPPED | OUTPATIENT
Start: 2019-10-03 | End: 2020-06-15 | Stop reason: SDUPTHER

## 2019-10-16 NOTE — PROGRESS NOTES
Pt was attending physical therapy secondary to a diagnosis of   1  Left cervical radiculopathy    2  Cervical stenosis of spine    3  S/P cervical spinal fusion      Pt was seen for initial evaluation on 8/7/19 and 5 follow up visits  Pt was last seen on 9/9/19 and has not since returned to physical therapy  Therefore, PT services are being discontinued  Discharge status and goal status are unknown

## 2019-10-22 ENCOUNTER — OFFICE VISIT (OUTPATIENT)
Dept: FAMILY MEDICINE CLINIC | Facility: CLINIC | Age: 64
End: 2019-10-22
Payer: COMMERCIAL

## 2019-10-22 VITALS
DIASTOLIC BLOOD PRESSURE: 70 MMHG | WEIGHT: 228.2 LBS | HEART RATE: 79 BPM | RESPIRATION RATE: 16 BRPM | HEIGHT: 74 IN | SYSTOLIC BLOOD PRESSURE: 124 MMHG | TEMPERATURE: 98.8 F | OXYGEN SATURATION: 98 % | BODY MASS INDEX: 29.29 KG/M2

## 2019-10-22 DIAGNOSIS — K21.00 GASTROESOPHAGEAL REFLUX DISEASE WITH ESOPHAGITIS: ICD-10-CM

## 2019-10-22 DIAGNOSIS — Z00.00 ANNUAL PHYSICAL EXAM: Primary | ICD-10-CM

## 2019-10-22 DIAGNOSIS — I82.512 CHRONIC DEEP VEIN THROMBOSIS (DVT) OF LEFT FEMORAL VEIN (HCC): Chronic | ICD-10-CM

## 2019-10-22 DIAGNOSIS — K42.9 UMBILICAL HERNIA WITHOUT OBSTRUCTION AND WITHOUT GANGRENE: ICD-10-CM

## 2019-10-22 DIAGNOSIS — M48.02 CERVICAL STENOSIS OF SPINE: ICD-10-CM

## 2019-10-22 DIAGNOSIS — Z12.5 PROSTATE CANCER SCREENING: ICD-10-CM

## 2019-10-22 DIAGNOSIS — Z23 NEED FOR INFLUENZA VACCINATION: ICD-10-CM

## 2019-10-22 PROCEDURE — 99396 PREV VISIT EST AGE 40-64: CPT | Performed by: FAMILY MEDICINE

## 2019-10-22 PROCEDURE — 90682 RIV4 VACC RECOMBINANT DNA IM: CPT | Performed by: FAMILY MEDICINE

## 2019-10-22 PROCEDURE — 90471 IMMUNIZATION ADMIN: CPT | Performed by: FAMILY MEDICINE

## 2019-10-22 NOTE — PROGRESS NOTES
Subjective:           Problem List Items Addressed This Visit        Digestive    Gastroesophageal reflux disease with esophagitis       Cardiovascular and Mediastinum    Chronic deep vein thrombosis (DVT) of left femoral vein (HCC) (Chronic)    Relevant Orders    CBC and differential    Comprehensive metabolic panel    Lipid panel       Other    Cervical stenosis of spine    Annual physical exam - Primary      Other Visit Diagnoses     Need for influenza vaccination        Relevant Orders    influenza vaccine, [id number], quadrivalent, recombinant, PF, 0 5 mL, for patients 18 yr+ (FLUBLOK) (Completed)    Umbilical hernia without obstruction and without gangrene        Relevant Orders    Ambulatory referral to General Surgery    Prostate cancer screening        Relevant Orders    PSA, Total Screen              Orders Placed This Encounter   Procedures    influenza vaccine, [id number], quadrivalent, recombinant, PF, 0 5 mL, for patients 18 yr+ (FLUBLOK)    CBC and differential     This is a patient instruction: This test is non-fasting  Please drink two glasses of water morning of bloodwork  Standing Status:   Future     Standing Expiration Date:   10/22/2020    Comprehensive metabolic panel     This is a patient instruction: Patient fasting for 8 hours or longer recommended  Standing Status:   Future     Standing Expiration Date:   10/22/2020    PSA, Total Screen     This is a patient instruction: This test is non-fasting  Please drink two glasses of water morning of bloodwork  Standing Status:   Future     Standing Expiration Date:   10/22/2020    Lipid panel     This is a patient instruction: This test requires patient fasting for 10-12 hours or longer  Drinking of black coffee or black tea is acceptable       Standing Status:   Future     Standing Expiration Date:   10/22/2020    Ambulatory referral to General Surgery     Standing Status:   Future     Standing Expiration Date: 10/22/2020     Referral Priority:   Routine     Referral Type:   Consult - AMB     Referral Reason:   Specialty Services Required     Referred to Provider:   Felton Adams MD     Requested Specialty:   General Surgery     Number of Visits Requested:   1     Expiration Date:   10/22/2020       Patient Instructions   Continue medications  Labs as ordered  Vision exam     Follow hematology   Orthopedics MRI as ordered    BMI Counseling: Body mass index is 29 7 kg/m²  Discussed the patient's BMI with him  The BMI is above normal  Nutrition recommendations include moderation in carbohydrate intake, increasing intake of lean protein and reducing intake of saturated fat and trans fat  he is on Eliquis for DVT left lower extremity with pulmonary embolus  No recent labs follows with Hematology  Orthopedics for cervical radiculopathy  Had x-ray showed postop changes did not have MRI    Brittni Cee    Chief Complaint   Patient presents with    Physical Exam     gee urbina is in for annual physical    /70   Pulse 79   Temp 98 8 °F (37 1 °C)   Resp 16   Ht 6' 1 5" (1 867 m)   Wt 104 kg (228 lb 3 2 oz)   SpO2 98%   BMI 29 70 kg/m²       No Known Allergies    Current Outpatient Medications on File Prior to Visit   Medication Sig Dispense Refill    Ciclopirox 1 % shampoo as needed       ELIQUIS 5 MG TAKE 1 TABLET BY MOUTH TWICE DAILY 180 tablet 3    ketoconazole (EXTINA) 2 % foam as needed       Multiple Vitamin (MULTIVITAMIN) tablet Take 1 tablet by mouth daily      omeprazole (PriLOSEC) 20 mg delayed release capsule Take 20 mg by mouth daily      Apixaban (ELIQUIS PO) Take by mouth       No current facility-administered medications on file prior to visit          Past Medical History:   Diagnosis Date    Calcific tendinitis 2/27/2018    DVT of upper extremity (deep vein thrombosis) (HCC)        Past Surgical History:   Procedure Laterality Date    ANTERIOR CERVICAL DISCECTOMY W/ FUSION Right     COLONOSCOPY      [place] COLONOSCOPY FLX DX W/COLLJ SPEC WHEN PFRMD N/A 7/31/2018    Procedure: COLONOSCOPY;  Surgeon: Kalpana March MD;  Location: [facility]; Service: Gastroenterology    SHOULDER SURGERY Right     ULNAR NERVE TRANSPOSITION Right           reports that he has never smoked  He has never used smokeless tobacco  He reports that he drinks alcohol  He reports that he does not use drugs  reports that he has never smoked  He has never used smokeless tobacco         Review of Systems   Constitutional: Negative for activity change, appetite change, chills, diaphoresis, fatigue and fever  HENT: Negative for congestion, drooling, postnasal drip, sore throat and trouble swallowing  Eyes: Positive for visual disturbance  Negative for discharge and redness  Respiratory: Negative for chest tightness, shortness of breath, wheezing and stridor  Cardiovascular: Negative for chest pain, palpitations and leg swelling  L calf   Gastrointestinal: Negative for abdominal distention and blood in stool  Endocrine: Negative for cold intolerance and heat intolerance  Genitourinary: Negative for hematuria  Musculoskeletal: Positive for arthralgias, neck pain and neck stiffness  Negative for joint swelling  Radiating    L limited ROM neck   Skin: Negative for color change, pallor, rash and wound  Neurological: Positive for numbness  Negative for dizziness, tremors and weakness  Tingling L UE      L lower neck to hand   Hematological: Negative for adenopathy  Psychiatric/Behavioral: Negative for agitation and behavioral problems  The patient is not nervous/anxious  Physical Exam   Constitutional: He is oriented to person, place, and time  He appears well-developed and well-nourished  HENT:   Head: Normocephalic  Eyes: Pupils are equal, round, and reactive to light  EOM are normal  No scleral icterus  Glasses     Cataract R>L   Neck: Normal range of motion  Neck supple  Cardiovascular: Normal rate, regular rhythm and normal heart sounds  No murmur heard  Pulmonary/Chest: Effort normal and breath sounds normal  No stridor  He has no rales  He exhibits no tenderness  Abdominal: Soft  Bowel sounds are normal  A hernia is present  4cm reducible Umb with diastasis   Genitourinary:   Genitourinary Comments: Defer next visit   Musculoskeletal: Normal range of motion  He exhibits no tenderness  Girth 1cm > L          Homans neg edema  Visible fullness Varicosities L>R         restriction ROM cervical   Neurological: He is alert and oriented to person, place, and time  He displays normal reflexes  No cranial nerve deficit or sensory deficit  He exhibits normal muscle tone  Coordination normal    Tingling intermittent SONYA Limited L  Neck ROM  Skin: Skin is warm  Capillary refill takes less than 2 seconds  No rash noted  Ak  seb K nevi   seb cyst occipital < 1cm   Psychiatric: He has a normal mood and affect  His behavior is normal  Judgment and thought content normal    Nursing note and vitals reviewed

## 2019-10-22 NOTE — PATIENT INSTRUCTIONS
Continue medications  Labs as ordered       Vision exam     Follow hematology   Orthopedics MRI as ordered

## 2019-10-24 ENCOUNTER — HOSPITAL ENCOUNTER (OUTPATIENT)
Dept: RADIOLOGY | Facility: HOSPITAL | Age: 64
Discharge: HOME | End: 2019-10-24
Attending: ORTHOPAEDIC SURGERY
Payer: COMMERCIAL

## 2019-10-24 DIAGNOSIS — M54.12 LEFT CERVICAL RADICULOPATHY: ICD-10-CM

## 2019-10-24 DIAGNOSIS — M48.02 CERVICAL STENOSIS OF SPINE: ICD-10-CM

## 2019-10-24 DIAGNOSIS — Z98.1 S/P CERVICAL SPINAL FUSION: ICD-10-CM

## 2019-10-24 PROCEDURE — 72141 MRI NECK SPINE W/O DYE: CPT

## 2019-10-25 ENCOUNTER — TELEPHONE (OUTPATIENT)
Dept: OBGYN CLINIC | Facility: CLINIC | Age: 64
End: 2019-10-25

## 2019-10-25 DIAGNOSIS — M54.12 LEFT CERVICAL RADICULOPATHY: Primary | ICD-10-CM

## 2019-10-25 DIAGNOSIS — M48.02 CERVICAL STENOSIS OF SPINE: ICD-10-CM

## 2019-10-25 NOTE — TELEPHONE ENCOUNTER
Pt is returning call and can be reached at [phone]    Attempted to reach MA but call was dropped on patient's end

## 2019-10-25 NOTE — TELEPHONE ENCOUNTER
lmom to call office for results  ----- Message from Mary Gonzales DO sent at 10/25/2019 10:34 AM EDT -----  Denise Settler had an MRI of the cervical spine it showed severe foraminal narrowing on the left side at C4-5  This could be causing his symptoms  I would like to refer him to see Dr García Stoner [facility] to discuss possible injection at this level  He can review the MRI with him

## 2019-10-25 NOTE — TELEPHONE ENCOUNTER
Patient called back  Called transferred to me and information was given to patient  Gave patient info on scheduling with [facility] as well  Thanks!

## 2019-11-01 ENCOUNTER — CONSULT (OUTPATIENT)
Dept: SURGERY | Facility: CLINIC | Age: 64
End: 2019-11-01
Payer: COMMERCIAL

## 2019-11-01 VITALS
HEART RATE: 76 BPM | DIASTOLIC BLOOD PRESSURE: 84 MMHG | BODY MASS INDEX: 29.26 KG/M2 | SYSTOLIC BLOOD PRESSURE: 122 MMHG | WEIGHT: 228 LBS | HEIGHT: 74 IN | TEMPERATURE: 96.6 F

## 2019-11-01 DIAGNOSIS — K42.9 UMBILICAL HERNIA WITHOUT OBSTRUCTION AND WITHOUT GANGRENE: ICD-10-CM

## 2019-11-01 PROCEDURE — 99204 OFFICE O/P NEW MOD 45 MIN: CPT | Performed by: SPECIALIST

## 2019-11-01 NOTE — PROGRESS NOTES
History and Physical Examination - General Surgery   [facility]  Carson Dc 60 y o  male MRN: [id number]  Unit/Bed#:  Encounter: [id number] PCP: Sandeep Nolan MD    History of Present Illness   Chief Complaint:    Umbilical hernia    HPI:  Carson Dc is a 60 y o  male who presents to office with umbilical hernia wants to get it fixed  As it is getting increase in size patient had this bump all the time with recent seen by Dr Chloé Tinsley was advised for possible surgery    Denies any abdominal pain nausea vomiting diarrhea constipation    Patient has DVT and PE 1/2 year ago and is on Eliquis for anticoagulation      Historical Information   The following portions of the patient's history were reviewed and updated as appropriate  Past Medical History:   Diagnosis Date    Calcific tendinitis 2/27/2018    DVT of upper extremity (deep vein thrombosis) (HCC)      Past Surgical History:   Procedure Laterality Date    ANTERIOR CERVICAL DISCECTOMY W/ FUSION Right     COLONOSCOPY      MS COLONOSCOPY FLX DX W/COLLJ SPEC WHEN PFRMD N/A 7/31/2018    Procedure: COLONOSCOPY;  Surgeon: Nereida Cogan, MD;  Location: [facility];   Service: Gastroenterology    SHOULDER SURGERY Right     ULNAR NERVE TRANSPOSITION Right      Social History   Social History     Substance and Sexual Activity   Alcohol Use Yes    Comment: social     Social History     Substance and Sexual Activity   Drug Use No     Social History     Tobacco Use   Smoking Status Never Smoker   Smokeless Tobacco Never Used     Family History:   Family History   Problem Relation Age of Onset    Diabetes Father     Cancer Maternal Grandmother     Cancer Maternal Grandfather     Colon cancer Neg Hx     Liver disease Neg Hx        Meds/Allergies   No Known Allergies    Current Outpatient Medications:     ELIQUIS 5 MG, TAKE 1 TABLET BY MOUTH TWICE DAILY, Disp: 180 tablet, Rfl: 3    Multiple Vitamin (MULTIVITAMIN) tablet, Take 1 tablet by mouth daily, Disp: , Rfl:     omeprazole (PriLOSEC) 20 mg delayed release capsule, Take 20 mg by mouth daily, Disp: , Rfl:     Apixaban (ELIQUIS PO), Take by mouth, Disp: , Rfl:     Ciclopirox 1 % shampoo, as needed , Disp: , Rfl:     ketoconazole (EXTINA) 2 % foam, as needed , Disp: , Rfl:      REVIEW OF SYSTEMS  Constitutional:  Denies fever or chills   Eyes:  Denies change in visual acuity   HENT:  Denies nasal congestion or sore throat   Respiratory:  Denies cough or shortness of breath history of PE and DVT  Cardiovascular:  Denies chest pain or edema   GI:  Denies abdominal pain, nausea, vomiting, bloody stools or diarrhea umbilical hernia  :  Denies dysuria, frequency, difficulty in micturition and nocturia  Musculoskeletal:  Denies back pain or joint pain tennis elbow C-spine arthritis  Neurologic:  Denies headache, focal weakness or sensory changes   Endocrine:  Denies polyuria or polydipsia   Lymphatic:  Denies swollen glands   Psychiatric:  Denies depression or anxiety     Objective   Current Vitals:   /84 (BP Location: Right arm, Patient Position: Sitting, Cuff Size: Standard)   Pulse 76   Temp (!) 96 6 °F (35 9 °C) (Tympanic)   Ht 6' 1 5" (1 867 m)   Wt 103 kg (228 lb)   BMI 29 67 kg/m²   Body mass index is 29 67 kg/m²  PHYSICAL EXAMS  General:  Patient is not in acute distress, laying in the bed comfortably, awake, alert responding to commands,   HEENT:  Both pupils normal-size atraumatic, normocephalic, nonicteric  Neck:  JVP not raised  Trachea central  Respiratory:  normal Breath sounds clear to auscultation,  Cardiovascular:  S1-S2 normal without any murmur   GI:  Abdomen soft nontender    4 x 3 cm bulge over the anterior abdominal wall and a near the umbilicus  Soft nontender and reducible with the defect approximately 1/2 in  Musculoskeletal:  No back pain  Integument:  No skin rashes or ulceration  Neurologic:  Patient is awake alert, responding to command, well-oriented to time and place and person moving all extremities ambulating well    Visit Diagnosis:   Diagnoses and all orders for this visit:    Umbilical hernia without obstruction and without gangrene  -     Ambulatory referral to General Surgery  -     ECG 12 lead; Future       Plan of care was discussed with patient in detail    Pertinent labs reviewed  Pertinent images and available reads personally reviewed  Procedure: Mri Cervical Spine Wo Contrast    Result Date: 10/25/2019  Narrative: MRI CERVICAL SPINE WITHOUT CONTRAST INDICATION: M54 12: Radiculopathy, cervical region Z98 1: Arthrodesis status M48 02: Spinal stenosis, cervical region  COMPARISON:  None  TECHNIQUE:  Sagittal T1, sagittal T2, sagittal inversion recovery, axial T2, axial  2D merge IMAGE QUALITY:  Diagnostic FINDINGS: ALIGNMENT:  ACDF C5-6 and C6-7  Appropriate alignment  Hardware appears appropriately positioned  No subluxation  No compression fracture  No scoliosis  MARROW SIGNAL:  Normal marrow signal is identified within the visualized bony structures  No discrete marrow lesion  CERVICAL AND VISUALIZED THORACIC CORD:  Normal signal within the visualized cord  PREVERTEBRAL AND PARASPINAL SOFT TISSUES:  Normal  VISUALIZED POSTERIOR FOSSA:  The visualized posterior fossa demonstrates no abnormal signal  CERVICAL DISC SPACES: C2-C3:  Normal  C3-C4:  Slight loss of disc height  Mild annular bulging and uncinate joint hypertrophic degenerative change  Mild canal stenosis with moderate bilateral foraminal narrowing  C4-C5:  Loss of disc height  Left disc osteophyte complex resulting in severe left foraminal narrowing  C5-C6:  Previous ACDF  Mild right greater than left uncinate joint hypertrophic degenerative change  No canal stenosis  Mild left and moderate right foraminal narrowing  C6-C7:  Previous ACDF  No disc herniation or endplate hypertrophic change  No canal stenosis or foraminal narrowing   C7-T1:  Normal  UPPER THORACIC DISC SPACES:  Normal      Impression: Cervical degenerative disc disease with endplate and uncinate joint hypertrophic change C3-4 and C4-5  Moderate bilateral foraminal narrowing at C3-4 and severe left foraminal narrowing at C4-5  Prior ACDF at the C5-6 and C6-7 levels  At C5-6 there is mild left and moderate right foraminal narrowing secondary to uncinate joint hypertrophic change  Workstation performed: [id number]     Pertinent notes reviewed    Assessment/Plan   Assessment:  Umbilical hernia  On Chemical Jacob coagulation for DVT and PE  Plan:  Proper consent was obtained  The risks, benefits, alternatives,and probabilities of success were discussed in detail with no guarantee made as to outcome  All questions were answered to the patient's satisfaction  Preoperative prep was explained to the patient  Will repeat the blood work CBC CMP EKG prior to surgery    Counseling / Coordination of Care  Expained patient in detailed about coordination of care  A description of the counseling / coordination of care:  I performed an interim history, pertinent images and labs, performed a physical examination to arrive at the plan delineated above with associated thought processes  MD Edmnod Bella    Office   Tel  [phone]  Fax   [phone]

## 2019-11-01 NOTE — PROGRESS NOTES
Office Visit - General Surgery  Meenakshi Acevedo MRN: [id number]  Encounter: [id number]    Assessment and Plan    Problem List Items Addressed This Visit     None      Visit Diagnoses     Umbilical hernia without obstruction and without gangrene              Chief Complaint:  Meenakshi Acevedo is a 60 y o  male who presents for Hernia    Subjective      Past Medical History  Past Medical History:   Diagnosis Date    Calcific tendinitis 2/27/2018    DVT of upper extremity (deep vein thrombosis) (HCC)        Past Surgical History  Past Surgical History:   Procedure Laterality Date    ANTERIOR CERVICAL DISCECTOMY W/ FUSION Right     COLONOSCOPY      DC COLONOSCOPY FLX DX W/COLLJ SPEC WHEN PFRMD N/A 7/31/2018    Procedure: COLONOSCOPY;  Surgeon: Terell Oshea MD;  Location: [place] [address] GI LAB; Service: Gastroenterology    SHOULDER SURGERY Right     ULNAR NERVE TRANSPOSITION Right        Family History  Family History   Problem Relation Age of Onset    Diabetes Father     Cancer Maternal Grandmother     Cancer Maternal Grandfather     Colon cancer Neg Hx     Liver disease Neg Hx        Medications  Current Outpatient Medications on File Prior to Visit   Medication Sig Dispense Refill    ELIQUIS 5 MG TAKE 1 TABLET BY MOUTH TWICE DAILY 180 tablet 3    Multiple Vitamin (MULTIVITAMIN) tablet Take 1 tablet by mouth daily      omeprazole (PriLOSEC) 20 mg delayed release capsule Take 20 mg by mouth daily      Apixaban (ELIQUIS PO) Take by mouth      Ciclopirox 1 % shampoo as needed       ketoconazole (EXTINA) 2 % foam as needed        No current facility-administered medications on file prior to visit          Allergies  No Known Allergies    Review of Systems    Objective  Vitals:    11/01/19 1124   BP: 122/84   Pulse: 76   Temp: (!) 96 6 °F (35 9 °C)       Physical Exam

## 2019-11-01 NOTE — PATIENT INSTRUCTIONS
[address] [place] [address] [facility] Pre Procedure instructions  Kirk Mccullough 60 y o  male MRN: [id number]  Encounter: [id number] PCP: Umer Hood MD        Preoperative instructions for major abdominal surgery    Please come fasting from midnight for the surgery    Advice light meal night before surgery    Take shower night before surgery and in the morning before coming to the hospital and apply Hebicleans antiseptic soap or lotion  To the site for operation area to prevent the postop surgical wound infection    Stop taking anticoagulation medicine  Plavix and aspirin 7 days prior to surgery  Coumadin 2 days prior to surgery  Xarelto /Eliquis / Pradaxa 72 hours after prior to surgery    Stop taking diabetic medicine long-acting insulin or oral diabetic medicine night before surgery and the morning dose before surgery  If you take these medications you will get hypoglycemia in the morning as you are fasting for surgery    Take regular laxative Colace/MiraLax/Dulcolax  take 1 of them for 2 days before surgery every night so he had a good bowel movement in the morning  Which will help in pain free postop recovery and will avoid constipation after surgery as all the anesthetic medications and pain medication will make you constipation    You will not be able to drive back by yourself and you will need somebody to drive you home from the hospital after your same day surgery  So make necessary arrangements    Only for patient undergoing large bowel surgery for colon cancer and diverticulitis  Take bowel prep if you are having:  Colonic surgery for colon cancer or diverticulitis    As prescribed   GoLYTELY/ Dulcolax prepped day before surgery start 8 a m  in the morning and drink glass every 15 minutes of bowel prep it should be be completed before 3:00 p m  You can't place GoLYTELY in the refrigerator night before make it more palatable    Take antibiotics after 3:00 p m   For bowel surgery Neomycin 500 mg 2 capsules at 3:00 p m  4:00 p m  And 6:00 p m  Take antibiotics after 3:00 p m  For bowel surgery Flagyl 500 mg 2 tablets at 3:00 p m  4 00 p m  And 6:00 p m  This is to sterilize your gut from inside for bowel surgery  And it has to be taken after completion of bowel prep    Take all your regular morning medicine specially heart and blood pressure medicine other than mentioned above with a sip of water in the morning      Pre-Surgery Instructions:   Medication Instructions    ELIQUIS 5 MG Stop taking 2 days prior to surgery    Multiple Vitamin (MULTIVITAMIN) tablet Take morning of surgery    omeprazole (PriLOSEC) 20 mg delayed release capsule Take morning of surgery       Dr Ami Green MD  [address] [facility]  [phone]

## 2019-11-07 ENCOUNTER — TELEPHONE (OUTPATIENT)
Dept: PAIN MEDICINE | Facility: CLINIC | Age: 64
End: 2019-11-07

## 2019-11-07 ENCOUNTER — OFFICE VISIT (OUTPATIENT)
Dept: PAIN MEDICINE | Facility: CLINIC | Age: 64
End: 2019-11-07
Payer: COMMERCIAL

## 2019-11-07 VITALS
SYSTOLIC BLOOD PRESSURE: 118 MMHG | HEART RATE: 80 BPM | HEIGHT: 74 IN | WEIGHT: 230.8 LBS | DIASTOLIC BLOOD PRESSURE: 76 MMHG | BODY MASS INDEX: 29.62 KG/M2

## 2019-11-07 DIAGNOSIS — M96.1 CERVICAL POST-LAMINECTOMY SYNDROME: ICD-10-CM

## 2019-11-07 DIAGNOSIS — G89.4 CHRONIC PAIN SYNDROME: Primary | ICD-10-CM

## 2019-11-07 DIAGNOSIS — M54.2 NECK PAIN: ICD-10-CM

## 2019-11-07 DIAGNOSIS — M54.12 LEFT CERVICAL RADICULOPATHY: ICD-10-CM

## 2019-11-07 DIAGNOSIS — M48.02 CERVICAL SPINAL STENOSIS: ICD-10-CM

## 2019-11-07 PROCEDURE — 99204 OFFICE O/P NEW MOD 45 MIN: CPT | Performed by: ANESTHESIOLOGY

## 2019-11-07 NOTE — H&P (VIEW-ONLY)
Pain Medicine Consult Note    Assessment:  1  Chronic pain syndrome    2  Neck pain    3  Left cervical radiculopathy    4  Cervical post-laminectomy syndrome    5  Cervical spinal stenosis        Plan:  My impressions and treatment recommendations were discussed in detail with the patient who verbalized understanding and had no further questions  The patient is reporting pain in his neck with radiation into his left upper extremity  He states that extension makes his pain worse  He did have an anterior cervical diskectomy and fusion about 12 years ago  Given that the patient has signs and symptoms of neck pain and left upper extremity radiculopathy in the context of cervical spinal stenosis and cervical post-laminectomy syndrome, I discussed the rationale of undergoing a C6-C7 cervical epidural steroid injection since this could be potentially therapeutic  The procedures, its risks, and benefits were explained in detail to the patient  Risks include but are not limited to bleeding, infection, hematoma formation, abscess formation, weakness, headache, failure the pain to improve, nerve irritation or damage, and potential worsening of the pain  The patient verbalized understanding and wished to proceed with the procedure  I did also discuss with the patient about returning to see the surgeon who had originally performed his cervical spine surgery  The patient stated that he is not sure whether the surgeon is still in practice at this time  Follow-up is planned in 4 weeks time or sooner as warranted  Discharge instructions were provided  I personally saw and examined the patient and I agree with the above discussed plan of care  History of Present Illness:    Ondina Hussein is a 60 y o  male who presents to [facility] for interval re-evaluation of the above stated pain complaints   The patient has a past medical and chronic pain history as outlined in the assessment section  He was referred by Dr Sandy Linton  The patient is reporting a 6 month history of neck pain with radiation into the left upper extremity, especially worse on extension of the cervical spine  He states that his pain is moderate and 3/10 on the verbal numerical pain rating scale  His pain is intermittent in nature without any typical pattern  He describes his pain as numbness    He reports no significant weakness in his left upper extremity  He does not ambulate with any assistive devices  Patient does report that physical therapy provides him moderate pain relief  He is not currently using any pain medications  Other than as stated above, the patient denies any interval changes in medications, medical condition, mental condition, symptoms, or allergies since the last office visit  Review of Systems:    Review of Systems   Constitutional: Negative for appetite change, chills, diaphoresis and unexpected weight change  HENT: Negative for congestion, drooling, ear discharge, facial swelling, nosebleeds, rhinorrhea and sneezing  Eyes: Negative for pain, discharge and redness  Gastrointestinal: Negative for diarrhea and nausea  Endocrine: Negative for polydipsia and polyphagia  Genitourinary: Negative for difficulty urinating, genital sores, penile swelling, testicular pain and urgency  Musculoskeletal: Positive for neck pain  Negative for back pain  Skin: Negative for color change, rash and wound  Allergic/Immunologic: Negative for food allergies  Neurological: Positive for numbness  Negative for dizziness, facial asymmetry and weakness  Hematological: Negative for adenopathy  Does not bruise/bleed easily  Psychiatric/Behavioral: Negative for agitation, confusion, decreased concentration, hallucinations and sleep disturbance  The patient is not nervous/anxious and is not hyperactive            Patient Active Problem List   Diagnosis    Mixed hyperlipidemia    Snoring    Gastroesophageal reflux disease with esophagitis    Cervical spinal stenosis    Calcific tendinitis    Colon cancer screening    Pulmonary embolism with acute cor pulmonale (HCC)    Chronic deep vein thrombosis (DVT) of left femoral vein (HCC)    NSTEMI (non-ST elevated myocardial infarction) (HCC)    Microcytic anemia    Iron deficiency anemia    Screen for colon cancer    Cramps of lower extremity    Annual physical exam    Umbilical hernia without obstruction and without gangrene    Cervical post-laminectomy syndrome    Left cervical radiculopathy    Neck pain    Chronic pain syndrome       Past Medical History:   Diagnosis Date    Calcific tendinitis 2/27/2018    DVT of upper extremity (deep vein thrombosis) (HCC)     Neck pain        Past Surgical History:   Procedure Laterality Date    ANTERIOR CERVICAL DISCECTOMY W/ FUSION Right     COLONOSCOPY      NM COLONOSCOPY FLX DX W/COLLJ SPEC WHEN PFRMD N/A 7/31/2018    Procedure: COLONOSCOPY;  Surgeon: Anju Beltran MD;  Location: [facility];   Service: Gastroenterology    SHOULDER SURGERY Right     SPINAL FUSION      ULNAR NERVE TRANSPOSITION Right        Family History   Problem Relation Age of Onset    Diabetes Father     Cancer Maternal Grandmother     Cancer Maternal Grandfather     No Known Problems Sister     No Known Problems Brother     No Known Problems Daughter     No Known Problems Son     No Known Problems Maternal Aunt     No Known Problems Maternal Uncle     No Known Problems Paternal Aunt     No Known Problems Paternal Uncle     No Known Problems Paternal Grandmother     No Known Problems Paternal Grandfather     Colon cancer Neg Hx     Liver disease Neg Hx        Social History     Occupational History    Not on file   Tobacco Use    Smoking status: Never Smoker    Smokeless tobacco: Never Used   Substance and Sexual Activity    Alcohol use: Yes     Comment: social    Drug use: No    Sexual activity: Yes         Current Outpatient Medications:     Ciclopirox 1 % shampoo, as needed , Disp: , Rfl:     ELIQUIS 5 MG, TAKE 1 TABLET BY MOUTH TWICE DAILY, Disp: 180 tablet, Rfl: 3    ketoconazole (EXTINA) 2 % foam, as needed , Disp: , Rfl:     Multiple Vitamin (MULTIVITAMIN) tablet, Take 1 tablet by mouth daily, Disp: , Rfl:     omeprazole (PriLOSEC) 20 mg delayed release capsule, Take 20 mg by mouth daily, Disp: , Rfl:     No Known Allergies    Physical Exam:    /76   Pulse 80   Ht 6' 1 5" (1 867 m)   Wt 105 kg (230 lb 12 8 oz)   BMI 30 04 kg/m²     Constitutional:obese  Eyes:anicteric  HEENT:grossly intact  Neck:supple, symmetric, trachea midline and no masses   Pulmonary:even and unlabored  Cardiovascular:No edema or pitting edema present  Skin:Normal without rashes or lesions and well hydrated  Psychiatric:Mood and affect appropriate  Neurologic:Cranial Nerves II-XII grossly intact  Musculoskeletal:antalgic     Cervical Spine Exam    Appearance:  Normal lordosis  Palpation/Tenderness:  no tenderness or spasm  Sensory:  no sensory deficits noted  Range of Motion:  Flexion:   Moderately limited  with pain  Extension:  Severely limited  with pain  Lateral Flexion - Left:  Severely limited  with pain  Lateral Flexion - Right:  Severely limited  with pain  Rotation - Left:  Severely limited  with pain  Rotation - Right:  Severely limited  with pain  Motor Strength:  Left Arm Flexion  5/5  Left Arm Extension  5/5  Right Arm Flexion  5/5  Right Arm Extension  5/5  Left Wrist Flexion  5/5  Left Wrist Extension  5/5  Left Finger Abduction  5/5  Right Finger Abduction  5/5  Left Pincer Grasp  5/5  Right Pincer Grasp  5/5  Left    5/5  Right   5/5  Reflexes:  Left Biceps:  2+   Right Biceps:  2+   Left Brachioradialis:  2+   Right Brachioradialis:  2+   Left Triceps:  2+   Right Triceps:  2+   Special Tests:  Left Spurlings:  positive  Right Spurlings  negative        Imaging  MRI Cervical Spine 10/24/19    Study Result     MRI CERVICAL SPINE WITHOUT CONTRAST     INDICATION: M54 12: Radiculopathy, cervical region  Z98 1: Arthrodesis status  M48 02: Spinal stenosis, cervical region      COMPARISON:  None      TECHNIQUE:  Sagittal T1, sagittal T2, sagittal inversion recovery, axial T2, axial  2D merge     IMAGE QUALITY:  Diagnostic     FINDINGS:     ALIGNMENT:  ACDF C5-6 and C6-7  Appropriate alignment  Hardware appears appropriately positioned  No subluxation  No compression fracture  No scoliosis      MARROW SIGNAL:  Normal marrow signal is identified within the visualized bony structures  No discrete marrow lesion      CERVICAL AND VISUALIZED THORACIC CORD:  Normal signal within the visualized cord      PREVERTEBRAL AND PARASPINAL SOFT TISSUES:  Normal      VISUALIZED POSTERIOR FOSSA:  The visualized posterior fossa demonstrates no abnormal signal      CERVICAL DISC SPACES:     C2-C3:  Normal      C3-C4:  Slight loss of disc height  Mild annular bulging and uncinate joint hypertrophic degenerative change  Mild canal stenosis with moderate bilateral foraminal narrowing      C4-C5:  Loss of disc height  Left disc osteophyte complex resulting in severe left foraminal narrowing      C5-C6:  Previous ACDF  Mild right greater than left uncinate joint hypertrophic degenerative change  No canal stenosis  Mild left and moderate right foraminal narrowing      C6-C7:  Previous ACDF  No disc herniation or endplate hypertrophic change  No canal stenosis or foraminal narrowing      C7-T1:  Normal      UPPER THORACIC DISC SPACES:  Normal      IMPRESSION:     Cervical degenerative disc disease with endplate and uncinate joint hypertrophic change C3-4 and C4-5  Moderate bilateral foraminal narrowing at C3-4 and severe left foraminal narrowing at C4-5      Prior ACDF at the C5-6 and C6-7 levels  At C5-6 there is mild left and moderate right foraminal narrowing secondary to uncinate joint hypertrophic change

## 2019-11-07 NOTE — TELEPHONE ENCOUNTER
Rafael Rodriguez, I see Mr  WENCESLAO'Such is having hernia surgery with you on 11/26/19  Pt was seen in the office today by Dr Oneil Stern and Dr Oneil Stern would like to do a C6 C7 Cervical Epidural for the patient  Pt is currently taking Eliquis and I know he will have to hold for both procedures  We are looking at scheduling the epidural on 11/12/19  Is this too close to your planned surgery? If so I will schedule him for another day   Thanks

## 2019-11-07 NOTE — PROGRESS NOTES
Pain Medicine Consult Note    Assessment:  1  Chronic pain syndrome    2  Neck pain    3  Left cervical radiculopathy    4  Cervical post-laminectomy syndrome    5  Cervical spinal stenosis        Plan:  My impressions and treatment recommendations were discussed in detail with the patient who verbalized understanding and had no further questions  The patient is reporting pain in his neck with radiation into his left upper extremity  He states that extension makes his pain worse  He did have an anterior cervical diskectomy and fusion about 12 years ago  Given that the patient has signs and symptoms of neck pain and left upper extremity radiculopathy in the context of cervical spinal stenosis and cervical post-laminectomy syndrome, I discussed the rationale of undergoing a C6-C7 cervical epidural steroid injection since this could be potentially therapeutic  The procedures, its risks, and benefits were explained in detail to the patient  Risks include but are not limited to bleeding, infection, hematoma formation, abscess formation, weakness, headache, failure the pain to improve, nerve irritation or damage, and potential worsening of the pain  The patient verbalized understanding and wished to proceed with the procedure  I did also discuss with the patient about returning to see the surgeon who had originally performed his cervical spine surgery  The patient stated that he is not sure whether the surgeon is still in practice at this time  Follow-up is planned in 4 weeks time or sooner as warranted  Discharge instructions were provided  I personally saw and examined the patient and I agree with the above discussed plan of care  History of Present Illness:    Stephane Mathur is a 60 y o  male who presents to [facility] for interval re-evaluation of the above stated pain complaints   The patient has a past medical and chronic pain history as outlined in the assessment section  He was referred by Dr Chantale Espana  The patient is reporting a 6 month history of neck pain with radiation into the left upper extremity, especially worse on extension of the cervical spine  He states that his pain is moderate and 3/10 on the verbal numerical pain rating scale  His pain is intermittent in nature without any typical pattern  He describes his pain as numbness    He reports no significant weakness in his left upper extremity  He does not ambulate with any assistive devices  Patient does report that physical therapy provides him moderate pain relief  He is not currently using any pain medications  Other than as stated above, the patient denies any interval changes in medications, medical condition, mental condition, symptoms, or allergies since the last office visit  Review of Systems:    Review of Systems   Constitutional: Negative for appetite change, chills, diaphoresis and unexpected weight change  HENT: Negative for congestion, drooling, ear discharge, facial swelling, nosebleeds, rhinorrhea and sneezing  Eyes: Negative for pain, discharge and redness  Gastrointestinal: Negative for diarrhea and nausea  Endocrine: Negative for polydipsia and polyphagia  Genitourinary: Negative for difficulty urinating, genital sores, penile swelling, testicular pain and urgency  Musculoskeletal: Positive for neck pain  Negative for back pain  Skin: Negative for color change, rash and wound  Allergic/Immunologic: Negative for food allergies  Neurological: Positive for numbness  Negative for dizziness, facial asymmetry and weakness  Hematological: Negative for adenopathy  Does not bruise/bleed easily  Psychiatric/Behavioral: Negative for agitation, confusion, decreased concentration, hallucinations and sleep disturbance  The patient is not nervous/anxious and is not hyperactive            Patient Active Problem List   Diagnosis    Mixed hyperlipidemia    Snoring    Gastroesophageal reflux disease with esophagitis    Cervical spinal stenosis    Calcific tendinitis    Colon cancer screening    Pulmonary embolism with acute cor pulmonale (HCC)    Chronic deep vein thrombosis (DVT) of left femoral vein (HCC)    NSTEMI (non-ST elevated myocardial infarction) (HCC)    Microcytic anemia    Iron deficiency anemia    Screen for colon cancer    Cramps of lower extremity    Annual physical exam    Umbilical hernia without obstruction and without gangrene    Cervical post-laminectomy syndrome    Left cervical radiculopathy    Neck pain    Chronic pain syndrome       Past Medical History:   Diagnosis Date    Calcific tendinitis 2/27/2018    DVT of upper extremity (deep vein thrombosis) (HCC)     Neck pain        Past Surgical History:   Procedure Laterality Date    ANTERIOR CERVICAL DISCECTOMY W/ FUSION Right     COLONOSCOPY      SD COLONOSCOPY FLX DX W/COLLJ SPEC WHEN PFRMD N/A 7/31/2018    Procedure: COLONOSCOPY;  Surgeon: Jocy Rodríguez MD;  Location: [facility];   Service: Gastroenterology    SHOULDER SURGERY Right     SPINAL FUSION      ULNAR NERVE TRANSPOSITION Right        Family History   Problem Relation Age of Onset    Diabetes Father     Cancer Maternal Grandmother     Cancer Maternal Grandfather     No Known Problems Sister     No Known Problems Brother     No Known Problems Daughter     No Known Problems Son     No Known Problems Maternal Aunt     No Known Problems Maternal Uncle     No Known Problems Paternal Aunt     No Known Problems Paternal Uncle     No Known Problems Paternal Grandmother     No Known Problems Paternal Grandfather     Colon cancer Neg Hx     Liver disease Neg Hx        Social History     Occupational History    Not on file   Tobacco Use    Smoking status: Never Smoker    Smokeless tobacco: Never Used   Substance and Sexual Activity    Alcohol use: Yes     Comment: social    Drug use: No    Sexual activity: Yes         Current Outpatient Medications:     Ciclopirox 1 % shampoo, as needed , Disp: , Rfl:     ELIQUIS 5 MG, TAKE 1 TABLET BY MOUTH TWICE DAILY, Disp: 180 tablet, Rfl: 3    ketoconazole (EXTINA) 2 % foam, as needed , Disp: , Rfl:     Multiple Vitamin (MULTIVITAMIN) tablet, Take 1 tablet by mouth daily, Disp: , Rfl:     omeprazole (PriLOSEC) 20 mg delayed release capsule, Take 20 mg by mouth daily, Disp: , Rfl:     No Known Allergies    Physical Exam:    /76   Pulse 80   Ht 6' 1 5" (1 867 m)   Wt 105 kg (230 lb 12 8 oz)   BMI 30 04 kg/m²     Constitutional:obese  Eyes:anicteric  HEENT:grossly intact  Neck:supple, symmetric, trachea midline and no masses   Pulmonary:even and unlabored  Cardiovascular:No edema or pitting edema present  Skin:Normal without rashes or lesions and well hydrated  Psychiatric:Mood and affect appropriate  Neurologic:Cranial Nerves II-XII grossly intact  Musculoskeletal:antalgic     Cervical Spine Exam    Appearance:  Normal lordosis  Palpation/Tenderness:  no tenderness or spasm  Sensory:  no sensory deficits noted  Range of Motion:  Flexion:   Moderately limited  with pain  Extension:  Severely limited  with pain  Lateral Flexion - Left:  Severely limited  with pain  Lateral Flexion - Right:  Severely limited  with pain  Rotation - Left:  Severely limited  with pain  Rotation - Right:  Severely limited  with pain  Motor Strength:  Left Arm Flexion  5/5  Left Arm Extension  5/5  Right Arm Flexion  5/5  Right Arm Extension  5/5  Left Wrist Flexion  5/5  Left Wrist Extension  5/5  Left Finger Abduction  5/5  Right Finger Abduction  5/5  Left Pincer Grasp  5/5  Right Pincer Grasp  5/5  Left    5/5  Right   5/5  Reflexes:  Left Biceps:  2+   Right Biceps:  2+   Left Brachioradialis:  2+   Right Brachioradialis:  2+   Left Triceps:  2+   Right Triceps:  2+   Special Tests:  Left Spurlings:  positive  Right Spurlings  negative        Imaging  MRI Cervical Spine 10/24/19    Study Result     MRI CERVICAL SPINE WITHOUT CONTRAST     INDICATION: M54 12: Radiculopathy, cervical region  Z98 1: Arthrodesis status  M48 02: Spinal stenosis, cervical region      COMPARISON:  None      TECHNIQUE:  Sagittal T1, sagittal T2, sagittal inversion recovery, axial T2, axial  2D merge     IMAGE QUALITY:  Diagnostic     FINDINGS:     ALIGNMENT:  ACDF C5-6 and C6-7  Appropriate alignment  Hardware appears appropriately positioned  No subluxation  No compression fracture  No scoliosis      MARROW SIGNAL:  Normal marrow signal is identified within the visualized bony structures  No discrete marrow lesion      CERVICAL AND VISUALIZED THORACIC CORD:  Normal signal within the visualized cord      PREVERTEBRAL AND PARASPINAL SOFT TISSUES:  Normal      VISUALIZED POSTERIOR FOSSA:  The visualized posterior fossa demonstrates no abnormal signal      CERVICAL DISC SPACES:     C2-C3:  Normal      C3-C4:  Slight loss of disc height  Mild annular bulging and uncinate joint hypertrophic degenerative change  Mild canal stenosis with moderate bilateral foraminal narrowing      C4-C5:  Loss of disc height  Left disc osteophyte complex resulting in severe left foraminal narrowing      C5-C6:  Previous ACDF  Mild right greater than left uncinate joint hypertrophic degenerative change  No canal stenosis  Mild left and moderate right foraminal narrowing      C6-C7:  Previous ACDF  No disc herniation or endplate hypertrophic change  No canal stenosis or foraminal narrowing      C7-T1:  Normal      UPPER THORACIC DISC SPACES:  Normal      IMPRESSION:     Cervical degenerative disc disease with endplate and uncinate joint hypertrophic change C3-4 and C4-5  Moderate bilateral foraminal narrowing at C3-4 and severe left foraminal narrowing at C4-5      Prior ACDF at the C5-6 and C6-7 levels  At C5-6 there is mild left and moderate right foraminal narrowing secondary to uncinate joint hypertrophic change

## 2019-11-07 NOTE — TELEPHONE ENCOUNTER
Rafael Potts,   Pt was seen today by Dr Bradley Johnson is ordering a C6 C7 Cervical Epidural Steroid Injection which requires a 4 day Eliquis hold  Please advise if you approve this hold   Thanks

## 2019-11-08 NOTE — TELEPHONE ENCOUNTER
Patient has a small umbilical hernia which is nonobstructed is more symptoms secondary to back pain    It is okay he can wait for hernia surgery and we can postponed for some other day until he gets better for his C-spine related pain    Thank you

## 2019-11-12 NOTE — TELEPHONE ENCOUNTER
Spoke to [place] at Dr Lacey Franco office to follow up on Eliquis hold approval  She will check with Dr Paz Soto and get back to me   Lvm for pt letting him know we are still awaiting approval

## 2019-11-12 NOTE — TELEPHONE ENCOUNTER
Since the recommendation from Dr Harini Juarez says to hold "at least 48 hours" and since the pain literature recommends to hold 72 hours (our office holds an extra day compared to the literature), would a 72 hour hold be ok?

## 2019-11-12 NOTE — TELEPHONE ENCOUNTER
The below information comes from [employer]  com, Eliquis dosage ([employer]  com/dosage/eliquis html) and manipulation for surgery  For either hernia repair or epidural steroid injection, 48 hours + (2-3 days only) should be sufficient  Four days is not needed  Eliquis should be started on the evening of both procedures if there are no surgical contraindications or signs of bleeding  Temporary Interruption for Surgery and Other Interventions    ELIQUIS should be discontinued at least 48 hours prior to elective surgery or invasive procedures with a moderate or high risk of unacceptable or clinically significant bleeding  ELIQUIS should be discontinued at least 24 hours prior to elective surgery or invasive procedures with a low risk of bleeding or where the bleeding would be non-critical in location and easily controlled  Bridging anticoagulation during the 24 to 48 hours after stopping ELIQUIS and prior to the intervention is not generally required  ELIQUIS should be restarted after the surgical or other procedures as soon as adequate hemostasis has been established

## 2019-11-15 ENCOUNTER — TRANSCRIBE ORDERS (OUTPATIENT)
Dept: ADMINISTRATIVE | Facility: HOSPITAL | Age: 64
End: 2019-11-15
Payer: COMMERCIAL

## 2019-11-15 ENCOUNTER — OFFICE VISIT (OUTPATIENT)
Dept: LAB | Facility: HOSPITAL | Age: 64
End: 2019-11-15
Attending: SPECIALIST
Payer: COMMERCIAL

## 2019-11-15 ENCOUNTER — APPOINTMENT (OUTPATIENT)
Dept: LAB | Facility: HOSPITAL | Age: 64
End: 2019-11-15
Attending: SPECIALIST
Payer: COMMERCIAL

## 2019-11-15 DIAGNOSIS — Z01.818 PRE-OP TESTING: Primary | ICD-10-CM

## 2019-11-15 DIAGNOSIS — K42.9 UMBILICAL HERNIA WITHOUT OBSTRUCTION AND WITHOUT GANGRENE: ICD-10-CM

## 2019-11-15 LAB
ALBUMIN SERPL BCP-MCNC: 3.7 G/DL (ref 3.5–5)
ALP SERPL-CCNC: 53 U/L (ref 46–116)
ALT SERPL W P-5'-P-CCNC: 30 U/L (ref 12–78)
ANION GAP SERPL CALCULATED.3IONS-SCNC: 5 MMOL/L (ref 4–13)
AST SERPL W P-5'-P-CCNC: 15 U/L (ref 5–45)
ATRIAL RATE: 65 BPM
BASOPHILS # BLD AUTO: 0.05 THOUSANDS/ΜL (ref 0–0.1)
BASOPHILS NFR BLD AUTO: 1 % (ref 0–1)
BILIRUB SERPL-MCNC: 0.54 MG/DL (ref 0.2–1)
BUN SERPL-MCNC: 20 MG/DL (ref 5–25)
CALCIUM SERPL-MCNC: 8.8 MG/DL (ref 8.3–10.1)
CHLORIDE SERPL-SCNC: 105 MMOL/L (ref 100–108)
CO2 SERPL-SCNC: 30 MMOL/L (ref 21–32)
CREAT SERPL-MCNC: 0.94 MG/DL (ref 0.6–1.3)
EOSINOPHIL # BLD AUTO: 0.29 THOUSAND/ΜL (ref 0–0.61)
EOSINOPHIL NFR BLD AUTO: 6 % (ref 0–6)
ERYTHROCYTE [DISTWIDTH] IN BLOOD BY AUTOMATED COUNT: 12.4 % (ref 11.6–15.1)
GFR SERPL CREATININE-BSD FRML MDRD: 87 ML/MIN/1.73SQ M
GLUCOSE P FAST SERPL-MCNC: 96 MG/DL (ref 65–99)
HCT VFR BLD AUTO: 42.8 % (ref 36.5–49.3)
HGB BLD-MCNC: 14.3 G/DL (ref 12–17)
IMM GRANULOCYTES # BLD AUTO: 0.01 THOUSAND/UL (ref 0–0.2)
IMM GRANULOCYTES NFR BLD AUTO: 0 % (ref 0–2)
LYMPHOCYTES # BLD AUTO: 1.78 THOUSANDS/ΜL (ref 0.6–4.47)
LYMPHOCYTES NFR BLD AUTO: 34 % (ref 14–44)
MCH RBC QN AUTO: 30.8 PG (ref 26.8–34.3)
MCHC RBC AUTO-ENTMCNC: 33.4 G/DL (ref 31.4–37.4)
MCV RBC AUTO: 92 FL (ref 82–98)
MONOCYTES # BLD AUTO: 0.49 THOUSAND/ΜL (ref 0.17–1.22)
MONOCYTES NFR BLD AUTO: 9 % (ref 4–12)
NEUTROPHILS # BLD AUTO: 2.63 THOUSANDS/ΜL (ref 1.85–7.62)
NEUTS SEG NFR BLD AUTO: 50 % (ref 43–75)
NRBC BLD AUTO-RTO: 0 /100 WBCS
P AXIS: 49 DEGREES
PLATELET # BLD AUTO: 207 THOUSANDS/UL (ref 149–390)
PMV BLD AUTO: 9.9 FL (ref 8.9–12.7)
POTASSIUM SERPL-SCNC: 4.1 MMOL/L (ref 3.5–5.3)
PR INTERVAL: 184 MS
PROT SERPL-MCNC: 6.9 G/DL (ref 6.4–8.2)
QRS AXIS: 3 DEGREES
QRSD INTERVAL: 78 MS
QT INTERVAL: 390 MS
QTC INTERVAL: 405 MS
RBC # BLD AUTO: 4.64 MILLION/UL (ref 3.88–5.62)
SODIUM SERPL-SCNC: 140 MMOL/L (ref 136–145)
T WAVE AXIS: 26 DEGREES
VENTRICULAR RATE: 65 BPM
WBC # BLD AUTO: 5.25 THOUSAND/UL (ref 4.31–10.16)

## 2019-11-15 PROCEDURE — 93005 ELECTROCARDIOGRAM TRACING: CPT

## 2019-11-15 PROCEDURE — 93010 ELECTROCARDIOGRAM REPORT: CPT | Performed by: INTERNAL MEDICINE

## 2019-11-15 PROCEDURE — 36415 COLL VENOUS BLD VENIPUNCTURE: CPT | Performed by: SPECIALIST

## 2019-11-15 PROCEDURE — 80053 COMPREHEN METABOLIC PANEL: CPT | Performed by: SPECIALIST

## 2019-11-15 PROCEDURE — 85025 COMPLETE CBC W/AUTO DIFF WBC: CPT | Performed by: SPECIALIST

## 2019-11-15 NOTE — PRE-PROCEDURE INSTRUCTIONS
Pre-Surgery Instructions:   Medication Instructions    Ciclopirox 1 % shampoo Patient was instructed by Physician and understands   ELIQUIS 5 MG Instructed patient per Anesthesia Guidelines   ketoconazole (EXTINA) 2 % foam Instructed patient per Anesthesia Guidelines   Multiple Vitamin (MULTIVITAMIN) tablet Instructed patient per Anesthesia Guidelines   omeprazole (PriLOSEC) 20 mg delayed release capsule Instructed patient per Anesthesia Guidelines

## 2019-11-15 NOTE — PRE-PROCEDURE INSTRUCTIONS
My Surgical Experience    The following information was developed to assist you to prepare for your operation  What do I need to do before coming to the hospital?   Arrange for a responsible person to drive you to and from the hospital    Arrange care for your children at home  Children are not allowed in the recovery areas of the hospital   Plan to wear clothing that is easy to put on and take off  If you are having shoulder surgery, wear a shirt that buttons or zippers in the front  Bathing  o Shower the evening before and the morning of your surgery with an antibacterial soap  Please refer to the Pre Op Showering Instructions for Surgery Patients Sheet   o Remove nail polish and all body piercing jewelry  o Do not shave any body part for at least 24 hours before surgery-this includes face, arms, legs and upper body  Food  o Nothing to eat or drink after midnight the night before your surgery  This includes candy and chewing gum  o Exception: If your surgery is after 12:00pm (noon), you may have clear liquids such as 7-Up®, ginger ale, apple or cranberry juice, Jell-O®, water, or clear broth until 8:00 am  o Do not drink milk or juice with pulp on the morning before surgery  o Do not drink alcohol 24 hours before surgery  Medicine  o Follow instructions you received from your surgeon about which medicines you may take on the day of surgery  o If instructed to take medicine on the morning of surgery, take pills with just a small sip of water  Call your prescribing doctor for specific infroamtion on what to do if you take insulin    What should I bring to the hospital?    Bring:  Floresita Yuan or a walker, if you have them, for foot or knee surgery   A list of the daily medicines, vitamins, minerals, herbals and nutritional supplements you take   Include the dosages of medicines and the time you take them each day   Glasses, dentures or hearing aids   Minimal clothing; you will be wearing hospital sleepwear   Photo ID; required to verify your identity   If you have a Living Will or Power of , bring a copy of the documents   If you have an ostomy, bring an extra pouch and any supplies you use    Do not bring   Medicines or inhalers   Money, valuables or jewelry    What other information should I know about the day of surgery?  Notify your surgeons if you develop a cold, sore throat, cough, fever, rash or any other illness   Report to the Ambulatory Surgical/Same Day Surgery Unit   You will be instructed to stop at Registration only if you have not been pre-registered   Inform your  fi they do not stay that they will be asked by the staff to leave a phone number where they can be reached   Be available to be reached before surgery  In the event the operating room schedule changes, you may be asked to come in earlier or later than expected    *It is important to tell your doctor and others involved in your health care if you are taking or have been taking any non-prescription drugs, vitamins, minerals, herbals or other nutritional supplements  Any of these may interact with some food or medicines and cause a reaction      Pre-Surgery Instructions:   Medication Instructions    Ciclopirox 1 % shampoo Instructed patient per Anesthesia Guidelines   ketoconazole (EXTINA) 2 % foam Instructed patient per Anesthesia Guidelines   Multiple Vitamin (MULTIVITAMIN) tablet Instructed patient per Anesthesia Guidelines   omeprazole (PriLOSEC) 20 mg delayed release capsule Instructed patient per Anesthesia Guidelines  To take omeprazole a m   Of surgery

## 2019-11-18 PROBLEM — M54.12 CERVICAL RADICULITIS: Status: ACTIVE | Noted: 2019-11-18

## 2019-11-18 PROBLEM — M96.1 POSTLAMINECTOMY SYNDROME, CERVICAL REGION: Status: ACTIVE | Noted: 2019-11-18

## 2019-11-18 PROBLEM — M54.2 CERVICALGIA: Status: ACTIVE | Noted: 2019-11-18

## 2019-11-18 PROBLEM — M48.02 SPINAL STENOSIS IN CERVICAL REGION: Status: ACTIVE | Noted: 2019-11-18

## 2019-11-18 NOTE — TELEPHONE ENCOUNTER
Scheduled pt for C6 C7 KRISTOPHER for 11/22/2019  Went over pre-procedure instructions below:  Pt is currently taking Eliquis and a 3 day hold was approved by both Dr Carmen Torres and Dr Monique Holland  Pt has letitia instructed to hold for 3 days with last dose on 11/18/19   Pt denies Nsaids  Nothing to eat or drink 1 hr prior to procedure  Need to arrange transportation  Proper clothing for procedure  If ill or placed on antibiotics please call to reschedule  FU scheduled

## 2019-11-18 NOTE — TELEPHONE ENCOUNTER
Pt called stating he will have to R/S his procedure for this month      Pt can be reached at [phone]

## 2019-11-22 ENCOUNTER — APPOINTMENT (OUTPATIENT)
Dept: RADIOLOGY | Facility: HOSPITAL | Age: 64
End: 2019-11-22
Payer: COMMERCIAL

## 2019-11-22 ENCOUNTER — HOSPITAL ENCOUNTER (OUTPATIENT)
Facility: AMBULARY SURGERY CENTER | Age: 64
Setting detail: OUTPATIENT SURGERY
Discharge: HOME | End: 2019-11-22
Attending: ANESTHESIOLOGY | Admitting: ANESTHESIOLOGY
Payer: COMMERCIAL

## 2019-11-22 VITALS
RESPIRATION RATE: 18 BRPM | TEMPERATURE: 96.8 F | OXYGEN SATURATION: 97 % | DIASTOLIC BLOOD PRESSURE: 77 MMHG | HEART RATE: 69 BPM | SYSTOLIC BLOOD PRESSURE: 138 MMHG

## 2019-11-22 PROCEDURE — 72020 X-RAY EXAM OF SPINE 1 VIEW: CPT

## 2019-11-22 PROCEDURE — 62321 NJX INTERLAMINAR CRV/THRC: CPT | Performed by: ANESTHESIOLOGY

## 2019-11-22 RX ORDER — LIDOCAINE WITH 8.4% SOD BICARB 0.9%(10ML)
SYRINGE (ML) INJECTION AS NEEDED
Status: DISCONTINUED | OUTPATIENT
Start: 2019-11-22 | End: 2019-11-22 | Stop reason: HOSPADM

## 2019-11-22 RX ORDER — METHYLPREDNISOLONE ACETATE 80 MG/ML
INJECTION, SUSPENSION INTRA-ARTICULAR; INTRALESIONAL; INTRAMUSCULAR; SOFT TISSUE AS NEEDED
Status: DISCONTINUED | OUTPATIENT
Start: 2019-11-22 | End: 2019-11-22 | Stop reason: HOSPADM

## 2019-11-22 NOTE — OP NOTE
ATTENDING PHYSICIAN:  Marisol Palafox MD     PROCEDURE:  Cervical epidural steroid injection with steroid and local anesthetic under fluoroscopy at the C6-C7 level  PREPROCEDURE DIAGNOSIS:  Neck pain and upper extremity radicular symptoms  POSTPROCEDURE DIAGNOSIS:  Neck pain and upper extremity radicular symptoms  ANESTHESIA:  Local     ESTIMATED BLOOD LOSS:  Minimal     COMPLICATIONS:  None  LOCATION:  [address]  CONSENT:  Today's procedure, its potential benefits as well as its risks and potential side effects were reviewed  Discussed risks of the procedure including bleeding, infection, nerve irritation or damage, reactions to the medications, headache, failure of the pain to improve, and potential worsening of the pain were explained to the patient who verbalized understanding and who wished to proceed  Written informed consent is thereby obtained  DESCRIPTION OF THE PROCEDURE:  After written informed consent was obtained, the patient was taken to the fluoroscopy suite and placed in the prone position  Anatomical landmarks were identified by way of fluoroscopy in multiple views  The skin of the cervical region was prepped and draped in the usual sterile fashion  Strict aseptic technique was utilized  The skin and subcutaneous tissues at the needle entry site were infiltrated with 3 mL of 1% preservative-free lidocaine using a 25-gauge 1-1/2-inch needle  A 20-gauge Tuohy needle was then incrementally advanced under fluoroscopy using a loss of resistance technique  Upon entering into the epidural space, a positive loss of resistance to air was noted and a characteristic "pop" was felt  Proper placement into the epidural space was confirmed with a hanging column technique where preservative-free normal saline was noted to flow freely to gravity in to the epidural space as well as by the administration of contrast to delineate the epidural space   There were no paresthesias reported  After negative aspiration for CSF or heme, a 6 mL injectate consisting of 1 mL of Depo-Medrol 80 mg/mL and 1 mL of Depo-Medrol 40 mg/mL mixed with 4 mL of preservative-free normal saline was slowly injected  The patient tolerated the procedure well and all needles were removed with the tips intact  Hemostasis was maintained  There were no apparent paresthesias or complications  The skin was wiped clean and a Band-Aid was placed as appropriate  The patient was monitored for an appropriate period of time following the procedure and remained hemodynamically stable and neurovascularly intact following the procedure  The patient was ultimately discharged to home with supervision in good condition and instructed to call the office in a few days for an update or sooner as warranted  I was present for and participated in all key and critical portions of this procedure      Mayda Gonsalez MD  11/22/2019  7:45 AM

## 2019-11-22 NOTE — INTERVAL H&P NOTE
H&P reviewed  After examining the patient I find no changes in the patients condition since the H&P had been written      Vitals:    11/22/19 0659   BP: 138/73   Pulse: 71   Resp: 20   Temp: (!) 96 8 °F (36 °C)   SpO2: 96%

## 2019-11-22 NOTE — DISCHARGE INSTRUCTIONS
Epidural Steroid Injection   WHAT YOU NEED TO KNOW:   An epidural steroid injection (JOHNNIE) is a procedure to inject steroid medicine into the epidural space  The epidural space is between your spinal cord and vertebrae  Steroids reduce inflammation and fluid buildup in your spine that may be causing pain  You may be given pain medicine along with the steroids  ACTIVITY  · Do not drive or operate machinery today  · No strenuous activity today - bending, lifting, etc   · You may resume normal activites starting tomorrow - start slowly and as tolerated  · You may shower today, but no tub baths or hot tubs  · You may have numbness for several hours from the local anesthetic  Please use caution and common sense, especially with weight-bearing activities  CARE OF THE INJECTION SITE  · If you have soreness or pain, apply ice to the area today (20 minutes on/20 minutes off)  · Starting tomorrow, you may use warm, moist heat or ice if needed  · You may have an increase or change in your discomfort for 36-48 hours after your treatment  · Apply ice and continue with any pain medication you have been prescribed  · Notify the [facility] if you have any of the following: redness, drainage, swelling, headache, stiff neck or fever above 100°F     SPECIAL INSTRUCTIONS  · Our office will contact you in approximately 7 days for a progress report  MEDICATIONS  · Continue to take all routine medications  · Our office may have instructed you to hold some medications  If you have a problem specifically related to your procedure, please call our office at [phone]  Problems not related to your procedure should be directed to your primary care physician

## 2019-11-29 ENCOUNTER — TELEPHONE (OUTPATIENT)
Dept: PAIN MEDICINE | Facility: CLINIC | Age: 64
End: 2019-11-29

## 2019-12-03 NOTE — TELEPHONE ENCOUNTER
Patient says that he is ok, its a little bit better  However there is still some numbness  His pain level is a 2-3/10, the injection helped about 70%

## 2019-12-09 ENCOUNTER — ANESTHESIA EVENT (OUTPATIENT)
Dept: PERIOP | Facility: HOSPITAL | Age: 64
End: 2019-12-09
Payer: COMMERCIAL

## 2019-12-09 NOTE — ANESTHESIA PREPROCEDURE EVALUATION
Review of Systems/Medical History  Patient summary reviewed  Chart reviewed  No history of anesthetic complications     Cardiovascular  EKG reviewed, DVT  PE,  Pulmonary       GI/Hepatic    GERD well controlled,             Endo/Other    Obesity    GYN       Hematology  Anemia iron deficiency anemia,  Coagulation disorder (eliquis therapy last dose saturday) currently taking oral anticoagulants,    Musculoskeletal  Back pain (s/p anterior cervical diskectomy) , cervical pain and spinal stenosis,        Neurology   Psychology           Physical Exam    Airway    Mallampati score: II  TM Distance: >3 FB  Neck ROM: full     Dental       Cardiovascular  Rhythm: regular, Rate: normal,     Pulmonary  Breath sounds clear to auscultation,     Other Findings  Implant post left lower molar site      Anesthesia Plan  ASA Score- 2     Anesthesia Type- general with ASA Monitors  Additional Monitors:   Airway Plan: LMA  Plan Factors-    Induction- intravenous  Postoperative Plan- Plan for postoperative opioid use  Planned trial extubation    Informed Consent- Anesthetic plan and risks discussed with patient  I personally reviewed this patient with the CRNA  Discussed and agreed on the Anesthesia Plan with the CRNA  Esteban Hoang

## 2019-12-10 ENCOUNTER — ANESTHESIA (OUTPATIENT)
Dept: PERIOP | Facility: HOSPITAL | Age: 64
End: 2019-12-10
Payer: COMMERCIAL

## 2019-12-10 ENCOUNTER — HOSPITAL ENCOUNTER (OUTPATIENT)
Facility: HOSPITAL | Age: 64
Setting detail: OUTPATIENT SURGERY
Discharge: HOME | End: 2019-12-10
Attending: SPECIALIST | Admitting: SPECIALIST
Payer: COMMERCIAL

## 2019-12-10 VITALS
HEART RATE: 78 BPM | TEMPERATURE: 97.8 F | DIASTOLIC BLOOD PRESSURE: 82 MMHG | OXYGEN SATURATION: 98 % | RESPIRATION RATE: 18 BRPM | SYSTOLIC BLOOD PRESSURE: 140 MMHG

## 2019-12-10 DIAGNOSIS — I82.512 CHRONIC DEEP VEIN THROMBOSIS (DVT) OF LEFT FEMORAL VEIN (HCC): Primary | Chronic | ICD-10-CM

## 2019-12-10 DIAGNOSIS — G89.4 CHRONIC PAIN SYNDROME: ICD-10-CM

## 2019-12-10 DIAGNOSIS — M48.02 CERVICAL SPINAL STENOSIS: ICD-10-CM

## 2019-12-10 DIAGNOSIS — K42.9 UMBILICAL HERNIA WITHOUT OBSTRUCTION AND WITHOUT GANGRENE: ICD-10-CM

## 2019-12-10 PROCEDURE — C1781 MESH (IMPLANTABLE): HCPCS | Performed by: SPECIALIST

## 2019-12-10 PROCEDURE — 49585 PR REPAIR UMBILICAL HERN,5+Y/O,REDUC: CPT | Performed by: SPECIALIST

## 2019-12-10 PROCEDURE — 49585 PR REPAIR UMBILICAL HERN,5+Y/O,REDUC: CPT | Performed by: PHYSICIAN ASSISTANT

## 2019-12-10 PROCEDURE — NC001 PR NO CHARGE: Performed by: SPECIALIST

## 2019-12-10 DEVICE — VENTRALEX HERNIA PATCH, 6.4 CM (2.5"), MEDIUM CIRCLE WITH STRAP
Type: IMPLANTABLE DEVICE | Site: ABDOMEN | Status: FUNCTIONAL
Brand: VENTRALEX

## 2019-12-10 RX ORDER — LIDOCAINE HYDROCHLORIDE 10 MG/ML
INJECTION, SOLUTION EPIDURAL; INFILTRATION; INTRACAUDAL; PERINEURAL AS NEEDED
Status: DISCONTINUED | OUTPATIENT
Start: 2019-12-10 | End: 2019-12-10 | Stop reason: SURG

## 2019-12-10 RX ORDER — SODIUM CHLORIDE, SODIUM LACTATE, POTASSIUM CHLORIDE, CALCIUM CHLORIDE 600; 310; 30; 20 MG/100ML; MG/100ML; MG/100ML; MG/100ML
INJECTION, SOLUTION INTRAVENOUS CONTINUOUS PRN
Status: DISCONTINUED | OUTPATIENT
Start: 2019-12-10 | End: 2019-12-10 | Stop reason: SURG

## 2019-12-10 RX ORDER — SODIUM CHLORIDE, SODIUM LACTATE, POTASSIUM CHLORIDE, CALCIUM CHLORIDE 600; 310; 30; 20 MG/100ML; MG/100ML; MG/100ML; MG/100ML
75 INJECTION, SOLUTION INTRAVENOUS CONTINUOUS
Status: DISCONTINUED | OUTPATIENT
Start: 2019-12-10 | End: 2019-12-10 | Stop reason: HOSPADM

## 2019-12-10 RX ORDER — CEFAZOLIN SODIUM 2 G/50ML
2000 SOLUTION INTRAVENOUS
Status: COMPLETED | OUTPATIENT
Start: 2019-12-10 | End: 2019-12-10

## 2019-12-10 RX ORDER — MAGNESIUM HYDROXIDE 1200 MG/15ML
LIQUID ORAL AS NEEDED
Status: DISCONTINUED | OUTPATIENT
Start: 2019-12-10 | End: 2019-12-10 | Stop reason: HOSPADM

## 2019-12-10 RX ORDER — FENTANYL CITRATE/PF 50 MCG/ML
50 SYRINGE (ML) INJECTION
Status: DISCONTINUED | OUTPATIENT
Start: 2019-12-10 | End: 2019-12-10 | Stop reason: HOSPADM

## 2019-12-10 RX ORDER — FENTANYL CITRATE 50 UG/ML
INJECTION, SOLUTION INTRAMUSCULAR; INTRAVENOUS AS NEEDED
Status: DISCONTINUED | OUTPATIENT
Start: 2019-12-10 | End: 2019-12-10 | Stop reason: SURG

## 2019-12-10 RX ORDER — DEXAMETHASONE SODIUM PHOSPHATE 4 MG/ML
INJECTION, SOLUTION INTRA-ARTICULAR; INTRALESIONAL; INTRAMUSCULAR; INTRAVENOUS; SOFT TISSUE AS NEEDED
Status: DISCONTINUED | OUTPATIENT
Start: 2019-12-10 | End: 2019-12-10 | Stop reason: SURG

## 2019-12-10 RX ORDER — ONDANSETRON 2 MG/ML
INJECTION INTRAMUSCULAR; INTRAVENOUS AS NEEDED
Status: DISCONTINUED | OUTPATIENT
Start: 2019-12-10 | End: 2019-12-10 | Stop reason: SURG

## 2019-12-10 RX ORDER — PROMETHAZINE HYDROCHLORIDE 25 MG/ML
12.5 INJECTION, SOLUTION INTRAMUSCULAR; INTRAVENOUS ONCE AS NEEDED
Status: DISCONTINUED | OUTPATIENT
Start: 2019-12-10 | End: 2019-12-10 | Stop reason: HOSPADM

## 2019-12-10 RX ORDER — ONDANSETRON 2 MG/ML
4 INJECTION INTRAMUSCULAR; INTRAVENOUS ONCE AS NEEDED
Status: DISCONTINUED | OUTPATIENT
Start: 2019-12-10 | End: 2019-12-10 | Stop reason: HOSPADM

## 2019-12-10 RX ORDER — BUPIVACAINE HYDROCHLORIDE AND EPINEPHRINE 2.5; 5 MG/ML; UG/ML
INJECTION, SOLUTION INFILTRATION; PERINEURAL AS NEEDED
Status: DISCONTINUED | OUTPATIENT
Start: 2019-12-10 | End: 2019-12-10 | Stop reason: HOSPADM

## 2019-12-10 RX ORDER — MEPERIDINE HYDROCHLORIDE 25 MG/ML
12.5 INJECTION INTRAMUSCULAR; INTRAVENOUS; SUBCUTANEOUS
Status: DISCONTINUED | OUTPATIENT
Start: 2019-12-10 | End: 2019-12-10 | Stop reason: HOSPADM

## 2019-12-10 RX ORDER — MIDAZOLAM HYDROCHLORIDE 2 MG/2ML
INJECTION, SOLUTION INTRAMUSCULAR; INTRAVENOUS AS NEEDED
Status: DISCONTINUED | OUTPATIENT
Start: 2019-12-10 | End: 2019-12-10 | Stop reason: SURG

## 2019-12-10 RX ORDER — PROPOFOL 10 MG/ML
INJECTION, EMULSION INTRAVENOUS AS NEEDED
Status: DISCONTINUED | OUTPATIENT
Start: 2019-12-10 | End: 2019-12-10 | Stop reason: SURG

## 2019-12-10 RX ORDER — OXYCODONE HYDROCHLORIDE AND ACETAMINOPHEN 5; 325 MG/1; MG/1
1 TABLET ORAL EVERY 4 HOURS PRN
Qty: 12 TABLET | Refills: 0 | Status: SHIPPED | OUTPATIENT
Start: 2019-12-10 | End: 2019-12-13

## 2019-12-10 RX ADMIN — SODIUM CHLORIDE, SODIUM LACTATE, POTASSIUM CHLORIDE, AND CALCIUM CHLORIDE: .6; .31; .03; .02 INJECTION, SOLUTION INTRAVENOUS at 12:29

## 2019-12-10 RX ADMIN — PROPOFOL 200 MG: 10 INJECTION, EMULSION INTRAVENOUS at 11:24

## 2019-12-10 RX ADMIN — DEXAMETHASONE SODIUM PHOSPHATE 4 MG: 4 INJECTION, SOLUTION INTRA-ARTICULAR; INTRALESIONAL; INTRAMUSCULAR; INTRAVENOUS; SOFT TISSUE at 11:39

## 2019-12-10 RX ADMIN — SODIUM CHLORIDE, SODIUM LACTATE, POTASSIUM CHLORIDE, AND CALCIUM CHLORIDE: .6; .31; .03; .02 INJECTION, SOLUTION INTRAVENOUS at 10:47

## 2019-12-10 RX ADMIN — FENTANYL CITRATE 50 MCG: 50 INJECTION, SOLUTION INTRAMUSCULAR; INTRAVENOUS at 11:24

## 2019-12-10 RX ADMIN — ONDANSETRON 4 MG: 2 INJECTION INTRAMUSCULAR; INTRAVENOUS at 11:39

## 2019-12-10 RX ADMIN — LIDOCAINE HYDROCHLORIDE 50 MG: 10 INJECTION, SOLUTION EPIDURAL; INFILTRATION; INTRACAUDAL; PERINEURAL at 11:24

## 2019-12-10 RX ADMIN — MIDAZOLAM HYDROCHLORIDE 2 MG: 1 INJECTION, SOLUTION INTRAMUSCULAR; INTRAVENOUS at 11:18

## 2019-12-10 RX ADMIN — CEFAZOLIN SODIUM 2000 MG: 2 SOLUTION INTRAVENOUS at 11:21

## 2019-12-10 RX ADMIN — SODIUM CHLORIDE, SODIUM LACTATE, POTASSIUM CHLORIDE, AND CALCIUM CHLORIDE 75 ML/HR: .6; .31; .03; .02 INJECTION, SOLUTION INTRAVENOUS at 10:02

## 2019-12-10 RX ADMIN — FENTANYL CITRATE 50 MCG: 50 INJECTION, SOLUTION INTRAMUSCULAR; INTRAVENOUS at 11:50

## 2019-12-10 RX ADMIN — SODIUM CHLORIDE, SODIUM LACTATE, POTASSIUM CHLORIDE, AND CALCIUM CHLORIDE: .6; .31; .03; .02 INJECTION, SOLUTION INTRAVENOUS at 09:57

## 2019-12-10 NOTE — DISCHARGE INSTRUCTIONS
Please follow carefully all instructions checked below  Gadiel Brannon  Pre-op Diagnosis:   Umbilical hernia without obstruction and without gangrene [K42 9]  Post-op Diagnosis:  Post-Op Diagnosis Codes:     * Umbilical hernia without obstruction and without gangrene [K42 9]  Procedure(s):  REPAIR HERNIA UMBILICAL  Surgeon(s):  FAZAL Epps MD    1  Diet:  · Resume your normal diet  Avoid red meat eat lots of yogurt  2  Medications:  · Home medications reviewed  Resume pre-operative medications unless noted below  · Take Eliquis from tomorrow onwards not today after surgery     · Prescription sent home with patient and Prescription sent over to pharmacy  · See after visit summary for reconciled discharge medications provided to patient and family  · Apply ice to incision area this will numb that area and it will prevent bruising and will minimize pain   3  Activities:    · Do NOT make important personal or business decisions for 24 hours  Do NOT take the anticoagulation medicine like Eliquis Xarelto Coumadin for 24 hours after surgery  · Do NOT drive or operate machinery for 7 days  · While taking pain medication, do not drive and be careful as you walk or climb stairs  · Limit your activities for 3 weeks  Do not engage in sports, heavy work or heavy  lifting until your physician gives you permission  4  Wound Care:  · Change dressings as necessary after 2 days  · Keep dressings dry  5  Special Instructions:  · Full weight bearing  6  Bathing:  · May shower after 2 days  7  When to Call:       Call if fever, Nausea, vomiting, Constipation not feeling well, or wound infection, bleeding,reddness and excessive pain,  8  Follow-up Care:  · Make an appointment to see MD Shirley Matutecristina [address] [place] [address]  in 10 days for Follow up   Please Call Office  [phone] for appointment,   · Call if fever, Nausea, vomiting, Constipation not feeling well, or wound infection, bleeding,reddness and excessive pain,    Jaymie To MD [address]:  [address]    Dinesh Lujan [address]  Office - [phone]  Fax - [phone]  94/86/16  2:16 PM  Umbilical Hernia   WHAT YOU NEED TO KNOW:   An umbilical hernia is a bulge through the abdominal muscles in the area of the navel (belly button)  The hernia may contain tissue from the abdomen, part of an organ (such as the intestine), or fluid  Umbilical hernias usually happen because of a hole or a weak area in the muscles of the abdominal wall  DISCHARGE INSTRUCTIONS:   Medicines:  · Ibuprofen or acetaminophen:  These medicines decrease pain  They are available without a doctor's order  Ask your healthcare provider which medicine is right for you  Ask how much to take and how often to take it  Follow directions  These medicines can cause stomach bleeding if not taken correctly  Ibuprofen can cause kidney damage  Do not take ibuprofen if you have kidney disease, an ulcer, or allergies to aspirin  Acetaminophen can cause liver damage  Do not drink alcohol if you take acetaminophen  · Take your medicine as directed  Contact your healthcare provider if you think your medicine is not helping or if you have side effects  Tell him of her if you are allergic to any medicine  Keep a list of the medicines, vitamins, and herbs you take  Include the amounts, and when and why you take them  Bring the list or the pill bottles to follow-up visits  Carry your medicine list with you in case of an emergency  Follow up with your healthcare provider as directed:  Write down your questions so you remember to ask them during your visits  Self care:   · Activity:  Avoid lifting, bending, and straining  Try not to stand for long periods of time  If you have to cough, try to cough gently  Support the hernia by holding your hand or a pillow over it when coughing   Ask your healthcare provider if it is okay for you to have sexual intercourse  · Prevent constipation:  Straining to have a bowel movement may make your hernia worse  Eat foods that are high in fiber and drink at least 8 glasses of water a day  A high-fiber diet includes whole grains, bran, cereals, and uncooked fruit and vegetables  Walking or other exercise can also help  Your healthcare provider may give you fiber medicine or a stool softener to help make your bowel movements softer and more regular  Do not use an enema or a laxative unless your healthcare provider says it is okay  · What to wear:  Do not wear anything tight over your hernia  Ask your healthcare provider if you should wear support belt or girdle to keep the hernia in place  · Ask your healthcare provider how to reduce your hernia:  Sometimes your hernia will slip back into your abdomen if you lie flat for a while  Ask your healthcare provider if you should try to gently push your hernia back into place if lying flat does not work  If your hernia does not slide back into your abdomen easily, stop pushing on it and call your healthcare provider  Do not try to push the hernia back into place if it is painful or tender  Contact your healthcare provider if:   · You have nausea or vomiting  · You cannot gently push your hernia back into your abdomen  (Do this only if your healthcare provider has shown you how to do it )     · You are constipated or have blood in your bowel movements  · Your hernia is getting bigger  · You have questions or concerns about your condition or care  Return to the emergency department if:   · You have a fever  · Your hernia is stuck outside your abdomen and is painful, swollen, or feels hard  · You completely stop having bowel movements and stop passing gas  · Your abdominal pain is bad or getting worse    © 2017 [employer] Information is for End User's use only and may not be sold, redistributed or otherwise used for commercial purposes  All illustrations and images included in CareNotes® are the copyrighted property of A D A M , Inc  or Vicente Walsh  The above information is an  only  It is not intended as medical advice for individual conditions or treatments  Talk to your doctor, nurse or pharmacist before following any medical regimen to see if it is safe and effective for you

## 2019-12-10 NOTE — PERIOPERATIVE NURSING NOTE
Patient comfortable, ice to surgical site, denies pain  Tolerating po fluids well  Ambulated to bathroom, voided in bathroom large amount

## 2019-12-10 NOTE — OP NOTE
General SurgeryREPAIR HERNIA UMBILICAL Op Note    Uriah Grande  12/10/2019    Pre-op Diagnosis:   Umbilical hernia without obstruction and without gangrene [K42 9]  Patient Active Problem List   Diagnosis    Mixed hyperlipidemia    Snoring    Gastroesophageal reflux disease with esophagitis    Cervical spinal stenosis    Calcific tendinitis    Colon cancer screening    Pulmonary embolism with acute cor pulmonale (HCC)    Chronic deep vein thrombosis (DVT) of left femoral vein (HCC)    NSTEMI (non-ST elevated myocardial infarction) ([address] )    Microcytic anemia    Iron deficiency anemia    Screen for colon cancer    Cramps of lower extremity    Annual physical exam    Umbilical hernia without obstruction and without gangrene    Cervical post-laminectomy syndrome    Left cervical radiculopathy    Neck pain    Chronic pain syndrome    Spinal stenosis in cervical region    Cervical radiculitis    Postlaminectomy syndrome, cervical region    Cervicalgia     Post-op Diagnosis:    Post-Op Diagnosis Codes:     * Umbilical hernia without obstruction and without gangrene [K42 9]  Past Medical History:   Diagnosis Date    Calcific tendinitis 2/27/2018    DVT (deep venous thrombosis) (HCC)     2 episodes 6 years apart- last time 2017    [facility]     Neck pain        Procedure(s):  REPAIR HERNIA UMBILICAL    Surgeon(s):  FAZAL Kothari MD    Anesthesia:  General    Assistant:  Trinh Manriquez PA-C  Services of FAZAL was utilized as a first assistant as there is no surgical residency program at [facility]    Staff:   Circulator: Last Hartman RN  Scrub Person: Marialuisa Goss RN    Operative Findings:  Large umbilical hernia  Weak anterior abdominal wall muscular  Morbid obese    OPERATIVE TECHNIQUE    Uriah Grande was identified by me   Proper detailed consent was obtained from patient risk and benefits were explained to patient and family in detail prior to coming to Operating Room  Site was marked  Desirae Khan was given pre-operative antibiotics  There is no height or weight on file to calculate BMI  Desirae Khan is ASA 2 and Fire risk- 3  Desirae Khan was re-identified in the OR  Site was identified and detailed timeout was performed with Anesthesia and OR staff  A Curvilinear transverse incision was made the Below the umbilicus  skin And subcutaneous tissue was cut along the line of incision Hernia under the Umbilicus was  from the umbilicus and sac was carefully dissected and opened Content of hernia was the extraperitoneal fat  Content was pushed back into the abdominal cavity and the mesh was placed extraperitoneal   Bard ventral ex hernia patch reference [id number] number  lot number [id number]  and expiry date November 28, 2022 small [place] with strap a 4 2 cm diameter  This mesh was laid the flat the over the extraperitoneal pad of fat and the was attached to the anterior abdominal wall with 4 anchoring stitches at 12:00 6:00 to 3:00 and 9:00 position with PDS 2-0 The mesh and the area was thoroughly lavaged her for irrigation mixed with antibiotics  Adjacent of the defect were approximated with PDS figure-of-eight interrupted suture    Umbilicus was reattached to the anterior abdominal wall with the Chromic catgut 2-0 suture  Subcutaneous tissue was approximated with the Chromic catgut 2-0 sutures The skin was approximated With Monocryl 40  All the layers were thoroughly up injected with the Marcaine with epinephrine total 30 cc was used  Priscila Nails O'Such tolerated the procedure well, remain stable during and after the procedure  At the end of the procedure No active bleeding was noted and all the instruments, needle and sponge counts were noted to be correct  Patient was transfered to recovery area in stable condition  I was present for the entire procedure No qualified resident was available   A physician's assistant was required due to the intensity of the surgery  This no residency program in this hospital no resident was available to assist  Physician assistant was required for hemostasis retraction and the closure of the surgical wound  Physician assistant was present during the entire procedure    Estimated Blood Loss:  Minimal    Specimens:  * No orders in the log *      Drains:  * No LDAs found *    Complications:  None    Total OR Time  * Missing case tracking time(s) *   or    I was present for the entire procedure    Patient Disposition:  PACU       Luis Obando MD MS Denise Rea    Date: 12/10/2019  Time: 12:17 PM    Copy to PCP: Annamaria Siemens, MD

## 2019-12-10 NOTE — ANESTHESIA POSTPROCEDURE EVALUATION
Post-Op Assessment Note    CV Status:  Stable  Pain Score: 1    Pain management: adequate     Mental Status:  Alert and awake   Hydration Status:  Stable   PONV Controlled:  None   Airway Patency:  Patent   Post Op Vitals Reviewed: Yes      Staff: CRNA           BP      Temp     Pulse     Resp      SpO2

## 2019-12-10 NOTE — CONSULTS
History and physical reviewed and up to date from 11/01/2019  /80   Pulse 74   Temp (!) 97 1 °F (36 2 °C) (Tympanic)   Resp 18   SpO2 99%       History and Physical Examination - General Surgery   [facility]  Jennifer BILLY'Ventura 60 y o  male MRN: [id number]  Unit/Bed#:  Encounter: [id number] PCP: Therese Benavidez MD     History of Present Illness         Chief Complaint:    Umbilical hernia     HPI:  Evan Canela is a 60 y o  male who presents to office with umbilical hernia wants to get it fixed  As it is getting increase in size patient had this bump all the time with recent seen by Dr Shiv Meier  And was advised for possible surgery     Denies any abdominal pain nausea vomiting diarrhea constipation     Patient has DVT and PE 1/2 year ago and is on Eliquis for anticoagulation        Historical Information         The following portions of the patient's history were reviewed and updated as appropriate  Medical History        Past Medical History:   Diagnosis Date    Calcific tendinitis 2/27/2018    DVT of upper extremity (deep vein thrombosis) ([address] )           Surgical History         Past Surgical History:   Procedure Laterality Date    ANTERIOR CERVICAL DISCECTOMY W/ FUSION Right      COLONOSCOPY        ND COLONOSCOPY FLX DX W/COLLJ SPEC WHEN PFRMD N/A 7/31/2018     Procedure: COLONOSCOPY;  Surgeon: Bruna Abdul MD;  [facility];   Service: Gastroenterology    SHOULDER SURGERY Right      ULNAR NERVE TRANSPOSITION Right           Social History         Social History           Substance and Sexual Activity   Alcohol Use Yes     Comment: social      Social History          Substance and Sexual Activity   Drug Use No      Social History          Tobacco Use   Smoking Status Never Smoker   Smokeless Tobacco Never Used      Family History:         Family History   Problem Relation Age of Onset    Diabetes Father      Cancer Maternal Grandmother      Cancer Maternal Grandfather      Colon cancer Neg Hx      Liver disease Neg Hx           Meds/Allergies   No Known Allergies     Current Outpatient Medications:     ELIQUIS 5 MG, TAKE 1 TABLET BY MOUTH TWICE DAILY, Disp: 180 tablet, Rfl: 3    Multiple Vitamin (MULTIVITAMIN) tablet, Take 1 tablet by mouth daily, Disp: , Rfl:     omeprazole (PriLOSEC) 20 mg delayed release capsule, Take 20 mg by mouth daily, Disp: , Rfl:     Apixaban (ELIQUIS PO), Take by mouth, Disp: , Rfl:     Ciclopirox 1 % shampoo, as needed , Disp: , Rfl:     ketoconazole (EXTINA) 2 % foam, as needed , Disp: , Rfl:       REVIEW OF SYSTEMS  Constitutional:  Denies fever or chills   Eyes:  Denies change in visual acuity   HENT:  Denies nasal congestion or sore throat   Respiratory:  Denies cough or shortness of breath history of PE and DVT  Cardiovascular:  Denies chest pain or edema   GI:  Denies abdominal pain, nausea, vomiting, bloody stools or diarrhea umbilical hernia  :  Denies dysuria, frequency, difficulty in micturition and nocturia  Musculoskeletal:  Denies back pain or joint pain tennis elbow C-spine arthritis  Neurologic:  Denies headache, focal weakness or sensory changes   Endocrine:  Denies polyuria or polydipsia   Lymphatic:  Denies swollen glands   Psychiatric:  Denies depression or anxiety      Objective   Current Vitals:   /84 (BP Location: Right arm, Patient Position: Sitting, Cuff Size: Standard)   Pulse 76   Temp (!) 96 6 °F (35 9 °C) (Tympanic)   Ht 6' 1 5" (1 867 m)   Wt 103 kg (228 lb)   BMI 29 67 kg/m²   Body mass index is 29 67 kg/m²       PHYSICAL EXAMS  General:  Patient is not in acute distress, laying in the bed comfortably, awake, alert responding to commands,   HEENT:  Both pupils normal-size atraumatic, normocephalic, nonicteric  Neck:  JVP not raised  Trachea central  Respiratory:  normal Breath sounds clear to auscultation,  Cardiovascular:  S1-S2 normal without any murmur   GI:  Abdomen soft nontender    4 x 3 cm bulge over the anterior abdominal wall and a near the umbilicus  Soft nontender and reducible with the defect approximately 1/2 in  Musculoskeletal:  No back pain  Integument:  No skin rashes or ulceration  Neurologic:  Patient is awake alert, responding to command, well-oriented to time and place and person moving all extremities ambulating well     Visit Diagnosis:   Diagnoses and all orders for this visit:     Umbilical hernia without obstruction and without gangrene  -     Ambulatory referral to General Surgery  -     ECG 12 lead; Future        Plan of care was discussed with patient in detail     Pertinent labs reviewed  Pertinent images and available reads personally reviewed  Procedure: Mri Cervical Spine Wo Contrast     Result Date: 10/25/2019  Narrative: MRI CERVICAL SPINE WITHOUT CONTRAST INDICATION: M54 12: Radiculopathy, cervical region Z98 1: Arthrodesis status M48 02: Spinal stenosis, cervical region  COMPARISON:  None  TECHNIQUE:  Sagittal T1, sagittal T2, sagittal inversion recovery, axial T2, axial  2D merge IMAGE QUALITY:  Diagnostic FINDINGS: ALIGNMENT:  ACDF C5-6 and C6-7  Appropriate alignment  Hardware appears appropriately positioned  No subluxation  No compression fracture  No scoliosis  MARROW SIGNAL:  Normal marrow signal is identified within the visualized bony structures  No discrete marrow lesion  CERVICAL AND VISUALIZED THORACIC CORD:  Normal signal within the visualized cord  PREVERTEBRAL AND PARASPINAL SOFT TISSUES:  Normal  VISUALIZED POSTERIOR FOSSA:  The visualized posterior fossa demonstrates no abnormal signal  CERVICAL DISC SPACES: C2-C3:  Normal  C3-C4:  Slight loss of disc height  Mild annular bulging and uncinate joint hypertrophic degenerative change  Mild canal stenosis with moderate bilateral foraminal narrowing  C4-C5:  Loss of disc height  Left disc osteophyte complex resulting in severe left foraminal narrowing  C5-C6:  Previous ACDF    Mild right greater than left uncinate joint hypertrophic degenerative change  No canal stenosis  Mild left and moderate right foraminal narrowing  C6-C7:  Previous ACDF  No disc herniation or endplate hypertrophic change  No canal stenosis or foraminal narrowing  C7-T1:  Normal  UPPER THORACIC DISC SPACES:  Normal       Impression: Cervical degenerative disc disease with endplate and uncinate joint hypertrophic change C3-4 and C4-5  Moderate bilateral foraminal narrowing at C3-4 and severe left foraminal narrowing at C4-5  Prior ACDF at the C5-6 and C6-7 levels  At C5-6 there is mild left and moderate right foraminal narrowing secondary to uncinate joint hypertrophic change  Workstation performed: [id number]      Pertinent notes reviewed     Assessment/Plan   Assessment:  Umbilical hernia  On Chemical Jacob coagulation for DVT and PE  Plan:  Proper consent was obtained  The risks, benefits, alternatives,and probabilities of success were discussed in detail with no guarantee made as to outcome  All questions were answered to the patient's satisfaction  Preoperative prep was explained to the patient  Will repeat the blood work CBC CMP EKG prior to surgery     Counseling / Coordination of Care  Expained patient in detailed about coordination of care   A description of the counseling / coordination of care: Milo Rossi performed an interim history, pertinent images and labs, performed a physical examination to arrive at the plan delineated above with associated thought processes         [place] [id number] [facility]

## 2019-12-12 DIAGNOSIS — I26.09 OTHER ACUTE PULMONARY EMBOLISM WITH ACUTE COR PULMONALE (HCC): ICD-10-CM

## 2019-12-12 DIAGNOSIS — I82.412 ACUTE DEEP VEIN THROMBOSIS (DVT) OF FEMORAL VEIN OF LEFT LOWER EXTREMITY (HCC): ICD-10-CM

## 2019-12-17 DIAGNOSIS — I26.09 OTHER ACUTE PULMONARY EMBOLISM WITH ACUTE COR PULMONALE (HCC): ICD-10-CM

## 2019-12-17 DIAGNOSIS — I82.412 ACUTE DEEP VEIN THROMBOSIS (DVT) OF FEMORAL VEIN OF LEFT LOWER EXTREMITY (HCC): ICD-10-CM

## 2019-12-26 ENCOUNTER — OFFICE VISIT (OUTPATIENT)
Dept: SURGERY | Facility: CLINIC | Age: 64
End: 2019-12-26

## 2019-12-26 VITALS
WEIGHT: 228 LBS | SYSTOLIC BLOOD PRESSURE: 122 MMHG | DIASTOLIC BLOOD PRESSURE: 82 MMHG | BODY MASS INDEX: 29.26 KG/M2 | HEART RATE: 97 BPM | HEIGHT: 74 IN

## 2019-12-26 DIAGNOSIS — Z09 POSTOPERATIVE EXAMINATION: Primary | ICD-10-CM

## 2019-12-26 PROCEDURE — 99024 POSTOP FOLLOW-UP VISIT: CPT | Performed by: SURGERY

## 2019-12-26 NOTE — PROGRESS NOTES
Patient is status post repair of umbilical hernia on 10 December 2019 by Dr Edgard Gorman  Patient with minimal pain complaints and no wound complaints  Incision healing well  There is a small scab will left portion of the incision  No signs of infection    Follow-up p r n

## 2020-03-09 NOTE — PRE-PROCEDURE INSTRUCTIONS
Pre-Surgery Instructions:   Medication Instructions    ELIQUIS 5 MG Instructed patient per Anesthesia Guidelines   Multiple Vitamin (MULTIVITAMIN) tablet Instructed patient per Anesthesia Guidelines   omeprazole (PriLOSEC) 20 mg delayed release capsule Instructed patient per Anesthesia Guidelines  Pre op instructions given   wife Darell Wills [phone] Surgical Experience    The following information was developed to assist you to prepare for your operation  What do I need to do before coming to the hospital?   Arrange for a responsible person to drive you to and from the hospital    Arrange care for your children at home  Children are not allowed in the recovery areas of the hospital   Plan to wear clothing that is easy to put on and take off  If you are having shoulder surgery, wear a shirt that buttons or zippers in the front  Bathing  o Shower the evening before and the morning of your surgery with an antibacterial soap  Please refer to the Pre Op Showering Instructions for Surgery Patients Sheet   o Remove nail polish and all body piercing jewelry  o Do not shave any body part for at least 24 hours before surgery-this includes face, arms, legs and upper body  Food  o Nothing to eat or drink after midnight the night before your surgery  This includes candy and chewing gum  o Exception: If your surgery is after 12:00pm (noon), you may have clear liquids such as 7-Up®, ginger ale, apple or cranberry juice, Jell-O®, water, or clear broth until 8:00 am  o Do not drink milk or juice with pulp on the morning before surgery  o Do not drink alcohol 24 hours before surgery  Medicine  o Follow instructions you received from your surgeon about which medicines you may take on the day of surgery  o If instructed to take medicine on the morning of surgery, take pills with just a small sip of water   Call your prescribing doctor for specific infroamtion on what to do if you take insulin    What should I bring to the hospital?    Bring:  Lonedell Rafter or a walker, if you have them, for foot or knee surgery   A list of the daily medicines, vitamins, minerals, herbals and nutritional supplements you take  Include the dosages of medicines and the time you take them each day   Glasses, dentures or hearing aids   Minimal clothing; you will be wearing hospital sleepwear   Photo ID; required to verify your identity   If you have a Living Will or Power of , bring a copy of the documents   If you have an ostomy, bring an extra pouch and any supplies you use    Do not bring   Medicines or inhalers   Money, valuables or jewelry    What other information should I know about the day of surgery?  Notify your surgeons if you develop a cold, sore throat, cough, fever, rash or any other illness   Report to the Ambulatory Surgical/Same Day Surgery Unit   You will be instructed to stop at Registration only if you have not been pre-registered   Inform your  fi they do not stay that they will be asked by the staff to leave a phone number where they can be reached   Be available to be reached before surgery  In the event the operating room schedule changes, you may be asked to come in earlier or later than expected    *It is important to tell your doctor and others involved in your health care if you are taking or have been taking any non-prescription drugs, vitamins, minerals, herbals or other nutritional supplements   Any of these may interact with some food or medicines and cause a reaction

## 2020-03-16 ENCOUNTER — ANESTHESIA (OUTPATIENT)
Dept: PERIOP | Facility: AMBULARY SURGERY CENTER | Age: 65
End: 2020-03-16
Payer: COMMERCIAL

## 2020-03-16 ENCOUNTER — ANESTHESIA EVENT (OUTPATIENT)
Dept: PERIOP | Facility: AMBULARY SURGERY CENTER | Age: 65
End: 2020-03-16
Payer: COMMERCIAL

## 2020-03-16 ENCOUNTER — HOSPITAL ENCOUNTER (OUTPATIENT)
Facility: AMBULARY SURGERY CENTER | Age: 65
Setting detail: OUTPATIENT SURGERY
Discharge: HOME | End: 2020-03-16
Attending: OPHTHALMOLOGY | Admitting: OPHTHALMOLOGY
Payer: COMMERCIAL

## 2020-03-16 VITALS
HEIGHT: 74 IN | HEART RATE: 65 BPM | TEMPERATURE: 97.2 F | WEIGHT: 228 LBS | SYSTOLIC BLOOD PRESSURE: 142 MMHG | OXYGEN SATURATION: 100 % | BODY MASS INDEX: 29.26 KG/M2 | DIASTOLIC BLOOD PRESSURE: 79 MMHG | RESPIRATION RATE: 18 BRPM

## 2020-03-16 DIAGNOSIS — H25.11 AGE-RELATED NUCLEAR CATARACT OF RIGHT EYE: Primary | ICD-10-CM

## 2020-03-16 PROCEDURE — V2632 POST CHMBR INTRAOCULAR LENS: HCPCS | Performed by: OPHTHALMOLOGY

## 2020-03-16 DEVICE — IMPLANTABLE DEVICE: Type: IMPLANTABLE DEVICE | Site: EYE | Status: FUNCTIONAL

## 2020-03-16 RX ORDER — TETRACAINE HYDROCHLORIDE 5 MG/ML
SOLUTION OPHTHALMIC AS NEEDED
Status: DISCONTINUED | OUTPATIENT
Start: 2020-03-16 | End: 2020-03-16 | Stop reason: HOSPADM

## 2020-03-16 RX ORDER — ONDANSETRON 2 MG/ML
4 INJECTION INTRAMUSCULAR; INTRAVENOUS ONCE AS NEEDED
Status: DISCONTINUED | OUTPATIENT
Start: 2020-03-16 | End: 2020-03-16 | Stop reason: HOSPADM

## 2020-03-16 RX ORDER — KETOROLAC TROMETHAMINE 5 MG/ML
1 SOLUTION OPHTHALMIC
Status: COMPLETED | OUTPATIENT
Start: 2020-03-16 | End: 2020-03-16

## 2020-03-16 RX ORDER — CYCLOPENTOLATE HYDROCHLORIDE 10 MG/ML
1 SOLUTION/ DROPS OPHTHALMIC
Status: COMPLETED | OUTPATIENT
Start: 2020-03-16 | End: 2020-03-16

## 2020-03-16 RX ORDER — PHENYLEPHRINE HCL 2.5 %
1 DROPS OPHTHALMIC (EYE)
Status: COMPLETED | OUTPATIENT
Start: 2020-03-16 | End: 2020-03-16

## 2020-03-16 RX ORDER — LIDOCAINE HYDROCHLORIDE 10 MG/ML
INJECTION, SOLUTION EPIDURAL; INFILTRATION; INTRACAUDAL; PERINEURAL AS NEEDED
Status: DISCONTINUED | OUTPATIENT
Start: 2020-03-16 | End: 2020-03-16 | Stop reason: HOSPADM

## 2020-03-16 RX ORDER — BALANCED SALT SOLUTION 6.4; .75; .48; .3; 3.9; 1.7 MG/ML; MG/ML; MG/ML; MG/ML; MG/ML; MG/ML
SOLUTION OPHTHALMIC AS NEEDED
Status: DISCONTINUED | OUTPATIENT
Start: 2020-03-16 | End: 2020-03-16 | Stop reason: HOSPADM

## 2020-03-16 RX ORDER — GATIFLOXACIN 5 MG/ML
1 SOLUTION/ DROPS OPHTHALMIC 2 TIMES DAILY
Qty: 3 ML | Refills: 0
Start: 2020-03-16 | End: 2022-05-25

## 2020-03-16 RX ORDER — MIDAZOLAM HYDROCHLORIDE 2 MG/2ML
INJECTION, SOLUTION INTRAMUSCULAR; INTRAVENOUS AS NEEDED
Status: DISCONTINUED | OUTPATIENT
Start: 2020-03-16 | End: 2020-03-16 | Stop reason: SURG

## 2020-03-16 RX ORDER — TETRACAINE HYDROCHLORIDE 5 MG/ML
1 SOLUTION OPHTHALMIC ONCE
Status: COMPLETED | OUTPATIENT
Start: 2020-03-16 | End: 2020-03-16

## 2020-03-16 RX ORDER — GATIFLOXACIN 5 MG/ML
SOLUTION/ DROPS OPHTHALMIC AS NEEDED
Status: DISCONTINUED | OUTPATIENT
Start: 2020-03-16 | End: 2020-03-16 | Stop reason: HOSPADM

## 2020-03-16 RX ADMIN — CYCLOPENTOLATE HYDROCHLORIDE 1 DROP: 10 SOLUTION/ DROPS OPHTHALMIC at 08:34

## 2020-03-16 RX ADMIN — PHENYLEPHRINE HYDROCHLORIDE 1 DROP: 25 SOLUTION/ DROPS OPHTHALMIC at 09:22

## 2020-03-16 RX ADMIN — CYCLOPENTOLATE HYDROCHLORIDE 1 DROP: 10 SOLUTION/ DROPS OPHTHALMIC at 08:51

## 2020-03-16 RX ADMIN — KETOROLAC TROMETHAMINE 1 DROP: 5 SOLUTION OPHTHALMIC at 08:34

## 2020-03-16 RX ADMIN — TETRACAINE HYDROCHLORIDE 1 DROP: 5 SOLUTION OPHTHALMIC at 08:34

## 2020-03-16 RX ADMIN — MIDAZOLAM HYDROCHLORIDE 2 MG: 1 INJECTION, SOLUTION INTRAMUSCULAR; INTRAVENOUS at 09:38

## 2020-03-16 RX ADMIN — KETOROLAC TROMETHAMINE 1 DROP: 5 SOLUTION OPHTHALMIC at 09:06

## 2020-03-16 RX ADMIN — KETOROLAC TROMETHAMINE 1 DROP: 5 SOLUTION OPHTHALMIC at 08:51

## 2020-03-16 RX ADMIN — CYCLOPENTOLATE HYDROCHLORIDE 1 DROP: 10 SOLUTION/ DROPS OPHTHALMIC at 09:06

## 2020-03-16 RX ADMIN — PHENYLEPHRINE HYDROCHLORIDE 1 DROP: 25 SOLUTION/ DROPS OPHTHALMIC at 08:51

## 2020-03-16 RX ADMIN — CYCLOPENTOLATE HYDROCHLORIDE 1 DROP: 10 SOLUTION/ DROPS OPHTHALMIC at 09:21

## 2020-03-16 RX ADMIN — KETOROLAC TROMETHAMINE 1 DROP: 5 SOLUTION OPHTHALMIC at 09:21

## 2020-03-16 RX ADMIN — PHENYLEPHRINE HYDROCHLORIDE 1 DROP: 25 SOLUTION/ DROPS OPHTHALMIC at 08:34

## 2020-03-16 RX ADMIN — PHENYLEPHRINE HYDROCHLORIDE 1 DROP: 25 SOLUTION/ DROPS OPHTHALMIC at 09:06

## 2020-03-16 NOTE — OP NOTE
OPERATIVE REPORT    PATIENT NAME: Sandro Barreto    :  1955  MRN: [id number]  Pt Location: [place] [address] OR ROOM 01    Surgery Date: 3/16/2020    Surgeon(s) and Role:     * Bryce Glaser MD - Primary    Cortical age-related cataract, right eye [H25 011]    Post-Op Diagnosis Codes:     * Cortical age-related cataract, right eye [H25 011]    Procedure(s):  EXTRACTION EXTRACAPSULAR CATARACT PHACO INTRAOCULAR LENS (IOL)    Anesthesia Type:   IV Sedation with Anesthesia    Operative Indications:  Cortical age-related cataract, right eye [H25 011]  Decreased vision to 20/60  With problems driving  Pt requested cataract sx the right eye    Procedure and Technique:    Procedure Details     The patient was brought in the OR in stable condition and placed on the operative table  The right eye was prepped and draped in the usual sterile manner  Attention was directed to the right eye where a lid speculum was placed  A 2 4 mm clear corneal incision was made temperally  1/2 cc of 1% MPF Lidocaine was irrigated into the anterior chamber followed by viscoat  The side port incision was placed superiorly  The capsularrhexis was made and the nucleus was hydrodissected with BSS  The nucleus was then removed with the phaco handpiece followed by removal of the cortical material with the I/A handpiece  The capsular bag was then filled with Provisc  The IOL was folded and placed in to the capsular bag and centered well  The remaining Provisc was removed from the eye with the I/A  The wounds were hydrated with BSS and found to be water tight  The lid speculum was removed and 2 drops of Gatifloxicin were placed over the cornea  A protective eye shield was taped over the eye and the patient went to PACU in stable condition  I will see the patient in the office tomorrow and the expected post op period is a few weeks         Complications: None        Disposition: PACU   Condition: Stable    SIGNATURE: Bryce Glaser MD  DATE:  2020  TIME: 10:05 AM

## 2020-03-16 NOTE — DISCHARGE INSTRUCTIONS
Dr Laury Talley Cataract Instructions    Activity:     1  No Driving until instructed   2  Keep shield on until seen tomorrow except when administering drops   3  No heavy lifting   4  No water in eye     Diet:     1  Resume normal diet    Normal Symptoms:     1  Mild Headache   2  Scratchy or picky feeling around eye    Call the office if:     1  You have any questions or concerns   2  If eye pain is not relieved by extra strength tylenol    Office phone number:  [phone]      Next appointment:     1  See Dr Laury Talley at his office tomorrow as scheduled   __8:15am_________   2  Bring blue eye kit with you and eyedrops to the office    A new set of comprehensive instructions will be given and reviewed with you during your office visit tomorrow

## 2020-03-19 RX ORDER — PREDNISOLONE ACETATE 10 MG/ML
SUSPENSION/ DROPS OPHTHALMIC
COMMUNITY
Start: 2020-02-27 | End: 2022-05-25

## 2020-03-19 NOTE — PRE-PROCEDURE INSTRUCTIONS
Pre-Surgery Instructions:   Medication Instructions    Bromfenac Sodium (BromSite) 0 075 % SOLN Patient was instructed by Physician and understands   ELIQUIS 5 MG Instructed patient per Anesthesia Guidelines   gatifloxacin (ZYMAXID) 0 5 % Patient was instructed by Physician and understands   Multiple Vitamin (MULTIVITAMIN) tablet Instructed patient per Anesthesia Guidelines   omeprazole (PriLOSEC) 20 mg delayed release capsule Instructed patient per Anesthesia Guidelines   prednisoLONE acetate (PRED FORTE) 1 % ophthalmic suspension Patient was instructed by Physician and understands  Pre op instructions given  Pt to take omeprazole the am of the surgery   wife Giovanny Stinson [phone] Surgical Experience    The following information was developed to assist you to prepare for your operation  What do I need to do before coming to the hospital?   Arrange for a responsible person to drive you to and from the hospital    Arrange care for your children at home  Children are not allowed in the recovery areas of the hospital   Plan to wear clothing that is easy to put on and take off  If you are having shoulder surgery, wear a shirt that buttons or zippers in the front  Bathing  o Shower the evening before and the morning of your surgery with an antibacterial soap  Please refer to the Pre Op Showering Instructions for Surgery Patients Sheet   o Remove nail polish and all body piercing jewelry  o Do not shave any body part for at least 24 hours before surgery-this includes face, arms, legs and upper body  Food  o Nothing to eat or drink after midnight the night before your surgery   This includes candy and chewing gum  o Exception: If your surgery is after 12:00pm (noon), you may have clear liquids such as 7-Up®, ginger ale, apple or cranberry juice, Jell-O®, water, or clear broth until 8:00 am  o Do not drink milk or juice with pulp on the morning before surgery  o Do not drink alcohol 24 hours before surgery  Medicine  o Follow instructions you received from your surgeon about which medicines you may take on the day of surgery  o If instructed to take medicine on the morning of surgery, take pills with just a small sip of water  Call your prescribing doctor for specific infroamtion on what to do if you take insulin    What should I bring to the hospital?    Bring:  Kelleen Barry or a walker, if you have them, for foot or knee surgery   A list of the daily medicines, vitamins, minerals, herbals and nutritional supplements you take  Include the dosages of medicines and the time you take them each day   Glasses, dentures or hearing aids   Minimal clothing; you will be wearing hospital sleepwear   Photo ID; required to verify your identity   If you have a Living Will or Power of , bring a copy of the documents   If you have an ostomy, bring an extra pouch and any supplies you use    Do not bring   Medicines or inhalers   Money, valuables or jewelry    What other information should I know about the day of surgery?  Notify your surgeons if you develop a cold, sore throat, cough, fever, rash or any other illness   Report to the Ambulatory Surgical/Same Day Surgery Unit   You will be instructed to stop at Registration only if you have not been pre-registered   Inform your  fi they do not stay that they will be asked by the staff to leave a phone number where they can be reached   Be available to be reached before surgery  In the event the operating room schedule changes, you may be asked to come in earlier or later than expected    *It is important to tell your doctor and others involved in your health care if you are taking or have been taking any non-prescription drugs, vitamins, minerals, herbals or other nutritional supplements   Any of these may interact with some food or medicines and cause a reaction

## 2020-03-22 ENCOUNTER — ANESTHESIA EVENT (OUTPATIENT)
Dept: PERIOP | Facility: AMBULARY SURGERY CENTER | Age: 65
End: 2020-03-22
Payer: COMMERCIAL

## 2020-03-22 NOTE — ANESTHESIA PREPROCEDURE EVALUATION
Review of Systems/Medical History  Patient summary reviewed  Chart reviewed  No history of anesthetic complications     Cardiovascular  EKG reviewed, No Hyperlipidemia, No past MI , DVT  PE,  Pulmonary       GI/Hepatic    GERD well controlled,             Endo/Other    Obesity    GYN       Hematology  Anemia iron deficiency anemia,  Coagulation disorder (eliquis therapy) currently taking oral anticoagulants,    Musculoskeletal  Back pain (s/p anterior cervical diskectomy) , cervical pain and spinal stenosis,        Neurology   Psychology           Physical Exam    Airway    Mallampati score: II  TM Distance: >3 FB  Neck ROM: full     Dental       Cardiovascular  Rhythm: regular, Rate: normal,     Pulmonary  Breath sounds clear to auscultation,     Other Findings        Anesthesia Plan  ASA Score- 2     Anesthesia Type- IV sedation with anesthesia with ASA Monitors  Additional Monitors:   Airway Plan:         Plan Factors-    Induction- intravenous  Postoperative Plan-     Informed Consent- Anesthetic plan and risks discussed with patient  I personally reviewed this patient with the CRNA  Discussed and agreed on the Anesthesia Plan with the KARI Bruno

## 2020-03-23 ENCOUNTER — ANESTHESIA (OUTPATIENT)
Dept: PERIOP | Facility: AMBULARY SURGERY CENTER | Age: 65
End: 2020-03-23
Payer: COMMERCIAL

## 2020-03-23 ENCOUNTER — HOSPITAL ENCOUNTER (OUTPATIENT)
Facility: AMBULARY SURGERY CENTER | Age: 65
Setting detail: OUTPATIENT SURGERY
Discharge: HOME | End: 2020-03-23
Attending: OPHTHALMOLOGY | Admitting: OPHTHALMOLOGY
Payer: COMMERCIAL

## 2020-03-23 VITALS
WEIGHT: 228 LBS | DIASTOLIC BLOOD PRESSURE: 62 MMHG | HEIGHT: 74 IN | BODY MASS INDEX: 29.26 KG/M2 | TEMPERATURE: 97.2 F | OXYGEN SATURATION: 96 % | RESPIRATION RATE: 18 BRPM | HEART RATE: 69 BPM | SYSTOLIC BLOOD PRESSURE: 137 MMHG

## 2020-03-23 DIAGNOSIS — H25.12 AGE-RELATED NUCLEAR CATARACT OF LEFT EYE: Primary | ICD-10-CM

## 2020-03-23 PROCEDURE — V2632 POST CHMBR INTRAOCULAR LENS: HCPCS | Performed by: OPHTHALMOLOGY

## 2020-03-23 DEVICE — IMPLANTABLE DEVICE: Type: IMPLANTABLE DEVICE | Site: EYE | Status: FUNCTIONAL

## 2020-03-23 RX ORDER — GATIFLOXACIN 5 MG/ML
SOLUTION/ DROPS OPHTHALMIC AS NEEDED
Status: DISCONTINUED | OUTPATIENT
Start: 2020-03-23 | End: 2020-03-23 | Stop reason: HOSPADM

## 2020-03-23 RX ORDER — LIDOCAINE HYDROCHLORIDE 10 MG/ML
INJECTION, SOLUTION EPIDURAL; INFILTRATION; INTRACAUDAL; PERINEURAL AS NEEDED
Status: DISCONTINUED | OUTPATIENT
Start: 2020-03-23 | End: 2020-03-23 | Stop reason: HOSPADM

## 2020-03-23 RX ORDER — CYCLOPENTOLATE HYDROCHLORIDE 10 MG/ML
1 SOLUTION/ DROPS OPHTHALMIC
Status: COMPLETED | OUTPATIENT
Start: 2020-03-23 | End: 2020-03-23

## 2020-03-23 RX ORDER — GATIFLOXACIN 5 MG/ML
1 SOLUTION/ DROPS OPHTHALMIC 2 TIMES DAILY
Qty: 3 ML | Refills: 0
Start: 2020-03-23 | End: 2022-05-25

## 2020-03-23 RX ORDER — PHENYLEPHRINE HCL 2.5 %
1 DROPS OPHTHALMIC (EYE)
Status: COMPLETED | OUTPATIENT
Start: 2020-03-23 | End: 2020-03-23

## 2020-03-23 RX ORDER — KETOROLAC TROMETHAMINE 5 MG/ML
1 SOLUTION OPHTHALMIC
Status: COMPLETED | OUTPATIENT
Start: 2020-03-23 | End: 2020-03-23

## 2020-03-23 RX ORDER — TETRACAINE HYDROCHLORIDE 5 MG/ML
1 SOLUTION OPHTHALMIC ONCE
Status: COMPLETED | OUTPATIENT
Start: 2020-03-23 | End: 2020-03-23

## 2020-03-23 RX ORDER — TETRACAINE HYDROCHLORIDE 5 MG/ML
SOLUTION OPHTHALMIC AS NEEDED
Status: DISCONTINUED | OUTPATIENT
Start: 2020-03-23 | End: 2020-03-23 | Stop reason: HOSPADM

## 2020-03-23 RX ORDER — BALANCED SALT SOLUTION 6.4; .75; .48; .3; 3.9; 1.7 MG/ML; MG/ML; MG/ML; MG/ML; MG/ML; MG/ML
SOLUTION OPHTHALMIC AS NEEDED
Status: DISCONTINUED | OUTPATIENT
Start: 2020-03-23 | End: 2020-03-23 | Stop reason: HOSPADM

## 2020-03-23 RX ORDER — MIDAZOLAM HYDROCHLORIDE 2 MG/2ML
INJECTION, SOLUTION INTRAMUSCULAR; INTRAVENOUS AS NEEDED
Status: DISCONTINUED | OUTPATIENT
Start: 2020-03-23 | End: 2020-03-23 | Stop reason: SURG

## 2020-03-23 RX ORDER — LIDOCAINE HYDROCHLORIDE 20 MG/ML
1 JELLY TOPICAL
Status: COMPLETED | OUTPATIENT
Start: 2020-03-23 | End: 2020-03-23

## 2020-03-23 RX ADMIN — MIDAZOLAM HYDROCHLORIDE 1 MG: 1 INJECTION, SOLUTION INTRAMUSCULAR; INTRAVENOUS at 07:52

## 2020-03-23 RX ADMIN — CYCLOPENTOLATE HYDROCHLORIDE 1 DROP: 10 SOLUTION/ DROPS OPHTHALMIC at 07:33

## 2020-03-23 RX ADMIN — PHENYLEPHRINE HYDROCHLORIDE 1 DROP: 25 SOLUTION/ DROPS OPHTHALMIC at 07:49

## 2020-03-23 RX ADMIN — KETOROLAC TROMETHAMINE 1 DROP: 5 SOLUTION OPHTHALMIC at 07:49

## 2020-03-23 RX ADMIN — KETOROLAC TROMETHAMINE 1 DROP: 5 SOLUTION OPHTHALMIC at 07:07

## 2020-03-23 RX ADMIN — CYCLOPENTOLATE HYDROCHLORIDE 1 DROP: 10 SOLUTION/ DROPS OPHTHALMIC at 07:18

## 2020-03-23 RX ADMIN — KETOROLAC TROMETHAMINE 1 DROP: 5 SOLUTION OPHTHALMIC at 07:33

## 2020-03-23 RX ADMIN — LIDOCAINE HYDROCHLORIDE 1 APPLICATION: 20 JELLY TOPICAL at 07:07

## 2020-03-23 RX ADMIN — LIDOCAINE HYDROCHLORIDE 1 APPLICATION: 20 JELLY TOPICAL at 07:18

## 2020-03-23 RX ADMIN — PHENYLEPHRINE HYDROCHLORIDE 1 DROP: 25 SOLUTION/ DROPS OPHTHALMIC at 07:18

## 2020-03-23 RX ADMIN — PHENYLEPHRINE HYDROCHLORIDE 1 DROP: 25 SOLUTION/ DROPS OPHTHALMIC at 07:33

## 2020-03-23 RX ADMIN — PHENYLEPHRINE HYDROCHLORIDE 1 DROP: 25 SOLUTION/ DROPS OPHTHALMIC at 07:07

## 2020-03-23 RX ADMIN — TETRACAINE HYDROCHLORIDE 1 DROP: 5 SOLUTION OPHTHALMIC at 07:07

## 2020-03-23 RX ADMIN — KETOROLAC TROMETHAMINE 1 DROP: 5 SOLUTION OPHTHALMIC at 07:18

## 2020-03-23 RX ADMIN — CYCLOPENTOLATE HYDROCHLORIDE 1 DROP: 10 SOLUTION/ DROPS OPHTHALMIC at 07:49

## 2020-03-23 RX ADMIN — LIDOCAINE HYDROCHLORIDE 1 APPLICATION: 20 JELLY TOPICAL at 07:34

## 2020-03-23 RX ADMIN — CYCLOPENTOLATE HYDROCHLORIDE 1 DROP: 10 SOLUTION/ DROPS OPHTHALMIC at 07:07

## 2020-03-23 RX ADMIN — MIDAZOLAM HYDROCHLORIDE 1 MG: 1 INJECTION, SOLUTION INTRAMUSCULAR; INTRAVENOUS at 07:56

## 2020-03-23 NOTE — ANESTHESIA POSTPROCEDURE EVALUATION
Post-Op Assessment Note    CV Status:  Stable  Pain Score: 0    Pain management: adequate     Mental Status:  Alert and awake   Hydration Status:  Euvolemic   PONV Controlled:  Controlled   Airway Patency:  Patent   Post Op Vitals Reviewed: Yes      Staff: CRNA           BP (P) 137/62 (03/23/20 0814)    Temp     Pulse (P) 69 (03/23/20 0814)   Resp (P) 18 (03/23/20 0814)    SpO2 (P) 96 % (03/23/20 0814)

## 2020-03-23 NOTE — OP NOTE
OPERATIVE REPORT    PATIENT NAME: Mini Lora    :  1955  MRN: [id number]  Pt Location: [facility] OR ROOM 01    Surgery Date: 3/23/2020    Surgeon(s) and Role:     * Amber Berg MD - Primary    Age-related nuclear cataract, left eye [H25 12]    Post-Op Diagnosis Codes:     * Age-related nuclear cataract, left eye [H25 12]    Procedure(s):  EXTRACTION EXTRACAPSULAR CATARACT PHACO INTRAOCULAR LENS (IOL)    Anesthesia Type:   IV Sedation with Anesthesia    Operative Indications:  Age-related nuclear cataract, left eye [H25 12]  Decreased vision to 20/60  With problems driving  Pt requested cataract sx the left eye    Procedure and Technique:    Procedure Details     The patient was brought in the OR in stable condition and placed on the operative table  The left eye was prepped and draped in the usual sterile manner  Attention was directed to the left eye where a lid speculum was placed  A 2 4 mm clear corneal incision was made temperally  1/2 cc of 1% MPF Lidocaine was irrigated into the anterior chamber followed by viscoat  The side port incision was placed superiorly  The capsularrhexis was made and the nucleus was hydrodissected with BSS  The nucleus was then removed with the phaco handpiece followed by removal of the cortical material with the I/A handpiece  The capsular bag was then filled with Provisc  The IOL was folded and placed in to the capsular bag and centered well  The remaining Provisc was removed from the eye with the I/A  The wounds were hydrated with BSS and found to be water tight  The lid speculum was removed and 2 drops of Gatifloxicin were placed over the cornea  A protective eye shield was taped over the eye and the patient went to PACU in stable condition  I will see the patient in the office tomorrow and the expected post op period is a few weeks         Complications: None        Disposition: PACU   Condition: Stable    SIGNATURE: Amber Berg MD  DATE: 2020  TIME: 8:14 AM

## 2020-03-23 NOTE — DISCHARGE INSTRUCTIONS
Dr Ana Lilia Finney Cataract Instructions    Activity:     1  No Driving until instructed   2  Keep shield on until seen tomorrow except when administering drops   3  No heavy lifting   4  No water in eye     Diet:     1  Resume normal diet    Normal Symptoms:     1  Mild Headache   2  Scratchy or picky feeling around eye    Call the office if:     1  You have any questions or concerns   2  If eye pain is not relieved by extra strength tylenol    Office phone number:  [phone]      Next appointment:     1  See Dr Ana Lilia Finney at his office tomorrow as scheduled   ____1:30pm______   2  Bring blue eye kit with you and eyedrops to the office    A new set of comprehensive instructions will be given and reviewed with you during your office visit tomorrow

## 2020-06-01 ENCOUNTER — TELEPHONE (OUTPATIENT)
Dept: HEMATOLOGY ONCOLOGY | Facility: CLINIC | Age: 65
End: 2020-06-01

## 2020-06-02 ENCOUNTER — DOCUMENTATION (OUTPATIENT)
Dept: HEMATOLOGY ONCOLOGY | Facility: CLINIC | Age: 65
End: 2020-06-02

## 2020-06-09 ENCOUNTER — TELEPHONE (OUTPATIENT)
Dept: HEMATOLOGY ONCOLOGY | Facility: CLINIC | Age: 65
End: 2020-06-09

## 2020-06-15 ENCOUNTER — TELEPHONE (OUTPATIENT)
Dept: HEMATOLOGY ONCOLOGY | Facility: CLINIC | Age: 65
End: 2020-06-15

## 2020-06-15 DIAGNOSIS — I26.09 OTHER ACUTE PULMONARY EMBOLISM WITH ACUTE COR PULMONALE (HCC): ICD-10-CM

## 2020-06-15 DIAGNOSIS — I82.412 ACUTE DEEP VEIN THROMBOSIS (DVT) OF FEMORAL VEIN OF LEFT LOWER EXTREMITY (HCC): ICD-10-CM

## 2020-10-15 DIAGNOSIS — I82.412 ACUTE DEEP VEIN THROMBOSIS (DVT) OF FEMORAL VEIN OF LEFT LOWER EXTREMITY (HCC): ICD-10-CM

## 2020-10-15 DIAGNOSIS — I26.09 OTHER ACUTE PULMONARY EMBOLISM WITH ACUTE COR PULMONALE (HCC): ICD-10-CM

## 2020-11-17 ENCOUNTER — TRANSCRIBE ORDERS (OUTPATIENT)
Dept: HEMATOLOGY ONCOLOGY | Facility: CLINIC | Age: 65
End: 2020-11-17

## 2020-11-18 ENCOUNTER — OFFICE VISIT (OUTPATIENT)
Dept: HEMATOLOGY ONCOLOGY | Facility: MEDICAL CENTER | Age: 65
End: 2020-11-18
Payer: COMMERCIAL

## 2020-11-18 VITALS
TEMPERATURE: 96.9 F | SYSTOLIC BLOOD PRESSURE: 126 MMHG | RESPIRATION RATE: 18 BRPM | WEIGHT: 238.4 LBS | HEIGHT: 74 IN | OXYGEN SATURATION: 98 % | HEART RATE: 68 BPM | DIASTOLIC BLOOD PRESSURE: 80 MMHG | BODY MASS INDEX: 30.6 KG/M2

## 2020-11-18 DIAGNOSIS — I26.09 OTHER ACUTE PULMONARY EMBOLISM WITH ACUTE COR PULMONALE (HCC): ICD-10-CM

## 2020-11-18 DIAGNOSIS — I82.412 ACUTE DEEP VEIN THROMBOSIS (DVT) OF FEMORAL VEIN OF LEFT LOWER EXTREMITY (HCC): ICD-10-CM

## 2020-11-18 DIAGNOSIS — I82.512 CHRONIC DEEP VEIN THROMBOSIS (DVT) OF LEFT FEMORAL VEIN (HCC): Primary | Chronic | ICD-10-CM

## 2020-11-18 PROCEDURE — 3008F BODY MASS INDEX DOCD: CPT | Performed by: INTERNAL MEDICINE

## 2020-11-18 PROCEDURE — 99213 OFFICE O/P EST LOW 20 MIN: CPT | Performed by: INTERNAL MEDICINE

## 2020-11-18 PROCEDURE — 1036F TOBACCO NON-USER: CPT | Performed by: INTERNAL MEDICINE

## 2020-12-18 ENCOUNTER — IMMUNIZATIONS (OUTPATIENT)
Dept: FAMILY MEDICINE CLINIC | Facility: CLINIC | Age: 65
End: 2020-12-18
Payer: COMMERCIAL

## 2020-12-18 DIAGNOSIS — Z23 ENCOUNTER FOR IMMUNIZATION: ICD-10-CM

## 2020-12-18 PROCEDURE — 90471 IMMUNIZATION ADMIN: CPT

## 2020-12-18 PROCEDURE — 90682 RIV4 VACC RECOMBINANT DNA IM: CPT

## 2021-01-21 ENCOUNTER — TELEPHONE (OUTPATIENT)
Dept: OTHER | Facility: OTHER | Age: 66
End: 2021-01-21

## 2021-02-25 ENCOUNTER — OFFICE VISIT (OUTPATIENT)
Dept: FAMILY MEDICINE CLINIC | Facility: CLINIC | Age: 66
End: 2021-02-25
Payer: COMMERCIAL

## 2021-02-25 VITALS
TEMPERATURE: 97.9 F | HEART RATE: 95 BPM | HEIGHT: 74 IN | BODY MASS INDEX: 30.35 KG/M2 | WEIGHT: 236.5 LBS | SYSTOLIC BLOOD PRESSURE: 128 MMHG | OXYGEN SATURATION: 93 % | DIASTOLIC BLOOD PRESSURE: 80 MMHG

## 2021-02-25 DIAGNOSIS — Z12.5 SCREENING FOR PROSTATE CANCER: ICD-10-CM

## 2021-02-25 DIAGNOSIS — I26.09 OTHER ACUTE PULMONARY EMBOLISM WITH ACUTE COR PULMONALE (HCC): ICD-10-CM

## 2021-02-25 DIAGNOSIS — I21.4 NSTEMI (NON-ST ELEVATED MYOCARDIAL INFARCTION) (HCC): Primary | ICD-10-CM

## 2021-02-25 DIAGNOSIS — I82.412 ACUTE DEEP VEIN THROMBOSIS (DVT) OF FEMORAL VEIN OF LEFT LOWER EXTREMITY (HCC): ICD-10-CM

## 2021-02-25 PROCEDURE — 3008F BODY MASS INDEX DOCD: CPT | Performed by: FAMILY MEDICINE

## 2021-02-25 PROCEDURE — 99396 PREV VISIT EST AGE 40-64: CPT | Performed by: FAMILY MEDICINE

## 2021-02-25 PROCEDURE — 3725F SCREEN DEPRESSION PERFORMED: CPT | Performed by: FAMILY MEDICINE

## 2021-02-25 PROCEDURE — 1036F TOBACCO NON-USER: CPT | Performed by: FAMILY MEDICINE

## 2021-02-25 NOTE — PROGRESS NOTES
Subjective:           Problem List Items Addressed This Visit     None              No orders of the defined types were placed in this encounter  Patient Instructions   Gi follow up labs as ordered vaccine update    BMI Counseling: Body mass index is 30 78 kg/m²  Discussed the patient's BMI with him  The BMI is above normal  Nutrition recommendations include 3-5 servings of fruits/vegetables daily, decreasing soda and/or juice intake and reducing intake of saturated fat and trans fat  [address]    Chief Complaint   Patient presents with    Annual Exam     HPI PE h/o DVT PE Non stemi MI anemia  Cervicalgia     /80   Pulse 95   Temp 97 9 °F (36 6 °C) (Tympanic)   Ht 6' 1 5" (1 867 m)   Wt 107 kg (236 lb 8 oz)   SpO2 93%   BMI 30 78 kg/m²       No Known Allergies    Current Outpatient Medications on File Prior to Visit   Medication Sig Dispense Refill    apixaban (Eliquis) 5 mg Take 1 tablet (5 mg total) by mouth 2 (two) times a day 60 tablet 2    Multiple Vitamin (MULTIVITAMIN) tablet Take 1 tablet by mouth every morning       omeprazole (PriLOSEC) 20 mg delayed release capsule Take 20 mg by mouth every morning       Bromfenac Sodium (BromSite) 0 075 % SOLN Administer 1 drop to the right eye 2 (two) times a day  0    Bromfenac Sodium (BromSite) 0 075 % SOLN Administer 1 drop into the left eye 2 (two) times a day  0    gatifloxacin (ZYMAXID) 0 5 % Administer 1 drop to the right eye 2 (two) times a day 3 mL 0    gatifloxacin (ZYMAXID) 0 5 % Administer 1 drop into the left eye 2 (two) times a day 3 mL 0    prednisoLONE acetate (PRED FORTE) 1 % ophthalmic suspension        No current facility-administered medications on file prior to visit          Past Medical History:   Diagnosis Date    Calcific tendinitis 2/27/2018    Cataracts, bilateral     DVT (deep venous thrombosis) (HCC)     2 episodes 6 years apart- last time 2017-left calf    DVT of upper extremity (deep vein thrombosis) ([address] )     Neck pain     Wears glasses        Past Surgical History:   Procedure Laterality Date    CATARACT EXTRACTION      CERVICAL FUSION  2007    with bone spur removal also- C5,6,7 fusion plate    COLONOSCOPY      EPIDURAL BLOCK INJECTION N/A 11/22/2019    Procedure: C6 C7 Cervical Epidural Steroid Injection (83338); Surgeon: Mayda Gonsalez MD;  Location: [facility] OR;  Service: Pain Management     HERNIA REPAIR      umbilical    NV COLONOSCOPY FLX DX W/COLLJ SPEC WHEN PFRMD N/A 7/31/2018    Procedure: COLONOSCOPY;  Surgeon: Anju Beltran MD;  Location: [facility] GI LAB; Service: Gastroenterology    NV REPAIR UMBILICAL IITM,7+T/J,ESVIU N/A 12/10/2019    Procedure: REPAIR HERNIA UMBILICAL;  Surgeon: Alfred Banuelos MD;  Location: [address] [place] [address];  Service: General    NV XCAPSL CTRC RMVL INSJ IO LENS PROSTH W/O ECP Right 3/16/2020    Procedure: EXTRACTION EXTRACAPSULAR CATARACT PHACO INTRAOCULAR LENS (IOL); Surgeon: Toney García MD;  Location: [facility] MAIN OR;  Service: Ophthalmology    NV XCAPSL CTRC RMVL INSJ IO LENS PROSTH W/O ECP Left 3/23/2020    Procedure: EXTRACTION EXTRACAPSULAR CATARACT PHACO INTRAOCULAR LENS (IOL); Surgeon: Toney García MD;  Location: [facility] OR;  Service: Ophthalmology    SHOULDER SURGERY Right     labrum repair, arthritis removal    ULNAR NERVE TRANSPOSITION Right           reports that he has never smoked  He has never used smokeless tobacco  He reports current alcohol use  He reports that he does not use drugs  reports that he has never smoked  He has never used smokeless tobacco         Review of Systems   Constitutional: Negative for activity change, appetite change, chills, diaphoresis, fatigue and fever  HENT: Negative for congestion, drooling, postnasal drip, sore throat and trouble swallowing  Eyes: Positive for visual disturbance  Negative for discharge and redness  Respiratory: Negative for chest tightness, shortness of breath, wheezing and stridor  Cardiovascular: Negative for chest pain, palpitations and leg swelling  L calf   Gastrointestinal: Negative for abdominal distention and blood in stool  Endocrine: Negative for cold intolerance and heat intolerance  Genitourinary: Negative for flank pain, frequency, hematuria and testicular pain  Musculoskeletal: Positive for arthralgias, neck pain and neck stiffness  Negative for joint swelling  Radiating    L limited ROM neck   Skin: Negative for color change, pallor, rash and wound  Neurological: Positive for numbness  Negative for dizziness, tremors, facial asymmetry, speech difficulty and weakness  Tingling L UE      L lower neck to hand   Hematological: Negative for adenopathy  Psychiatric/Behavioral: Negative for agitation, behavioral problems, confusion and decreased concentration  The patient is not nervous/anxious  Physical Exam  Vitals signs and nursing note reviewed  Constitutional:       Appearance: He is well-developed  HENT:      Head: Normocephalic  Eyes:      General: No scleral icterus  Pupils: Pupils are equal, round, and reactive to light  Comments: Glasses     Cataract R>L   Neck:      Musculoskeletal: Normal range of motion and neck supple  Cardiovascular:      Rate and Rhythm: Normal rate and regular rhythm  Heart sounds: Normal heart sounds  No murmur  Pulmonary:      Effort: Pulmonary effort is normal       Breath sounds: Normal breath sounds  No stridor  No rales  Chest:      Chest wall: No tenderness  Abdominal:      General: Bowel sounds are normal       Palpations: Abdomen is soft  Hernia: A hernia is present  Comments: 4cm reducible Umb with diastasis   Genitourinary:     Comments: Defer next visit  Musculoskeletal: Normal range of motion  General: No tenderness  Comments: Girth 1cm > L          Homans neg edema   Visible fullness Varicosities L>R         restriction ROM cervical   Skin: General: Skin is warm  Capillary Refill: Capillary refill takes less than 2 seconds  Findings: No rash  Comments: Ak  seb K nevi   seb cyst occipital < 1cm   Neurological:      Mental Status: He is alert and oriented to person, place, and time  Cranial Nerves: No cranial nerve deficit  Sensory: No sensory deficit  Motor: No abnormal muscle tone  Coordination: Coordination normal       Gait: Gait normal       Deep Tendon Reflexes: Reflexes normal       Comments:  Limited L  Neck ROM  Psychiatric:         Behavior: Behavior normal          Thought Content:  Thought content normal          Judgment: Judgment normal

## 2021-04-13 DIAGNOSIS — Z23 ENCOUNTER FOR IMMUNIZATION: ICD-10-CM

## 2022-01-07 ENCOUNTER — TELEPHONE (OUTPATIENT)
Dept: FAMILY MEDICINE CLINIC | Facility: CLINIC | Age: 67
End: 2022-01-07

## 2022-01-07 DIAGNOSIS — I82.512 CHRONIC DEEP VEIN THROMBOSIS (DVT) OF LEFT FEMORAL VEIN (HCC): Primary | ICD-10-CM

## 2022-01-07 NOTE — TELEPHONE ENCOUNTER
Dr Von Hutchinson,    Patient left a detailed message on the refill line stating that his insurance is no longer covering Eliquis  He either needs a new prescription sent for Xarelto (which insurance will cover) or a prior auth done  Please advise  If a prior Simon  is to be done, I will need a detailed rationale in order to initiate that  If you will be sending a new prescription for Xarelto, he would like it sent to [facility]      Lizzette Riley

## 2022-01-10 NOTE — TELEPHONE ENCOUNTER
Spoke to Maricruz @ [facility] care miguel started urgent PA for Eliquis ryan# [id number] ON 01/10/2022 2 11:52 am  Thanks

## 2022-01-10 NOTE — TELEPHONE ENCOUNTER
[employer] message sent to patient notifying him of new script sent to [facility] by Dr Fay Casper today

## 2022-05-06 ENCOUNTER — OFFICE VISIT (OUTPATIENT)
Dept: FAMILY MEDICINE CLINIC | Facility: CLINIC | Age: 67
End: 2022-05-06
Payer: COMMERCIAL

## 2022-05-06 VITALS
RESPIRATION RATE: 20 BRPM | TEMPERATURE: 98.2 F | WEIGHT: 238.4 LBS | HEART RATE: 93 BPM | DIASTOLIC BLOOD PRESSURE: 80 MMHG | BODY MASS INDEX: 31.03 KG/M2 | OXYGEN SATURATION: 98 % | SYSTOLIC BLOOD PRESSURE: 146 MMHG

## 2022-05-06 DIAGNOSIS — Z23 ENCOUNTER FOR IMMUNIZATION: ICD-10-CM

## 2022-05-06 DIAGNOSIS — S50.862A TICK BITE OF LEFT FOREARM, INITIAL ENCOUNTER: Primary | ICD-10-CM

## 2022-05-06 DIAGNOSIS — Z11.59 NEED FOR HEPATITIS C SCREENING TEST: ICD-10-CM

## 2022-05-06 DIAGNOSIS — Z13.9 ENCOUNTER FOR SCREENING: ICD-10-CM

## 2022-05-06 DIAGNOSIS — R03.0 ELEVATED BP WITHOUT DIAGNOSIS OF HYPERTENSION: ICD-10-CM
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
## 2022-05-06 PROBLEM — Z12.11 COLON CANCER SCREENING: Status: RESOLVED | Noted: 2018-02-28 | Resolved: 2022-05-06

## 2022-05-06 PROCEDURE — 90677 PCV20 VACCINE IM: CPT | Performed by: FAMILY MEDICINE

## 2022-05-06 PROCEDURE — G0009 ADMIN PNEUMOCOCCAL VACCINE: HCPCS | Performed by: FAMILY MEDICINE

## 2022-05-06 PROCEDURE — 1101F PT FALLS ASSESS-DOCD LE1/YR: CPT | Performed by: FAMILY MEDICINE

## 2022-05-06 PROCEDURE — 3725F SCREEN DEPRESSION PERFORMED: CPT | Performed by: FAMILY MEDICINE

## 2022-05-06 PROCEDURE — 99213 OFFICE O/P EST LOW 20 MIN: CPT | Performed by: FAMILY MEDICINE

## 2022-05-06 NOTE — PROGRESS NOTES
[facility]  Acute Illness OP Visit     Patient's Information      Name: Freddy Kelly  Age/Sex: 74 y o  male  MRN: [id number]  : 1955  LINDSEY: 22     Assessment/Plan     Visit Diagnoses:    1  Tick bite of left forearm, initial encounter    2  Elevated BP without diagnosis of hypertension    3  Need for hepatitis C screening test    4  Encounter for immunization    5  Encounter for screening        Orders:    Orders Placed This Encounter   Procedures    Pneumococcal Conjugate Vaccine 20-valent (Pcv20)    Hepatitis C Antibody ([facility])    HIV 1/2 Antigen/Antibody (4th Generation) w Reflex [facility]       A/P: 74 y o  male patient presenting with complaints of tick bite  Had elevated BP  Asymptomatic  0 5 cm circular healing scab on left forearm  NO other complaints or findings  /80 when re measured  Stable  Patient Instructions:    Jeff Makayla the area with soap and water  Do not pick/scratch scab   Was made aware of symptoms related to tick bites that he needs to be aware of for if he needs to go to the ED   Presented with elevated BP reading of 178/76 mmHg  When retaken 30 min after was 146/80  Will reassess in next schedule visit for AWV and consider BP diary and starting on antihypertensive medication if BP continues to be above normal     Was given Pneumococcal vaccine today at clinic  Advised to get Tdap vaccination in pharmacy free of charge   Needs to come to scheduled visit for Medicare AWV   Needs to upload a copy of COVID vaccination (2 doses and booster) in My Chart   Screening test sent for HIV and Hepatitis C      Screenings:     Hepatitis C Antibody ([facility])   HIV 1/2 Antigen/Antibody (4th Generation) w Reflex [facility]    Immunizations:    Immunizations benefits, risks, and adverse effects were explained to the patient  Information of vaccine was given to the patient   Patient stated understanding and agreed to administration of vaccine  He tolerated well the immunization  No adverse reactions noted at the office  Advised the use of Tylenol for low grade fever and minor pains and calling the office or visiting ED if worsening symptoms  · Pneumococcal Conjugate Vaccine 20-valent (Pcv20)    Follow up: Follow up in 3 weeks for AWV  A/P of patient's case was discussed with the Attending, Dr Marce Altamirano  Plan has been fully explained to the patient, who voices understanding and acceptance  Patient will call the office if any further questions or concerns  Subjective     Chief Complaint     Chief Complaint   Patient presents with    Insect Bite     tick bite on left forearm (5/1/2022)  not engorged, mild swelling after removing with a scab in place  no bullseye rash on body since  History of Present Illness     56-year-old male patient came to the clinic due to a tick bite on his left forearm  Patient states that he went golfing on Saturday, 5/1/2022  He woke up on Sunday and noticed a small tick on his right forearm that was not engorged  He removed a tick and threw it away  Tick was removed in less than 36 hours after possible bite  States having a bit of swelling in the area but no concerning suppuration or exacerbated pain  No target shaped rash  Small lesion has been healing on it's own  No other complaints  Review of Systems     History obtained from the patient  Constitutional: Negative for chills and fever  Respiratory: Negative for cough, chest tightness and shortness of breath  Cardiovascular: Negative for chest pain  Gastrointestinal: Negative for abdominal pain  Musculoskeletal: Negative for arthralgias, joint swelling and myalgias  Skin: Positive for wound  No target shaped rash   Neurological: Negative for dizziness, tremors, weakness, light-headedness and numbness  Psychiatric/Behavioral: Negative for behavioral problems       Medical History Outpatient Encounter Medications as of 5/6/2022   Medication Sig Dispense Refill    Bromfenac Sodium (BromSite) 0 075 % SOLN Administer 1 drop to the right eye 2 (two) times a day  0    Bromfenac Sodium (BromSite) 0 075 % SOLN Administer 1 drop into the left eye 2 (two) times a day  0    gatifloxacin (ZYMAXID) 0 5 % Administer 1 drop to the right eye 2 (two) times a day 3 mL 0    gatifloxacin (ZYMAXID) 0 5 % Administer 1 drop into the left eye 2 (two) times a day 3 mL 0    Multiple Vitamin (MULTIVITAMIN) tablet Take 1 tablet by mouth every morning       omeprazole (PriLOSEC) 20 mg delayed release capsule Take 20 mg by mouth every morning       prednisoLONE acetate (PRED FORTE) 1 % ophthalmic suspension       rivaroxaban (Xarelto) 10 mg tablet Take 1 tablet (10 mg total) by mouth daily 30 tablet 6     No facility-administered encounter medications on file as of 5/6/2022       Allergies as of 05/06/2022    (No Known Allergies)      Patient Active Problem List    Diagnosis Date Noted    Spinal stenosis in cervical region 11/18/2019    Cervical radiculitis 11/18/2019    Cervical post-laminectomy syndrome 11/07/2019    Left cervical radiculopathy 11/07/2019    Chronic pain syndrome 84/17/8306    Umbilical hernia without obstruction and without gangrene 11/01/2019    Iron deficiency anemia 03/13/2018    Pulmonary embolism with acute cor pulmonale (HCC) 03/06/2018    Chronic deep vein thrombosis (DVT) of left femoral vein ([facility] [address] ) 03/06/2018    NSTEMI (non-ST elevated myocardial infarction) ([facility] [address] ) 03/06/2018    Calcific tendinitis 02/27/2018    Mixed hyperlipidemia 02/26/2018    Gastroesophageal reflux disease with esophagitis 02/26/2018       Past Medical History:   Diagnosis Date    Calcific tendinitis 2/27/2018    Cataracts, bilateral     DVT of upper extremity (deep vein thrombosis) (HCC)     Neck pain     Wears glasses       Past Surgical History:   Procedure Laterality Date    CATARACT EXTRACTION      CERVICAL FUSION  2007    with bone spur removal also- C5,6,7 fusion plate    COLONOSCOPY      EPIDURAL BLOCK INJECTION N/A 11/22/2019    Procedure: C6 C7 Cervical Epidural Steroid Injection (03911); Surgeon: Jemal Geller MD;  Location: [facility] MAIN OR;  Service: Pain Management     HERNIA REPAIR      umbilical    IA COLONOSCOPY FLX DX W/COLLJ SPEC WHEN PFRMD N/A 7/31/2018    Procedure: COLONOSCOPY;  Surgeon: Bruna Abdul MD;  Location: [facility] GI LAB; Service: Gastroenterology    IA REPAIR UMBILICAL ZZLH,6+T/O,BBXSW N/A 12/10/2019    Procedure: REPAIR HERNIA UMBILICAL;  Surgeon: Vandana Tran MD;  Location: [address] [place] [address];  Service: General    IA XCAPSL CTRC RMVL INSJ IO LENS PROSTH W/O ECP Right 3/16/2020    Procedure: EXTRACTION EXTRACAPSULAR CATARACT PHACO INTRAOCULAR LENS (IOL); Surgeon: Silvio Corbett MD;  Location: [facility] MAIN OR;  Service: Ophthalmology    IA XCAPSL CTRC RMVL INSJ IO LENS PROSTH W/O ECP Left 3/23/2020    Procedure: EXTRACTION EXTRACAPSULAR CATARACT PHACO INTRAOCULAR LENS (IOL);   Surgeon: Silvio Corbett MD;  Location: [facility] MAIN OR;  Service: Ophthalmology    SHOULDER SURGERY Right     labrum repair, arthritis removal    ULNAR NERVE TRANSPOSITION Right       Family History   Problem Relation Age of Onset    Diabetes Father     Heart disease Father         CHF    Cancer Maternal Grandmother     Cancer Maternal Grandfather     No Known Problems Sister     No Known Problems Brother     No Known Problems Daughter     No Known Problems Son     No Known Problems Maternal Aunt     No Known Problems Maternal Uncle     No Known Problems Paternal Aunt     No Known Problems Paternal Uncle     No Known Problems Paternal Grandmother     No Known Problems Paternal Grandfather     Fibromyalgia Mother     Thyroid disease unspecified Mother     Colon cancer Neg Hx     Liver disease Neg Hx       Social History     Social Determinants of Health     Tobacco Use: Low Risk     Smoking Tobacco Use: Never Smoker    Smokeless Tobacco Use: Never Used   Alcohol Use: Not on file   Financial Resource Strain: Not on file   Food Insecurity: Not on file   Transportation Needs: Not on file   Physical Activity: Not on file   Stress: Not on file   Social Connections: Not on file   Intimate Partner Violence: Not on file   Depression: Not at risk    PHQ-2 Score: 0   Housing Stability: Not on file       Social History     Substance and Sexual Activity   Sexual Activity Yes    Partners: Female      Social History     Tobacco Use    Smoking status: Never Smoker    Smokeless tobacco: Never Used   Substance Use Topics    Alcohol use: Yes     Comment: social     Social History     Substance and Sexual Activity   Drug Use No     Tobacco Use: Low Risk     Smoking Tobacco Use: Never Smoker    Smokeless Tobacco Use: Never Used      Objective      Vital Signs     Visit Vitals  /80   Pulse 93   Temp 98 2 °F (36 8 °C) (Tympanic)   Resp 20   Wt 108 kg (238 lb 6 4 oz)   SpO2 98%   BMI 31 03 kg/m²   Smoking Status Never Smoker   BSA 2 33 m²      I have reviewed the patient's vital signs and all significant values were addressed in the A/P section  Physical Exam      Physical Exam  Constitutional:       General: He is not in acute distress  Appearance: Normal appearance  He is not ill-appearing or toxic-appearing  HENT:      Head: Normocephalic and atraumatic  Right Ear: External ear normal       Left Ear: External ear normal    Eyes:      General: No scleral icterus  Extraocular Movements: Extraocular movements intact  Conjunctiva/sclera: Conjunctivae normal    Cardiovascular:      Rate and Rhythm: Normal rate  Pulses: Normal pulses  Pulmonary:      Effort: Pulmonary effort is normal  No respiratory distress  Abdominal:      General: Abdomen is flat  Musculoskeletal:         General: No swelling, tenderness, deformity or signs of injury  Normal range of motion  Cervical back: Normal range of motion  Skin:     General: Skin is warm and dry  Findings: Lesion (Small 0 5 cm circular scab on the mid lateral aspect of left forearm  No suppuration  Minimal erythema surrounding scab) present  No rash  Neurological:      Mental Status: He is alert and oriented to person, place, and time  Motor: No weakness  Psychiatric:         Mood and Affect: Mood normal          Behavior: Behavior normal          Thought Content: Thought content normal          Judgment: Judgment normal      I personally examined this patient physically and all significant findings were addressed in the A/P section  Health Maintenance      Health Maintenance   Topic Date Due    Hepatitis C Screening  Never done    Medicare Annual Wellness Visit (AWV)  Never done    COVID-19 Vaccine (1) Never done    Pneumococcal Vaccine: 65+ Years (1 of 2 - PPSV23) 03/27/1961    HIV Screening  Never done    DTaP,Tdap,and Td Vaccines (2 - Td or Tdap) 12/30/2019    Influenza Vaccine (Season Ended) 09/01/2022    Fall Risk  05/06/2023    Depression Screening  05/06/2023    BMI: Adult  05/06/2023    Colorectal Cancer Screening  07/31/2028    HIB Vaccine  Aged Out    Hepatitis B Vaccine  Aged Out    IPV Vaccine  Aged Out    Hepatitis A Vaccine  Aged Out    Meningococcal ACWY Vaccine  Aged Out    HPV Vaccine  Aged Out     I have reviewed the patient's care gaps and those that were significant to this visit were addressed in the A/P section  It was a pleasure being of service to [facility]  Thank you  Samuel Aguirre MD , [facility]     [address] PGY1 - Nellie Flores      05/06/22

## 2022-05-25 PROBLEM — M54.12 CERVICAL RADICULITIS: Status: RESOLVED | Noted: 2019-11-18 | Resolved: 2022-05-25

## 2022-05-25 PROBLEM — M65.20 CALCIFIC TENDINITIS: Status: RESOLVED | Noted: 2018-02-27 | Resolved: 2022-05-25

## 2022-05-25 PROBLEM — I25.2 HISTORY OF NON-ST ELEVATION MYOCARDIAL INFARCTION (NSTEMI): Status: ACTIVE | Noted: 2018-03-06

## 2022-05-25 PROBLEM — I27.82 CHRONIC PULMONARY EMBOLISM WITH ACUTE COR PULMONALE (HCC): Status: ACTIVE | Noted: 2018-03-06

## 2022-05-27 ENCOUNTER — OFFICE VISIT (OUTPATIENT)
Dept: FAMILY MEDICINE CLINIC | Facility: CLINIC | Age: 67
End: 2022-05-27
Payer: COMMERCIAL

## 2022-05-27 VITALS
BODY MASS INDEX: 30.58 KG/M2 | WEIGHT: 235 LBS | DIASTOLIC BLOOD PRESSURE: 78 MMHG | RESPIRATION RATE: 18 BRPM | TEMPERATURE: 97.1 F | OXYGEN SATURATION: 99 % | HEART RATE: 76 BPM | SYSTOLIC BLOOD PRESSURE: 126 MMHG

## 2022-05-27 DIAGNOSIS — I82.512 CHRONIC DEEP VEIN THROMBOSIS (DVT) OF LEFT FEMORAL VEIN (HCC): Chronic | ICD-10-CM

## 2022-05-27 DIAGNOSIS — Z12.11 ENCOUNTER FOR SCREENING COLONOSCOPY: ICD-10-CM

## 2022-05-27 DIAGNOSIS — E78.2 MIXED HYPERLIPIDEMIA: Primary | ICD-10-CM

## 2022-05-27 DIAGNOSIS — K21.00 GASTROESOPHAGEAL REFLUX DISEASE WITH ESOPHAGITIS WITHOUT HEMORRHAGE: ICD-10-CM

## 2022-05-27 DIAGNOSIS — I25.2 HISTORY OF NON-ST ELEVATION MYOCARDIAL INFARCTION (NSTEMI): ICD-10-CM

## 2022-05-27 DIAGNOSIS — I26.09 OTHER CHRONIC PULMONARY EMBOLISM WITH ACUTE COR PULMONALE (HCC): ICD-10-CM

## 2022-05-27 DIAGNOSIS — D50.8 IRON DEFICIENCY ANEMIA SECONDARY TO INADEQUATE DIETARY IRON INTAKE: ICD-10-CM

## 2022-05-27 DIAGNOSIS — I27.82 OTHER CHRONIC PULMONARY EMBOLISM WITH ACUTE COR PULMONALE (HCC): ICD-10-CM

## 2022-05-27 PROCEDURE — 3288F FALL RISK ASSESSMENT DOCD: CPT | Performed by: FAMILY MEDICINE

## 2022-05-27 PROCEDURE — 99214 OFFICE O/P EST MOD 30 MIN: CPT | Performed by: FAMILY MEDICINE

## 2022-05-27 PROCEDURE — 1036F TOBACCO NON-USER: CPT | Performed by: FAMILY MEDICINE

## 2022-05-27 NOTE — PROGRESS NOTES
Assessment/Plan:    Diagnoses and all orders for this visit:    Mixed hyperlipidemia  -     Lipid Panel with Direct LDL reflex; Future  -     Comprehensive metabolic panel; Future  -     CBC and differential; Future  -     TSH, 3rd generation with Free T4 reflex; Future    Iron deficiency anemia secondary to inadequate dietary iron intake    Chronic deep vein thrombosis (DVT) of left femoral vein (HCC)  -     apixaban (Eliquis) 5 mg; Take 1 tablet (5 mg total) by mouth 2 (two) times a day    Gastroesophageal reflux disease with esophagitis without hemorrhage    History of non-ST elevation myocardial infarction (NSTEMI)    Other chronic pulmonary embolism with acute cor pulmonale (HCC)  -     apixaban (Eliquis) 5 mg; Take 1 tablet (5 mg total) by mouth 2 (two) times a day    Encounter for screening colonoscopy  -     Ambulatory referral for colonoscopy; Future    Other orders  -     Discontinue: Multiple Vitamins-Minerals (EYE VITAMINS PO); Take by mouth      To stop taking aspirin  Will send a new script for Eliquis, advised patient if there is any issue with Eliquis coverage is to notify our office immediately  Laboratory evaluation as noted above    Return in about 4 weeks (around 6/24/2022) for AWV  Subjective:   72-year-old male with multiple medical illnesses who presented to the clinic today for medication re-evaluation  The patient noted that he has not been taking Xarelto since it was prescribed in January as he did not feel comfortable with the change from Eliquis but instead he started taking baby aspirin  He denied any shortness of breath or lower extremity pain  He would like to have a new script for Eliquis as he recently acquired a new insurance      The following portions of the patient's history were reviewed and updated as appropriate: allergies, current medications, past family history, past medical history, past social history, past surgical history and problem list     Review of Systems All other systems reviewed and are negative  Objective:  /78 (BP Location: Left arm, Patient Position: Sitting, Cuff Size: Adult)   Pulse 76   Temp (!) 97 1 °F (36 2 °C) (Tympanic)   Resp 18   Wt 107 kg (235 lb)   SpO2 99%   BMI 30 58 kg/m²     Physical Exam  Vitals reviewed  Constitutional:       General: He is not in acute distress  Appearance: He is well-developed  He is not diaphoretic  HENT:      Head: Normocephalic and atraumatic  Nose: Nose normal    Eyes:      Conjunctiva/sclera: Conjunctivae normal       Pupils: Pupils are equal, round, and reactive to light  Cardiovascular:      Rate and Rhythm: Normal rate and regular rhythm  Heart sounds: Normal heart sounds  No murmur heard  No friction rub  No gallop  Pulmonary:      Effort: Pulmonary effort is normal  No respiratory distress  Breath sounds: Normal breath sounds  No wheezing or rales  Abdominal:      General: Bowel sounds are normal  There is no distension  Palpations: Abdomen is soft  Tenderness: There is no abdominal tenderness  Musculoskeletal:         General: Normal range of motion  Cervical back: Normal range of motion and neck supple  Skin:     General: Skin is warm and dry  Findings: No erythema or rash  Neurological:      Mental Status: He is alert and oriented to person, place, and time         Ollie Ball MD  05/27/22  5:17 PM

## 2022-05-27 NOTE — PROGRESS NOTES
Assessment and Plan:     Problem List Items Addressed This Visit        Digestive    Gastroesophageal reflux disease with esophagitis       Cardiovascular and Mediastinum    Chronic deep vein thrombosis (DVT) of left femoral vein (HCC) (Chronic)    Chronic pulmonary embolism with acute cor pulmonale (HCC)       Other    Mixed hyperlipidemia    History of non-ST elevation myocardial infarction (NSTEMI)    Iron deficiency anemia      Other Visit Diagnoses     Encounter for annual wellness exam in Medicare patient    -  Primary           Preventive health issues were discussed with patient, and age appropriate screening tests were ordered as noted in patient's After Visit Summary  Personalized health advice and appropriate referrals for health education or preventive services given if needed, as noted in patient's After Visit Summary       History of Present Illness:     Patient presents for Medicare Annual Wellness visit    Patient Care Team:  Koki Saxena MD as PCP - General (Family Medicine)  Fili Corbin MD (Vascular Surgery)  Natan Graf MD (Gastroenterology)  Natan Graf MD as Endoscopist     Problem List:     Patient Active Problem List   Diagnosis    Mixed hyperlipidemia    Gastroesophageal reflux disease with esophagitis    Chronic pulmonary embolism with acute cor pulmonale (HCC)    Chronic deep vein thrombosis (DVT) of left femoral vein ([address] )    History of non-ST elevation myocardial infarction (NSTEMI)    Iron deficiency anemia    Umbilical hernia without obstruction and without gangrene    Cervical post-laminectomy syndrome    Left cervical radiculopathy    Chronic pain syndrome    Spinal stenosis in cervical region      Past Medical and Surgical History:     Past Medical History:   Diagnosis Date    Calcific tendinitis 2/27/2018    Cataracts, bilateral     DVT of upper extremity (deep vein thrombosis) ([address] )     Neck pain     Wears glasses      Past Surgical History: Procedure Laterality Date    CATARACT EXTRACTION      CERVICAL FUSION  2007    with bone spur removal also- C5,6,7 fusion plate    COLONOSCOPY      EPIDURAL BLOCK INJECTION N/A 11/22/2019    Procedure: C6 C7 Cervical Epidural Steroid Injection (85866); Surgeon: Khalida Jackson MD;  Location: [facility] OR;  Service: Pain Management     HERNIA REPAIR      umbilical    NC COLONOSCOPY FLX DX W/COLLJ SPEC WHEN PFRMD N/A 7/31/2018    Procedure: COLONOSCOPY;  Surgeon: Ana María Browne MD;  Location: [facility] GI LAB; Service: Gastroenterology    NC REPAIR UMBILICAL NOLD,8+A/E,XGDBD N/A 12/10/2019    Procedure: REPAIR HERNIA UMBILICAL;  Surgeon: Leonardo Mata MD;  Location: [address] [place] [address];  Service: General    NC XCAPSL CTRC RMVL INSJ IO LENS PROSTH W/O ECP Right 3/16/2020    Procedure: EXTRACTION EXTRACAPSULAR CATARACT PHACO INTRAOCULAR LENS (IOL); Surgeon: Nancy Riggs MD;  Location: [facility] OR;  Service: Ophthalmology    NC XCAPSL CTRC RMVL INSJ IO LENS PROSTH W/O ECP Left 3/23/2020    Procedure: EXTRACTION EXTRACAPSULAR CATARACT PHACO INTRAOCULAR LENS (IOL);   Surgeon: Nancy Riggs MD;  Location: [facility] OR;  Service: Ophthalmology    SHOULDER SURGERY Right     labrum repair, arthritis removal    ULNAR NERVE TRANSPOSITION Right       Family History:     Family History   Problem Relation Age of Onset    Diabetes Father     Heart disease Father         CHF    Cancer Maternal Grandmother     Cancer Maternal Grandfather     No Known Problems Sister     No Known Problems Brother     No Known Problems Daughter     No Known Problems Son     No Known Problems Maternal Aunt     No Known Problems Maternal Uncle     No Known Problems Paternal Aunt     No Known Problems Paternal Uncle     No Known Problems Paternal Grandmother     No Known Problems Paternal Grandfather     Fibromyalgia Mother     Thyroid disease unspecified Mother     Colon cancer Neg Hx     Liver disease Neg Hx       Social History:     Social History     Socioeconomic History    Marital status: /Civil Union     Spouse name: None    Number of children: None    Years of education: None    Highest education level: None   Occupational History    None   Tobacco Use    Smoking status: Never Smoker    Smokeless tobacco: Never Used   Substance and Sexual Activity    Alcohol use: Yes     Comment: social    Drug use: No    Sexual activity: Yes     Partners: Female   Other Topics Concern    None   Social History Narrative    None     Social Determinants of Health     Financial Resource Strain: Not on file   Food Insecurity: Not on file   Transportation Needs: Not on file   Physical Activity: Not on file   Stress: Not on file   Social Connections: Not on file   Intimate Partner Violence: Not on file   Housing Stability: Not on file      Medications and Allergies:     Current Outpatient Medications   Medication Sig Dispense Refill    Multiple Vitamin (MULTIVITAMIN) tablet Take 1 tablet by mouth every morning       Multiple Vitamins-Minerals (EYE VITAMINS PO) Take by mouth      rivaroxaban (Xarelto) 10 mg tablet Take 1 tablet (10 mg total) by mouth daily 30 tablet 6     No current facility-administered medications for this visit       No Known Allergies   Immunizations:     Immunization History   Administered Date(s) Administered    COVID-19 PFIZER VACCINE 0 3 ML IM 03/23/2021, 04/13/2021, 12/14/2021    Influenza, recombinant, quadrivalent,injectable, preservative free 09/18/2018, 10/22/2019, 12/18/2020    Pneumococcal Conjugate Vaccine 20-valent (Pcv20), Polysace 05/06/2022    Tdap 12/30/2009    Zoster 02/20/2018    Zoster Vaccine Recombinant 02/16/2022, 05/03/2022      Health Maintenance:         Topic Date Due    Hepatitis C Screening  Never done    HIV Screening  Never done    Colorectal Cancer Screening  07/31/2028         Topic Date Due    COVID-19 Vaccine (4 - Booster for [employer] series) 04/14/2022      Medicare Health Risk Assessment:     /78 (BP Location: Left arm, Patient Position: Sitting, Cuff Size: Adult)   Pulse 76   Temp (!) 97 1 °F (36 2 °C) (Tympanic)   Resp 18   Wt 107 kg (235 lb)   SpO2 99%   BMI 30 58 kg/m²          Health Risk Assessment:   Patient rates overall health as very good  Patient feels that their physical health rating is same  Patient is very satisfied with their life  Eyesight was rated as same  Hearing was rated as same  Patient feels that their emotional and mental health rating is same  Patients states they are never, rarely angry  Patient states they are never, rarely unusually tired/fatigued  Pain experienced in the last 7 days has been some  Patient's pain rating has been 6/10  Patient states that he has experienced no weight loss or gain in last 6 months  Fall Risk Screening: In the past year, patient has experienced: no history of falling in past year      Home Safety:  Patient does not have trouble with stairs inside or outside of their home  Patient has working smoke alarms and has working carbon monoxide detector  Home safety hazards include: none  Nutrition:   Current diet is Limited junk food  Medications:   Patient is currently taking over-the-counter supplements  OTC medications include: Omeprazole  Patient is able to manage medications  Activities of Daily Living (ADLs)/Instrumental Activities of Daily Living (IADLs):   Walk and transfer into and out of bed and chair?: Yes  Dress and groom yourself?: Yes    Bathe or shower yourself?: Yes    Feed yourself?  Yes  Do your laundry/housekeeping?: Yes  Manage your money, pay your bills and track your expenses?: Yes  Make your own meals?: Yes    Do your own shopping?: Yes    Previous Hospitalizations:   Any hospitalizations or ED visits within the last 12 months?: No      Advance Care Planning:   Living will: Yes    Durable POA for healthcare: No    Advanced directive: Yes      PREVENTIVE SCREENINGS Cardiovascular Screening:    General: Screening Not Indicated and History Lipid Disorder      Colorectal Cancer Screening:     General: Screening Current      Abdominal Aortic Aneurysm (AAA) Screening:    Risk factors include: age between 73-67 yo        Lung Cancer Screening:     General: Screening Not Indicated    Screening, Brief Intervention, and Referral to Treatment (SBIRT)    Screening  Typical number of drinks in a day: 1  Typical number of drinks in a week: 5  Interpretation: Low risk drinking behavior  AUDIT-C Screenin) How often did you have a drink containing alcohol in the past year? 2 to 3 times a week  2) How many drinks did you have on a typical day when you were drinking in the past year?  1 to 2  3) How often did you have 6 or more drinks on one occasion in the past year? never    AUDIT-C Score: 3  Interpretation: Score 0-3 (male): Negative screen for alcohol misuse    Single Item Drug Screening:  How often have you used an illegal drug (including marijuana) or a prescription medication for non-medical reasons in the past year? never    Single Item Drug Screen Score: 0  Interpretation: Negative screen for possible drug use disorder      Rubina De La Cruz MD

## 2022-05-27 NOTE — PATIENT INSTRUCTIONS
Low Back Strain   WHAT YOU NEED TO KNOW:   What is low back strain? Low back strain is an injury to your lower back muscles or tendons  Tendons are strong tissues that connect muscles to bones  The lower back supports most of your body weight and helps you move, twist, and bend  What causes low back strain? Low back strain is usually caused by activities that increase stress on the lower back, such as exercise or injury  The following may increase your risk for low back strain:  You have had low back strain before  You lift heavy objects with your back instead of your legs  You do not warm up before you exercise  You sit or stand for long periods of time  You are overweight  What are the signs and symptoms of low back strain? Low back pain or muscle spasms    Stiffness or limited movement    Pain that goes down to the buttocks, groin, or legs    Pain that is worse with activity    How is low back strain diagnosed? An x-ray, CT scan, or MRI may be done to check for damage to your spine, muscles, or tendons  You may be given contrast liquid to help the tissues in your lower back show up better in the pictures  Tell the healthcare provider if you have ever had an allergic reaction to contrast liquid  Do not enter the MRI room with anything metal  Metal can cause serious injury  Tell the healthcare provider if you have any metal in or on your body  How is low back strain treated? Acetaminophen  decreases pain and fever  It is available without a doctor's order  Ask how much to take and how often to take it  Follow directions  Read the labels of all other medicines you are using to see if they also contain acetaminophen, or ask your doctor or pharmacist  Acetaminophen can cause liver damage if not taken correctly  Do not use more than 4 grams (4,000 milligrams) total of acetaminophen in one day  NSAIDs , such as ibuprofen, help decrease swelling, pain, and fever   This medicine is available Weight Management Medical Nutrition Assessment  Lindasoy Jasso is here for 1 of 3 bundle  Seen last month for medical meal planning (bypass MD)  Current wt: 181 3  lbs  Loss of 8 2 lbs x 1 1/2 months  Seca completed and results reviewed  Using meal replacement occasionally at lunch  Has reduced sugary items and doing better with portions when she is having them  Notices a big difference with increasing protein in her diet  Not food logging but feels she keeps track in her mind  Questions answered re: appropriate BMI  Recommend resistance bands for upper body strength  She will f/u in 1 month  Patient seen by Medical Provider in past 6 months:  Yes (today)  Requested to schedule appointment with Medical Provider: no    Anthropometric Measurements  Start Weight (#): 189 4 lbs  Current wt: 181 3 lbs  %TBW loss 4 3%   Ideal Body Weight (#): 100 lbs  Goal Weight (#): 120-140 lb  Highest: 211 lbs  Lowest: 100 lbs  UBW: 180 lbs    Weight Loss History  Previous weight loss attempts: [place] with PCP    Food and Nutrition Related History  Wake up: 8:00   Bed Time: 9-10    Food Recall  Breakfast: 9:00 kashi, 1% milk  Snack: 11:00  skip  Lunch: 1:00 or later:  Meal replacement OR s/w, deli ham, cheese OR salad w/deli ham/cheese  Snack: greek yogurt OR apple/pb  Dinner: 5:00: protein, veggies, carb if no carb at lunch   Snack:  Small amount of ice cream  Beverages: water, 1% milk, sugar free beverages, diet soda and coffee/tea  Volume of beverage intake: 6-7 glasses water    Weekends: Same  Cravings: sweets, chocolate  Trouble area of day:midmorning    Frequency of Eating out: 1x/wk  Food restrictions: n/a  Cooking: self   Food Shopping: self    Physical Activity Intake  Activity:none  Frequency:never  Physical limitations/barriers to exercise: 2 knee replacements and 2 hip replacements & back issues    Estimated Needs  Energy  Seca REE 1405x1  [phone]  [employer] Needs:  BMR :9759   1# loss weekly sedentary:  1029 Protein:55-68 gm      (1 2-1 5g/kg IBW)  Fluid: 53 oz     (35mL/kg IBW)    Nutrition Diagnosis  Yes; Overweight/obesity  related to Excess energy intake as evidenced by  BMI more than normative standard for age and sex (obesity-grade II 35-39  9)       Nutrition Intervention    Nutrition Prescription  Calories: 900-1200  Protein: 68-93 gm    Meal Plan (Aleksander/Pro)  Breakfast: 200, 10  Snack: skip  Lunch: 200-300, 21-27  Snack: 100-150, 0-15  Dinner: 300-400, 21  Snack: 100-150, 5-10    Nutrition Education:    Calorie controlled menu  Lean protein food choices  Healthy snack options  Food journaling tips    Nutrition Counseling:  Strategies: meal planning, portion sizes, healthy snack choices, hydration, fiber intake, protein intake, exercise, food journal      Monitoring and Evaluation:  Evaluation criteria:  Energy Intake  Meet protein needs  Maintain adequate hydration  Monitor weekly weight  Meal planning/preparation  Food journal   Decreased portions at mealtimes and snacks  Physical activity     Barriers to learning:none  Readiness to change: Action:  (Changing behavior)  Comprehension: very good  Expected Compliance: very good with or without a doctor's order  NSAIDs can cause stomach bleeding or kidney problems in certain people  If you take blood thinner medicine, always ask your healthcare provider if NSAIDs are safe for you  Always read the medicine label and follow directions  Muscle relaxers  help decrease pain and muscle spasms  Prescription pain medicine  may be given  Ask your healthcare provider how to take this medicine safely  Some prescription pain medicines contain acetaminophen  Do not take other medicines that contain acetaminophen without talking to your healthcare provider  Too much acetaminophen may cause liver damage  Prescription pain medicine may cause constipation  Ask your healthcare provider how to prevent or treat constipation  Surgery  may be needed if your strain is severe  How can I manage my symptoms? Rest  as directed  You may need to rest in bed for a period of time after your injury  Do not lift heavy objects  Apply ice  on your back for 15 to 20 minutes every hour or as directed  Use an ice pack, or put crushed ice in a plastic bag  Cover it with a towel  Ice helps prevent tissue damage and decreases swelling and pain  Apply heat  on your lower back for 20 to 30 minutes every 2 hours for as many days as directed  Heat helps decrease pain and muscle spasms  Slowly start to increase your activity  as the pain decreases, or as directed  How can low back strain be prevented? Use correct body movements  Bend at the hips and knees when you  objects  Do not bend from the waist  Use your leg muscles as you lift the load  Do not use your back  Keep the object close to your chest as you lift it  Try not to twist or lift anything above your waist          Change your position often when you stand for long periods of time  Rest one foot on a small box or footrest, and then switch to the other foot often  Try not to sit for long periods of time   When you do, sit in a straight-backed chair with your feet flat on the floor  Never reach, pull, or push while you are sitting  Warm up before you exercise  Do exercises that strengthen your back muscles  Ask your healthcare provider about the best exercise plan for you  Maintain a healthy weight  Ask your healthcare provider how much you should weigh  Ask him to help you create a weight loss plan if you are overweight  When should I seek immediate care? You hear or feel a pop in your lower back  You have increased swelling or pain in your lower back  You have trouble moving your legs  Your legs are numb  When should I call my doctor? You have a fever  Your pain does not go away, even after treatment  You have questions or concerns about your condition or care  CARE AGREEMENT:   You have the right to help plan your care  Learn about your health condition and how it may be treated  Discuss treatment options with your healthcare providers to decide what care you want to receive  You always have the right to refuse treatment  The above information is an  only  It is not intended as medical advice for individual conditions or treatments  Talk to your doctor, nurse or pharmacist before following any medical regimen to see if it is safe and effective for you  © Copyright [employer] 2022 Information is for End User's use only and may not be sold, redistributed or otherwise used for commercial purposes   All illustrations and images included in CareNotes® are the copyrighted property of A D A M , Inc  or [address] [place] [address] [email or web address]

## 2022-05-31 ENCOUNTER — APPOINTMENT (OUTPATIENT)
Dept: LAB | Facility: HOSPITAL | Age: 67
End: 2022-05-31
Payer: COMMERCIAL

## 2022-05-31 DIAGNOSIS — Z13.9 ENCOUNTER FOR SCREENING: ICD-10-CM

## 2022-05-31 DIAGNOSIS — E78.2 MIXED HYPERLIPIDEMIA: ICD-10-CM

## 2022-05-31 DIAGNOSIS — Z11.59 NEED FOR HEPATITIS C SCREENING TEST: ICD-10-CM

## 2022-05-31 LAB
ALBUMIN SERPL BCP-MCNC: 3.8 G/DL (ref 3.5–5)
ALP SERPL-CCNC: 69 U/L (ref 46–116)
ALT SERPL W P-5'-P-CCNC: 37 U/L (ref 12–78)
ANION GAP SERPL CALCULATED.3IONS-SCNC: 6 MMOL/L (ref 4–13)
AST SERPL W P-5'-P-CCNC: 19 U/L (ref 5–45)
BASOPHILS # BLD AUTO: 0.07 THOUSANDS/ΜL (ref 0–0.1)
BASOPHILS NFR BLD AUTO: 1 % (ref 0–1)
BILIRUB SERPL-MCNC: 0.4 MG/DL (ref 0.2–1)
BUN SERPL-MCNC: 15 MG/DL (ref 5–25)
CALCIUM SERPL-MCNC: 8.7 MG/DL (ref 8.3–10.1)
CHLORIDE SERPL-SCNC: 105 MMOL/L (ref 100–108)
CHOLEST SERPL-MCNC: 239 MG/DL
CO2 SERPL-SCNC: 30 MMOL/L (ref 21–32)
CREAT SERPL-MCNC: 0.93 MG/DL (ref 0.6–1.3)
EOSINOPHIL # BLD AUTO: 0.27 THOUSAND/ΜL (ref 0–0.61)
EOSINOPHIL NFR BLD AUTO: 4 % (ref 0–6)
ERYTHROCYTE [DISTWIDTH] IN BLOOD BY AUTOMATED COUNT: 13.1 % (ref 11.6–15.1)
GFR SERPL CREATININE-BSD FRML MDRD: 85 ML/MIN/1.73SQ M
GLUCOSE P FAST SERPL-MCNC: 105 MG/DL (ref 65–99)
HCT VFR BLD AUTO: 42.3 % (ref 36.5–49.3)
HCV AB SER QL: NORMAL
HDLC SERPL-MCNC: 59 MG/DL
HGB BLD-MCNC: 14.2 G/DL (ref 12–17)
IMM GRANULOCYTES # BLD AUTO: 0.03 THOUSAND/UL (ref 0–0.2)
IMM GRANULOCYTES NFR BLD AUTO: 0 % (ref 0–2)
LDLC SERPL CALC-MCNC: 152 MG/DL (ref 0–100)
LYMPHOCYTES # BLD AUTO: 1.85 THOUSANDS/ΜL (ref 0.6–4.47)
LYMPHOCYTES NFR BLD AUTO: 26 % (ref 14–44)
MCH RBC QN AUTO: 30.7 PG (ref 26.8–34.3)
MCHC RBC AUTO-ENTMCNC: 33.6 G/DL (ref 31.4–37.4)
MCV RBC AUTO: 91 FL (ref 82–98)
MONOCYTES # BLD AUTO: 0.58 THOUSAND/ΜL (ref 0.17–1.22)
MONOCYTES NFR BLD AUTO: 8 % (ref 4–12)
NEUTROPHILS # BLD AUTO: 4.25 THOUSANDS/ΜL (ref 1.85–7.62)
NEUTS SEG NFR BLD AUTO: 61 % (ref 43–75)
NRBC BLD AUTO-RTO: 0 /100 WBCS
PLATELET # BLD AUTO: 225 THOUSANDS/UL (ref 149–390)
PMV BLD AUTO: 9.6 FL (ref 8.9–12.7)
POTASSIUM SERPL-SCNC: 4.1 MMOL/L (ref 3.5–5.3)
PROT SERPL-MCNC: 7.2 G/DL (ref 6.4–8.2)
RBC # BLD AUTO: 4.63 MILLION/UL (ref 3.88–5.62)
SODIUM SERPL-SCNC: 141 MMOL/L (ref 136–145)
TRIGL SERPL-MCNC: 140 MG/DL
TSH SERPL DL<=0.05 MIU/L-ACNC: 1.13 UIU/ML (ref 0.45–4.5)
WBC # BLD AUTO: 7.05 THOUSAND/UL (ref 4.31–10.16)

## 2022-05-31 PROCEDURE — 84443 ASSAY THYROID STIM HORMONE: CPT

## 2022-05-31 PROCEDURE — 86803 HEPATITIS C AB TEST: CPT

## 2022-05-31 PROCEDURE — 87389 HIV-1 AG W/HIV-1&-2 AB AG IA: CPT

## 2022-05-31 PROCEDURE — 80061 LIPID PANEL: CPT

## 2022-05-31 PROCEDURE — 85025 COMPLETE CBC W/AUTO DIFF WBC: CPT

## 2022-05-31 PROCEDURE — 36415 COLL VENOUS BLD VENIPUNCTURE: CPT

## 2022-05-31 PROCEDURE — 80053 COMPREHEN METABOLIC PANEL: CPT

## 2022-06-01 LAB — HIV 1+2 AB+HIV1 P24 AG SERPL QL IA: NORMAL

## 2022-06-13 ENCOUNTER — OFFICE VISIT (OUTPATIENT)
Dept: FAMILY MEDICINE CLINIC | Facility: CLINIC | Age: 67
End: 2022-06-13
Payer: COMMERCIAL

## 2022-06-13 VITALS
HEART RATE: 99 BPM | WEIGHT: 237.5 LBS | TEMPERATURE: 98.7 F | SYSTOLIC BLOOD PRESSURE: 124 MMHG | HEIGHT: 74 IN | DIASTOLIC BLOOD PRESSURE: 60 MMHG | RESPIRATION RATE: 16 BRPM | OXYGEN SATURATION: 97 % | BODY MASS INDEX: 30.48 KG/M2

## 2022-06-13 DIAGNOSIS — I82.512 CHRONIC DEEP VEIN THROMBOSIS (DVT) OF LEFT FEMORAL VEIN (HCC): Chronic | ICD-10-CM

## 2022-06-13 DIAGNOSIS — I26.09 OTHER CHRONIC PULMONARY EMBOLISM WITH ACUTE COR PULMONALE (HCC): ICD-10-CM

## 2022-06-13 DIAGNOSIS — Z00.00 WELCOME TO MEDICARE PREVENTIVE VISIT: Primary | ICD-10-CM

## 2022-06-13 DIAGNOSIS — E78.5 DYSLIPIDEMIA: ICD-10-CM

## 2022-06-13 DIAGNOSIS — I27.82 OTHER CHRONIC PULMONARY EMBOLISM WITH ACUTE COR PULMONALE (HCC): ICD-10-CM

## 2022-06-13 DIAGNOSIS — M46.1 SACROILIITIS (HCC): ICD-10-CM

## 2022-06-13 PROBLEM — D50.9 IRON DEFICIENCY ANEMIA: Status: RESOLVED | Noted: 2018-03-13 | Resolved: 2022-06-13

## 2022-06-13 PROCEDURE — 3725F SCREEN DEPRESSION PERFORMED: CPT | Performed by: FAMILY MEDICINE

## 2022-06-13 PROCEDURE — 3008F BODY MASS INDEX DOCD: CPT | Performed by: FAMILY MEDICINE

## 2022-06-13 PROCEDURE — G0402 INITIAL PREVENTIVE EXAM: HCPCS | Performed by: FAMILY MEDICINE

## 2022-06-13 PROCEDURE — 1036F TOBACCO NON-USER: CPT | Performed by: FAMILY MEDICINE

## 2022-06-13 RX ORDER — ATORVASTATIN CALCIUM 20 MG/1
20 TABLET, FILM COATED ORAL DAILY
Qty: 90 TABLET | Refills: 1 | Status: SHIPPED | OUTPATIENT
Start: 2022-06-13

## 2022-06-13 NOTE — PROGRESS NOTES
Assessment and Plan:     Problem List Items Addressed This Visit        Cardiovascular and Mediastinum    Chronic deep vein thrombosis (DVT) of left femoral vein (HCC) (Chronic)    Relevant Medications    apixaban (Eliquis) 5 mg    Chronic pulmonary embolism with acute cor pulmonale (HCC)    Relevant Medications    apixaban (Eliquis) 5 mg       Other    Dyslipidemia    Relevant Medications    atorvastatin (LIPITOR) 20 mg tablet      Other Visit Diagnoses     Welcome to Medicare preventive visit    -  Primary    Sacroiliitis ([address] )        Relevant Orders    Ambulatory Referral to Physical Therapy        BMI Counseling: Body mass index is 30 91 kg/m²  The BMI is above normal  Nutrition recommendations include encouraging healthy choices of fruits and vegetables and consuming healthier snacks  Exercise recommendations include moderate physical activity 150 minutes/week  No pharmacotherapy was ordered  Patient referred to PCP  Rationale for BMI follow-up plan is due to patient being overweight or obese  Depression Screening and Follow-up Plan: Patient was screened for depression during today's encounter  They screened negative with a PHQ-2 score of 0  Patient will update our office if there are any issues with obtaining Eliquis  To start Lipitor 20 mg once daily and will titrate up to 40 mg  Physical therapy for sacroiliitis versus hip impingement, consider imaging there is no improvement  May try NSAIDs for pain control  Provided patient with precautionary measures    Preventive health issues were discussed with patient, and age appropriate screening tests were ordered as noted in patient's After Visit Summary  Personalized health advice and appropriate referrals for health education or preventive services given if needed, as noted in patient's After Visit Summary  History of Present Illness:     Patient presents for Welcome to Medicare visit       Patient Care Team:  Dejon Gupta MD as PCP - General (Family Medicine)  Felicia Ventura MD (Vascular Surgery)  Thaddeus Chow MD (Gastroenterology)  Thaddeus Chow MD as Endoscopist     Review of Systems:     Review of Systems   All other systems reviewed and are negative  Problem List:     Patient Active Problem List   Diagnosis    Mixed hyperlipidemia    Gastroesophageal reflux disease with esophagitis    Chronic pulmonary embolism with acute cor pulmonale (HCC)    Chronic deep vein thrombosis (DVT) of left femoral vein (HCC)    History of non-ST elevation myocardial infarction (NSTEMI)    Umbilical hernia without obstruction and without gangrene    Cervical post-laminectomy syndrome    Left cervical radiculopathy    Chronic pain syndrome    Spinal stenosis in cervical region    Dyslipidemia      Past Medical and Surgical History:     Past Medical History:   Diagnosis Date    Calcific tendinitis 2/27/2018    Cataracts, bilateral     DVT of upper extremity (deep vein thrombosis) ([address] )     Neck pain     Wears glasses      Past Surgical History:   Procedure Laterality Date    CATARACT EXTRACTION      CERVICAL FUSION  2007    with bone spur removal also- C5,6,7 fusion plate    COLONOSCOPY      EPIDURAL BLOCK INJECTION N/A 11/22/2019    Procedure: C6 C7 Cervical Epidural Steroid Injection (82181); Surgeon: Lea Mccurdy MD;  Location: [facility] MAIN OR;  Service: Pain Management     HERNIA REPAIR      umbilical    VT COLONOSCOPY FLX DX W/COLLJ SPEC WHEN PFRMD N/A 7/31/2018    Procedure: COLONOSCOPY;  Surgeon: Thaddeus Chow MD;  Location: [place] [address] GI LAB; Service: Gastroenterology    VT REPAIR UMBILICAL LRGG,7+Y/Z,DGWOL N/A 12/10/2019    Procedure: REPAIR HERNIA UMBILICAL;  Surgeon: Taqueria Licona MD;  Location: [address] [place] [address];  Service: General    VT XCAPSL CTRC RMVL INSJ IO LENS PROSTH W/O ECP Right 3/16/2020    Procedure: EXTRACTION EXTRACAPSULAR CATARACT PHACO INTRAOCULAR LENS (IOL);   Surgeon: Amber Berg MD;  Location: [facility] OR; Service: Ophthalmology    CT XCAPSL CTRC RMVL INSJ IO LENS PROSTH W/O ECP Left 3/23/2020    Procedure: EXTRACTION EXTRACAPSULAR CATARACT PHACO INTRAOCULAR LENS (IOL);   Surgeon: Matt De La Cruz MD;  [facility] MAIN OR;  Service: Ophthalmology    SHOULDER SURGERY Right     labrum repair, arthritis removal    ULNAR NERVE TRANSPOSITION Right       Family History:     Family History   Problem Relation Age of Onset    Diabetes Father     Heart disease Father         CHF    Cancer Maternal Grandmother     Cancer Maternal Grandfather     No Known Problems Sister     No Known Problems Brother     No Known Problems Daughter     No Known Problems Son     No Known Problems Maternal Aunt     No Known Problems Maternal Uncle     No Known Problems Paternal Aunt     No Known Problems Paternal Uncle     No Known Problems Paternal Grandmother     No Known Problems Paternal Grandfather     Fibromyalgia Mother     Thyroid disease unspecified Mother     Colon cancer Neg Hx     Liver disease Neg Hx       Social History:     Social History     Socioeconomic History    Marital status: /Civil Union     Spouse name: None    Number of children: None    Years of education: None    Highest education level: None   Occupational History    None   Tobacco Use    Smoking status: Never Smoker    Smokeless tobacco: Never Used   Substance and Sexual Activity    Alcohol use: Yes     Comment: social    Drug use: No    Sexual activity: Yes     Partners: Female   Other Topics Concern    None   Social History Narrative    None     Social Determinants of Health     Financial Resource Strain: Not on file   Food Insecurity: Not on file   Transportation Needs: Not on file   Physical Activity: Not on file   Stress: Not on file   Social Connections: Not on file   Intimate Partner Violence: Not on file   Housing Stability: Not on file      Medications and Allergies:     Current Outpatient Medications   Medication Sig Dispense Refill    apixaban (Eliquis) 5 mg Take 1 tablet (5 mg total) by mouth 2 (two) times a day 180 tablet 1    atorvastatin (LIPITOR) 20 mg tablet Take 1 tablet (20 mg total) by mouth daily 90 tablet 1    Multiple Vitamin (MULTIVITAMIN) tablet Take 1 tablet by mouth every morning        No current facility-administered medications for this visit  No Known Allergies   Immunizations:     Immunization History   Administered Date(s) Administered    COVID-19 PFIZER VACCINE 0 3 ML IM 03/23/2021, 04/13/2021, 12/14/2021    Influenza, recombinant, quadrivalent,injectable, preservative free 09/18/2018, 10/22/2019, 12/18/2020    Pneumococcal Conjugate Vaccine 20-valent (Pcv20), Polysace 05/06/2022    Tdap 12/30/2009    Zoster 02/20/2018    Zoster Vaccine Recombinant 02/16/2022, 05/03/2022      Health Maintenance:         Topic Date Due    Colorectal Cancer Screening  07/31/2028    HIV Screening  Completed    Hepatitis C Screening  Completed         Topic Date Due    COVID-19 Vaccine (4 - Booster for Sam Pal series) 04/14/2022    Influenza Vaccine (Season Ended) 09/01/2022      Medicare Screening Tests and Risk Assessments:     Mani Quan is here for his Welcome to Medicare visit  Health Risk Assessment:   Patient rates overall health as very good  Patient feels that their physical health rating is same  Patient is satisfied with their life  Eyesight was rated as same  Hearing was rated as same  Patient feels that their emotional and mental health rating is same  Patients states they are never, rarely angry  Patient states they are never, rarely unusually tired/fatigued  Pain experienced in the last 7 days has been some  Patient's pain rating has been 5/10  Patient states that he has experienced no weight loss or gain in last 6 months  Fall Risk Screening:    In the past year, patient has experienced: no history of falling in past year      Home Safety:  Patient does not have trouble with stairs inside or outside of their home  Patient has working smoke alarms and has working carbon monoxide detector  Home safety hazards include: none  Nutrition:   Current diet is Regular  Medications:   Patient is currently taking over-the-counter supplements  OTC medications include: Multi-vitamin  Patient is able to manage medications  Activities of Daily Living (ADLs)/Instrumental Activities of Daily Living (IADLs):   Walk and transfer into and out of bed and chair?: Yes  Dress and groom yourself?: Yes    Bathe or shower yourself?: Yes    Feed yourself? Yes  Do your laundry/housekeeping?: Yes  Manage your money, pay your bills and track your expenses?: Yes  Make your own meals?: Yes    Do your own shopping?: Yes    Previous Hospitalizations:   Any hospitalizations or ED visits within the last 12 months?: No      Advance Care Planning:   Living will: Yes    Durable POA for healthcare: Yes    Advanced directive: Yes      PREVENTIVE SCREENINGS      Cardiovascular Screening:    General: Screening Not Indicated and History Lipid Disorder      Diabetes Screening:     General: Screening Current      Colorectal Cancer Screening:     General: Screening Current      Abdominal Aortic Aneurysm (AAA) Screening:    Risk factors include: age between 73-69 yo        Lung Cancer Screening:     General: Screening Not Indicated      Hepatitis C Screening:    General: Screening Current    Screening, Brief Intervention, and Referral to Treatment (SBIRT)    Screening  Typical number of drinks in a day: 1  Typical number of drinks in a week: 5  Interpretation: Low risk drinking behavior      Single Item Drug Screening:  How often have you used an illegal drug (including marijuana) or a prescription medication for non-medical reasons in the past year? never    Single Item Drug Screen Score: 0  Interpretation: Negative screen for possible drug use disorder     Visual Acuity Screening    Right eye Left eye Both eyes   Without correction: 20/50 20/100 20/50   With correction:           Physical Exam:     /60 (BP Location: Left arm, Patient Position: Sitting, Cuff Size: Standard)   Pulse 99   Temp 98 7 °F (37 1 °C) (Tympanic)   Resp 16   Ht 6' 1 5" (1 867 m)   Wt 108 kg (237 lb 8 oz)   SpO2 97%   BMI 30 91 kg/m²     Physical Exam  Vitals and nursing note reviewed  Constitutional:       Appearance: He is well-developed  HENT:      Head: Normocephalic and atraumatic  Eyes:      Conjunctiva/sclera: Conjunctivae normal    Cardiovascular:      Rate and Rhythm: Normal rate and regular rhythm  Heart sounds: No murmur heard  Pulmonary:      Effort: Pulmonary effort is normal  No respiratory distress  Breath sounds: Normal breath sounds  Abdominal:      Palpations: Abdomen is soft  Tenderness: There is no abdominal tenderness  Musculoskeletal:      Cervical back: Neck supple  Comments: BACK EXAM:  Gait: normal, no trendelenberg gait, no antalgic gait    BACK TENDERNESS:  Spinous Processes: no  Paraspinal Muscles: no  SI Joint: no  Sacrum: no    ROM:  Grossly intact    DERMATOMAL SENSATION:  Grossly intact    STRENGTH (bilateral):  Grossly intact    BACK:   SUPINE STRAIGHT LEG: negative  SLUMP: negative    RIGHT HIP:  LOG ROLL: negative  REJI: negative  FADIR: negative    LEFT HIP:  LOG ROLL: negative  REJI: negative  FADIR:  Positive   Skin:     General: Skin is warm and dry  Neurological:      Mental Status: He is alert          Dana Lora MD

## 2022-06-13 NOTE — PATIENT INSTRUCTIONS

## 2022-06-14 ENCOUNTER — TELEPHONE (OUTPATIENT)
Dept: FAMILY MEDICINE CLINIC | Facility: CLINIC | Age: 67
End: 2022-06-14

## 2022-06-14 NOTE — TELEPHONE ENCOUNTER
Patient called and stated that he order eliquis through [facility] and provided them with your name and his new PCP  Just an FYI

## 2022-06-21 ENCOUNTER — EVALUATION (OUTPATIENT)
Dept: PHYSICAL THERAPY | Facility: CLINIC | Age: 67
End: 2022-06-21
Payer: COMMERCIAL

## 2022-06-21 DIAGNOSIS — M46.1 SACROILIITIS (HCC): Primary | ICD-10-CM

## 2022-06-21 PROCEDURE — 97161 PT EVAL LOW COMPLEX 20 MIN: CPT

## 2022-06-21 NOTE — PROGRESS NOTES
PT Evaluation     Today's date: 2022  Patient name: Yessy Khan  : 1955  MRN: [id number]  Referring provider: Sagar Gore MD  Dx:   Encounter Diagnosis     ICD-10-CM    1  Sacroiliitis ([facility] )  M46 1 Ambulatory Referral to Physical Therapy                  Assessment  Assessment details: Yessy Khan is a 74 y o  male who presents to physical therapy with pain, decreased LE range of motion, decreased LE strength, decreased lumbar range of motion, impaired function, decreased activity tolerance, poor posture, fair balance and poor body mechanics  Patient's clinical presentation is consistent with their referring diagnosis of [facility]ili[facility] ([facility] )  (primary encounter diagnosis)  The pt presents with functional limitations of ADLs, recreational activities, and  Ambulation  Pt would benefit from physical therapy services to address these limitations and maximize function  Pt was instructed and educated on home exercise program and good sitting posture today and demonstrates understanding     Impairments: abnormal gait, abnormal muscle firing, abnormal muscle tone, abnormal or restricted ROM, abnormal movement, activity intolerance, impaired balance, impaired physical strength, lacks appropriate home exercise program, pain with function and poor posture     Symptom irritability: lowUnderstanding of Dx/Px/POC: good   Prognosis: good  Prognosis details: Short Term Goals (4 weeks)  Pt will be independent in basic Home Exercise program   Pt will demonstrate good hip hinge and squat technique  Pt will demonstrate improved lumbar spine ROM to 100% WNL  Pt will report a decrease in radicular symptoms by 50%    Long Term Goals (6-8 weeks)  Pt will be independent in comprehensive Home Exercise Program  Pt will demonstrate improved FOTO status score by 10 points  Pt will be able to sit/drive 30 min with pain no more than 2/10  Pt will demonstrate improved SLS balance to at least 30 seconds to reduce risk of falling  Pt will be able to participate in recreational activities with no restrictions     Plan  Patient would benefit from: skilled PT  Referral necessary: No  Planned modality interventions: cryotherapy and thermotherapy: hydrocollator packs  Planned therapy interventions: ADL training, activity modification, balance, body mechanics training, flexibility, functional ROM exercises, gait training, graded exercise, home exercise program, transfer training, therapeutic exercise, therapeutic activities, stretching, strengthening, postural training, patient education, neuromuscular re-education, manual therapy and joint mobilization  Frequency: 2x week  Duration in weeks: 6  Treatment plan discussed with: patient        Subjective Evaluation    History of Present Illness  Mechanism of injury: Pt reports right sided low back and leg pain that started about 6 months ago of insidious onset  First started noticing symptoms on long car rides  Pain typically increases after 15-20 min of sitting  Describes pain as sharp and increases the longer he remains in position  Symptoms can travel to hamstring, does not extend past knee  Pt reports difficulties running while playing softball  Denies any sleep disturbances  No imaging performed  Taking Advil as needed             Not a recurrent problem   Pain  Current pain ratin  At best pain ratin  At worst pain ratin  Location: right glute and HS  Quality: sharp, radiating, pulling and pressure  Relieving factors: medications, heat and change in position  Aggravating factors: running and sitting    Social Support    Employment status: not working  Exercise history: softball, cardio, lifting      Diagnostic Tests  No diagnostic tests performed  Treatments  No previous or current treatments  Patient Goals  Patient goals for therapy: decreased pain, return to sport/leisure activities and improved balance  Patient goal: play softball pain free        Objective     Concurrent Complaints  Negative for disturbed sleep, bladder dysfunction and bowel dysfunction    Postural Observations  Seated posture: poor  Standing posture: poor    Additional Postural Observation Details  Sacral sitting    Palpation   Left   No palpable tenderness to the lumbar paraspinals  Hypertonic in the lumbar paraspinals  Right   No palpable tenderness to the lumbar paraspinals  Hypertonic in the lumbar paraspinals and quadratus lumborum  Tenderness of the quadratus lumborum  Additional Palpation Details  (-) at piriformis    Tenderness     Lumbar Spine  No tenderness in the spinous process  Left Hip   No tenderness in the PSIS  Right Hip   No tenderness in the PSIS  Neurological Testing     Sensation     Lumbar   Left   Intact: light touch    Right   Intact: light touch    Active Range of Motion     Lumbar   Flexion: 50 (%) degrees  with pain  Extension: 75 (%) degrees   Left lateral flexion: 75 (%) degrees       Right lateral flexion: 75 (%) degrees   Left rotation: 50 (%) degrees   Right rotation: 50 (%) degrees     Joint Play     Hypomobile: L3, L4, L5 and S1   Mechanical Assessment    Cervical      Thoracic      Lumbar    Standing flexion: repeated movements   Pain location:peripheralized  Pain intensity: worse  Pain level: increased  Standing extension: repeated movements  Pain location: centralized  Pain intensity: better  Pain level: abolished    Muscle Activation   Patient unable to activate left transverse abdominals and right transverse abdominals  Tests     Lumbar     Left   Negative femoral stretch and passive SLR  Right   Positive passive SLR  Negative femoral stretch  Right Pelvic Girdle/Sacrum   Positive: active SLR test      Additional Tests Details  Flexibility: moderate tightness in bilateral hamstrings right greater than left    Ambulation     Observational Gait   Left step length within functional limits  Decreased walking speed and right stance time  Left foot contact pattern: heel to toe  Right foot contact pattern: foot flat    Quality of Movement During Gait     Additional Quality of Movement During Gait Details  Decreased right knee extension at heel strike    Functional Assessment        Single Leg Stance   Left: 30 seconds  Right: 7 seconds    General Comments:      Hip Comments   Moderate restrictions in right hip ER      Flowsheet Rows    Flowsheet Row Most Recent Value   PT/OT G-Codes    Current Score 64   Projected Score 73   FOTO information reviewed Yes          Pt was given initial Home Exercise Program today and demonstrates understanding   [employer] access code: [email or web address]       Precautions standard       Manuals 6/21       STM        mobilization                        Neuro Re-Ed         TA x10       TA Add        TA clam                                        Ther Ex        LTR x10       Figure 4 stretch 30 sec x2       Standing ext x10                                               Ther Activity                        Gait Training                        Modalities

## 2022-06-28 ENCOUNTER — OFFICE VISIT (OUTPATIENT)
Dept: PHYSICAL THERAPY | Facility: CLINIC | Age: 67
End: 2022-06-28
Payer: COMMERCIAL

## 2022-06-28 DIAGNOSIS — M46.1 SACROILIITIS (HCC): Primary | ICD-10-CM

## 2022-06-28 PROCEDURE — 97140 MANUAL THERAPY 1/> REGIONS: CPT

## 2022-06-28 PROCEDURE — 97110 THERAPEUTIC EXERCISES: CPT

## 2022-06-28 PROCEDURE — 97112 NEUROMUSCULAR REEDUCATION: CPT

## 2022-06-28 NOTE — PROGRESS NOTES
Daily Note     Today's date: 2022  Patient name: Gunner Forde  : 1955  MRN: [id number]  Referring provider: Deja Ash MD  Dx:   Encounter Diagnosis   Name Primary?  Sacroiliitis (HCC) Yes                  Subjective: Pt reports 3/10 pain into hamstring while driving  No pain currently  Objective: See treatment diary below      Assessment: Pt tolerated treatment well with no complaints of pain  +SLR test, measured at 45 degrees  Significant restrictions present into bilateral thoracic rotation  Added to HEP  Pt demonstrates improved ability to contract TA but significant difficulties engaging glutes  Reviewed initial HEP with patient and demonstrates independence  Plan: Continue with plan of care and progress as tolerated          Precautions standard       Manuals       STM        mobilization  LS PA gr IIII      Nerve mobilization   Left sciatic              Neuro Re-Ed         TA x10 x10      TA Add  Hold 5, 2x10       TA clam  Blue, Hold 5, 2x10       glute sets  Hold 5, 2x10                               Ther Ex        LTR x10 Hold 5, 2x10       Figure 4 stretch 30 sec x2       Standing ext x10       SL TS rot  Hold 5, x15 ea                                      Ther Activity                        Gait Training                        Modalities

## 2022-07-05 ENCOUNTER — OFFICE VISIT (OUTPATIENT)
Dept: PHYSICAL THERAPY | Facility: CLINIC | Age: 67
End: 2022-07-05
Payer: COMMERCIAL

## 2022-07-05 DIAGNOSIS — M46.1 SACROILIITIS (HCC): Primary | ICD-10-CM

## 2022-07-05 PROCEDURE — 97140 MANUAL THERAPY 1/> REGIONS: CPT

## 2022-07-05 PROCEDURE — 97110 THERAPEUTIC EXERCISES: CPT

## 2022-07-05 PROCEDURE — 97112 NEUROMUSCULAR REEDUCATION: CPT

## 2022-07-05 NOTE — PROGRESS NOTES
Daily Note     Today's date: 2022  Patient name: Clemente Beach  : 1955  MRN: [id number]  Referring provider: Sofia Vuong MD  Dx:   Encounter Diagnosis   Name Primary?  Sacroiliitis (HCC) Yes                  Subjective: Pt reports 3-4/10 pain in hamstring after driving/sitting  Objective: See treatment diary below      Assessment: Pt tolerated treatment well with minimal complaints of pain  Pt demonstrating improved sciatic nerve tension, added nerve glides to HEP  Pt able to perform glute set without HS compensation and able to tolerate bridges off bolster  Pt's HEP was updated on [address] code and demonstrates understanding  Plan: Continue with plan of care to decrease pain, improve mobility, strength, and function            Precautions standard       Manuals      STM   Right QL     mobilization  LS PA gr IIII      Nerve mobilization   Left sciatic Left sciatic             Neuro Re-Ed         TA x10 x10      TA Add  Hold 5, 2x10  Hold 5, 2x10      TA clam  Blue, Hold 5, 2x10  Black Hold 5, 2x10      glute sets  Hold 5, 2x10  Hold 5, 2x10      bridges   Off bolster, Hold 5, 2x10                      Ther Ex        LTR x10 Hold 5, 2x10  Hold 5, 2x10      Figure 4 stretch 30 sec x2       Standing ext x10       SL TS rot  Hold 5, x15 ea Hold 5, x15 ea                                     Ther Activity                        Gait Training                        Modalities

## 2022-07-12 ENCOUNTER — OFFICE VISIT (OUTPATIENT)
Dept: PHYSICAL THERAPY | Facility: CLINIC | Age: 67
End: 2022-07-12
Payer: COMMERCIAL

## 2022-07-12 DIAGNOSIS — M46.1 SACROILIITIS (HCC): Primary | ICD-10-CM

## 2022-07-12 PROCEDURE — 97112 NEUROMUSCULAR REEDUCATION: CPT

## 2022-07-12 PROCEDURE — 97110 THERAPEUTIC EXERCISES: CPT

## 2022-07-19 ENCOUNTER — APPOINTMENT (OUTPATIENT)
Dept: PHYSICAL THERAPY | Facility: CLINIC | Age: 67
End: 2022-07-19
Payer: COMMERCIAL

## 2022-10-10 DIAGNOSIS — E78.5 DYSLIPIDEMIA: ICD-10-CM

## 2022-10-10 DIAGNOSIS — I27.82 OTHER CHRONIC PULMONARY EMBOLISM WITH ACUTE COR PULMONALE (HCC): ICD-10-CM

## 2022-10-10 DIAGNOSIS — I82.512 CHRONIC DEEP VEIN THROMBOSIS (DVT) OF LEFT FEMORAL VEIN (HCC): Chronic | ICD-10-CM

## 2022-10-10 DIAGNOSIS — I26.09 OTHER CHRONIC PULMONARY EMBOLISM WITH ACUTE COR PULMONALE (HCC): ICD-10-CM

## 2022-10-11 RX ORDER — APIXABAN 5 MG/1
TABLET, FILM COATED ORAL
Qty: 180 TABLET | Refills: 3 | Status: SHIPPED | OUTPATIENT
Start: 2022-10-11

## 2022-10-11 RX ORDER — ATORVASTATIN CALCIUM 20 MG/1
TABLET, FILM COATED ORAL
Qty: 90 TABLET | Refills: 3 | Status: SHIPPED | OUTPATIENT
Start: 2022-10-11

## 2022-11-07 ENCOUNTER — TELEPHONE (OUTPATIENT)
Dept: GASTROENTEROLOGY | Facility: CLINIC | Age: 67
End: 2022-11-07

## 2022-11-07 NOTE — TELEPHONE ENCOUNTER
Patients GI provider:  Dr Nella Hatch    Number to return call: [phone]    Reason for call: Pt calling to schedule colonoscopy apparently due in 2019 (1 year due to poor prep?) Could find no report or recall  He has referral from PCP  On Eliquis  Please call him to schedule Colonoscopy or OV if necessary      Scheduled procedure/appointment date if applicable: Apt/procedure 07/2018

## 2022-11-08 ENCOUNTER — TELEPHONE (OUTPATIENT)
Dept: GASTROENTEROLOGY | Facility: CLINIC | Age: 67
End: 2022-11-08

## 2022-11-08 ENCOUNTER — TELEPHONE (OUTPATIENT)
Dept: GASTROENTEROLOGY | Facility: AMBULARY SURGERY CENTER | Age: 67
End: 2022-11-08

## 2022-11-08 DIAGNOSIS — Z12.11 COLON CANCER SCREENING: Primary | ICD-10-CM

## 2022-11-08 RX ORDER — POLYETHYLENE GLYCOL 3350 17 G/17G
POWDER, FOR SOLUTION ORAL
Qty: 119 G | Refills: 0 | Status: SHIPPED | OUTPATIENT
Start: 2022-11-08

## 2022-11-08 NOTE — TELEPHONE ENCOUNTER
Spoke w/ pt   Patient is scheduled for colonoscopy on December 21 , 2022 at [address] [place] [address] with Gely Goldman MD  Patient is aware of pre-procedure prep of Golytely/Dulcolax 2 Day and they will be called the day prior between 2 and 6 pm for time to report for procedure  Pre-procedure prep has been given to the patient  via [address] mail on November 8 , 2022

## 2022-11-08 NOTE — TELEPHONE ENCOUNTER
Please see message below, please advise if ov necessarily    As per Tissue report 07/31/18 dr Nanci Crespo requests one year repeat colon and egd, 2 day prep required      Many thanks

## 2022-11-08 NOTE — TELEPHONE ENCOUNTER
Pt takes ELIQUIS/med clearance faxed to Dr Ally Diaz @ [phone]/TL is scheduled w/ dr Radha Garcia for egd/colonoscopy @ [facility] 12/21/2022

## 2022-11-08 NOTE — TELEPHONE ENCOUNTER
----- Message from Aixa Godinez sent at 2022 10:29 AM EST -----  Regardin day bowel prep  Please order 2 day bowel prep/GOLYTELY/DULCOLAX/MIRALAX   Thank you

## 2022-11-09 ENCOUNTER — TELEPHONE (OUTPATIENT)
Dept: FAMILY MEDICINE CLINIC | Facility: CLINIC | Age: 67
End: 2022-11-09

## 2022-11-11 ENCOUNTER — TELEPHONE (OUTPATIENT)
Dept: GASTROENTEROLOGY | Facility: AMBULARY SURGERY CENTER | Age: 67
End: 2022-11-11

## 2022-11-11 NOTE — TELEPHONE ENCOUNTER
Spoke w/ pt  Pt is aware-OK to hold ELIQUIS 2 days prior to GI procedures w/ Dr Ashlyn Lu 12/21/2022 per Dr Romeo Duke

## 2022-12-19 ENCOUNTER — OFFICE VISIT (OUTPATIENT)
Dept: FAMILY MEDICINE CLINIC | Facility: CLINIC | Age: 67
End: 2022-12-19

## 2022-12-19 VITALS
HEART RATE: 84 BPM | TEMPERATURE: 98.4 F | SYSTOLIC BLOOD PRESSURE: 112 MMHG | DIASTOLIC BLOOD PRESSURE: 62 MMHG | OXYGEN SATURATION: 99 % | WEIGHT: 237.25 LBS | HEIGHT: 74 IN | RESPIRATION RATE: 21 BRPM | BODY MASS INDEX: 30.45 KG/M2

## 2022-12-19 DIAGNOSIS — L81.9 PIGMENTED SKIN LESION SUSPICIOUS FOR MALIGNANT NEOPLASM: ICD-10-CM

## 2022-12-19 DIAGNOSIS — I26.09 OTHER CHRONIC PULMONARY EMBOLISM WITH ACUTE COR PULMONALE (HCC): ICD-10-CM

## 2022-12-19 DIAGNOSIS — E78.5 DYSLIPIDEMIA: ICD-10-CM

## 2022-12-19 DIAGNOSIS — L82.1 SK (SEBORRHEIC KERATOSIS): ICD-10-CM

## 2022-12-19 DIAGNOSIS — M54.16 LUMBAR RADICULOPATHY: ICD-10-CM

## 2022-12-19 DIAGNOSIS — I27.82 OTHER CHRONIC PULMONARY EMBOLISM WITH ACUTE COR PULMONALE (HCC): ICD-10-CM

## 2022-12-19 DIAGNOSIS — I25.2 HISTORY OF NON-ST ELEVATION MYOCARDIAL INFARCTION (NSTEMI): ICD-10-CM

## 2022-12-19 DIAGNOSIS — I82.512 CHRONIC DEEP VEIN THROMBOSIS (DVT) OF LEFT FEMORAL VEIN (HCC): Primary | Chronic | ICD-10-CM

## 2022-12-19 RX ORDER — ATORVASTATIN CALCIUM 20 MG/1
20 TABLET, FILM COATED ORAL DAILY
Qty: 90 TABLET | Refills: 3 | Status: SHIPPED | OUTPATIENT
Start: 2022-12-19

## 2022-12-19 NOTE — PROGRESS NOTES
Assessment/Plan:    Diagnoses and all orders for this visit:    Chronic deep vein thrombosis (DVT) of left femoral vein (HCC)  -     apixaban (Eliquis) 5 mg; Take 1 tablet (5 mg total) by mouth 2 (two) times a day  -     CBC and differential; Future    Lumbar radiculopathy  -     XR spine lumbar minimum 4 views non injury; Future  -     Ambulatory Referral to Pain Management; Future    Pigmented skin lesion suspicious for malignant neoplasm  -     Pathology Report  -     Biopsy    SK (seborrheic keratosis)  -     Lesion Destruction    Dyslipidemia  -     atorvastatin (LIPITOR) 20 mg tablet; Take 1 tablet (20 mg total) by mouth daily  -     Lipid Panel with Direct LDL reflex; Future  -     Comprehensive metabolic panel; Future    Other chronic pulmonary embolism with acute cor pulmonale (HCC)  -     apixaban (Eliquis) 5 mg; Take 1 tablet (5 mg total) by mouth 2 (two) times a day    History of non-ST elevation myocardial infarction (NSTEMI)      Patient tolerated the procedures well  Continue the current management  To avoid NSAID as the patient is currently on Eliquis, to try Tylenol or Voltaren gel for pain as needed  X-ray for evaluation of the lumbar spine  Referral to Pain Management for further evaluation    Return in about 6 months (around 6/19/2023) for AWV  Subjective:   79-year-old male with multiple medical illnesses who presented for medication re-evaluation  Patient reports good compliance and tolerance of medications  That he continues to have mild-to-moderate lumbar radiculopathy with pain radiating down the right side  He reported previously had similar symptoms in the cervical spine and had resolution after 1 injection with pain medicine  He is inquiring about the possibility of re-evaluation by pain medicine  Patient also reported to 2-3 irritated spots on the right side of the face  Denied any previous history of personal skin cancer      The following portions of the patient's history were reviewed and updated as appropriate: allergies, current medications, past family history, past medical history, past social history, past surgical history and problem list     Review of Systems   All other systems reviewed and are negative  Objective:  /62 (BP Location: Left arm, Patient Position: Sitting, Cuff Size: Large)   Pulse 84   Temp 98 4 °F (36 9 °C) (Temporal)   Resp 21   Ht 6' 1 5" (1 867 m)   Wt 108 kg (237 lb 4 oz)   SpO2 99%   BMI 30 88 kg/m²     Physical Exam  Vitals reviewed  Constitutional:       General: He is not in acute distress  Appearance: Normal appearance  He is not ill-appearing, toxic-appearing or diaphoretic  HENT:      Head: Normocephalic and atraumatic  Nose: Nose normal       Mouth/Throat:      Mouth: Mucous membranes are moist    Eyes:      Pupils: Pupils are equal, round, and reactive to light  Cardiovascular:      Rate and Rhythm: Normal rate and regular rhythm  Pulses: Normal pulses  Heart sounds: Normal heart sounds  No murmur heard  No gallop  Pulmonary:      Effort: Pulmonary effort is normal       Breath sounds: Normal breath sounds  No wheezing or rhonchi  Abdominal:      General: Abdomen is flat  Bowel sounds are normal  There is no distension  Palpations: Abdomen is soft  Musculoskeletal:         General: Normal range of motion  Cervical back: Normal range of motion  Lymphadenopathy:      Cervical: No cervical adenopathy  Skin:     Comments: Noted to SKs superior to the right eyebrow  Suspicious lesion resembling BC noted very close to this case   Neurological:      General: No focal deficit present  Mental Status: He is alert and oriented to person, place, and time  Motor: No weakness  Coordination: Coordination normal        Biopsy    Date/Time: 12/19/2022 6:03 PM  Performed by: Jose Tavera MD  Authorized by: Jose Tavera MD   Universal Protocol:  Consent: Verbal consent obtained    Risks and benefits: risks, benefits and alternatives were discussed  Consent given by: patient  Patient understanding: patient states understanding of the procedure being performed  Patient consent: the patient's understanding of the procedure matches consent given  Patient identity confirmed: verbally with patient      Procedure Details - Lesion Biopsy: Body area:  [address] location:  R eyebrow    Biopsy method: shave biopsy      Biopsy tissue type: skin    Initial size (mm):  5    Final defect size (mm):  5    Malignancy: malignancy unknown    Lesion Destruction    Date/Time: 12/19/2022 6:04 PM  Performed by: Rosa Broderick MD  Authorized by: Rosa Broderick MD   Universal Protocol:  Consent: Verbal consent obtained  Risks and benefits: risks, benefits and alternatives were discussed  Consent given by: patient  Patient understanding: patient states understanding of the procedure being performed  Patient consent: the patient's understanding of the procedure matches consent given  Patient identity confirmed: verbally with patient      Procedure Details - Lesion Destruction:     Number of Lesions:  2  Lesion 1:     Body area:  Head/neck    Head/neck location:  Forehead    Initial size (mm):  3    Final defect size (mm):  3    Malignancy: benign lesion      Destruction method: cryotherapy    Lesion 2:     Body area:  Head/neck    Head/neck location:  Forehead    Initial size (mm):  5    Final defect size (mm):  5    Malignancy: benign lesion      Destruction method: cryotherapy       Patient tolerated all the procedures well          Rosa Broderick MD  12/19/22  6:07 PM

## 2022-12-21 ENCOUNTER — HOSPITAL ENCOUNTER (OUTPATIENT)
Dept: GASTROENTEROLOGY | Facility: AMBULARY SURGERY CENTER | Age: 67
Setting detail: OUTPATIENT SURGERY
Discharge: HOME | End: 2022-12-21
Attending: INTERNAL MEDICINE

## 2022-12-21 ENCOUNTER — ANESTHESIA EVENT (OUTPATIENT)
Dept: GASTROENTEROLOGY | Facility: AMBULARY SURGERY CENTER | Age: 67
End: 2022-12-21

## 2022-12-21 ENCOUNTER — ANESTHESIA (OUTPATIENT)
Dept: GASTROENTEROLOGY | Facility: AMBULARY SURGERY CENTER | Age: 67
End: 2022-12-21

## 2022-12-21 VITALS
HEART RATE: 72 BPM | SYSTOLIC BLOOD PRESSURE: 129 MMHG | DIASTOLIC BLOOD PRESSURE: 73 MMHG | OXYGEN SATURATION: 96 % | RESPIRATION RATE: 18 BRPM | TEMPERATURE: 96.4 F

## 2022-12-21 DIAGNOSIS — Z86.010 HX OF COLONIC POLYPS: ICD-10-CM

## 2022-12-21 DIAGNOSIS — K22.70 BARRETT'S ESOPHAGUS WITHOUT DYSPLASIA: ICD-10-CM

## 2022-12-21 RX ORDER — PROPOFOL 10 MG/ML
INJECTION, EMULSION INTRAVENOUS CONTINUOUS PRN
Status: DISCONTINUED | OUTPATIENT
Start: 2022-12-21 | End: 2022-12-21

## 2022-12-21 RX ORDER — PROPOFOL 10 MG/ML
INJECTION, EMULSION INTRAVENOUS AS NEEDED
Status: DISCONTINUED | OUTPATIENT
Start: 2022-12-21 | End: 2022-12-21

## 2022-12-21 RX ORDER — SODIUM CHLORIDE, SODIUM LACTATE, POTASSIUM CHLORIDE, CALCIUM CHLORIDE 600; 310; 30; 20 MG/100ML; MG/100ML; MG/100ML; MG/100ML
125 INJECTION, SOLUTION INTRAVENOUS CONTINUOUS
Status: CANCELLED | OUTPATIENT
Start: 2022-12-21

## 2022-12-21 RX ORDER — LIDOCAINE HYDROCHLORIDE 20 MG/ML
INJECTION, SOLUTION EPIDURAL; INFILTRATION; INTRACAUDAL; PERINEURAL AS NEEDED
Status: DISCONTINUED | OUTPATIENT
Start: 2022-12-21 | End: 2022-12-21

## 2022-12-21 RX ORDER — SODIUM CHLORIDE, SODIUM LACTATE, POTASSIUM CHLORIDE, CALCIUM CHLORIDE 600; 310; 30; 20 MG/100ML; MG/100ML; MG/100ML; MG/100ML
125 INJECTION, SOLUTION INTRAVENOUS CONTINUOUS
Status: DISCONTINUED | OUTPATIENT
Start: 2022-12-21 | End: 2022-12-25 | Stop reason: HOSPADM

## 2022-12-21 RX ADMIN — PROPOFOL 50 MG: 10 INJECTION, EMULSION INTRAVENOUS at 08:53

## 2022-12-21 RX ADMIN — PROPOFOL 100 MG: 10 INJECTION, EMULSION INTRAVENOUS at 08:48

## 2022-12-21 RX ADMIN — PROPOFOL 140 MCG/KG/MIN: 10 INJECTION, EMULSION INTRAVENOUS at 08:54

## 2022-12-21 RX ADMIN — SODIUM CHLORIDE, POTASSIUM CHLORIDE, SODIUM LACTATE AND CALCIUM CHLORIDE 125 ML/HR: 600; 310; 30; 20 INJECTION, SOLUTION INTRAVENOUS at 08:33

## 2022-12-21 RX ADMIN — PROPOFOL 50 MG: 10 INJECTION, EMULSION INTRAVENOUS at 08:49

## 2022-12-21 RX ADMIN — LIDOCAINE HYDROCHLORIDE 100 MG: 20 INJECTION, SOLUTION EPIDURAL; INFILTRATION; INTRACAUDAL; PERINEURAL at 08:48

## 2022-12-21 RX ADMIN — PROPOFOL 50 MG: 10 INJECTION, EMULSION INTRAVENOUS at 09:11

## 2022-12-21 NOTE — ANESTHESIA POSTPROCEDURE EVALUATION
Post-Op Assessment Note    CV Status:  Stable  Pain Score: 0    Pain management: adequate     Mental Status:  Sleepy   Hydration Status:  Stable   PONV Controlled:  None   Airway Patency:  Patent      Post Op Vitals Reviewed: Yes      Staff: Anesthesiologist, CRNA         No notable events documented      BP   116/58   Temp     Pulse  68   Resp   14   SpO2   98

## 2022-12-21 NOTE — ANESTHESIA PREPROCEDURE EVALUATION
Procedure:  COLONOSCOPY  EGD    Relevant Problems   CARDIO   (+) Chronic deep vein thrombosis (DVT) of left femoral vein (HCC)   (+) Mixed hyperlipidemia      GI/HEPATIC   (+) Gastroesophageal reflux disease with esophagitis      NEURO/PSYCH   (+) History of non-ST elevation myocardial infarction (NSTEMI)        Physical Exam    Airway    Mallampati score: III  TM Distance: >3 FB  Neck ROM: full     Dental   No notable dental hx     Cardiovascular  Rhythm: regular, Rate: normal, Cardiovascular exam normal    Pulmonary  Pulmonary exam normal Breath sounds clear to auscultation,     Other Findings        Anesthesia Plan  ASA Score- 2     Anesthesia Type- IV sedation with anesthesia with ASA Monitors  Additional Monitors:   Airway Plan:           Plan Factors-Exercise tolerance (METS): >4 METS  Chart reviewed  Existing labs reviewed  Patient summary reviewed  Patient is not a current smoker  Induction- intravenous  Postoperative Plan-     Informed Consent- Anesthetic plan and risks discussed with patient  I personally reviewed this patient with the CRNA  Discussed and agreed on the Anesthesia Plan with the CRNA  Freddy Paul

## 2022-12-21 NOTE — H&P
History and Physical - [facility]  Lonnie Haley 74 y o  male MRN: [id number]    HPI: Lonnie Haley is a 74y o  year old male who presents with Barretts, hx of colon  Polyps    Review of Systems    Historical Information   Past Medical History:   Diagnosis Date   • Calcific tendinitis 02/27/2018   • Cataracts, bilateral    • DVT (deep venous thrombosis) ([address] ) 03/2018    left calf-traveled to the lung-PE-given heparin   • GERD (gastroesophageal reflux disease)    • Hyperlipidemia    • Neck pain    • Wears glasses      Past Surgical History:   Procedure Laterality Date   • CATARACT EXTRACTION     • CERVICAL FUSION  2007    with bone spur removal also- C5,6,7 fusion plate   • COLONOSCOPY     • EPIDURAL BLOCK INJECTION N/A 11/22/2019    Procedure: C6 C7 Cervical Epidural Steroid Injection (84582); Surgeon: Romero David MD;  Location: [facility] OR;  Service: Pain Management    • HERNIA REPAIR      umbilical   • OH COLONOSCOPY FLX DX W/COLLJ SPEC WHEN PFRMD N/A 7/31/2018    Procedure: COLONOSCOPY;  Surgeon: Dulce Ashley MD;  Location: [facility] GI LAB; Service: Gastroenterology   • OH REPAIR UMBILICAL GQAT,2+L/T,ZEXOB N/A 12/10/2019    Procedure: REPAIR HERNIA UMBILICAL;  Surgeon: Mary Acevedo MD;  Location: [address] [place] [address];  Service: General   • OH XCAPSL CTRC RMVL INSJ IO LENS PROSTH W/O ECP Right 3/16/2020    Procedure: EXTRACTION EXTRACAPSULAR CATARACT PHACO INTRAOCULAR LENS (IOL); Surgeon: Brii Gonzalez MD;  Location: [facility] OR;  Service: Ophthalmology   • OH XCAPSL CTRC RMVL INSJ IO LENS PROSTH W/O ECP Left 3/23/2020    Procedure: EXTRACTION EXTRACAPSULAR CATARACT PHACO INTRAOCULAR LENS (IOL);   Surgeon: Brii Gonzalez MD;  Location: [facility] OR;  Service: Ophthalmology   • SHOULDER SURGERY Right     labrum repair, arthritis removal   • ULNAR NERVE TRANSPOSITION Right      Social History   Social History     Substance and Sexual Activity   Alcohol Use Yes    Comment: social     Social History     Substance and Sexual Activity   Drug Use No     Social History     Tobacco Use   Smoking Status Never   Smokeless Tobacco Never     Family History   Problem Relation Age of Onset   • Diabetes Father    • Heart disease Father         CHF   • Cancer Maternal Grandmother    • Cancer Maternal Grandfather    • No Known Problems Sister    • No Known Problems Brother    • No Known Problems Daughter    • No Known Problems Son    • No Known Problems Maternal Aunt    • No Known Problems Maternal Uncle    • No Known Problems Paternal Aunt    • No Known Problems Paternal Uncle    • No Known Problems Paternal Grandmother    • No Known Problems Paternal Grandfather    • Fibromyalgia Mother    • Thyroid disease unspecified Mother    • Colon cancer Neg Hx    • Liver disease Neg Hx        Meds/Allergies     (Not in a hospital admission)      No Known Allergies    Objective     /75   Pulse 86   Temp (!) 96 4 °F (35 8 °C) (Temporal)   Resp 16   SpO2 97%       PHYSICAL EXAM    Gen: NAD  CV: RRR  CHEST: Clear  ABD: soft, NT/ND  EXT: no edema  Neuro: AAO      ASSESSMENT/PLAN:  This is a 74y o  year old male here for Barretts, hx of colon  Polyps      PLAN:   Procedure: egd/colonosocpy

## 2022-12-27 ENCOUNTER — APPOINTMENT (OUTPATIENT)
Dept: LAB | Facility: HOSPITAL | Age: 67
End: 2022-12-27

## 2022-12-27 ENCOUNTER — APPOINTMENT (OUTPATIENT)
Dept: RADIOLOGY | Facility: CLINIC | Age: 67
End: 2022-12-27

## 2022-12-27 ENCOUNTER — CONSULT (OUTPATIENT)
Dept: PAIN MEDICINE | Facility: CLINIC | Age: 67
End: 2022-12-27

## 2022-12-27 VITALS
DIASTOLIC BLOOD PRESSURE: 92 MMHG | BODY MASS INDEX: 30.42 KG/M2 | HEIGHT: 74 IN | SYSTOLIC BLOOD PRESSURE: 154 MMHG | WEIGHT: 237 LBS | HEART RATE: 88 BPM

## 2022-12-27 DIAGNOSIS — M54.16 LUMBAR RADICULOPATHY: Primary | ICD-10-CM

## 2022-12-27 DIAGNOSIS — M54.16 LUMBAR RADICULOPATHY: ICD-10-CM

## 2022-12-27 DIAGNOSIS — G89.4 CHRONIC PAIN SYNDROME: ICD-10-CM

## 2022-12-27 DIAGNOSIS — I82.512 CHRONIC DEEP VEIN THROMBOSIS (DVT) OF LEFT FEMORAL VEIN (HCC): Chronic | ICD-10-CM

## 2022-12-27 DIAGNOSIS — E78.5 DYSLIPIDEMIA: ICD-10-CM

## 2022-12-27 LAB
ALBUMIN SERPL BCP-MCNC: 3.8 G/DL (ref 3.5–5)
ALP SERPL-CCNC: 71 U/L (ref 46–116)
ALT SERPL W P-5'-P-CCNC: 50 U/L (ref 12–78)
ANION GAP SERPL CALCULATED.3IONS-SCNC: 8 MMOL/L (ref 4–13)
AST SERPL W P-5'-P-CCNC: 27 U/L (ref 5–45)
BASOPHILS # BLD AUTO: 0.06 THOUSANDS/ÂΜL (ref 0–0.1)
BASOPHILS NFR BLD AUTO: 1 % (ref 0–1)
BILIRUB SERPL-MCNC: 0.55 MG/DL (ref 0.2–1)
BUN SERPL-MCNC: 15 MG/DL (ref 5–25)
CALCIUM SERPL-MCNC: 8.9 MG/DL (ref 8.3–10.1)
CHLORIDE SERPL-SCNC: 103 MMOL/L (ref 96–108)
CHOLEST SERPL-MCNC: 171 MG/DL
CO2 SERPL-SCNC: 31 MMOL/L (ref 21–32)
CREAT SERPL-MCNC: 0.86 MG/DL (ref 0.6–1.3)
EOSINOPHIL # BLD AUTO: 0.3 THOUSAND/ÂΜL (ref 0–0.61)
EOSINOPHIL NFR BLD AUTO: 4 % (ref 0–6)
ERYTHROCYTE [DISTWIDTH] IN BLOOD BY AUTOMATED COUNT: 12.6 % (ref 11.6–15.1)
GFR SERPL CREATININE-BSD FRML MDRD: 90 ML/MIN/1.73SQ M
GLUCOSE P FAST SERPL-MCNC: 99 MG/DL (ref 65–99)
HCT VFR BLD AUTO: 45.8 % (ref 36.5–49.3)
HDLC SERPL-MCNC: 58 MG/DL
HGB BLD-MCNC: 15.3 G/DL (ref 12–17)
IMM GRANULOCYTES # BLD AUTO: 0.02 THOUSAND/UL (ref 0–0.2)
IMM GRANULOCYTES NFR BLD AUTO: 0 % (ref 0–2)
LDLC SERPL CALC-MCNC: 95 MG/DL (ref 0–100)
LYMPHOCYTES # BLD AUTO: 2.05 THOUSANDS/ÂΜL (ref 0.6–4.47)
LYMPHOCYTES NFR BLD AUTO: 29 % (ref 14–44)
MCH RBC QN AUTO: 30.8 PG (ref 26.8–34.3)
MCHC RBC AUTO-ENTMCNC: 33.4 G/DL (ref 31.4–37.4)
MCV RBC AUTO: 92 FL (ref 82–98)
MONOCYTES # BLD AUTO: 0.64 THOUSAND/ÂΜL (ref 0.17–1.22)
MONOCYTES NFR BLD AUTO: 9 % (ref 4–12)
NEUTROPHILS # BLD AUTO: 4.03 THOUSANDS/ÂΜL (ref 1.85–7.62)
NEUTS SEG NFR BLD AUTO: 57 % (ref 43–75)
NRBC BLD AUTO-RTO: 0 /100 WBCS
PLATELET # BLD AUTO: 242 THOUSANDS/UL (ref 149–390)
PMV BLD AUTO: 9.7 FL (ref 8.9–12.7)
POTASSIUM SERPL-SCNC: 4.1 MMOL/L (ref 3.5–5.3)
PROT SERPL-MCNC: 7.6 G/DL (ref 6.4–8.4)
RBC # BLD AUTO: 4.96 MILLION/UL (ref 3.88–5.62)
SODIUM SERPL-SCNC: 142 MMOL/L (ref 135–147)
TRIGL SERPL-MCNC: 90 MG/DL
WBC # BLD AUTO: 7.1 THOUSAND/UL (ref 4.31–10.16)

## 2022-12-27 NOTE — PROGRESS NOTES
Assessment:  1  Lumbar radiculopathy    2  Chronic pain syndrome        Plan:  Mr Brannon is a pleasant 79-year-old male who presents for initial evaluation regarding chronic low back pain with intermittent worsening radicular symptoms into the right lower extremity  During today's evaluation he is demonstrating clinical evidence of lumbar radiculopathy in the L5 and S1 dermatomal distribution  He is already participated in several weeks of formal physical therapy program and continues with his conservative home exercises with minimal relief in his pain  At this time further diagnostic work-up will be beneficial and warranted  As such we will plan for a lumbar MRI without contrast and x-ray to evaluate for disc and spine pathology contributing to his ongoing radicular symptoms into his leg  If diagnostic imaging matches clinical presentation would plan for a epidural steroid injection favoring the right side of radicular pain  For now we will await imaging results and discussed with patient moving forward  All questions answered, patient is agreeable with plan  History of Present Illness:    Kymberly Nelson is a 74 y o  male who presents to [facility] for initial evaluation of the above stated pain complaints  The patient has a past medical and chronic pain history as outlined in the assessment section  He was referred by Max Michelle MD  [facility],  82  [facility]   Today patient presents for initial evaluation regarding 10 months duration of low back pain with radiating symptoms into the right lower extremity  Denies any significant inciting event or recent trauma  Today reports moderate to severe pain rated 7 out of 10 and interfering with activities  Pain is intermittent 30 to 60% of the time that is present throughout the day and night  Describes symptoms as sharp, throbbing, shooting, aching sensation    Denies any significant lower extremity weakness or falls  Does not use any durable medical confabulation  Symptoms are worse with bending, standing, walking  Has had no significant relief with home exercises and physical therapy  Denies smoking, marijuana use  Admits to social alcohol use  Not currently taking anything for pain and wishes to avoid medication management  Presents today for initial evaluation  Review of Systems:    Review of Systems   Constitutional: Negative for fever and unexpected weight change  HENT: Negative for trouble swallowing  Eyes: Negative for visual disturbance  Respiratory: Negative for shortness of breath and wheezing  Cardiovascular: Negative for chest pain and palpitations  Gastrointestinal: Negative for constipation, diarrhea, nausea and vomiting  Endocrine: Negative for cold intolerance, heat intolerance and polydipsia  Genitourinary: Negative for difficulty urinating and frequency  Musculoskeletal: Negative for arthralgias, gait problem, joint swelling and myalgias  Skin: Negative for rash  Neurological: Negative for dizziness, seizures, syncope, weakness and headaches  Hematological: Does not bruise/bleed easily  Psychiatric/Behavioral: Negative for dysphoric mood  Past Medical History:   Diagnosis Date   • Calcific tendinitis 02/27/2018   • Cataracts, bilateral    • DVT (deep venous thrombosis) ([facility] [address] ) 03/2018    left calf-traveled to the lung-PE-given heparin   • GERD (gastroesophageal reflux disease)    • Hyperlipidemia    • Neck pain    • Wears glasses        Past Surgical History:   Procedure Laterality Date   • CATARACT EXTRACTION     • CERVICAL FUSION  2007    with bone spur removal also- C5,6,7 fusion plate   • COLONOSCOPY     • EPIDURAL BLOCK INJECTION N/A 11/22/2019    Procedure: C6 C7 Cervical Epidural Steroid Injection (95115);   Surgeon: Justa Le MD;  Location: [facility] MAIN OR;  Service: Pain Management    • HERNIA REPAIR      umbilical   • NC COLONOSCOPY FLX DX W/COLLJ SPEC WHEN PFRMD N/A 7/31/2018    Procedure: COLONOSCOPY;  Surgeon: Werner Baumann MD;  Location: [facility] GI LAB; Service: Gastroenterology   • RI RPR UMBILICAL HRNA 5 YRS/> REDUCIBLE N/A 12/10/2019    Procedure: REPAIR HERNIA UMBILICAL;  Surgeon: Sameera Jacobson MD;  Location: [address] [place] [address];  Service: General   • RI XCAPSL CTRC RMVL INSJ IO LENS PROSTH W/O ECP Right 3/16/2020    Procedure: EXTRACTION EXTRACAPSULAR CATARACT PHACO INTRAOCULAR LENS (IOL); Surgeon: Leigh Polo MD;  Location: [facility] MAIN OR;  Service: Ophthalmology   • RI XCAPSL CTRC RMVL INSJ IO LENS PROSTH W/O ECP Left 3/23/2020    Procedure: EXTRACTION EXTRACAPSULAR CATARACT PHACO INTRAOCULAR LENS (IOL);   Surgeon: Leigh Polo MD;  Location: [facility] MAIN OR;  Service: Ophthalmology   • SHOULDER SURGERY Right     labrum repair, arthritis removal   • ULNAR NERVE TRANSPOSITION Right        Family History   Problem Relation Age of Onset   • Diabetes Father    • Heart disease Father         CHF   • Cancer Maternal Grandmother    • Cancer Maternal Grandfather    • No Known Problems Sister    • No Known Problems Brother    • No Known Problems Daughter    • No Known Problems Son    • No Known Problems Maternal Aunt    • No Known Problems Maternal Uncle    • No Known Problems Paternal Aunt    • No Known Problems Paternal Uncle    • No Known Problems Paternal Grandmother    • No Known Problems Paternal Grandfather    • Fibromyalgia Mother    • Thyroid disease unspecified Mother    • Colon cancer Neg Hx    • Liver disease Neg Hx        Social History     Occupational History   • Not on file   Tobacco Use   • Smoking status: Never   • Smokeless tobacco: Never   Substance and Sexual Activity   • Alcohol use: Yes     Comment: social   • Drug use: No   • Sexual activity: Yes     Partners: Female         Current Outpatient Medications:   •  apixaban (Eliquis) 5 mg, Take 1 tablet (5 mg total) by mouth 2 (two) times a day, Disp: 180 tablet, Rfl: 3  •  atorvastatin (LIPITOR) 20 mg tablet, Take 1 tablet (20 mg total) by mouth daily, Disp: 90 tablet, Rfl: 3  •  Multiple Vitamin (MULTIVITAMIN) tablet, Take 1 tablet by mouth 3 (three) times a week, Disp: , Rfl:   •  OMEPRAZOLE PO, Take by mouth every morning, Disp: , Rfl:     No Known Allergies    Physical Exam:    /92   Pulse 88   Ht 6' 1 5" (1 867 m)   Wt 108 kg (237 lb)   BMI 30 84 kg/m²     Constitutional: normal, well developed, well nourished, alert, in no distress and non-toxic and no overt pain behavior    Eyes: anicteric  HEENT: grossly intact  Neck: supple, symmetric, trachea midline and no masses   Pulmonary:even and unlabored  Cardiovascular:No edema or pitting edema present  Skin:Normal without rashes or lesions and well hydrated  Psychiatric:Mood and affect appropriate  Neurologic:Cranial Nerves II-XII grossly intact  Musculoskeletal:antalgic, tenderness to palpation right-sided lumbar paraspinals, decreased active and passive range of motion with lumbar flexion and extension limited by pain, MMT 5-5 bilateral lower extremities, sensation gross intact to light touch, DTRs within normal limits, positive straight leg raise in the supine position with radicular pain into the posterior lateral right leg    Imaging  MRI lumbar spine without contrast    (Results Pending)       Orders Placed This Encounter   Procedures   • MRI lumbar spine without contrast

## 2022-12-31 LAB
PATH REPORT.SITE OF ORIGIN SPEC: NORMAL
PAYMENT PROCEDURE: NORMAL
SL AMB .: NORMAL

## 2023-01-08 ENCOUNTER — HOSPITAL ENCOUNTER (OUTPATIENT)
Dept: RADIOLOGY | Facility: HOSPITAL | Age: 68
Discharge: HOME | End: 2023-01-08
Attending: PHYSICAL MEDICINE & REHABILITATION

## 2023-01-08 DIAGNOSIS — G89.4 CHRONIC PAIN SYNDROME: ICD-10-CM

## 2023-01-08 DIAGNOSIS — M54.16 LUMBAR RADICULOPATHY: ICD-10-CM

## 2023-01-12 ENCOUNTER — TELEPHONE (OUTPATIENT)
Dept: PAIN MEDICINE | Facility: MEDICAL CENTER | Age: 68
End: 2023-01-12

## 2023-01-12 NOTE — TELEPHONE ENCOUNTER
David Fragoso, DO  [facility]  Cc: Shawn Johnson  Clinical: Please notify patient of MRI lumbar spine results demonstrating multilevel degenerative disc disease and a notable L5 just S1 disc extrusion likely contributing to the ongoing radicular pain into the right leg   Adriana Mancia if patient is interested and amenable please schedule for a LESI L5-S1   Thank you

## 2023-01-12 NOTE — TELEPHONE ENCOUNTER
S/w pt and advised of same  Pt would like to proceed with scheduling an LESI as noted on previous message  Pt does take Eliquis which is prescribed by his PCP Dr Ondina Chavez hold request will need to be sent please   Thank you

## 2023-01-13 NOTE — TELEPHONE ENCOUNTER
Dear Dr Kassie Moreno has been scheduled with Dr Adelina Newman for a epidural steroid injection on 3/15/23  The patient has currently taking Eliquis, this medication needs to be held for 3 days prior to the procedure  Can the patient hold this medication for required days prior to the procedure? Thank you,    Page Ek  Procedure

## 2023-01-13 NOTE — TELEPHONE ENCOUNTER
Scheduled patient for LESI on 3/15/23  Patient is taking Eliquis- Task sent to his PCP  Nothing to eat or drink 1 hour prior to procedure  Needs to arrange transportation  Proper clothing for procedure  No vaccines 2 weeks prior or after procedure  If ill or place on antibiotics, please call to reschedule

## 2023-01-16 ENCOUNTER — OFFICE VISIT (OUTPATIENT)
Dept: FAMILY MEDICINE CLINIC | Facility: CLINIC | Age: 68
End: 2023-01-16

## 2023-01-16 VITALS
RESPIRATION RATE: 18 BRPM | TEMPERATURE: 97.8 F | DIASTOLIC BLOOD PRESSURE: 64 MMHG | HEIGHT: 74 IN | OXYGEN SATURATION: 98 % | HEART RATE: 80 BPM | WEIGHT: 237.9 LBS | SYSTOLIC BLOOD PRESSURE: 126 MMHG | BODY MASS INDEX: 30.53 KG/M2

## 2023-01-16 DIAGNOSIS — L57.0 AK (ACTINIC KERATOSIS): Primary | ICD-10-CM

## 2023-01-17 NOTE — PROGRESS NOTES
Lesion Destruction    Date/Time: 1/17/2023 1:53 PM  Performed by: Vonn Meckel, MD  Authorized by: Vonn Meckel, MD   Universal Protocol:  Consent: Verbal consent obtained    Consent given by: patient  Patient understanding: patient states understanding of the procedure being performed  Patient identity confirmed: verbally with patient      Procedure Details - Lesion Destruction:     Number of Lesions:  1  Lesion 1:     Body area:  Head/neck    Head/neck location:  Scalp    Initial size (mm):  5    Final defect size (mm):  5    Malignancy: pre-malignant lesion      Destruction method: cryotherapy       Tolerated procedure well

## 2023-01-17 NOTE — TELEPHONE ENCOUNTER
S/w the patient and reviewed the previous consent with him  Instructed that he should take his last dose of Eliquis on 3/11/23  Donot take on 3/12/23  Reviewed that if he should become sick or be put on antibiotics then he should contact us  He appreciated the CB

## 2023-01-17 NOTE — TELEPHONE ENCOUNTER
Rafael Mott to hold Eliquis for 3 days  I discussed with patient risks and benefits during our last visit   Thank you

## 2023-01-18 NOTE — TELEPHONE ENCOUNTER
Kevin Ventura has been rescheduled for 2/24/23   Please call to revise the Eliquis hold instructions

## 2023-01-18 NOTE — TELEPHONE ENCOUNTER
S/W pt advised pt LD of Eliquis to be 2/20/23 prior to procedure on 2/24  Pt verbalized understanding to not take eliquis 2/21 until advised in discharge instructions post procedure  Pt verbalized understanding and appreciative of call

## 2023-02-24 ENCOUNTER — HOSPITAL ENCOUNTER (OUTPATIENT)
Facility: AMBULARY SURGERY CENTER | Age: 68
Setting detail: OUTPATIENT SURGERY
Discharge: HOME | End: 2023-02-24
Attending: PHYSICAL MEDICINE & REHABILITATION | Admitting: PHYSICAL MEDICINE & REHABILITATION

## 2023-02-24 ENCOUNTER — APPOINTMENT (OUTPATIENT)
Dept: RADIOLOGY | Facility: HOSPITAL | Age: 68
End: 2023-02-24

## 2023-02-24 VITALS
HEART RATE: 68 BPM | SYSTOLIC BLOOD PRESSURE: 159 MMHG | RESPIRATION RATE: 18 BRPM | OXYGEN SATURATION: 99 % | DIASTOLIC BLOOD PRESSURE: 93 MMHG | TEMPERATURE: 96.7 F

## 2023-02-24 RX ORDER — METHYLPREDNISOLONE ACETATE 80 MG/ML
INJECTION, SUSPENSION INTRA-ARTICULAR; INTRALESIONAL; INTRAMUSCULAR; SOFT TISSUE AS NEEDED
Status: DISCONTINUED | OUTPATIENT
Start: 2023-02-24 | End: 2023-02-24 | Stop reason: HOSPADM

## 2023-02-24 RX ORDER — LIDOCAINE HYDROCHLORIDE 10 MG/ML
INJECTION, SOLUTION EPIDURAL; INFILTRATION; INTRACAUDAL; PERINEURAL AS NEEDED
Status: DISCONTINUED | OUTPATIENT
Start: 2023-02-24 | End: 2023-02-24 | Stop reason: HOSPADM

## 2023-02-24 NOTE — H&P
History of Present Illness: The patient is a 74 y o  male who presents with complaints of low back pain    Past Medical History:   Diagnosis Date   • Calcific tendinitis 02/27/2018   • Cataracts, bilateral    • DVT (deep venous thrombosis) ([address] ) 03/2018    left calf-traveled to the lung-PE-given heparin   • GERD (gastroesophageal reflux disease)    • Hyperlipidemia    • Neck pain    • Wears glasses        Past Surgical History:   Procedure Laterality Date   • CATARACT EXTRACTION     • CERVICAL FUSION  2007    with bone spur removal also- C5,6,7 fusion plate   • COLONOSCOPY     • EPIDURAL BLOCK INJECTION N/A 11/22/2019    Procedure: C6 C7 Cervical Epidural Steroid Injection (48252); Surgeon: Jocy Paez MD;  Location: [facility] OR;  Service: Pain Management    • HERNIA REPAIR      umbilical   • TN COLONOSCOPY FLX DX W/COLLJ SPEC WHEN PFRMD N/A 7/31/2018    Procedure: COLONOSCOPY;  Surgeon: Evelia Patel MD;  Location: [facility] GI LAB; Service: Gastroenterology   • TN RPR UMBILICAL HRNA 5 YRS/> REDUCIBLE N/A 12/10/2019    Procedure: REPAIR HERNIA UMBILICAL;  Surgeon: Lizzette Cano MD;  Location: [address] [place] [address];  Service: General   • TN XCAPSL CTRC RMVL INSJ IO LENS PROSTH W/O ECP Right 3/16/2020    Procedure: EXTRACTION EXTRACAPSULAR CATARACT PHACO INTRAOCULAR LENS (IOL); Surgeon: Karen Padgett MD;  Location: [facility] OR;  Service: Ophthalmology   • TN XCAPSL CTRC RMVL INSJ IO LENS PROSTH W/O ECP Left 3/23/2020    Procedure: EXTRACTION EXTRACAPSULAR CATARACT PHACO INTRAOCULAR LENS (IOL); Surgeon: Karen Padgett MD;  Location: [facility] OR;  Service: Ophthalmology   • SHOULDER SURGERY Right     labrum repair, arthritis removal   • ULNAR NERVE TRANSPOSITION Right        No current facility-administered medications for this encounter      No Known Allergies    Physical Exam:   Vitals:    02/24/23 0911   BP: 152/88   Pulse: 67   Resp: 18   Temp: (!) 96 7 °F (35 9 °C)   SpO2: 99%     General: Awake, Alert, Oriented x 3, Mood and affect appropriate  Respiratory: Respirations even and unlabored  Cardiovascular: Peripheral pulses intact; no edema  Musculoskeletal Exam: Tenderness palpation bilateral lumbar paraspinals    ASA Score: 2    Patient/Chart Verification  Patient ID Verified: Verbal, Armband  ID Band Applied: Yes  Consents Confirmed: Procedural  H&P( within 30 days) Verified: Yes  Interval H&P(within 24 hr) Complete (required for Outpatients and Surgery Admit only): Yes  Beta Blocker given : N/A  Pre-op Lab/Test Results Available: N/A  Does Patient Have a Prosthetic Device/Implant: Yes    Assessment: Lumbar radiculopathy  Plan: L5-S1 LESI

## 2023-02-24 NOTE — DISCHARGE INSTRUCTIONS
Epidural Steroid Injection   WHAT YOU NEED TO KNOW:   An epidural steroid injection (JOHNNIE) is a procedure to inject steroid medicine into the epidural space  The epidural space is between your spinal cord and vertebrae  Steroids reduce inflammation and fluid buildup in your spine that may be causing pain  You may be given pain medicine along with the steroids  ACTIVITY  Do not drive or operate machinery today  No strenuous activity today - bending, lifting, etc   You may resume normal activites starting tomorrow - start slowly and as tolerated  You may shower today, but no tub baths or hot tubs  You may have numbness for several hours from the local anesthetic  Please use caution and common sense, especially with weight-bearing activities  CARE OF THE INJECTION SITE  If you have soreness or pain, apply ice to the area today (20 minutes on/20 minutes off)  Starting tomorrow, you may use warm, moist heat or ice if needed  You may have an increase or change in your discomfort for 36-48 hours after your treatment  Apply ice and continue with any pain medication you have been prescribed  Notify [facility] if you have any of the following: redness, drainage, swelling, headache, stiff neck or fever above 100°F     SPECIAL INSTRUCTIONS  Our office will contact you in approximately 7 days for a progress report  MEDICATIONS  Continue to take all routine medications  Our office may have instructed you to hold some medications  As no general anesthesia was used in today's procedure, you should not experience any side effects related to anesthesia  If you are diabetic, the steroids used in today's injection may temporarily increase your blood sugar levels after the first few days after your injection  Please keep a close eye on your sugars and alert the doctor who manages your diabetes if your sugars are significantly high from your baseline or you are symptomatic       If you have a problem specifically related to your procedure, please call our office at [phone]  Problems not related to your procedure should be directed to your primary care physician

## 2023-02-24 NOTE — OP NOTE
OPERATIVE REPORT  PATIENT NAME: Ele Muñiz    :  1955  MRN: [id number]  Pt Location: [facility] MINOR/PAIN ROOM 01    SURGERY DATE: 2023    Surgeon(s) and Role:     * Jose Alberto Hernandez DO - Primary    Preop Diagnosis:  Lumbar radiculopathy [M54 16]  Chronic pain syndrome [G89 4]    Post-Op Diagnosis Codes:     * Lumbar radiculopathy [M54 16]     * Chronic pain syndrome [G89 4]    Procedure(s):  L5 S1 LUMBAR epidural steroid injection (72547)      Lumbar epidural  Indication:  Back and radiating leg pain  Preoperative diagnosis:  Lumbar radiculitis  Postoperative diagnosis:  Lumbar radiculitis    Procedure: Fluoroscopically-guided L5-S1 interlaminar epidural steroid injection under fluoroscopy    EBL:  none  Specimens:  not applicable    After discussing the risks, benefits, and alternatives to the procedure, the patient expressed understanding and wished to proceed  The patient was brought to the fluoroscopy suite and placed in the prone position  A procedural pause was conducted to verify:  correct patient identity, procedure to be performed and as applicable, correct side and site, correct patient position, and availability of implants, special equipment and special requirements  After identifying the L5-S1 space fluoroscopically, the skin was sterilely prepped and draped in the usual fashion using Chloraprep skin prep  The skin and subcutaneous tissues were anesthetized with 1% lidocaine  Utilizing a loss of resistance technique and intermittent fluoroscopic guidance, a 3 5 inch 20-gauge Tuohy needle was advanced into the epidural space  Proper needle positioning was confirmed using multiple fluoroscopic views  After negative aspiration, Omnipaque 240 contrast was injected confirming epidural spread without evidence of intravascular or intrathecal spread  A 4 ml solution consisting of 80 mg Depo-Medrol in sterile saline was injected slowly and incrementally into the epidural space  Following the injection the needle was withdrawn slightly and flushed with 1% buffered lidocaine as it was fully extracted  The patient tolerated the procedure well and there were no apparent complications  After appropriate observation, the patient was dismissed from the clinic in good condition under their own power          SIGNATURE: Batsheva Barnett,   DATE: February 24, 2023  TIME: 9:48 AM

## 2023-03-03 ENCOUNTER — TELEPHONE (OUTPATIENT)
Dept: RADIOLOGY | Facility: MEDICAL CENTER | Age: 68
End: 2023-03-03

## 2023-03-07 DIAGNOSIS — I26.09 OTHER CHRONIC PULMONARY EMBOLISM WITH ACUTE COR PULMONALE (HCC): ICD-10-CM

## 2023-03-07 DIAGNOSIS — I27.82 OTHER CHRONIC PULMONARY EMBOLISM WITH ACUTE COR PULMONALE (HCC): ICD-10-CM

## 2023-03-07 DIAGNOSIS — E78.5 DYSLIPIDEMIA: ICD-10-CM

## 2023-03-07 DIAGNOSIS — I82.512 CHRONIC DEEP VEIN THROMBOSIS (DVT) OF LEFT FEMORAL VEIN (HCC): Chronic | ICD-10-CM

## 2023-03-07 RX ORDER — ATORVASTATIN CALCIUM 20 MG/1
20 TABLET, FILM COATED ORAL DAILY
Qty: 90 TABLET | Refills: 3 | Status: SHIPPED | OUTPATIENT
Start: 2023-03-07

## 2023-03-07 NOTE — TELEPHONE ENCOUNTER
Caller: Noemy Faith   Doctor/office: Dr Christiano Solorzano   CB#: [phone]    % of improvement: 80%  Pain Scale (1-10): 0/10

## 2023-03-29 ENCOUNTER — OFFICE VISIT (OUTPATIENT)
Dept: FAMILY MEDICINE CLINIC | Facility: CLINIC | Age: 68
End: 2023-03-29

## 2023-03-29 ENCOUNTER — HOSPITAL ENCOUNTER (OUTPATIENT)
Dept: RADIOLOGY | Facility: HOSPITAL | Age: 68
Discharge: HOME | End: 2023-03-29

## 2023-03-29 VITALS
BODY MASS INDEX: 30.27 KG/M2 | HEART RATE: 72 BPM | SYSTOLIC BLOOD PRESSURE: 124 MMHG | OXYGEN SATURATION: 99 % | DIASTOLIC BLOOD PRESSURE: 74 MMHG | HEIGHT: 74 IN | WEIGHT: 235.9 LBS | RESPIRATION RATE: 16 BRPM | TEMPERATURE: 97.2 F

## 2023-03-29 DIAGNOSIS — R07.81 RIB PAIN ON RIGHT SIDE: Primary | ICD-10-CM

## 2023-03-29 DIAGNOSIS — R07.81 RIB PAIN ON RIGHT SIDE: ICD-10-CM

## 2023-03-29 PROBLEM — I27.82 CHRONIC PULMONARY EMBOLISM WITH ACUTE COR PULMONALE (HCC): Status: RESOLVED | Noted: 2018-03-06 | Resolved: 2023-03-29

## 2023-03-29 PROBLEM — I82.512 CHRONIC DEEP VEIN THROMBOSIS (DVT) OF LEFT FEMORAL VEIN (HCC): Chronic | Status: RESOLVED | Noted: 2018-03-06 | Resolved: 2023-03-29

## 2023-03-29 PROBLEM — I26.09 CHRONIC PULMONARY EMBOLISM WITH ACUTE COR PULMONALE (HCC): Status: RESOLVED | Noted: 2018-03-06 | Resolved: 2023-03-29

## 2023-03-29 PROBLEM — M46.1 SACROILIITIS (HCC): Status: RESOLVED | Noted: 2023-03-29 | Resolved: 2023-03-29

## 2023-03-29 PROBLEM — M46.1 SACROILIITIS (HCC): Status: ACTIVE | Noted: 2023-03-29

## 2023-03-29 NOTE — PROGRESS NOTES
1  Rib pain on right side  Not improved after about 6 weeks  While likely musculoskeletal given persistence will check imaging:  - XR chest pa & lateral; Future  - XR ribs 2 vw right; Future    He can try over-the-counter analgesics as needed  Report any new or worse symptoms of concern  Chief concern and HPI: Mamie Singleton is a 79 y o  male  Chief Complaint   Patient presents with   • Flank Pain     Rt sided flank pain  Started 1 5 months ago  States possible muscle strain  Aggravated with positional changes  HPI  Complain of right lateral rib ache for 4 to 6 weeks with no clear trauma or overuse history  Its about the same not worse or better  There is no deep abdominal or chest pain it is not related to exertion or cycle or breathing  If flares a little with twisting the body and feels musculoskeletal however has not gone away  No fever chills sweats unexplained weight loss or any other concerning red flag symptoms    He continues to take his Eliquis without side effects because of his history of pulmonary embolus  This does not feel like that episode at all  Previous relevant clinical information reviewed from medical, surgical and psychosocial history, medication and allergies and labs/studies      Patient Active Problem List   Diagnosis   • Mixed hyperlipidemia   • Gastroesophageal reflux disease with esophagitis   • History of non-ST elevation myocardial infarction (NSTEMI)   • Umbilical hernia without obstruction and without gangrene   • Cervical post-laminectomy syndrome   • Left cervical radiculopathy   • Lumbar radiculopathy   • Spinal stenosis in cervical region   • Dyslipidemia           Review of systems: No new or worsening:   Unexplained fever/chills/sweats/weight loss  HEENT issues such as new ear, mouth, nose or eye problems  Respiratory issues such as new shortness of breath, cough  Heart issues such as new chest pain or pressure, palpitations  Abdominal or digestive issues "such as diarrhea, constipation or blood in stool  BM's are normal  Genitourinary issues  Neurological issues such as new numbness or motor weakness  Psychological issues such as depression or anxiety  Skin issues such as new rashes        Exam:  Alert, no acute distress /74 (BP Location: Left arm, Patient Position: Sitting, Cuff Size: Standard)   Pulse 72   Temp (!) 97 2 °F (36 2 °C)   Resp 16   Ht 6' 2\" (1 88 m)   Wt 107 kg (235 lb 14 4 oz)   SpO2 99%   BMI 30 29 kg/m²   HEENT: Head normocephalic and atraumatic  Lungs: No respiratory labor  Clear to auscultation  Cardiac: Regular rate and rhythm  No murmur, rub or gallop  Abdomen: Benign  Normal active bowel sounds  Soft and non-tender  No organomegaly  He has no tenderness over the rib area of concern  No CVA tenderness  No tenderness to spinal percussion  Extremities: No cyanosis, clubbing or edema  No calf swelling or tenderness  Neuro screen: No gross deficits           Risks and benefits of therapeutic plan discussed, answered all patient questions and concerns and patient expressed understanding and agreement of therapeutic plan          "

## 2023-06-21 ENCOUNTER — OFFICE VISIT (OUTPATIENT)
Dept: FAMILY MEDICINE CLINIC | Facility: CLINIC | Age: 68
End: 2023-06-21
Payer: COMMERCIAL

## 2023-06-21 ENCOUNTER — APPOINTMENT (OUTPATIENT)
Dept: LAB | Facility: HOSPITAL | Age: 68
End: 2023-06-21
Attending: FAMILY MEDICINE
Payer: COMMERCIAL

## 2023-06-21 VITALS
BODY MASS INDEX: 31.38 KG/M2 | RESPIRATION RATE: 16 BRPM | TEMPERATURE: 97.1 F | DIASTOLIC BLOOD PRESSURE: 70 MMHG | SYSTOLIC BLOOD PRESSURE: 118 MMHG | WEIGHT: 236.8 LBS | HEIGHT: 73 IN | HEART RATE: 58 BPM | OXYGEN SATURATION: 98 %

## 2023-06-21 DIAGNOSIS — E78.5 DYSLIPIDEMIA: ICD-10-CM

## 2023-06-21 DIAGNOSIS — I26.09 OTHER CHRONIC PULMONARY EMBOLISM WITH ACUTE COR PULMONALE (HCC): ICD-10-CM

## 2023-06-21 DIAGNOSIS — Z00.00 ENCOUNTER FOR ANNUAL WELLNESS VISIT (AWV) IN MEDICARE PATIENT: Primary | ICD-10-CM

## 2023-06-21 DIAGNOSIS — R06.83 SNORING: ICD-10-CM

## 2023-06-21 DIAGNOSIS — Z00.00 ENCOUNTER FOR ANNUAL WELLNESS VISIT (AWV) IN MEDICARE PATIENT: ICD-10-CM

## 2023-06-21 DIAGNOSIS — I27.82 OTHER CHRONIC PULMONARY EMBOLISM WITH ACUTE COR PULMONALE (HCC): ICD-10-CM

## 2023-06-21 DIAGNOSIS — I82.512 CHRONIC DEEP VEIN THROMBOSIS (DVT) OF LEFT FEMORAL VEIN (HCC): Chronic | ICD-10-CM

## 2023-06-21 LAB
25(OH)D3 SERPL-MCNC: 31.3 NG/ML (ref 30–100)
ALBUMIN SERPL BCP-MCNC: 4.2 G/DL (ref 3.5–5)
ALP SERPL-CCNC: 52 U/L (ref 34–104)
ALT SERPL W P-5'-P-CCNC: 28 U/L (ref 7–52)
ANION GAP SERPL CALCULATED.3IONS-SCNC: 5 MMOL/L
AST SERPL W P-5'-P-CCNC: 15 U/L (ref 13–39)
BASOPHILS # BLD AUTO: 0.06 THOUSANDS/ÂΜL (ref 0–0.1)
BASOPHILS NFR BLD AUTO: 1 % (ref 0–1)
BILIRUB SERPL-MCNC: 0.62 MG/DL (ref 0.2–1)
BUN SERPL-MCNC: 13 MG/DL (ref 5–25)
CALCIUM SERPL-MCNC: 8.6 MG/DL (ref 8.4–10.2)
CHLORIDE SERPL-SCNC: 106 MMOL/L (ref 96–108)
CHOLEST SERPL-MCNC: 138 MG/DL
CO2 SERPL-SCNC: 29 MMOL/L (ref 21–32)
CREAT SERPL-MCNC: 0.8 MG/DL (ref 0.6–1.3)
EOSINOPHIL # BLD AUTO: 0.19 THOUSAND/ÂΜL (ref 0–0.61)
EOSINOPHIL NFR BLD AUTO: 3 % (ref 0–6)
ERYTHROCYTE [DISTWIDTH] IN BLOOD BY AUTOMATED COUNT: 13.1 % (ref 11.6–15.1)
FOLATE SERPL-MCNC: 7.8 NG/ML
GFR SERPL CREATININE-BSD FRML MDRD: 93 ML/MIN/1.73SQ M
GLUCOSE P FAST SERPL-MCNC: 95 MG/DL (ref 65–99)
HCT VFR BLD AUTO: 41.7 % (ref 36.5–49.3)
HDLC SERPL-MCNC: 56 MG/DL
HGB BLD-MCNC: 14.1 G/DL (ref 12–17)
IMM GRANULOCYTES # BLD AUTO: 0.03 THOUSAND/UL (ref 0–0.2)
IMM GRANULOCYTES NFR BLD AUTO: 1 % (ref 0–2)
LDLC SERPL CALC-MCNC: 63 MG/DL (ref 0–100)
LYMPHOCYTES # BLD AUTO: 1.74 THOUSANDS/ÂΜL (ref 0.6–4.47)
LYMPHOCYTES NFR BLD AUTO: 29 % (ref 14–44)
MAGNESIUM SERPL-MCNC: 1.9 MG/DL (ref 1.9–2.7)
MCH RBC QN AUTO: 31.6 PG (ref 26.8–34.3)
MCHC RBC AUTO-ENTMCNC: 33.8 G/DL (ref 31.4–37.4)
MCV RBC AUTO: 94 FL (ref 82–98)
MONOCYTES # BLD AUTO: 0.55 THOUSAND/ÂΜL (ref 0.17–1.22)
MONOCYTES NFR BLD AUTO: 9 % (ref 4–12)
NEUTROPHILS # BLD AUTO: 3.43 THOUSANDS/ÂΜL (ref 1.85–7.62)
NEUTS SEG NFR BLD AUTO: 57 % (ref 43–75)
NRBC BLD AUTO-RTO: 0 /100 WBCS
PLATELET # BLD AUTO: 195 THOUSANDS/UL (ref 149–390)
PMV BLD AUTO: 9.7 FL (ref 8.9–12.7)
POTASSIUM SERPL-SCNC: 3.9 MMOL/L (ref 3.5–5.3)
PROT SERPL-MCNC: 6.3 G/DL (ref 6.4–8.4)
RBC # BLD AUTO: 4.46 MILLION/UL (ref 3.88–5.62)
SODIUM SERPL-SCNC: 140 MMOL/L (ref 135–147)
TRIGL SERPL-MCNC: 95 MG/DL
TSH SERPL DL<=0.05 MIU/L-ACNC: 0.9 UIU/ML (ref 0.45–4.5)
VIT B12 SERPL-MCNC: 154 PG/ML (ref 180–914)
WBC # BLD AUTO: 6 THOUSAND/UL (ref 4.31–10.16)

## 2023-06-21 PROCEDURE — 82306 VITAMIN D 25 HYDROXY: CPT

## 2023-06-21 PROCEDURE — 82746 ASSAY OF FOLIC ACID SERUM: CPT

## 2023-06-21 PROCEDURE — 36415 COLL VENOUS BLD VENIPUNCTURE: CPT

## 2023-06-21 PROCEDURE — 85025 COMPLETE CBC W/AUTO DIFF WBC: CPT

## 2023-06-21 PROCEDURE — 82607 VITAMIN B-12: CPT

## 2023-06-21 PROCEDURE — 80053 COMPREHEN METABOLIC PANEL: CPT

## 2023-06-21 PROCEDURE — 83735 ASSAY OF MAGNESIUM: CPT

## 2023-06-21 PROCEDURE — 80061 LIPID PANEL: CPT

## 2023-06-21 PROCEDURE — 84443 ASSAY THYROID STIM HORMONE: CPT

## 2023-06-21 PROCEDURE — G0439 PPPS, SUBSEQ VISIT: HCPCS | Performed by: FAMILY MEDICINE

## 2023-06-21 RX ORDER — ATORVASTATIN CALCIUM 20 MG/1
20 TABLET, FILM COATED ORAL DAILY
Qty: 90 TABLET | Refills: 3 | Status: SHIPPED | OUTPATIENT
Start: 2023-06-21

## 2023-06-21 NOTE — PROGRESS NOTES
Assessment and Plan:     Problem List Items Addressed This Visit        Other    Dyslipidemia    Relevant Medications    atorvastatin (LIPITOR) 20 mg tablet    Other Relevant Orders    Lipid Panel with Direct LDL reflex (Completed)   Other Visit Diagnoses     Encounter for annual wellness visit (AWV) in Medicare patient    -  Primary    Relevant Orders    CBC and differential (Completed)    Comprehensive metabolic panel (Completed)    Snoring        Relevant Orders    Diagnostic Sleep Study    Vitamin B12    Folate    Vitamin D 25 hydroxy    TSH, 3rd generation with Free T4 reflex (Completed)    Magnesium (Completed)    Other chronic pulmonary embolism with acute cor pulmonale (HCC)        Relevant Medications    apixaban (Eliquis) 5 mg    Chronic deep vein thrombosis (DVT) of left femoral vein (HCC)  (Chronic)       Relevant Medications    apixaban (Eliquis) 5 mg           Preventive health issues were discussed with patient, and age appropriate screening tests were ordered as noted in patient's After Visit Summary  Personalized health advice and appropriate referrals for health education or preventive services given if needed, as noted in patient's After Visit Summary  History of Present Illness:   Patient presents for a Medicare Wellness Visit    HPI   Patient Care Team:  Shadia King MD as PCP - General (Family Medicine)  Shadia King MD as PCP - [address] [place] [address] (RTE)  Bryanna Roy MD (Vascular Surgery)  All Horvath MD (Gastroenterology)  All Horvath MD as Endoscopist     Review of Systems:   Review of Systems   All other systems reviewed and are negative         Problem List:     Patient Active Problem List   Diagnosis   • Mixed hyperlipidemia   • Gastroesophageal reflux disease with esophagitis   • History of non-ST elevation myocardial infarction (NSTEMI)   • Umbilical hernia without obstruction and without gangrene   • Cervical post-laminectomy syndrome   • Left cervical radiculopathy   • Lumbar radiculopathy   • Spinal stenosis in cervical region   • Dyslipidemia      Past Medical and Surgical History:     Past Medical History:   Diagnosis Date   • Calcific tendinitis 02/27/2018   • Cataracts, bilateral    • Chronic deep vein thrombosis (DVT) of left femoral vein ([facility] ) 3/6/2018   • Chronic pulmonary embolism with acute cor pulmonale ([facility] ) 3/6/2018   • DVT (deep venous thrombosis) ([facility] ) 03/2018    left calf-traveled to the lung-PE-given heparin   • GERD (gastroesophageal reflux disease)    • Hyperlipidemia    • Neck pain    • Sacroiliitis ([facility] ) 3/29/2023   • Wears glasses      Past Surgical History:   Procedure Laterality Date   • CATARACT EXTRACTION     • CERVICAL FUSION  2007    with bone spur removal also- C5,6,7 fusion plate   • COLONOSCOPY     • EPIDURAL BLOCK INJECTION N/A 11/22/2019    Procedure: C6 C7 Cervical Epidural Steroid Injection (66936); Surgeon: Umer Camacho MD;  Location: [facility] MAIN OR;  Service: Pain Management    • HERNIA REPAIR      umbilical   • LUMBAR EPIDURAL INJECTION N/A 2/24/2023    Procedure: L5 S1 LUMBAR epidural steroid injection (59891); Surgeon: Gerardo Zhang DO;  Location: [facility] MAIN OR;  Service: Pain Management    • MD COLONOSCOPY FLX DX W/COLLJ SPEC WHEN PFRMD N/A 7/31/2018    Procedure: COLONOSCOPY;  Surgeon: Damian Suggs MD;  Location: [facility] GI LAB; Service: Gastroenterology   • MD RPR UMBILICAL HRNA 5 YRS/> REDUCIBLE N/A 12/10/2019    Procedure: REPAIR HERNIA UMBILICAL;  Surgeon: Belen Abrams MD;  Location: [address] [place] [address];  Service: General   • MD XCAPSL CTRC RMVL INSJ IO LENS PROSTH W/O ECP Right 3/16/2020    Procedure: EXTRACTION EXTRACAPSULAR CATARACT PHACO INTRAOCULAR LENS (IOL); Surgeon: Edgard Barros MD;  Location: [facility] MAIN OR;  Service: Ophthalmology   • MD XCAPSL CTRC RMVL INSJ IO LENS PROSTH W/O ECP Left 3/23/2020    Procedure: EXTRACTION EXTRACAPSULAR CATARACT PHACO INTRAOCULAR LENS (IOL);   Surgeon: Edgard Barros MD;  Location: [facility] MAIN OR;  Service: Ophthalmology   • SHOULDER SURGERY Right     labrum repair, arthritis removal   • ULNAR NERVE TRANSPOSITION Right       Family History:     Family History   Problem Relation Age of Onset   • Diabetes Father    • Heart disease Father         CHF   • Cancer Maternal Grandmother    • Cancer Maternal Grandfather    • No Known Problems Sister    • No Known Problems Brother    • No Known Problems Daughter    • No Known Problems Son    • No Known Problems Maternal Aunt    • No Known Problems Maternal Uncle    • No Known Problems Paternal Aunt    • No Known Problems Paternal Uncle    • No Known Problems Paternal Grandmother    • No Known Problems Paternal Grandfather    • Fibromyalgia Mother    • Thyroid disease unspecified Mother    • Colon cancer Neg Hx    • Liver disease Neg Hx       Social History:     Social History     Socioeconomic History   • Marital status: /Civil Union     Spouse name: None   • Number of children: None   • Years of education: None   • Highest education level: None   Occupational History   • None   Tobacco Use   • Smoking status: Never   • Smokeless tobacco: Never   Substance and Sexual Activity   • Alcohol use: Yes     Comment: social   • Drug use: No   • Sexual activity: Yes     Partners: Female   Other Topics Concern   • None   Social History Narrative   • None     Social Determinants of Health     Financial Resource Strain: Low Risk  (6/14/2023)    Overall Financial Resource Strain (CARDIA)    • Difficulty of Paying Living Expenses: Not hard at all   Food Insecurity: Not on file   Transportation Needs: No Transportation Needs (6/14/2023)    PRAPARE - Transportation    • Lack of Transportation (Medical): No    • Lack of Transportation (Non-Medical):  No   Physical Activity: Not on file   Stress: Not on file   Social Connections: Not on file   Intimate Partner Violence: Not on file   Housing Stability: Not on file      Medications and Allergies:     Current Outpatient Medications   Medication Sig Dispense Refill   • apixaban (Eliquis) 5 mg Take 1 tablet (5 mg total) by mouth 2 (two) times a day 180 tablet 3   • atorvastatin (LIPITOR) 20 mg tablet Take 1 tablet (20 mg total) by mouth daily 90 tablet 3   • OMEPRAZOLE PO Take by mouth every morning     • Multiple Vitamin (MULTIVITAMIN) tablet Take 1 tablet by mouth 3 (three) times a week       No current facility-administered medications for this visit  No Known Allergies   Immunizations:     Immunization History   Administered Date(s) Administered   • COVID-19 PFIZER VACCINE 0 3 ML IM 03/23/2021, 04/13/2021, 12/14/2021   • INFLUENZA 10/24/2022   • Influenza, recombinant, quadrivalent,injectable, preservative free 09/18/2018, 10/22/2019, 12/18/2020   • Pneumococcal Conjugate Vaccine 20-valent (Pcv20), Polysace 05/06/2022   • Tdap 12/30/2009   • Zoster 02/20/2018   • Zoster Vaccine Recombinant 02/16/2022, 05/03/2022      Health Maintenance:         Topic Date Due   • Colorectal Cancer Screening  12/20/2027   • Hepatitis C Screening  Completed         Topic Date Due   • COVID-19 Vaccine (4 - Pfizer series) 02/08/2022      Medicare Screening Tests and Risk Assessments:     Guillermina Ashby is here for his Subsequent Wellness visit  Last Medicare Wellness visit information reviewed, patient interviewed and updates made to the record today  Health Risk Assessment:   Patient rates overall health as very good  Patient feels that their physical health rating is slightly better  Patient is very satisfied with their life  Eyesight was rated as same  Hearing was rated as same  Patient feels that their emotional and mental health rating is same  Patients states they are never, rarely angry  Patient states they are sometimes unusually tired/fatigued  Pain experienced in the last 7 days has been some  Patient's pain rating has been 7/10  Patient states that he has experienced no weight loss or gain in last 6 months  Fall Risk Screening:    In the past year, patient has experienced: no history of falling in past year      Home Safety:  Patient does not have trouble with stairs inside or outside of their home  Patient has working smoke alarms and has working carbon monoxide detector  Home safety hazards include: none  Nutrition:   Current diet is Regular  Medications:   Patient is not currently taking any over-the-counter supplements  Patient is able to manage medications  Activities of Daily Living (ADLs)/Instrumental Activities of Daily Living (IADLs):   Walk and transfer into and out of bed and chair?: Yes  Dress and groom yourself?: Yes    Bathe or shower yourself?: Yes    Feed yourself? Yes  Do your laundry/housekeeping?: Yes  Manage your money, pay your bills and track your expenses?: Yes  Make your own meals?: Yes    Do your own shopping?: Yes    Previous Hospitalizations:   Any hospitalizations or ED visits within the last 12 months?: No      Advance Care Planning:   Living will: Yes    Durable POA for healthcare: Yes    Advanced directive: Yes      PREVENTIVE SCREENINGS      Cardiovascular Screening:    General: Screening Not Indicated and History Lipid Disorder      Diabetes Screening:     General: Screening Current      Colorectal Cancer Screening:     General: Screening Current      Abdominal Aortic Aneurysm (AAA) Screening:    Risk factors include: age between 73-69 yo        Lung Cancer Screening:     General: Screening Not Indicated      Hepatitis C Screening:    General: Screening Current    Screening, Brief Intervention, and Referral to Treatment (SBIRT)    Screening  Typical number of drinks in a day: 1  Typical number of drinks in a week: 7  Interpretation: Low risk drinking behavior  AUDIT-C Screenin) How often did you have a drink containing alcohol in the past year? 2 to 3 times a week  2) How many drinks did you have on a typical day when you were drinking in the past year?  1 to 2  3) How often did you have 6 or "more drinks on one occasion in the past year? less than monthly    AUDIT-C Score: 4  Interpretation: Score 4-12 (male): POSITIVE screen for alcohol misuse    AUDIT Screenin) How often during the last year have you found that you were not able to stop drinking once you had started? 0 - never  5) How often during the last year have you failed to do what was normally expected from you because of drinking? 0 - never  6) How often during the last year have you needed a first drink in the morning to get yourself going after a heavy drinking session? 0 - never  7) How often during the last year have you had a feeling of guilt or remorse after drinking? 0 - never  8) How often during the last year have you been unable to remember what happened the night before because you had been drinking? 0 - never  9) Have you or someone else been injured as a result of your drinking? 0 - no  10) Has a relative or friend or a doctor or another health worker been concerned about your drinking or suggested you cut down? 0 - no    AUDIT Score: 4  Interpretation: Low risk alcohol consumption    Single Item Drug Screening:  How often have you used an illegal drug (including marijuana) or a prescription medication for non-medical reasons in the past year? never    Single Item Drug Screen Score: 0  Interpretation: Negative screen for possible drug use disorder    No results found  Physical Exam:     /70   Pulse 58   Temp (!) 97 1 °F (36 2 °C)   Resp 16   Ht 6' 1\" (1 854 m)   Wt 107 kg (236 lb 12 8 oz)   SpO2 98%   BMI 31 24 kg/m²     Physical Exam  Vitals and nursing note reviewed  Constitutional:       General: He is not in acute distress  Appearance: Normal appearance  He is well-developed  He is not ill-appearing  HENT:      Head: Normocephalic and atraumatic        Nose: Nose normal       Mouth/Throat:      Mouth: Mucous membranes are moist    Eyes:      Conjunctiva/sclera: Conjunctivae normal       Pupils: " Pupils are equal, round, and reactive to light  Cardiovascular:      Rate and Rhythm: Normal rate and regular rhythm  Heart sounds: No murmur heard  Pulmonary:      Effort: Pulmonary effort is normal  No respiratory distress  Breath sounds: Normal breath sounds  Abdominal:      General: Abdomen is flat  Palpations: Abdomen is soft  Tenderness: There is no abdominal tenderness  There is no right CVA tenderness or left CVA tenderness  Musculoskeletal:         General: No swelling  Normal range of motion  Cervical back: Normal range of motion and neck supple  Right lower leg: No edema  Left lower leg: No edema  Lymphadenopathy:      Cervical: No cervical adenopathy  Skin:     General: Skin is warm and dry  Capillary Refill: Capillary refill takes less than 2 seconds  Neurological:      General: No focal deficit present  Mental Status: He is alert     Psychiatric:         Mood and Affect: Mood normal         Jaz House MD

## 2023-06-29 ENCOUNTER — TELEPHONE (OUTPATIENT)
Dept: FAMILY MEDICINE CLINIC | Facility: CLINIC | Age: 68
End: 2023-06-29

## 2023-06-29 ENCOUNTER — OFFICE VISIT (OUTPATIENT)
Dept: URGENT CARE | Facility: CLINIC | Age: 68
End: 2023-06-29
Payer: COMMERCIAL

## 2023-06-29 ENCOUNTER — APPOINTMENT (OUTPATIENT)
Dept: RADIOLOGY | Facility: CLINIC | Age: 68
End: 2023-06-29
Payer: COMMERCIAL

## 2023-06-29 VITALS
OXYGEN SATURATION: 98 % | HEART RATE: 91 BPM | WEIGHT: 231 LBS | TEMPERATURE: 97.2 F | BODY MASS INDEX: 30.48 KG/M2 | RESPIRATION RATE: 14 BRPM

## 2023-06-29 DIAGNOSIS — T14.90XA INJURY: ICD-10-CM

## 2023-06-29 DIAGNOSIS — M70.42 PREPATELLAR BURSITIS, LEFT KNEE: Primary | ICD-10-CM

## 2023-06-29 PROCEDURE — 20610 DRAIN/INJ JOINT/BURSA W/O US: CPT | Performed by: FAMILY MEDICINE

## 2023-06-29 PROCEDURE — 73564 X-RAY EXAM KNEE 4 OR MORE: CPT

## 2023-06-29 PROCEDURE — 99214 OFFICE O/P EST MOD 30 MIN: CPT | Performed by: FAMILY MEDICINE

## 2023-06-29 RX ORDER — LIDOCAINE HYDROCHLORIDE 10 MG/ML
4 INJECTION, SOLUTION EPIDURAL; INFILTRATION; INTRACAUDAL; PERINEURAL
Status: COMPLETED | OUTPATIENT
Start: 2023-06-29 | End: 2023-06-29

## 2023-06-29 RX ADMIN — LIDOCAINE HYDROCHLORIDE 4 ML: 10 INJECTION, SOLUTION EPIDURAL; INFILTRATION; INTRACAUDAL; PERINEURAL at 16:00

## 2023-06-29 NOTE — TELEPHONE ENCOUNTER
"Patient is requesting a call back regarding knee pain and swelling due to injury  Informed patient he would need to be seen in office  No available appointments to offer at this time  Recommended patient go to [facility] provided office hours and phone number  Dr Savage Milner-  Patient would like for you to give him a call back to discuss something states \"I value his opinion\"    "

## 2023-06-29 NOTE — PROGRESS NOTES
[facility]        NAME: Rubén Barron is a 79 y o  male  : 1955    MRN: [id number]  DATE: 2023  TIME: 5:04 PM    Assessment and Plan   Prepatellar bursitis, left knee [M70 42]  1  Prepatellar bursitis, left knee  XR knee 4+ vw left injury    Large joint arthrocentesis: L prepatellar bursa    Ambulatory Referral to Orthopedic Surgery    lidocaine (PF) (XYLOCAINE-MPF) 1 % injection 4 mL            Patient Instructions     Attempted to drain prepatellar bursitis at today's visit  Patient had been aggressively icing the knee prior to arrival  During the procedure scant blood was obtained, however no significant drainage was obtained  Probable that the icing caused thickening of the intra-bursal fluid  Recommend follow up with orthopedics tomorrow for repeat attempt at drainage  Advised patient that he may ice the knee overnight, but should not ice immediately prior to ortho appt  Thick dressing applied to knee to catch any drainage as knee thaws  Follow up with orthopedics tomorrow  Chief Complaint     Chief Complaint   Patient presents with   • Pain     Pt presents with left knee swelling r/t tripping over baseball field base today; tender to touch, limited ROM with bending of knee         History of Present Illness       72-year-old male presents today complaining of left knee swelling  He states that he fell on top of a baseball playing softball, landing with his knee onto the bag  He states that his knee instantly swelled up  He notes a large pocket of swelling on the anterior aspect  He states that it does not hurt, but feels tight when he walks  He denies any pain within the knee  He denies any instability  He states that he has been icing it since the injury  He presents today noting that he is traveling this weekend and would like to have it addressed as soon as possible  He is requesting that it be drained today        Review of Systems   Review of Systems Constitutional: Negative for chills, fatigue and fever  HENT: Negative for postnasal drip and sore throat  Respiratory: Negative for cough and shortness of breath  Cardiovascular: Negative for chest pain and palpitations  Gastrointestinal: Negative for abdominal pain, nausea and vomiting  Genitourinary: Negative for dysuria  Musculoskeletal: Positive for joint swelling  Negative for gait problem  Skin: Negative for rash  Neurological: Negative for dizziness, syncope, light-headedness, numbness and headaches  Psychiatric/Behavioral: Negative for agitation and confusion  All other systems reviewed and are negative  Current Medications       Current Outpatient Medications:   •  apixaban (Eliquis) 5 mg, Take 1 tablet (5 mg total) by mouth 2 (two) times a day, Disp: 180 tablet, Rfl: 3  •  atorvastatin (LIPITOR) 20 mg tablet, Take 1 tablet (20 mg total) by mouth daily, Disp: 90 tablet, Rfl: 3  •  OMEPRAZOLE PO, Take by mouth every morning, Disp: , Rfl:   •  Multiple Vitamin (MULTIVITAMIN) tablet, Take 1 tablet by mouth 3 (three) times a week, Disp: , Rfl:   No current facility-administered medications for this visit      Current Allergies     Allergies as of 06/29/2023   • (No Known Allergies)            The following portions of the patient's history were reviewed and updated as appropriate: allergies, current medications, past family history, past medical history, past social history, past surgical history and problem list      Past Medical History:   Diagnosis Date   • Calcific tendinitis 02/27/2018   • Cataracts, bilateral    • Chronic deep vein thrombosis (DVT) of left femoral vein ([facility] ) 3/6/2018   • Chronic pulmonary embolism with acute cor pulmonale ([facility] ) 3/6/2018   • DVT (deep venous thrombosis) ([facility] ) 03/2018    left calf-traveled to the lung-PE-given heparin   • GERD (gastroesophageal reflux disease)    • Hyperlipidemia    • Neck pain    • Sacroiliitis ([facility] ) 3/29/2023   • Wears glasses        Past Surgical History:   Procedure Laterality Date   • CATARACT EXTRACTION     • CERVICAL FUSION  2007    with bone spur removal also- C5,6,7 fusion plate   • COLONOSCOPY     • EPIDURAL BLOCK INJECTION N/A 11/22/2019    Procedure: C6 C7 Cervical Epidural Steroid Injection (82835); Surgeon: Hillary Ta MD;  Location: [facility] OR;  Service: Pain Management    • HERNIA REPAIR      umbilical   • LUMBAR EPIDURAL INJECTION N/A 2/24/2023    Procedure: L5 S1 LUMBAR epidural steroid injection (03146); Surgeon: Jacinta Currie DO;  Location: [facility] OR;  Service: Pain Management    • TN COLONOSCOPY FLX DX W/COLLJ SPEC WHEN PFRMD N/A 7/31/2018    Procedure: COLONOSCOPY;  Surgeon: Ghazal Ye MD;  Location: [place] [address] GI LAB; Service: Gastroenterology   • TN RPR UMBILICAL HRNA 5 YRS/> REDUCIBLE N/A 12/10/2019    Procedure: REPAIR HERNIA UMBILICAL;  Surgeon: Clarice Dockery MD;  Location: [address] [place] [address];  Service: General   • TN XCAPSL CTRC RMVL INSJ IO LENS PROSTH W/O ECP Right 3/16/2020    Procedure: EXTRACTION EXTRACAPSULAR CATARACT PHACO INTRAOCULAR LENS (IOL); Surgeon: Melva Boyle MD;  Location: [facility] OR;  Service: Ophthalmology   • TN XCAPSL CTRC RMVL INSJ IO LENS PROSTH W/O ECP Left 3/23/2020    Procedure: EXTRACTION EXTRACAPSULAR CATARACT PHACO INTRAOCULAR LENS (IOL);   Surgeon: Melva Boyle MD;  Location: [facility] OR;  Service: Ophthalmology   • SHOULDER SURGERY Right     labrum repair, arthritis removal   • ULNAR NERVE TRANSPOSITION Right        Family History   Problem Relation Age of Onset   • Fibromyalgia Mother    • Thyroid disease unspecified Mother    • Diabetes Father    • Heart disease Father         CHF   • No Known Problems Sister    • No Known Problems Brother    • No Known Problems Daughter    • No Known Problems Son    • No Known Problems Maternal Aunt    • No Known Problems Maternal Uncle    • No Known Problems Paternal Aunt    • No Known Problems Paternal Uncle    • Cancer Maternal Grandmother    • Cancer Maternal Grandfather    • No Known Problems Paternal Grandmother    • No Known Problems Paternal Grandfather    • Colon cancer Neg Hx    • Liver disease Neg Hx          Medications have been verified  Objective   Pulse 91   Temp (!) 97 2 °F (36 2 °C)   Resp 14   Wt 105 kg (231 lb)   SpO2 98%   BMI 30 48 kg/m²   No LMP for male patient  Physical Exam     Physical Exam  Vitals reviewed  Constitutional:       General: He is not in acute distress  Appearance: Normal appearance  He is not ill-appearing  HENT:      Head: Normocephalic and atraumatic  Eyes:      Extraocular Movements: Extraocular movements intact  Conjunctiva/sclera: Conjunctivae normal    Musculoskeletal:         General: Tenderness and signs of injury present  Cervical back: Normal range of motion  Right knee: Swelling present  No effusion or ecchymosis  No tenderness  No LCL laxity, MCL laxity, ACL laxity or PCL laxity  Instability Tests: Anterior drawer test negative  Posterior drawer test negative  Anterior Lachman test negative  Skin:     General: Skin is warm  Neurological:      General: No focal deficit present  Mental Status: He is alert  Psychiatric:         Mood and Affect: Mood normal          Behavior: Behavior normal          Judgment: Judgment normal        Large joint arthrocentesis: L prepatellar bursa  Universal Protocol:  Consent: Verbal consent obtained    Risks and benefits: risks, benefits and alternatives were discussed  Consent given by: patient  Patient understanding: patient states understanding of the procedure being performed  Patient identity confirmed: verbally with patient    Supporting Documentation  Indications: joint swelling   Procedure Details  Location: knee - L prepatellar bursa  Needle size: 18 G  Ultrasound guidance: no  Approach: anterolateral  Medications administered: 4 mL lidocaine (PF) 1 %    Aspirate amount: 0 5 mL  Aspirate: bloody    Patient tolerance: patient tolerated the procedure well with no immediate complications    Attempted to aspirate x2 with no success  Patient wrapped with gauze and a compression wrap  Able to ambulate pain-free following the visit

## 2023-06-30 ENCOUNTER — OFFICE VISIT (OUTPATIENT)
Dept: OBGYN CLINIC | Facility: CLINIC | Age: 68
End: 2023-06-30
Payer: COMMERCIAL

## 2023-06-30 ENCOUNTER — TELEPHONE (OUTPATIENT)
Dept: OBGYN CLINIC | Facility: HOSPITAL | Age: 68
End: 2023-06-30

## 2023-06-30 VITALS
BODY MASS INDEX: 31 KG/M2 | SYSTOLIC BLOOD PRESSURE: 132 MMHG | HEART RATE: 68 BPM | TEMPERATURE: 98.2 F | DIASTOLIC BLOOD PRESSURE: 75 MMHG | WEIGHT: 235 LBS

## 2023-06-30 DIAGNOSIS — S80.02XA TRAUMATIC HEMATOMA OF LEFT KNEE, INITIAL ENCOUNTER: Primary | ICD-10-CM

## 2023-06-30 DIAGNOSIS — M70.42 PREPATELLAR BURSITIS, LEFT KNEE: ICD-10-CM

## 2023-06-30 PROCEDURE — 99203 OFFICE O/P NEW LOW 30 MIN: CPT | Performed by: ORTHOPAEDIC SURGERY

## 2023-06-30 RX ORDER — CEPHALEXIN 500 MG/1
500 CAPSULE ORAL EVERY 6 HOURS SCHEDULED
Qty: 40 CAPSULE | Refills: 0 | Status: SHIPPED | OUTPATIENT
Start: 2023-06-30 | End: 2023-07-10

## 2023-06-30 NOTE — PROGRESS NOTES
Ortho Sports Medicine New Patient Visit     Assesment:   79 y o  male with left prepatellar hematoma    Plan:    I had a long discussion with the patient regarding this injury and treatment plan  The situation is slightly complicated due to his plan to leave the country in 2 days  I explained that the exam today is most consistent with a hematoma overlying his patella that was caused by the injury but made possible by the fact that he is on Eliquis  I recommended against trying to drain this again as it was not successful likely due to the blood being coagulated in the collection  At this point I do not believe the patient requires surgery to evacuate the hematoma at this time as the body should slowly reabsorb the blood on its own and there is no indication of infection currently  We did discuss briefly discontinuing his Eliquis to stop any potential further bleeding and help the body handle the hematoma on its own, however, the patient is leaving for [facility] for 2 weeks in 2 days, so I do not recommend stopping the Eliquis with his plans for prolonged travel  As such, I recommended the patient ice and use compression to help address the swelling  I explained that the hematoma may take a long time to gradually decrease in size  Also, we discussed the importance of continuing to monitor for red flag symptoms, including increasing hematoma size/swelling, erythema, or warmth, as these are signs of infection  If the patient notices these symptoms prior to leaving, he should go to the hospital here  However, the patient should also seek medical care while in [facility] if these signs develop  I recommended the patient fill a 10-day prescription of Keflex to bring with him in case infection develops, but he does not need to take the antibiotics preventatively as there is no current sign of infection  If the patient feels he needs to take the antibiotic, he should seek medical attention on vacation   We also discussed that it is possible for the overlying skin to become pale or even black due to lack of blood supply, which is unlikely, but another reason the patient should seek immediate medical attention  The patient can plan to follow up when he returns from vacation if the hematoma is not improving  We can also further evaluate his knee at that time as the swelling decreases  Follow up:    3 weeks when the patient returns from vacation      Chief Complaint   Patient presents with   • Left Knee - Pain, Swelling       History of Present Illness: The patient is a 79 y o  male presenting with acute, traumatic left knee pain  The patient was playing in a softball game yesterday when he tripped over second base and landed directly on his knee  He had immediate pain, which he reports is difficult to localize today, and swelling that is localized to the anterior and medial aspect of the knee  The patient is on Eliquis for chronic PE and DVT, and notes bruising as well as abrasions along the anterior aspect of the leg  Ambulating is difficult secondary to pain and swelling  Gabe Selby denies history of knee injuries, locking episodes, instability, numbness/tingling, warmth, erythema, or drainage from any abrasion  He has tried ice, Tylenol, and NSAIDs so far  He reports he only sparingly uses ibuprofen for severe pain and is aware that NSAID use increases his risk of bleeding while on a blood thinner  He did go to urgent care yesterday and they tried to drain the fluid collection  There was a small amount of blood but this was overall unsuccessful      Knee Surgical History:  None    Past Medical, Social and Family History:  Past Medical History:   Diagnosis Date   • Calcific tendinitis 02/27/2018   • Cataracts, bilateral    • Chronic deep vein thrombosis (DVT) of left femoral vein ([facility] ) 3/6/2018   • Chronic pulmonary embolism with acute cor pulmonale ([facility] ) 3/6/2018   • DVT (deep venous thrombosis) ([facility] ) 03/2018    left calf-traveled to the lung-PE-given heparin   • GERD (gastroesophageal reflux disease)    • Hyperlipidemia    • Neck pain    • Sacroiliitis ([address] ) 3/29/2023   • Wears glasses      Past Surgical History:   Procedure Laterality Date   • CATARACT EXTRACTION     • CERVICAL FUSION  2007    with bone spur removal also- C5,6,7 fusion plate   • COLONOSCOPY     • EPIDURAL BLOCK INJECTION N/A 11/22/2019    Procedure: C6 C7 Cervical Epidural Steroid Injection (15638); Surgeon: Akil East MD;  Location: [facility] MAIN OR;  Service: Pain Management    • HERNIA REPAIR      umbilical   • LUMBAR EPIDURAL INJECTION N/A 2/24/2023    Procedure: L5 S1 LUMBAR epidural steroid injection (62069); Surgeon: Hollis Kay DO;  Location: [facility] OR;  Service: Pain Management    • DC COLONOSCOPY FLX DX W/COLLJ SPEC WHEN PFRMD N/A 7/31/2018    Procedure: COLONOSCOPY;  Surgeon: Nicolasa Agrawal MD;  Location: [facility] GI LAB; Service: Gastroenterology   • DC RPR UMBILICAL HRNA 5 YRS/> REDUCIBLE N/A 12/10/2019    Procedure: REPAIR HERNIA UMBILICAL;  Surgeon: Cher Ruiz MD;  Location: [address] [place] [address];  Service: General   • DC XCAPSL CTRC RMVL INSJ IO LENS PROSTH W/O ECP Right 3/16/2020    Procedure: EXTRACTION EXTRACAPSULAR CATARACT PHACO INTRAOCULAR LENS (IOL); Surgeon: Chad Selby MD;  Location: [facility] OR;  Service: Ophthalmology   • DC XCAPSL CTRC RMVL INSJ IO LENS PROSTH W/O ECP Left 3/23/2020    Procedure: EXTRACTION EXTRACAPSULAR CATARACT PHACO INTRAOCULAR LENS (IOL);   Surgeon: Chad Selby MD;  Location: [facility] OR;  Service: Ophthalmology   • SHOULDER SURGERY Right     labrum repair, arthritis removal   • ULNAR NERVE TRANSPOSITION Right      No Known Allergies  Current Outpatient Medications on File Prior to Visit   Medication Sig Dispense Refill   • apixaban (Eliquis) 5 mg Take 1 tablet (5 mg total) by mouth 2 (two) times a day 180 tablet 3   • atorvastatin (LIPITOR) 20 mg tablet Take 1 tablet (20 mg total) by mouth daily 90 tablet 3 • OMEPRAZOLE PO Take by mouth every morning     • Multiple Vitamin (MULTIVITAMIN) tablet Take 1 tablet by mouth 3 (three) times a week       Current Facility-Administered Medications on File Prior to Visit   Medication Dose Route Frequency Provider Last Rate Last Admin   • [COMPLETED] lidocaine (PF) (XYLOCAINE-MPF) 1 % injection 4 mL  4 mL Intra-articular One-time injection Nampa Flank, DO   4 mL at 06/29/23 1600     Social History     Socioeconomic History   • Marital status: /Civil Union     Spouse name: Not on file   • Number of children: Not on file   • Years of education: Not on file   • Highest education level: Not on file   Occupational History   • Not on file   Tobacco Use   • Smoking status: Never     Passive exposure: Never   • Smokeless tobacco: Never   Vaping Use   • Vaping Use: Never used   Substance and Sexual Activity   • Alcohol use: Yes     Comment: social   • Drug use: No   • Sexual activity: Yes     Partners: Female   Other Topics Concern   • Not on file   Social History Narrative   • Not on file     Social Determinants of Health     Financial Resource Strain: Low Risk  (6/14/2023)    Overall Financial Resource Strain (CARDIA)    • Difficulty of Paying Living Expenses: Not hard at all   Food Insecurity: Not on file   Transportation Needs: No Transportation Needs (6/14/2023)    PRAPARE - Transportation    • Lack of Transportation (Medical): No    • Lack of Transportation (Non-Medical): No   Physical Activity: Not on file   Stress: Not on file   Social Connections: Not on file   Intimate Partner Violence: Not on file   Housing Stability: Not on file         I have reviewed the past medical, surgical, social and family history, medications and allergies as documented in the EMR  Review of systems: ROS is negative other than that noted in the HPI  Constitutional: Negative for fatigue and fever  HENT: Negative for sore throat  Respiratory: Negative for shortness of breath  Cardiovascular: Negative for chest pain  Gastrointestinal: Negative for abdominal pain  Endocrine: Negative for cold intolerance and heat intolerance  Genitourinary: Negative for flank pain  Musculoskeletal: Negative for back pain  Skin: Negative for rash  Allergic/Immunologic: Negative for immunocompromised state  Neurological: Negative for dizziness  Psychiatric/Behavioral: Negative for agitation  Physical Exam:    Blood pressure 132/75, pulse 68, temperature 98 2 °F (36 8 °C), weight 107 kg (235 lb)  General/Constitutional: NAD, well developed, well nourished  HENT: Normocephalic, atraumatic  CV: Intact distal pulses, regular rate  Resp: No respiratory distress or labored breathing  Abdomen: soft, nondistended   Lymphatic: No lymphadenopathy palpated  Neuro: Alert and Oriented x 3, no focal deficits  Psych: Normal mood, normal affect  Skin: Warm, dry, no rashes, no erythema      Knee Exam:   No significant skin lesions  Prepatellar hematoma that extends from the anterior and medial aspects of the knee such that the patella cannot be palpated  Ecchymosis on the anterior knee, shin, and ankle  Range of motion from just shy of full extension to 80  Lateral joint line tenderness  Medial joint line tenderness unable to be assessed due to hematoma  Knee is stable to varus stress and valgus stress  Lachman, anterior drawer, and posterior drawer difficult to assess secondary to location of hematoma and guarding   Neurovascularly intact distally    Knee Imaging    X-rays of the left knee reviewed from yesterday  These show no acute osseous abnormalities or significant degenerative changes  Patellar enthesopathy  Marked prepatellar soft tissue swelling  Patella is well-centered on the trochlea

## 2023-06-30 NOTE — TELEPHONE ENCOUNTER
Caller: Patient     Doctor: Vanessa Ferraro     Reason for call: Patient asked for medical advice about his knee, I did tell patient we have not seen him so we can can not give medical advise over the phone and he was directed to contact his PCP

## 2023-07-07 DIAGNOSIS — E53.8 B12 DEFICIENCY: Primary | ICD-10-CM

## 2023-07-14 ENCOUNTER — TELEPHONE (OUTPATIENT)
Dept: FAMILY MEDICINE CLINIC | Facility: CLINIC | Age: 68
End: 2023-07-14

## 2023-07-14 NOTE — TELEPHONE ENCOUNTER
I called patient and left a message encouraging him to check his [employer] messages regarding the abnormal lab. His B12 level is low. He is to call Flora Henry if he has any questions on management.   Dr. Esha Sanchezs

## 2023-07-18 ENCOUNTER — TELEPHONE (OUTPATIENT)
Dept: OBGYN CLINIC | Facility: HOSPITAL | Age: 68
End: 2023-07-18

## 2023-07-18 ENCOUNTER — APPOINTMENT (OUTPATIENT)
Dept: LAB | Facility: HOSPITAL | Age: 68
End: 2023-07-18
Attending: FAMILY MEDICINE
Payer: COMMERCIAL

## 2023-07-18 ENCOUNTER — HOSPITAL ENCOUNTER (OUTPATIENT)
Dept: NON INVASIVE DIAGNOSTICS | Facility: CLINIC | Age: 68
Discharge: HOME | End: 2023-07-18
Payer: COMMERCIAL

## 2023-07-18 ENCOUNTER — APPOINTMENT (OUTPATIENT)
Dept: RADIOLOGY | Facility: CLINIC | Age: 68
End: 2023-07-18
Payer: COMMERCIAL

## 2023-07-18 ENCOUNTER — OFFICE VISIT (OUTPATIENT)
Dept: OBGYN CLINIC | Facility: CLINIC | Age: 68
End: 2023-07-18
Payer: COMMERCIAL

## 2023-07-18 VITALS
SYSTOLIC BLOOD PRESSURE: 151 MMHG | HEART RATE: 70 BPM | WEIGHT: 235 LBS | DIASTOLIC BLOOD PRESSURE: 76 MMHG | HEIGHT: 73 IN | BODY MASS INDEX: 31.14 KG/M2

## 2023-07-18 DIAGNOSIS — M25.579 PAIN IN JOINT INVOLVING ANKLE AND FOOT, UNSPECIFIED LATERALITY: ICD-10-CM

## 2023-07-18 DIAGNOSIS — M79.89 SWELLING OF LOWER LEG: ICD-10-CM

## 2023-07-18 DIAGNOSIS — E53.8 B12 DEFICIENCY: ICD-10-CM

## 2023-07-18 DIAGNOSIS — M25.579 PAIN IN JOINT INVOLVING ANKLE AND FOOT, UNSPECIFIED LATERALITY: Primary | ICD-10-CM

## 2023-07-18 PROCEDURE — 93971 EXTREMITY STUDY: CPT | Performed by: SURGERY

## 2023-07-18 PROCEDURE — 73610 X-RAY EXAM OF ANKLE: CPT

## 2023-07-18 PROCEDURE — 83516 IMMUNOASSAY NONANTIBODY: CPT

## 2023-07-18 PROCEDURE — 36415 COLL VENOUS BLD VENIPUNCTURE: CPT

## 2023-07-18 PROCEDURE — 86340 INTRINSIC FACTOR ANTIBODY: CPT

## 2023-07-18 PROCEDURE — 83918 ORGANIC ACIDS TOTAL QUANT: CPT

## 2023-07-18 PROCEDURE — 93971 EXTREMITY STUDY: CPT

## 2023-07-18 PROCEDURE — 99213 OFFICE O/P EST LOW 20 MIN: CPT | Performed by: ORTHOPAEDIC SURGERY

## 2023-07-18 NOTE — PROGRESS NOTES
Ortho Sports Medicine follow-up patient Visit     Assesment:   79 y.o. male with left prepatellar hematoma and lower leg edema     Plan:    The patient has a resolving hematoma over overlying his patella. He did undergo surgical drainage of the hematoma while on vacation. Swelling in the prepatellar region continues to improve. However he does have significant swelling throughout the lower leg. He is on Eliquis today but he did stop this for several days after he developed a hematoma. He did also have an episode of prolonged travel and immobilization due to the injury as well. I am concerned about a DVT given unilateral swelling throughout the lower extremity. I recommended he go for stat Doppler to evaluate for the possibility of a DVT. I recommend he continue to emergency department for additional evaluation if he does have a positive ultrasound study as he is already on Eliquis and he may require additional anticoagulation steps at the hospital.  He can follow-up with me next week for continued evaluation of his hematoma and removal of sutures. Chief Complaint   Patient presents with   • Left Knee - Follow-up       History of Present Illness: The patient is a 79 y.o. male returning with a prepatellar hematoma. He went on vacation and did end up going for surgical drainage of the hematoma. This went well and did not have any episodes of infection or other issues. He has had significant worsening of swelling of his lower leg over the last few days. This is causing significant pain. He denies any numbness or tingling. He did stop his Eliquis for a few days but has started taking it again. He denies any chest pain or shortness of breath.     Knee Surgical History:  None    Past Medical, Social and Family History:  Past Medical History:   Diagnosis Date   • Calcific tendinitis 02/27/2018   • Cataracts, bilateral    • Chronic deep vein thrombosis (DVT) of left femoral vein ([address]) 3/6/2018   • Chronic pulmonary embolism with acute cor pulmonale ([address]) 3/6/2018   • DVT (deep venous thrombosis) ([address]) 03/2018    left calf-traveled to the lung-PE-given heparin   • GERD (gastroesophageal reflux disease)    • Hyperlipidemia    • Neck pain    • Sacroiliitis ([address]) 3/29/2023   • Wears glasses      Past Surgical History:   Procedure Laterality Date   • CATARACT EXTRACTION     • CERVICAL FUSION  2007    with bone spur removal also- C5,6,7 fusion plate   • COLONOSCOPY     • EPIDURAL BLOCK INJECTION N/A 11/22/2019    Procedure: C6 C7 Cervical Epidural Steroid Injection (65869); Surgeon: Freida Russell MD;  Location: [facility] MAIN OR;  Service: Pain Management    • HERNIA REPAIR      umbilical   • LUMBAR EPIDURAL INJECTION N/A 2/24/2023    Procedure: L5 S1 LUMBAR epidural steroid injection (71827); Surgeon: Cherry Chowdary DO;  Location: [facility] MAIN OR;  Service: Pain Management    • NV COLONOSCOPY FLX DX W/COLLJ SPEC WHEN PFRMD N/A 7/31/2018    Procedure: COLONOSCOPY;  Surgeon: Christiano Gautam MD;  Location: [address] [place] [address] GI LAB; Service: Gastroenterology   • NV RPR UMBILICAL HRNA 5 YRS/> REDUCIBLE N/A 12/10/2019    Procedure: REPAIR HERNIA UMBILICAL;  Surgeon: Pawan White MD;  Location: [facility];  Service: General   • NV XCAPSL CTRC RMVL INSJ IO LENS PROSTH W/O ECP Right 3/16/2020    Procedure: EXTRACTION EXTRACAPSULAR CATARACT PHACO INTRAOCULAR LENS (IOL); Surgeon: Sheldon Larry MD;  Location: [facility] MAIN OR;  Service: Ophthalmology   • NV XCAPSL CTRC RMVL INSJ IO LENS PROSTH W/O ECP Left 3/23/2020    Procedure: EXTRACTION EXTRACAPSULAR CATARACT PHACO INTRAOCULAR LENS (IOL);   Surgeon: Sheldon Larry MD;  Location: [facility] OR;  Service: Ophthalmology   • SHOULDER SURGERY Right     labrum repair, arthritis removal   • ULNAR NERVE TRANSPOSITION Right      No Known Allergies  Current Outpatient Medications on File Prior to Visit   Medication Sig Dispense Refill   • apixaban (Eliquis) 5 mg Take 1 tablet (5 mg total) by mouth 2 (two) times a day 180 tablet 3   • atorvastatin (LIPITOR) 20 mg tablet Take 1 tablet (20 mg total) by mouth daily 90 tablet 3   • Multiple Vitamin (MULTIVITAMIN) tablet Take 1 tablet by mouth 3 (three) times a week     • OMEPRAZOLE PO Take by mouth every morning       No current facility-administered medications on file prior to visit. Social History     Socioeconomic History   • Marital status: /Civil Union     Spouse name: Not on file   • Number of children: Not on file   • Years of education: Not on file   • Highest education level: Not on file   Occupational History   • Not on file   Tobacco Use   • Smoking status: Never     Passive exposure: Never   • Smokeless tobacco: Never   Vaping Use   • Vaping Use: Never used   Substance and Sexual Activity   • Alcohol use: Yes     Comment: social   • Drug use: No   • Sexual activity: Yes     Partners: Female   Other Topics Concern   • Not on file   Social History Narrative   • Not on file     Social Determinants of Health     Financial Resource Strain: Low Risk  (6/14/2023)    Overall Financial Resource Strain (CARDIA)    • Difficulty of Paying Living Expenses: Not hard at all   Food Insecurity: Not on file   Transportation Needs: No Transportation Needs (6/14/2023)    PRAPARE - Transportation    • Lack of Transportation (Medical): No    • Lack of Transportation (Non-Medical): No   Physical Activity: Not on file   Stress: Not on file   Social Connections: Not on file   Intimate Partner Violence: Not on file   Housing Stability: Not on file         I have reviewed the past medical, surgical, social and family history, medications and allergies as documented in the EMR. Review of systems: ROS is negative other than that noted in the HPI. Constitutional: Negative for fatigue and fever. HENT: Negative for sore throat. Respiratory: Negative for shortness of breath. Cardiovascular: Negative for chest pain.    Gastrointestinal: Negative for abdominal pain. Endocrine: Negative for cold intolerance and heat intolerance. Genitourinary: Negative for flank pain. Musculoskeletal: Negative for back pain. Skin: Negative for rash. Allergic/Immunologic: Negative for immunocompromised state. Neurological: Negative for dizziness. Psychiatric/Behavioral: Negative for agitation. Physical Exam:    Blood pressure 151/76, pulse 70, height 6' 1" (1.854 m), weight 107 kg (235 lb). General/Constitutional: NAD, well developed, well nourished  HENT: Normocephalic, atraumatic  CV: Intact distal pulses, regular rate  Resp: No respiratory distress or labored breathing  Abdomen: soft, nondistended   Lymphatic: No lymphadenopathy palpated  Neuro: Alert and Oriented x 3, no focal deficits  Psych: Normal mood, normal affect  Skin: Warm, dry, no rashes, no erythema      Knee Exam:   Surgical incision overlying the patella is healing well  No evidence of infection  Prepatellar hematoma is smaller today. He does have a large degree of swelling throughout the lower leg  Neurovascularly intact distally    Knee Imaging    X-rays of the left knee reviewed from yesterday. These show no acute osseous abnormalities or significant degenerative changes. Patellar enthesopathy. Marked prepatellar soft tissue swelling. Patella is well-centered on the trochlea.

## 2023-07-18 NOTE — TELEPHONE ENCOUNTER
Caller: [facility]    Doctor: Susan Heart    Reason for call: Patient is negative for any new acute DVT but positive for an old chronic DVT behind left popliteal vein    Call back#: [phone]

## 2023-07-19 ENCOUNTER — TELEPHONE (OUTPATIENT)
Dept: SLEEP CENTER | Facility: CLINIC | Age: 68
End: 2023-07-19

## 2023-07-19 NOTE — TELEPHONE ENCOUNTER
----- Message from Juan King MD sent at 7/19/2023 11:08 AM EDT -----  Approved  ----- Message -----  From: Vince Lewis  Sent: 7/19/2023   9:21 AM EDT  To: Sleep Medicine [facility] Provider    This Diagnostic sleep study needs approval.     If approved please sign and return to clerical pool. If denied please include reasons why. Also provide alternative testing if warranted. Please sign and return to clerical pool.

## 2023-07-20 DIAGNOSIS — E53.8 B12 DEFICIENCY: Primary | ICD-10-CM

## 2023-07-20 LAB
IF BLOCK AB SER QL RIA: 1.1 AU/ML (ref 0–1.1)
PCA AB SER-ACNC: 1.9 UNITS (ref 0–20)

## 2023-07-20 RX ORDER — MAGNESIUM 200 MG
1000 TABLET ORAL DAILY
Qty: 30 TABLET | Refills: 1 | Status: SHIPPED | OUTPATIENT
Start: 2023-07-20

## 2023-07-22 LAB — METHYLMALONATE SERPL-SCNC: 125 NMOL/L (ref 0–378)

## 2023-07-24 ENCOUNTER — APPOINTMENT (EMERGENCY)
Dept: RADIOLOGY | Facility: HOSPITAL | Age: 68
DRG: 605 | End: 2023-07-24
Payer: COMMERCIAL

## 2023-07-24 ENCOUNTER — APPOINTMENT (INPATIENT)
Dept: RADIOLOGY | Facility: HOSPITAL | Age: 68
DRG: 605 | End: 2023-07-24
Payer: COMMERCIAL

## 2023-07-24 ENCOUNTER — ANESTHESIA EVENT (EMERGENCY)
Dept: PERIOP | Facility: HOSPITAL | Age: 68
DRG: 857 | End: 2023-07-24
Payer: COMMERCIAL

## 2023-07-24 ENCOUNTER — ANESTHESIA (EMERGENCY)
Dept: PERIOP | Facility: HOSPITAL | Age: 68
DRG: 857 | End: 2023-07-24
Payer: COMMERCIAL

## 2023-07-24 ENCOUNTER — HOSPITAL ENCOUNTER (INPATIENT)
Facility: HOSPITAL | Age: 68
LOS: 2 days | Discharge: HOME | DRG: 605 | End: 2023-07-26
Attending: EMERGENCY MEDICINE | Admitting: FAMILY MEDICINE
Payer: COMMERCIAL

## 2023-07-24 DIAGNOSIS — M00.9 INFECTION OF LEFT KNEE (HCC): ICD-10-CM

## 2023-07-24 DIAGNOSIS — L08.9 INFECTED HEMATOMA: ICD-10-CM

## 2023-07-24 DIAGNOSIS — M25.562 LEFT KNEE PAIN: Primary | ICD-10-CM

## 2023-07-24 DIAGNOSIS — S80.02XA HEMATOMA OF LEFT KNEE REGION: ICD-10-CM

## 2023-07-24 DIAGNOSIS — T14.8XXA INFECTED HEMATOMA: ICD-10-CM

## 2023-07-24 PROBLEM — E78.5 DYSLIPIDEMIA: Status: RESOLVED | Noted: 2022-06-13 | Resolved: 2023-07-24

## 2023-07-24 PROBLEM — K21.9 GERD (GASTROESOPHAGEAL REFLUX DISEASE): Status: ACTIVE | Noted: 2023-07-24

## 2023-07-24 LAB
ALBUMIN SERPL BCP-MCNC: 4 G/DL (ref 3.5–5)
ALP SERPL-CCNC: 93 U/L (ref 34–104)
ALT SERPL W P-5'-P-CCNC: 21 U/L (ref 7–52)
ANION GAP SERPL CALCULATED.3IONS-SCNC: 5 MMOL/L
AST SERPL W P-5'-P-CCNC: 15 U/L (ref 13–39)
BASOPHILS # BLD AUTO: 0.04 THOUSANDS/ÂΜL (ref 0–0.1)
BASOPHILS NFR BLD AUTO: 1 % (ref 0–1)
BILIRUB SERPL-MCNC: 0.77 MG/DL (ref 0.2–1)
BUN SERPL-MCNC: 11 MG/DL (ref 5–25)
CALCIUM SERPL-MCNC: 9 MG/DL (ref 8.4–10.2)
CHLORIDE SERPL-SCNC: 103 MMOL/L (ref 96–108)
CO2 SERPL-SCNC: 30 MMOL/L (ref 21–32)
CREAT SERPL-MCNC: 0.91 MG/DL (ref 0.6–1.3)
CRP SERPL QL: 21.4 MG/L
EOSINOPHIL # BLD AUTO: 0.18 THOUSAND/ÂΜL (ref 0–0.61)
EOSINOPHIL NFR BLD AUTO: 2 % (ref 0–6)
ERYTHROCYTE [DISTWIDTH] IN BLOOD BY AUTOMATED COUNT: 12.8 % (ref 11.6–15.1)
ERYTHROCYTE [SEDIMENTATION RATE] IN BLOOD: 16 MM/HOUR (ref 0–19)
GFR SERPL CREATININE-BSD FRML MDRD: 87 ML/MIN/1.73SQ M
GLUCOSE SERPL-MCNC: 84 MG/DL (ref 65–140)
HCT VFR BLD AUTO: 41.8 % (ref 36.5–49.3)
HGB BLD-MCNC: 14.4 G/DL (ref 12–17)
IMM GRANULOCYTES # BLD AUTO: 0.02 THOUSAND/UL (ref 0–0.2)
IMM GRANULOCYTES NFR BLD AUTO: 0 % (ref 0–2)
LYMPHOCYTES # BLD AUTO: 1.71 THOUSANDS/ÂΜL (ref 0.6–4.47)
LYMPHOCYTES NFR BLD AUTO: 21 % (ref 14–44)
MAGNESIUM SERPL-MCNC: 2 MG/DL (ref 1.9–2.7)
MCH RBC QN AUTO: 31.6 PG (ref 26.8–34.3)
MCHC RBC AUTO-ENTMCNC: 34.4 G/DL (ref 31.4–37.4)
MCV RBC AUTO: 92 FL (ref 82–98)
MONOCYTES # BLD AUTO: 0.82 THOUSAND/ÂΜL (ref 0.17–1.22)
MONOCYTES NFR BLD AUTO: 10 % (ref 4–12)
NEUTROPHILS # BLD AUTO: 5.58 THOUSANDS/ÂΜL (ref 1.85–7.62)
NEUTS SEG NFR BLD AUTO: 66 % (ref 43–75)
NRBC BLD AUTO-RTO: 0 /100 WBCS
PLATELET # BLD AUTO: 212 THOUSANDS/UL (ref 149–390)
PMV BLD AUTO: 9.3 FL (ref 8.9–12.7)
POTASSIUM SERPL-SCNC: 4 MMOL/L (ref 3.5–5.3)
PROT SERPL-MCNC: 7.1 G/DL (ref 6.4–8.4)
RBC # BLD AUTO: 4.55 MILLION/UL (ref 3.88–5.62)
SARS-COV-2 RNA RESP QL NAA+PROBE: NEGATIVE
SODIUM SERPL-SCNC: 138 MMOL/L (ref 135–147)
WBC # BLD AUTO: 8.35 THOUSAND/UL (ref 4.31–10.16)

## 2023-07-24 PROCEDURE — 73630 X-RAY EXAM OF FOOT: CPT

## 2023-07-24 PROCEDURE — 27301 DRAIN THIGH/KNEE LESION: CPT

## 2023-07-24 PROCEDURE — 36415 COLL VENOUS BLD VENIPUNCTURE: CPT | Performed by: EMERGENCY MEDICINE

## 2023-07-24 PROCEDURE — 99223 1ST HOSP IP/OBS HIGH 75: CPT | Performed by: ORTHOPAEDIC SURGERY

## 2023-07-24 PROCEDURE — 87147 CULTURE TYPE IMMUNOLOGIC: CPT | Performed by: ORTHOPAEDIC SURGERY

## 2023-07-24 PROCEDURE — 0Y9F00Z DRAINAGE OF RIGHT KNEE REGION WITH DRAINAGE DEVICE, OPEN APPROACH: ICD-10-PCS | Performed by: ORTHOPAEDIC SURGERY

## 2023-07-24 PROCEDURE — 83735 ASSAY OF MAGNESIUM: CPT

## 2023-07-24 PROCEDURE — 86140 C-REACTIVE PROTEIN: CPT | Performed by: EMERGENCY MEDICINE

## 2023-07-24 PROCEDURE — 99284 EMERGENCY DEPT VISIT MOD MDM: CPT

## 2023-07-24 PROCEDURE — 87070 CULTURE OTHR SPECIMN AEROBIC: CPT | Performed by: ORTHOPAEDIC SURGERY

## 2023-07-24 PROCEDURE — G1004 CDSM NDSC: HCPCS

## 2023-07-24 PROCEDURE — 87075 CULTR BACTERIA EXCEPT BLOOD: CPT | Performed by: ORTHOPAEDIC SURGERY

## 2023-07-24 PROCEDURE — 85652 RBC SED RATE AUTOMATED: CPT | Performed by: EMERGENCY MEDICINE

## 2023-07-24 PROCEDURE — 99223 1ST HOSP IP/OBS HIGH 75: CPT | Performed by: FAMILY MEDICINE

## 2023-07-24 PROCEDURE — 27301 DRAIN THIGH/KNEE LESION: CPT | Performed by: ORTHOPAEDIC SURGERY

## 2023-07-24 PROCEDURE — 80053 COMPREHEN METABOLIC PANEL: CPT | Performed by: EMERGENCY MEDICINE

## 2023-07-24 PROCEDURE — 73701 CT LOWER EXTREMITY W/DYE: CPT

## 2023-07-24 PROCEDURE — 87186 SC STD MICRODIL/AGAR DIL: CPT | Performed by: ORTHOPAEDIC SURGERY

## 2023-07-24 PROCEDURE — 87205 SMEAR GRAM STAIN: CPT | Performed by: ORTHOPAEDIC SURGERY

## 2023-07-24 PROCEDURE — 87635 SARS-COV-2 COVID-19 AMP PRB: CPT | Performed by: EMERGENCY MEDICINE

## 2023-07-24 PROCEDURE — 99285 EMERGENCY DEPT VISIT HI MDM: CPT | Performed by: EMERGENCY MEDICINE

## 2023-07-24 PROCEDURE — 85025 COMPLETE CBC W/AUTO DIFF WBC: CPT | Performed by: EMERGENCY MEDICINE

## 2023-07-24 RX ORDER — PROPOFOL 10 MG/ML
INJECTION, EMULSION INTRAVENOUS AS NEEDED
Status: DISCONTINUED | OUTPATIENT
Start: 2023-07-24 | End: 2023-07-24

## 2023-07-24 RX ORDER — OXYCODONE HYDROCHLORIDE 5 MG/1
5 TABLET ORAL EVERY 4 HOURS PRN
Status: DISCONTINUED | OUTPATIENT
Start: 2023-07-24 | End: 2023-07-26 | Stop reason: HOSPADM

## 2023-07-24 RX ORDER — FENTANYL CITRATE/PF 50 MCG/ML
50 SYRINGE (ML) INJECTION
Status: DISCONTINUED | OUTPATIENT
Start: 2023-07-24 | End: 2023-07-24 | Stop reason: HOSPADM

## 2023-07-24 RX ORDER — CEFAZOLIN SODIUM 2 G/50ML
SOLUTION INTRAVENOUS AS NEEDED
Status: DISCONTINUED | OUTPATIENT
Start: 2023-07-24 | End: 2023-07-24

## 2023-07-24 RX ORDER — ONDANSETRON 2 MG/ML
INJECTION INTRAMUSCULAR; INTRAVENOUS AS NEEDED
Status: DISCONTINUED | OUTPATIENT
Start: 2023-07-24 | End: 2023-07-24

## 2023-07-24 RX ORDER — ONDANSETRON 2 MG/ML
4 INJECTION INTRAMUSCULAR; INTRAVENOUS ONCE AS NEEDED
Status: DISCONTINUED | OUTPATIENT
Start: 2023-07-24 | End: 2023-07-24 | Stop reason: HOSPADM

## 2023-07-24 RX ORDER — SUCCINYLCHOLINE/SOD CL,ISO/PF 100 MG/5ML
SYRINGE (ML) INTRAVENOUS AS NEEDED
Status: DISCONTINUED | OUTPATIENT
Start: 2023-07-24 | End: 2023-07-24

## 2023-07-24 RX ORDER — FENTANYL CITRATE 50 UG/ML
INJECTION, SOLUTION INTRAMUSCULAR; INTRAVENOUS AS NEEDED
Status: DISCONTINUED | OUTPATIENT
Start: 2023-07-24 | End: 2023-07-24

## 2023-07-24 RX ORDER — LIDOCAINE HYDROCHLORIDE 10 MG/ML
INJECTION, SOLUTION EPIDURAL; INFILTRATION; INTRACAUDAL; PERINEURAL AS NEEDED
Status: DISCONTINUED | OUTPATIENT
Start: 2023-07-24 | End: 2023-07-24

## 2023-07-24 RX ORDER — DEXAMETHASONE SODIUM PHOSPHATE 10 MG/ML
INJECTION, SOLUTION INTRAMUSCULAR; INTRAVENOUS AS NEEDED
Status: DISCONTINUED | OUTPATIENT
Start: 2023-07-24 | End: 2023-07-24

## 2023-07-24 RX ORDER — SODIUM CHLORIDE, SODIUM LACTATE, POTASSIUM CHLORIDE, CALCIUM CHLORIDE 600; 310; 30; 20 MG/100ML; MG/100ML; MG/100ML; MG/100ML
100 INJECTION, SOLUTION INTRAVENOUS CONTINUOUS
Status: DISCONTINUED | OUTPATIENT
Start: 2023-07-24 | End: 2023-07-26 | Stop reason: HOSPADM

## 2023-07-24 RX ORDER — ACETAMINOPHEN 325 MG/1
975 TABLET ORAL EVERY 8 HOURS SCHEDULED
Status: DISCONTINUED | OUTPATIENT
Start: 2023-07-24 | End: 2023-07-26 | Stop reason: HOSPADM

## 2023-07-24 RX ORDER — PANTOPRAZOLE SODIUM 40 MG/10ML
40 INJECTION, POWDER, LYOPHILIZED, FOR SOLUTION INTRAVENOUS
Status: DISCONTINUED | OUTPATIENT
Start: 2023-07-25 | End: 2023-07-24

## 2023-07-24 RX ORDER — ATORVASTATIN CALCIUM 20 MG/1
20 TABLET, FILM COATED ORAL
Status: DISCONTINUED | OUTPATIENT
Start: 2023-07-25 | End: 2023-07-26 | Stop reason: HOSPADM

## 2023-07-24 RX ORDER — SODIUM CHLORIDE, SODIUM LACTATE, POTASSIUM CHLORIDE, CALCIUM CHLORIDE 600; 310; 30; 20 MG/100ML; MG/100ML; MG/100ML; MG/100ML
INJECTION, SOLUTION INTRAVENOUS CONTINUOUS PRN
Status: DISCONTINUED | OUTPATIENT
Start: 2023-07-24 | End: 2023-07-24

## 2023-07-24 RX ORDER — IBUPROFEN 400 MG/1
800 TABLET ORAL EVERY 8 HOURS PRN
Status: DISCONTINUED | OUTPATIENT
Start: 2023-07-24 | End: 2023-07-26 | Stop reason: HOSPADM

## 2023-07-24 RX ORDER — PANTOPRAZOLE SODIUM 40 MG/1
40 TABLET, DELAYED RELEASE ORAL
Status: DISCONTINUED | OUTPATIENT
Start: 2023-07-25 | End: 2023-07-26 | Stop reason: HOSPADM

## 2023-07-24 RX ORDER — KETOROLAC TROMETHAMINE 30 MG/ML
15 INJECTION, SOLUTION INTRAMUSCULAR; INTRAVENOUS EVERY 6 HOURS PRN
Status: DISCONTINUED | OUTPATIENT
Start: 2023-07-24 | End: 2023-07-24

## 2023-07-24 RX ORDER — MAGNESIUM HYDROXIDE 1200 MG/15ML
LIQUID ORAL AS NEEDED
Status: DISCONTINUED | OUTPATIENT
Start: 2023-07-24 | End: 2023-07-24 | Stop reason: HOSPADM

## 2023-07-24 RX ORDER — POLYETHYLENE GLYCOL 3350 17 G/17G
17 POWDER, FOR SOLUTION ORAL DAILY
Status: DISCONTINUED | OUTPATIENT
Start: 2023-07-25 | End: 2023-07-26 | Stop reason: HOSPADM

## 2023-07-24 RX ADMIN — IOHEXOL 100 ML: 350 INJECTION, SOLUTION INTRAVENOUS at 13:10

## 2023-07-24 RX ADMIN — VANCOMYCIN HYDROCHLORIDE 1250 MG: 10 INJECTION, POWDER, LYOPHILIZED, FOR SOLUTION INTRAVENOUS at 18:16

## 2023-07-24 RX ADMIN — Medication 100 MG: at 15:39

## 2023-07-24 RX ADMIN — ONDANSETRON 4 MG: 2 INJECTION INTRAMUSCULAR; INTRAVENOUS at 15:44

## 2023-07-24 RX ADMIN — ACETAMINOPHEN 975 MG: 325 TABLET ORAL at 22:47

## 2023-07-24 RX ADMIN — CEFAZOLIN SODIUM 2000 MG: 2 SOLUTION INTRAVENOUS at 15:53

## 2023-07-24 RX ADMIN — SODIUM CHLORIDE, SODIUM LACTATE, POTASSIUM CHLORIDE, AND CALCIUM CHLORIDE 100 ML/HR: .6; .31; .03; .02 INJECTION, SOLUTION INTRAVENOUS at 17:51

## 2023-07-24 RX ADMIN — CEFEPIME 2000 MG: 2 INJECTION, POWDER, FOR SOLUTION INTRAVENOUS at 22:46

## 2023-07-24 RX ADMIN — OXYCODONE HYDROCHLORIDE 5 MG: 5 TABLET ORAL at 19:09

## 2023-07-24 RX ADMIN — DEXAMETHASONE SODIUM PHOSPHATE 10 MG: 10 INJECTION, SOLUTION INTRAMUSCULAR; INTRAVENOUS at 15:44

## 2023-07-24 RX ADMIN — PROPOFOL 200 MG: 10 INJECTION, EMULSION INTRAVENOUS at 15:38

## 2023-07-24 RX ADMIN — LIDOCAINE HYDROCHLORIDE 5 ML: 10 INJECTION, SOLUTION EPIDURAL; INFILTRATION; INTRACAUDAL at 15:38

## 2023-07-24 RX ADMIN — FENTANYL CITRATE 100 MCG: 50 INJECTION, SOLUTION INTRAMUSCULAR; INTRAVENOUS at 15:38

## 2023-07-24 RX ADMIN — SODIUM CHLORIDE, SODIUM LACTATE, POTASSIUM CHLORIDE, AND CALCIUM CHLORIDE: .6; .31; .03; .02 INJECTION, SOLUTION INTRAVENOUS at 14:44

## 2023-07-24 NOTE — DISCHARGE INSTR - AVS FIRST PAGE
-Continue Keflex 500 mg PO q 8 hours to complete 10 day course from I&D through 8/3/23.    - Wound Care/Dressing = change if saturated. - Weight bearing as tolerated. - Follow up with Dr. Concha Ortiz 10-14 days following surgery. - [facility].

## 2023-07-24 NOTE — INTERIM OP NOTE
INCISION AND DRAINAGE (I&D) EXTREMITY  Postoperative Note  PATIENT NAME: Nicolas Garcia  : 1955  MRN: [id number]  [address] [facility] OR ROOM 04    Surgery Date: 2023    Preop Diagnosis:  Infected hematoma [T14. 8XXA, L08.9]    Post-Op Diagnosis Codes:     * Infected hematoma [T14. 8XXA, L08.9]    Procedure(s) (LRB):  INCISION AND DRAINAGE (I&D) EXTREMITY (Left)    Surgeon(s) and Role:     * Moni Christianson MD - Primary     * Talia Osorio PA-C - Assisting    Specimens:  ID Type Source Tests Collected by Time Destination   A : lt knee wound Wound Wound ANAEROBIC CULTURE AND GRAM STAIN, WOUND CULTURE Moni Christianson MD 2023 1553        Estimated Blood Loss:   Minimal    Anesthesia Type:   General     Findings:   Small amount of purulent fluid immediately deep to previous incision. Large serous collection, extraarticular. Tracks from medial side of the patella to several centimeters distal to the knee joint line on the anteromedial aspect of the knee. No necrotic tissue. No active bleeding. Cultures sent. Drain left in the wound to prevent recollection .     Complications:   None      SIGNATURE: Moni Christianson MD   DATE: 2023   TIME: 4:18 PM

## 2023-07-24 NOTE — ANESTHESIA POSTPROCEDURE EVALUATION
Post-Op Assessment Note    CV Status:  Stable       Mental Status:  Awake   Hydration Status:  Stable   PONV Controlled:  Controlled   Airway Patency:  Patent      Post Op Vitals Reviewed: Yes      Staff: Anesthesiologist         No notable events documented.     BP   140/65   Temp      Pulse  85   Resp 20   SpO2   95 room air

## 2023-07-24 NOTE — CONSULTS
Chief Complaint     Left knee pain      History of the Present Illness     Paola Lang is a 79 y.o. male  Presenting with pain in the left knee. Initially had an injury while playing soft ball 3+ weeks ago. Takes eliquis and developed a hematoma. I saw him in clinic after that injury and recommended conservative treatment as there was no sign of infection or skin compromise. He then went on vacation and had persistent swelling and pain. He underwent I&D of the hematoma in [facility]. Last week I saw him and the wound was healing well and swelling had improved. Was due to see him again tomorrow for suture removal. Over the last few days he has had worsening pain and swelling. There has also been warmth and drainage from the wound. He did start keflex several days ago which has helped. He has been afebrile. Denies numbness or tingling. Past Medical History:   Diagnosis Date   • Calcific tendinitis 02/27/2018   • Cataracts, bilateral    • Chronic deep vein thrombosis (DVT) of left femoral vein ([address]) 3/6/2018   • Chronic pulmonary embolism with acute cor pulmonale ([address]) 3/6/2018   • DVT (deep venous thrombosis) ([address]) 03/2018    left calf-traveled to the lung-PE-given heparin   • GERD (gastroesophageal reflux disease)    • Hyperlipidemia    • Neck pain    • Sacroiliitis ([address]) 3/29/2023   • Wears glasses        Past Surgical History:   Procedure Laterality Date   • CATARACT EXTRACTION     • CERVICAL FUSION  2007    with bone spur removal also- C5,6,7 fusion plate   • COLONOSCOPY     • EPIDURAL BLOCK INJECTION N/A 11/22/2019    Procedure: C6 C7 Cervical Epidural Steroid Injection (61105); Surgeon: Rad Ennis MD;  Location: [facility] MAIN OR;  Service: Pain Management    • HERNIA REPAIR      umbilical   • LUMBAR EPIDURAL INJECTION N/A 2/24/2023    Procedure: L5 S1 LUMBAR epidural steroid injection (54719);   Surgeon: Orlin Marie DO;  Location: [facility] MAIN OR;  Service: Pain Management    • IN COLONOSCOPY FLX DX W/COLLJ SPEC WHEN PFRMD N/A 7/31/2018    Procedure: COLONOSCOPY;  Surgeon: Maite Coyle MD;  Location: [address] [place] [address] GI LAB; Service: Gastroenterology   • DE RPR UMBILICAL HRNA 5 YRS/> REDUCIBLE N/A 12/10/2019    Procedure: REPAIR HERNIA UMBILICAL;  Surgeon: Helen Barbosa MD;  Location: [facility];  Service: General   • DE XCAPSL CTRC RMVL INSJ IO LENS PROSTH W/O ECP Right 3/16/2020    Procedure: EXTRACTION EXTRACAPSULAR CATARACT PHACO INTRAOCULAR LENS (IOL); Surgeon: Matthew Mera MD;  Location: [facility] MAIN OR;  Service: Ophthalmology   • DE XCAPSL CTRC RMVL INSJ IO LENS PROSTH W/O ECP Left 3/23/2020    Procedure: EXTRACTION EXTRACAPSULAR CATARACT PHACO INTRAOCULAR LENS (IOL); Surgeon: Matthew Mera MD;  Location: [facility] MAIN OR;  Service: Ophthalmology   • SHOULDER SURGERY Right     labrum repair, arthritis removal   • ULNAR NERVE TRANSPOSITION Right        No Known Allergies    No current facility-administered medications on file prior to encounter.      Current Outpatient Medications on File Prior to Encounter   Medication Sig Dispense Refill   • apixaban (Eliquis) 5 mg Take 1 tablet (5 mg total) by mouth 2 (two) times a day 180 tablet 3   • atorvastatin (LIPITOR) 20 mg tablet Take 1 tablet (20 mg total) by mouth daily 90 tablet 3   • Cyanocobalamin (B-12) 1000 MCG SUBL Place 1 tablet (1,000 mcg total) under the tongue in the morning For a month, then once a week for maintenance 30 tablet 1   • Multiple Vitamin (MULTIVITAMIN) tablet Take 1 tablet by mouth 3 (three) times a week         Social History     Tobacco Use   • Smoking status: Never     Passive exposure: Never   • Smokeless tobacco: Never   Vaping Use   • Vaping Use: Never used   Substance Use Topics   • Alcohol use: Yes     Comment: social   • Drug use: No       Family History   Problem Relation Age of Onset   • Fibromyalgia Mother    • Thyroid disease unspecified Mother    • Diabetes Father    • Heart disease Father         CHF   • No Known Problems Sister    • No Known Problems Brother    • No Known Problems Daughter    • No Known Problems Son    • No Known Problems Maternal Aunt    • No Known Problems Maternal Uncle    • No Known Problems Paternal Aunt    • No Known Problems Paternal Uncle    • Cancer Maternal Grandmother    • Cancer Maternal Grandfather    • No Known Problems Paternal Grandmother    • No Known Problems Paternal Grandfather    • Colon cancer Neg Hx    • Liver disease Neg Hx        Review of Systems     As stated in the HPI. All other systems were reviewed and are negative. Physical Exam     /79   Pulse 84   Temp 98.3 °F (36.8 °C) (Oral)   Resp 18   SpO2 97%     GENERAL: This is a well-developed, well-nourished, age-appropriate patient in no acute distress. The patient is alert and oriented x3. Pleasant and cooperative. Eyes: Anicteric sclerae. Extraocular movements appear intact. HENT: Nares are patent with no drainage. Lungs: There is equal chest rise on inspection. Breathing is non-labored with no audible wheezing. Cardiovascular: No cyanosis. No upper extremity lymphadema. Skin: Skin is warm to touch. No obvious skin lesions or rashes other than described below. Neurologic: No ataxia  Psychiatric: Mood and affect are appropriate. Left knee    Previous incision sutures in place, serous drainage present. With firm palpation there is purulent fluid coming through the wound. No pain with knee motion through 45 degrees of motion.  Front of knee gets tight and hurts beyond that in flexion  Tender over the dorsal foot  SILT  Palpable DP    Data Review     Results Reviewed     Procedure Component Value Units Date/Time    Comprehensive metabolic panel [[id number]] Collected: 07/24/23 1228    Lab Status: Final result Specimen: Blood from Arm, Left Updated: 07/24/23 1256     Sodium 138 mmol/L      Potassium 4.0 mmol/L      Chloride 103 mmol/L      CO2 30 mmol/L      ANION GAP 5 mmol/L      BUN 11 mg/dL      Creatinine 0.91 mg/dL      Glucose 84 mg/dL      Calcium 9.0 mg/dL      AST 15 U/L      ALT 21 U/L      Alkaline Phosphatase 93 U/L      Total Protein 7.1 g/dL      Albumin 4.0 g/dL      Total Bilirubin 0.77 mg/dL      eGFR 87 ml/min/1.73sq m     Narrative:      [facility] guidelines for Chronic Kidney Disease (CKD):   •  Stage 1 with normal or high GFR (GFR > 90 mL/min/1.73 square meters)  •  Stage 2 Mild CKD (GFR = 60-89 mL/min/1.73 square meters)  •  Stage 3A Moderate CKD (GFR = 45-59 mL/min/1.73 square meters)  •  Stage 3B Moderate CKD (GFR = 30-44 mL/min/1.73 square meters)  •  Stage 4 Severe CKD (GFR = 15-29 mL/min/1.73 square meters)  •  Stage 5 End Stage CKD (GFR <15 mL/min/1.73 square meters)  Note: GFR calculation is accurate only with a steady state creatinine    C-reactive protein [[id number]]  (Abnormal) Collected: 07/24/23 1228    Lab Status: Final result Specimen: Blood from Arm, Left Updated: 07/24/23 1256     CRP 21.4 mg/L     Sedimentation rate, automated [[id number]]  (Normal) Collected: 07/24/23 1228    Lab Status: Final result Specimen: Blood from Arm, Left Updated: 07/24/23 1252     Sed Rate 16 mm/hour     CBC and differential [[id number]] Collected: 07/24/23 1228    Lab Status: Final result Specimen: Blood from Arm, Left Updated: 07/24/23 1235     WBC 8.35 Thousand/uL      RBC 4.55 Million/uL      Hemoglobin 14.4 g/dL      Hematocrit 41.8 %      MCV 92 fL      MCH 31.6 pg      MCHC 34.4 g/dL      RDW 12.8 %      MPV 9.3 fL      Platelets 056 Thousands/uL      nRBC 0 /100 WBCs      Neutrophils Relative 66 %      Immat GRANS % 0 %      Lymphocytes Relative 21 %      Monocytes Relative 10 %      Eosinophils Relative 2 %      Basophils Relative 1 %      Neutrophils Absolute 5.58 Thousands/µL      Immature Grans Absolute 0.02 Thousand/uL      Lymphocytes Absolute 1.71 Thousands/µL      Monocytes Absolute 0.82 Thousand/µL      Eosinophils Absolute 0.18 Thousand/µL      Basophils Absolute 0.04 Thousands/µL              Imaging:  CT of the knee reviewed showing fluid collection consistent with either hematoma or abscess on my review. No read from radiology yet     Assessment and Plan        78 yo male with infected hematoma over the left knee    - I do not feel there is concern for septic arthritis at this time as he has no pain through a 45 degree arc of motion and no palpable effusion  - I do believe he either has an infected hematoma or abscess that is present after a previous I&D in [facility] that was present prior to this admission   - I recommended I&D today as he has had this issue now for several weeks and its not resolving. He is on eliquis which puts him at higher risk of bleeding after surgery. However, I would prefer to proceed now given the obvious purulent drainage. We will use a tourniquet and likely place a drain after to limit the risk of recollection  - Will send cultures in the OR. Recommend starting broad spectrum ABX and narrowing per culture results   - Continue NPO until OR today.  Can start normal diet today after surgery   - WBAT LLE   - Will get X-ray of the left foot post op due to persistent pain there and negative ankle xray

## 2023-07-24 NOTE — ASSESSMENT & PLAN NOTE
Patient states that he was on 40 mg of omeprazole daily, but then found to have low B12 levels and decreased his omeprazole dose from 40 mg to 20 mg. Epigastric burning controlled with 20 mg of omeprazole daily.   · IV pantoprazole 40 mg during admission  · Continue 20 mg Omeprazole daily  · Discuss trying famotidine outpatient

## 2023-07-24 NOTE — ANESTHESIA PREPROCEDURE EVALUATION
Procedure:  INCISION AND DRAINAGE (I&D) EXTREMITY (Left: Knee)    Relevant Problems   ANESTHESIA (within normal limits)      CARDIO   (+) Chronic deep vein thrombosis (DVT) of left femoral vein (HCC)   (+) Mixed hyperlipidemia      GI/HEPATIC   (+) Gastroesophageal reflux disease with esophagitis      PULMONARY (within normal limits)        Physical Exam    Airway    Mallampati score: II  TM Distance: >3 FB  Neck ROM: full     Dental   No notable dental hx     Cardiovascular  Rhythm: regular, Rate: normal,     Pulmonary  Breath sounds clear to auscultation,     Other Findings        Anesthesia Plan  ASA Score- 2 Emergent    Anesthesia Type- general with ASA Monitors. Additional Monitors:   Airway Plan: ETT. Plan Factors-Exercise tolerance (METS): >4 METS. Chart reviewed. EKG reviewed. Existing labs reviewed. Patient summary reviewed. Patient is not a current smoker. Induction- intravenous. Postoperative Plan- Plan for postoperative opioid use. Planned trial extubation    Informed Consent- Anesthetic plan and risks discussed with patient. I personally reviewed this patient with the CRNA. Discussed and agreed on the Anesthesia Plan with the CRNA. Ryan Menodsa

## 2023-07-24 NOTE — ED PROVIDER NOTES
History  Chief Complaint   Patient presents with   • Knee Pain     Pt states left knee pain/swelling x1 month. Saw Dr. Franci Moreno last Tuesday in office, sent for doppler - clot. Sutures in place for 3+ weeks. Serosanguinous drainage and swelling noted to left knee. No fever. Started keflex yesterday     Pt is a 73yo M who presents for pain and swelling. Patient reports that approximately 1 month ago he had a traumatic injury to his left knee and developed a hematoma. Per chart review, patient was seen by orthopedics at that time who recommended natural resorption and did not recommend surgical evacuation due to patient being on Eliquis. Patient left for [facility] several days later and while in [facility] had surgical evacuation of the hematoma with 3 sutures placed at the surgical site. Patient reports that his pain and swelling have been "fluctuating" since that time. Patient was seen by orthopedics here again last week who did not have concerns about the wound or the knee itself but did have concerns for DVT due to swelling of the left leg. Patient had a duplex done at that time that was negative for acute DVT. Patient states that 3 days ago he began with increasing swelling and pain as well as drainage from the wound. Patient states at his first visit he was prescribed Keflex for if the wound got infected and he did start the Keflex yesterday but does not seem to be having improvement. Patient reports he has been able to ambulate but it is painful to do so. Patient states he has not been taking anything for pain at home. Patient is still on Eliquis and denies any new trauma to the knee. Patient denies any fevers but does report that he had chills once yesterday. Patient denies any chills currently. Prior to Admission Medications   Prescriptions Last Dose Informant Patient Reported? Taking?    Cyanocobalamin (B-12) 1000 MCG SUBL 7/23/2023  No Yes   Sig: Place 1 tablet (1,000 mcg total) under the tongue in the morning For a month, then once a week for maintenance   Multiple Vitamin (MULTIVITAMIN) tablet 7/23/2023 Self Yes Yes   Sig: Take 1 tablet by mouth 3 (three) times a week   apixaban (Eliquis) 5 mg   No No   Sig: Take 1 tablet (5 mg total) by mouth 2 (two) times a day   atorvastatin (LIPITOR) 20 mg tablet 7/23/2023  No Yes   Sig: Take 1 tablet (20 mg total) by mouth daily      Facility-Administered Medications: None       Past Medical History:   Diagnosis Date   • Calcific tendinitis 02/27/2018   • Cataracts, bilateral    • Chronic deep vein thrombosis (DVT) of left femoral vein ([address]) 3/6/2018   • Chronic pulmonary embolism with acute cor pulmonale ([address]) 3/6/2018   • DVT (deep venous thrombosis) ([address]) 03/2018    left calf-traveled to the lung-PE-given heparin   • GERD (gastroesophageal reflux disease)    • Hyperlipidemia    • Neck pain    • Sacroiliitis ([address]) 3/29/2023   • Wears glasses        Past Surgical History:   Procedure Laterality Date   • CATARACT EXTRACTION     • CERVICAL FUSION  2007    with bone spur removal also- C5,6,7 fusion plate   • COLONOSCOPY     • EPIDURAL BLOCK INJECTION N/A 11/22/2019    Procedure: C6 C7 Cervical Epidural Steroid Injection (30566); Surgeon: Radha Solorzano MD;  Location: [facility] OR;  Service: Pain Management    • HERNIA REPAIR      umbilical   • LUMBAR EPIDURAL INJECTION N/A 2/24/2023    Procedure: L5 S1 LUMBAR epidural steroid injection (55230); Surgeon: Javy Cantu DO;  Location: [facility] OR;  Service: Pain Management    • MT COLONOSCOPY FLX DX W/COLLJ SPEC WHEN PFRMD N/A 7/31/2018    Procedure: COLONOSCOPY;  Surgeon: Jethro Villanueva MD;  Location: [address] [place] [address] GI LAB;   Service: Gastroenterology   • MT RPR UMBILICAL HRNA 5 YRS/> REDUCIBLE N/A 12/10/2019    Procedure: REPAIR HERNIA UMBILICAL;  Surgeon: Daniel Ordonez MD;  Location: [facility];  Service: General   • MT XCAPSL CTRC RMVL INSJ IO LENS PROSTH W/O ECP Right 3/16/2020    Procedure: EXTRACTION EXTRACAPSULAR CATARACT PHACO INTRAOCULAR LENS (IOL); Surgeon: Garrett Silva MD;  Location: [facility] MAIN OR;  Service: Ophthalmology   • IL XCAPSL CTRC RMVL INSJ IO LENS PROSTH W/O ECP Left 3/23/2020    Procedure: EXTRACTION EXTRACAPSULAR CATARACT PHACO INTRAOCULAR LENS (IOL); Surgeon: Garrett Silva MD;  Location: [facility] MAIN OR;  Service: Ophthalmology   • SHOULDER SURGERY Right     labrum repair, arthritis removal   • ULNAR NERVE TRANSPOSITION Right        Family History   Problem Relation Age of Onset   • Fibromyalgia Mother    • Thyroid disease unspecified Mother    • Diabetes Father    • Heart disease Father         CHF   • No Known Problems Sister    • No Known Problems Brother    • No Known Problems Daughter    • No Known Problems Son    • No Known Problems Maternal Aunt    • No Known Problems Maternal Uncle    • No Known Problems Paternal Aunt    • No Known Problems Paternal Uncle    • Cancer Maternal Grandmother    • Cancer Maternal Grandfather    • No Known Problems Paternal Grandmother    • No Known Problems Paternal Grandfather    • Colon cancer Neg Hx    • Liver disease Neg Hx      I have reviewed and agree with the history as documented. E-Cigarette/Vaping   • E-Cigarette Use Never User      E-Cigarette/Vaping Substances     Social History     Tobacco Use   • Smoking status: Never     Passive exposure: Never   • Smokeless tobacco: Never   Vaping Use   • Vaping Use: Never used   Substance Use Topics   • Alcohol use: Yes     Comment: social   • Drug use: No       Review of Systems   Constitutional: Positive for chills (resolved). Musculoskeletal: Positive for arthralgias (L knee) and joint swelling (L knee). Skin: Positive for wound (post-procedural L knee). All other systems reviewed and are negative. Physical Exam  Physical Exam  Vitals reviewed. Constitutional:       General: He is not in acute distress. Appearance: He is well-developed. He is not toxic-appearing or diaphoretic. HENT:      Head: Normocephalic and atraumatic. Right Ear: External ear normal.      Left Ear: External ear normal.      Nose: Nose normal.      Mouth/Throat:      Pharynx: Oropharynx is clear. Eyes:      Extraocular Movements: Extraocular movements intact. Pupils: Pupils are equal, round, and reactive to light. Cardiovascular:      Rate and Rhythm: Normal rate and regular rhythm. Heart sounds: Normal heart sounds. Pulmonary:      Effort: Pulmonary effort is normal. No respiratory distress. Breath sounds: Normal breath sounds. Abdominal:      General: There is no distension. Palpations: Abdomen is soft. Tenderness: There is no abdominal tenderness. Musculoskeletal:      Cervical back: Normal range of motion and neck supple. Right knee: Swelling and erythema present. Decreased range of motion. Tenderness (diffuse) present. Normal patellar mobility. Legs:    Skin:     General: Skin is warm and dry. Capillary Refill: Capillary refill takes less than 2 seconds. Neurological:      Mental Status: He is alert and oriented to person, place, and time. Cranial Nerves: No cranial nerve deficit. Sensory: No sensory deficit. Coordination: Coordination normal.   Psychiatric:         Speech: Speech normal.         Behavior: Behavior is cooperative.          Vital Signs  ED Triage Vitals   Temperature Pulse Respirations Blood Pressure SpO2   07/24/23 1017 07/24/23 1017 07/24/23 1017 07/24/23 1017 07/24/23 1017   98.3 °F (36.8 °C) 63 18 159/76 98 %      Temp Source Heart Rate Source Patient Position - Orthostatic VS BP Location FiO2 (%)   07/24/23 1017 07/24/23 1017 07/24/23 1245 07/24/23 1245 --   Oral Monitor Sitting Right arm       Pain Score       07/24/23 1620       No Pain           Vitals:    07/24/23 1500 07/24/23 1620 07/24/23 1630 07/24/23 1641   BP: 138/76 140/65 161/71 143/69   Pulse:  84 83 87   Patient Position - Orthostatic VS: Visual Acuity      ED Medications  Medications   ondansetron (ZOFRAN) injection 4 mg (has no administration in time range)   lactated ringers infusion (has no administration in time range)   fentaNYL (SUBLIMAZE) injection 50 mcg (has no administration in time range)   atorvastatin (LIPITOR) tablet 20 mg (has no administration in time range)   cyanocobalamin (VITAMIN B-12) tablet 50 mcg (has no administration in time range)   multivitamin stress formula tablet 1 tablet (has no administration in time range)   iohexol (OMNIPAQUE) 350 MG/ML injection (SINGLE-DOSE) 100 mL (100 mL Intravenous Given 7/24/23 1310)       Diagnostic Studies  Results Reviewed     Procedure Component Value Units Date/Time    Magnesium [[id number]]  (Normal) Collected: 07/24/23 1228    Lab Status: Final result Specimen: Blood from Arm, Left Updated: 07/24/23 1643     Magnesium 2.0 mg/dL     COVID only [[id number]]  (Normal) Collected: 07/24/23 1520    Lab Status: Final result Specimen: Nares from Nose Updated: 07/24/23 1614     SARS-CoV-2 Negative    Narrative:      FOR PEDIATRIC PATIENTS - copy/paste COVID Guidelines URL to browser: [email or web address]    SARS-CoV-2 assay is a Nucleic Acid Amplification assay intended for the  qualitative detection of nucleic acid from SARS-CoV-2 in nasopharyngeal  swabs. Results are for the presumptive identification of SARS-CoV-2 RNA. Positive results are indicative of infection with SARS-CoV-2, the virus  causing COVID-19, but do not rule out bacterial infection or co-infection  with other viruses. Laboratories within the [facility] and its  territories are required to report all positive results to the appropriate  public health authorities. Negative results do not preclude SARS-CoV-2  infection and should not be used as the sole basis for treatment or other  patient management decisions.  Negative results must be combined with  clinical observations, patient history, and epidemiological information. This test has not been FDA cleared or approved. This test has been authorized by FDA under an Emergency Use Authorization  (EUA). This test is only authorized for the duration of time the  declaration that circumstances exist justifying the authorization of the  emergency use of an in vitro diagnostic tests for detection of SARS-CoV-2  virus and/or diagnosis of COVID-19 infection under section 564(b)(1) of  the Act, 21 U. S.C. 003JNW-9(U)(4), unless the authorization is terminated  or revoked sooner. The test has been validated but independent review by FDA  and CLIA is pending. Test performed using [employer] GeneXpert: This RT-PCR assay targets N2,  a region unique to SARS-CoV-2. A conserved region in the E-gene was chosen  for pan-Sarbecovirus detection which includes SARS-CoV-2. According to CMS-2020-01-R, this platform meets the definition of high-throughput technology.     Comprehensive metabolic panel [[id number]] Collected: 07/24/23 1228    Lab Status: Final result Specimen: Blood from Arm, Left Updated: 07/24/23 1256     Sodium 138 mmol/L      Potassium 4.0 mmol/L      Chloride 103 mmol/L      CO2 30 mmol/L      ANION GAP 5 mmol/L      BUN 11 mg/dL      Creatinine 0.91 mg/dL      Glucose 84 mg/dL      Calcium 9.0 mg/dL      AST 15 U/L      ALT 21 U/L      Alkaline Phosphatase 93 U/L      Total Protein 7.1 g/dL      Albumin 4.0 g/dL      Total Bilirubin 0.77 mg/dL      eGFR 87 ml/min/1.73sq m     Narrative:      [place] guidelines for Chronic Kidney Disease (CKD):   •  Stage 1 with normal or high GFR (GFR > 90 mL/min/1.73 square meters)  •  Stage 2 Mild CKD (GFR = 60-89 mL/min/1.73 square meters)  •  Stage 3A Moderate CKD (GFR = 45-59 mL/min/1.73 square meters)  •  Stage 3B Moderate CKD (GFR = 30-44 mL/min/1.73 square meters)  •  Stage 4 Severe CKD (GFR = 15-29 mL/min/1.73 square meters)  •  Stage 5 End Stage CKD (GFR <15 mL/min/1.73 square meters)  Note: GFR calculation is accurate only with a steady state creatinine    C-reactive protein [[id number]]  (Abnormal) Collected: 07/24/23 1228    Lab Status: Final result Specimen: Blood from Arm, Left Updated: 07/24/23 1256     CRP 21.4 mg/L     Sedimentation rate, automated [[id number]]  (Normal) Collected: 07/24/23 1228    Lab Status: Final result Specimen: Blood from Arm, Left Updated: 07/24/23 1252     Sed Rate 16 mm/hour     CBC and differential [[id number]] Collected: 07/24/23 1228    Lab Status: Final result Specimen: Blood from Arm, Left Updated: 07/24/23 1235     WBC 8.35 Thousand/uL      RBC 4.55 Million/uL      Hemoglobin 14.4 g/dL      Hematocrit 41.8 %      MCV 92 fL      MCH 31.6 pg      MCHC 34.4 g/dL      RDW 12.8 %      MPV 9.3 fL      Platelets 351 Thousands/uL      nRBC 0 /100 WBCs      Neutrophils Relative 66 %      Immat GRANS % 0 %      Lymphocytes Relative 21 %      Monocytes Relative 10 %      Eosinophils Relative 2 %      Basophils Relative 1 %      Neutrophils Absolute 5.58 Thousands/µL      Immature Grans Absolute 0.02 Thousand/uL      Lymphocytes Absolute 1.71 Thousands/µL      Monocytes Absolute 0.82 Thousand/µL      Eosinophils Absolute 0.18 Thousand/µL      Basophils Absolute 0.04 Thousands/µL                  CT lower extremity w contrast left   Final Result by Argenis Abreu MD (07/24 1537)      9.2 x 6.3 x 3 cm superficial fluid collection at the anteromedial aspect of the knee.  No intra-articular extension this may be a hematoma/Angelica Willingham lesion, sequela of degloving injury  however clinical correlation suggested if concern for    infection/abscess      No fracture seen         Workstation performed: [id number]         XR foot 3+ vw left    (Results Pending)              Procedures  Procedures         ED Course  ED Course as of 07/24/23 1649   Mon Jul 24, 2023   1057 Ortho made aware via TT.    [address] of ortho recommending labs and CT of LLE. Will order. 1238 WBC: 8.35  WNL   1238 CBC and differential  Reviewed and without actionable derangement. 1255 Sed Rate: 16  WNL   1256 Comprehensive metabolic panel  Reviewed and without actionable derangement. 1256 C-REACTIVE PROTEIN(!): 21.4  Elevated. [address] Ortho in room. [address] for OR today and requesting medicine admission and broad spectrum abx.    1456 FM contacted via TT for admission. SBIRT 22yo+    Flowsheet Row Most Recent Value   Initial Alcohol Screen: US AUDIT-C     1. How often do you have a drink containing alcohol? 0 Filed at: 07/24/2023 1027   2. How many drinks containing alcohol do you have on a typical day you are drinking? 0 Filed at: 07/24/2023 1027   3b. FEMALE Any Age, or MALE 65+: How often do you have 4 or more drinks on one occassion? 0 Filed at: 07/24/2023 1027   Audit-C Score 0 Filed at: 07/24/2023 1027   DORYS: How many times in the past year have you. .. Used an illegal drug or used a prescription medication for non-medical reasons? Never Filed at: 07/24/2023 1027                    Medical Decision Making  Pt is a 75yo M who presents with L knee pain. Exam pertinent for left knee pain and swelling. Differential diagnosis to include but not limited to septic arthritis, recurrence of hematoma, localized swelling, cellulitis. Will plan to reach out to orthopedics for recommendations for further care due to patient's risk factors in regards to possible arthrocentesis and that he has already been following with orthopedics. See ED course for results and details. Plan to [facility]. Pt discussed with admitting team and admission orders placed. Pt admitted without incident. Amount and/or Complexity of Data Reviewed  Labs: ordered. Decision-making details documented in ED Course. Radiology: ordered. Risk  Prescription drug management.   Decision regarding hospitalization. Disposition  Final diagnoses:   Left knee pain   Infection of left knee ([address])     Time reflects when diagnosis was documented in both MDM as applicable and the Disposition within this note     Time User Action Codes Description Comment    7/24/2023 10:58 AM Brendolyn Greener Add [M25.562] Left knee pain     7/24/2023  2:29 PM Grey Lim Add [S80.02XA] Hematoma of left knee region     7/24/2023  2:29 PM Grey Lim Add [T14. 8XXA,  L08.9] Infected hematoma     7/24/2023  2:56 PM Brendolyn Greener Add [M00.9] Infection of left knee ([address])     7/24/2023  4:02 PM Carr, Reyessebony. 8XXA,  L08.9] Infected hematoma       ED Disposition     ED Disposition   Admit    Condition   Stable    Date/Time   Mon Jul 24, 2023  2:57 PM    Comment   Case was discussed with FP and the patient's admission status was agreed to be Admission Status: inpatient status to the service of Dr. Vivian Damian. Follow-up Information    None         Current Discharge Medication List      CONTINUE these medications which have NOT CHANGED    Details   atorvastatin (LIPITOR) 20 mg tablet Take 1 tablet (20 mg total) by mouth daily  Qty: 90 tablet, Refills: 3    Associated Diagnoses: Dyslipidemia      Cyanocobalamin (B-12) 1000 MCG SUBL Place 1 tablet (1,000 mcg total) under the tongue in the morning For a month, then once a week for maintenance  Qty: 30 tablet, Refills: 1    Associated Diagnoses: B12 deficiency      Multiple Vitamin (MULTIVITAMIN) tablet Take 1 tablet by mouth 3 (three) times a week      apixaban (Eliquis) 5 mg Take 1 tablet (5 mg total) by mouth 2 (two) times a day  Qty: 180 tablet, Refills: 3    Associated Diagnoses: Other chronic pulmonary embolism with acute cor pulmonale ([address]); Chronic deep vein thrombosis (DVT) of left femoral vein (HCC)             No discharge procedures on file.     PDMP Review       Value Time User    PDMP Reviewed  Yes 12/10/2019 12:23 PM Jj Manzano MD ED Provider  Electronically Signed by           Cheri Mata MD  07/24/23 3482

## 2023-07-24 NOTE — H&P
History and Physical - [facility]  Family Medicine Residency     Patient Information: Lisandra Abad 79 y.o. male MRN: [id number]  Unit/Bed#: OR [place] Encounter: [id number]  Admitting Physician: Radha Sorensen MD   PCP: Yue Wetzel MD  Date of Admission:  07/24/23     Assessment and Plan     * Hematoma of left knee region  Assessment & Plan  Acute        79 yo male w/pmh of DVT on Eliquis presents with left knee pain after injuring it while playing softball in [facility]. Patient has a left prepatellar hematoma. Pt saw Dr. Ibrahima Barclay (orthopedic surgeon) on 6/30 for prepatellar hematoma, given keflex. He received I&D of left hematoma in [facility] 2 weeks ago. Pt presents on 7/24 w/sharp pain to his left knee, limited ROM, erythema and warmth. Likely hematoma versus abscess. Low suspicion of septic arthritis    Ct of Lower Left extremity w/contrast: 9.2 x 6.3 x 3 cm superficial fluid collection at the anteromedial aspect of the knee. · Ortho Recommendations:  ·  I&D in OR. Will send cultures in the OR. · Recommend starting broad spectrum ABX and narrowing per culture results. · Continue NPO until OR today. · Can start normal diet today after surgery. ·  WBAT LLE  ·  X-ray of the left foot post op due to persistent pain there and negative ankle xray   · Resume eliquis when safe    Chronic deep vein thrombosis (DVT) of left femoral vein (HCC)  Assessment & Plan  Continue Eliquis 5 mg twice daily    GERD (gastroesophageal reflux disease)  Assessment & Plan  Patient states that he was on 40 mg of omeprazole daily, but then found to have low B12 levels and decreased his omeprazole dose from 40 mg to 20 mg. Epigastric burning controlled with 20 mg of omeprazole daily.     · We will continue omeprazole 20 mg daily during admission  · Discussed trying famotidine outpatient    Mixed hyperlipidemia  Assessment & Plan  Chronic; continue Lipitor 20 mg daily       Fluids: None  Electrolyte repletion: Replete as needed. Nutrition:        Diet Orders   (From admission, onward)             Start     Ordered    07/24/23 1553  Diet NPO; Sips with meds  Diet effective now        References:    Adult Nutrition Support Algorithm    RD Therapeutic Diet Order Protocol   Question Answer Comment   Diet Type NPO    NPO Except: Sips with meds    RD to adjust diet per protocol? Yes        07/24/23 1559               VTE Prophylaxis:   High Risk (Score >/= 5) - Pharmacological DVT Prophylaxis Ordered: apixaban (Eliquis). Sequential Compression Devices Ordered. Code Status: Level 1 - Full Code  Anticipated Length of Stay:  Patient will be admitted on an Inpatient basis with an anticipated length of stay of  < than 2 midnights. Justification for Hospital Stay: I&D of Left prepatellar hematoma  Total Time for Visit, including Counseling / Coordination of Care: 30 mins. Greater than 50% of this total time spent on direct patient counseling and coordination of care. Chief Complaint:     Chief Complaint   Patient presents with   • Knee Pain     Pt states left knee pain/swelling x1 month. Saw Dr. Marc Fuentes last Tuesday in office, sent for doppler - clot. Sutures in place for 3+ weeks. Serosanguinous drainage and swelling noted to left knee. No fever. Started keflex yesterday       Subjective      History of Present Illness:     Julia Rosas is a 79 y.o. male who presents with left knee pain associated w/ erythema, edema, warmth since 4 days ago. Patient initially injured his left knee 3 weeks ago while playing softball and seen by orthopedic surgeon, Dr. Marc Fuentes. Patient was [place] to [place] to [facility] due to the hematoma, but he still insisted on traveling and was given prophylactic Keflex. During his 2-week stay in [facility], he got an I&D of left knee.   Patient was diagnosed w/ prepatellar hematoma by Dr. Marc Fuentes (orthopedic surgery) on 6/30 and was supposed to be seen tomorrow for suture removal. Pt presents today with purulent drainage since Friday and increasingly sharp knee pain since Saturday. He took no pain medications at home and began taking Keflex on Friday. Denies chest pain, shortness of breath, numbness, tingling or any other acute complaints at this time. Review of Systems:  Review of Systems   Constitutional: Negative for chills and fever. HENT: Negative for ear pain and sore throat. Eyes: Negative for pain and visual disturbance. Respiratory: Negative for cough and shortness of breath. Cardiovascular: Negative for chest pain and palpitations. Gastrointestinal: Negative for abdominal pain and vomiting. Genitourinary: Negative for dysuria and hematuria. Musculoskeletal: Positive for joint swelling (left knee). Negative for arthralgias and back pain. Skin: Negative for color change and rash. Neurological: Negative for seizures and syncope. All other systems reviewed and are negative. Past Medical History:   Diagnosis Date   • Calcific tendinitis 02/27/2018   • Cataracts, bilateral    • Chronic deep vein thrombosis (DVT) of left femoral vein ([address]) 3/6/2018   • Chronic pulmonary embolism with acute cor pulmonale ([address]) 3/6/2018   • DVT (deep venous thrombosis) ([address]) 03/2018    left calf-traveled to the lung-PE-given heparin   • GERD (gastroesophageal reflux disease)    • Hyperlipidemia    • Neck pain    • Sacroiliitis ([address]) 3/29/2023   • Wears glasses      Past Surgical History:   Procedure Laterality Date   • CATARACT EXTRACTION     • CERVICAL FUSION  2007    with bone spur removal also- C5,6,7 fusion plate   • COLONOSCOPY     • EPIDURAL BLOCK INJECTION N/A 11/22/2019    Procedure: C6 C7 Cervical Epidural Steroid Injection (59555); Surgeon: Leida Quinn MD;  Location: [facility] MAIN OR;  Service: Pain Management    • HERNIA REPAIR      umbilical   • LUMBAR EPIDURAL INJECTION N/A 2/24/2023    Procedure: L5 S1 LUMBAR epidural steroid injection (54063);   Surgeon: Dominic Loza Santa Jackson DO;  Location: [facility] MAIN OR;  Service: Pain Management    • ME COLONOSCOPY FLX DX W/COLLJ SPEC WHEN PFRMD N/A 7/31/2018    Procedure: COLONOSCOPY;  Surgeon: Candelaria Euceda MD;  Location: [address] [place] [address] GI LAB; Service: Gastroenterology   • ME RPR UMBILICAL HRNA 5 YRS/> REDUCIBLE N/A 12/10/2019    Procedure: REPAIR HERNIA UMBILICAL;  Surgeon: Adela Madrid MD;  Location: [facility];  Service: General   • ME XCAPSL CTRC RMVL INSJ IO LENS PROSTH W/O ECP Right 3/16/2020    Procedure: EXTRACTION EXTRACAPSULAR CATARACT PHACO INTRAOCULAR LENS (IOL); Surgeon: Nellie Granda MD;  Location: [facility] OR;  Service: Ophthalmology   • ME XCAPSL CTRC RMVL INSJ IO LENS PROSTH W/O ECP Left 3/23/2020    Procedure: EXTRACTION EXTRACAPSULAR CATARACT PHACO INTRAOCULAR LENS (IOL); Surgeon: Nellie Granda MD;  Location: [facility] OR;  Service: Ophthalmology   • SHOULDER SURGERY Right     labrum repair, arthritis removal   • ULNAR NERVE TRANSPOSITION Right      No Known Allergies  Prior to Admission Medications   Prescriptions Last Dose Informant Patient Reported? Taking?    Cyanocobalamin (B-12) 1000 MCG SUBL 7/23/2023  No Yes   Sig: Place 1 tablet (1,000 mcg total) under the tongue in the morning For a month, then once a week for maintenance   Multiple Vitamin (MULTIVITAMIN) tablet 7/23/2023 Self Yes Yes   Sig: Take 1 tablet by mouth 3 (three) times a week   apixaban (Eliquis) 5 mg   No No   Sig: Take 1 tablet (5 mg total) by mouth 2 (two) times a day   atorvastatin (LIPITOR) 20 mg tablet 7/23/2023  No Yes   Sig: Take 1 tablet (20 mg total) by mouth daily      Facility-Administered Medications: None     Social History     Socioeconomic History   • Marital status: /Civil Union     Spouse name: Not on file   • Number of children: Not on file   • Years of education: Not on file   • Highest education level: Not on file   Occupational History   • Not on file   Tobacco Use   • Smoking status: Never     Passive exposure: Never   • Smokeless tobacco: Never   Vaping Use   • Vaping Use: Never used   Substance and Sexual Activity   • Alcohol use: Yes     Comment: social   • Drug use: No   • Sexual activity: Yes     Partners: Female   Other Topics Concern   • Not on file   Social History Narrative   • Not on file     Social Determinants of Health     Financial Resource Strain: Low Risk  (6/14/2023)    Overall Financial Resource Strain (CARDIA)    • Difficulty of Paying Living Expenses: Not hard at all   Food Insecurity: Not on file   Transportation Needs: No Transportation Needs (6/14/2023)    PRAPARE - Transportation    • Lack of Transportation (Medical): No    • Lack of Transportation (Non-Medical):  No   Physical Activity: Not on file   Stress: Not on file   Social Connections: Not on file   Intimate Partner Violence: Not on file   Housing Stability: Not on file     Family History   Problem Relation Age of Onset   • Fibromyalgia Mother    • Thyroid disease unspecified Mother    • Diabetes Father    • Heart disease Father         CHF   • No Known Problems Sister    • No Known Problems Brother    • No Known Problems Daughter    • No Known Problems Son    • No Known Problems Maternal Aunt    • No Known Problems Maternal Uncle    • No Known Problems Paternal Aunt    • No Known Problems Paternal Uncle    • Cancer Maternal Grandmother    • Cancer Maternal Grandfather    • No Known Problems Paternal Grandmother    • No Known Problems Paternal Grandfather    • Colon cancer Neg Hx    • Liver disease Neg Hx         Patient Pre-hospital Living Situation: home  Patient Pre-hospital Level of Mobility: limited  Patient Pre-hospital Diet Restrictions: none          Objective     Physical Exam:   Vitals:   Blood Pressure: 143/69 (07/24/23 1641)  Pulse: 87 (07/24/23 1641)  Temperature: 98.2 °F (36.8 °C) (07/24/23 1620)  Temp Source: Oral (07/24/23 1017)  Respirations: 18 (07/24/23 1641)  SpO2: 97 % (07/24/23 1641)     Physical Exam  Constitutional: Appearance: Normal appearance. HENT:      Head: Normocephalic and atraumatic. Left Ear: Tympanic membrane and ear canal normal.      Nose: Nose normal.      Mouth/Throat:      Mouth: Mucous membranes are moist.   Eyes:      General: No scleral icterus. Right eye: No discharge. Left eye: No discharge. Cardiovascular:      Rate and Rhythm: Normal rate and regular rhythm. Pulses: Normal pulses. Heart sounds: Normal heart sounds. No murmur heard. No friction rub. Pulmonary:      Effort: Pulmonary effort is normal.      Breath sounds: Normal breath sounds. Abdominal:      General: Abdomen is flat. Palpations: Abdomen is soft. Musculoskeletal:         General: Swelling (left knee ) and tenderness present. Cervical back: Normal range of motion and neck supple. Left knee: Swelling, effusion and erythema present. Decreased range of motion. Tenderness present over the patellar tendon. No ACL tenderness. Skin:     General: Skin is warm. Capillary Refill: Capillary refill takes less than 2 seconds. Coloration: Skin is not jaundiced. Neurological:      General: No focal deficit present. Mental Status: He is alert and oriented to person, place, and time. Psychiatric:         Mood and Affect: Mood normal.         Behavior: Behavior normal.         Thought Content: Thought content normal.         Judgment: Judgment normal.           Lab Results: I have personally reviewed pertinent reports.     Results from last 7 days   Lab Units 07/24/23  1228   WBC Thousand/uL 8.35   HEMOGLOBIN g/dL 14.4   HEMATOCRIT % 41.8   PLATELETS Thousands/uL 212   NEUTROS PCT % 66   LYMPHS PCT % 21   MONOS PCT % 10   EOS PCT % 2     Results from last 7 days   Lab Units 07/24/23  1228   POTASSIUM mmol/L 4.0   CHLORIDE mmol/L 103   CO2 mmol/L 30   BUN mg/dL 11   CREATININE mg/dL 0.91   CALCIUM mg/dL 9.0   ALK PHOS U/L 93   ALT U/L 21   AST U/L 15   EGFR ml/min/1.73sq m 87 MAGNESIUM mg/dL 2.0                            Invalid input(s): "URIBILINOGEN"                    Imaging: I have personally reviewed pertinent reports. CT lower extremity w contrast left    Result Date: 7/24/2023  9.2 x 6.3 x 3 cm superficial fluid collection at the anteromedial aspect of the knee. No intra-articular extension this may be a hematoma/Angelica Willingham lesion, sequela of degloving injury  however clinical correlation suggested if concern for infection/abscess No fracture seen Workstation performed: [id number]     XR ankle 3+ vw left    Result Date: 7/24/2023  No acute osseous abnormality. Soft tissue swelling.  Workstation performed: [id number]               Entire H&P was discussed with Dr. Kristian Dumont who agreed to what is noted above     ** Please Note: This note has been constructed using a voice recognition system Sue Amezcua MD  07/24/23  4:48 PM

## 2023-07-24 NOTE — PERIOPERATIVE NURSING NOTE
Left knee ace dsg, dry, intact. Hemovac intact patent for bloody drainage. Left pedal pulse strong, marked and palpable.

## 2023-07-24 NOTE — ASSESSMENT & PLAN NOTE
Acute    77 yo male w/pmh of DVT on Eliquis presents with left knee pain after injuring it while playing softball in [facility]. Pt saw Dr. aNto Quijano (orthopedic surgeon) on 6/30 for prepatellar hematoma, given keflex. He received I&D of left hematoma in [facility] 2 weeks ago. Pt presents on 7/24 w/sharp pain to his left knee, limited ROM, erythema and warmth. Likely hematoma versus abscess. Low suspicion of septic arthritis    Ct of Lower Left extremity w/contrast: 9.2 x 6.3 x 3 cm superficial fluid collection at the anteromedial aspect of the knee. Xray of left foot: No osseous abnormality    · Ortho Recommendations:  · I&D completed in OR on 1/24 w/ no complications   · Wound culture-Staph aureus, gram-positive cocci in clusters. Awaiting identification panel and sensitivities. · Initially started broad-spectrum antibiotics on 7/24: Vancomycin and Cefepime. Discontinued cefepime on 7/25. · Consulted ID; recommendations appreciated  · Hemovac in wound to prevent recollection  · DVT prophylaxis with lovenox 40mg subQ daily.  Restart home Eliquis after Hemovac removed  · WBAT LLE

## 2023-07-24 NOTE — ED NOTES
Patient ask to change into a hospital gown. Affected area cleanse with chlorohexidine wipes.      Jem Williamson RN  07/24/23 4069

## 2023-07-24 NOTE — PLAN OF CARE
Problem: PAIN - ADULT  Goal: Verbalizes/displays adequate comfort level or baseline comfort level  Description: Interventions:  - Encourage patient to monitor pain and request assistance  - Assess pain using appropriate pain scale  - Administer analgesics based on type and severity of pain and evaluate response  - Implement non-pharmacological measures as appropriate and evaluate response  - Consider cultural and social influences on pain and pain management  - Notify physician/advanced practitioner if interventions unsuccessful or patient reports new pain  Outcome: Progressing     Problem: INFECTION - ADULT  Goal: Absence or prevention of progression during hospitalization  Description: INTERVENTIONS:  - Assess and monitor for signs and symptoms of infection  - Monitor lab/diagnostic results  - Monitor all insertion sites, i.e. indwelling lines, tubes, and drains  - Monitor endotracheal if appropriate and nasal secretions for changes in amount and color  - [place] appropriate cooling/warming therapies per order  - Administer medications as ordered  - Instruct and encourage patient and family to use good hand hygiene technique  - Identify and instruct in appropriate isolation precautions for identified infection/condition  Outcome: Progressing     Problem: SAFETY ADULT  Goal: Patient will remain free of falls  Description: INTERVENTIONS:  - Educate patient/family on patient safety including physical limitations  - Instruct patient to call for assistance with activity   - Consult OT/PT to assist with strengthening/mobility   - Keep Call bell within reach  - Keep bed low and locked with side rails adjusted as appropriate  - Keep care items and personal belongings within reach  - Initiate and maintain comfort rounds  - Make Fall Risk Sign visible to staff  - Offer Toileting every call Hours, in advance of need  - Initiate/Maintain  - Obtain necessary fall risk management equipment:   - Apply yellow socks and bracelet for high fall risk patients  - Consider moving patient to room near nurses station  Outcome: Progressing

## 2023-07-25 LAB
ANION GAP SERPL CALCULATED.3IONS-SCNC: 7 MMOL/L
BASOPHILS # BLD AUTO: 0.01 THOUSANDS/ÂΜL (ref 0–0.1)
BASOPHILS NFR BLD AUTO: 0 % (ref 0–1)
BUN SERPL-MCNC: 15 MG/DL (ref 5–25)
CALCIUM SERPL-MCNC: 8.8 MG/DL (ref 8.4–10.2)
CHLORIDE SERPL-SCNC: 104 MMOL/L (ref 96–108)
CO2 SERPL-SCNC: 26 MMOL/L (ref 21–32)
CREAT SERPL-MCNC: 0.76 MG/DL (ref 0.6–1.3)
EOSINOPHIL # BLD AUTO: 0 THOUSAND/ÂΜL (ref 0–0.61)
EOSINOPHIL NFR BLD AUTO: 0 % (ref 0–6)
ERYTHROCYTE [DISTWIDTH] IN BLOOD BY AUTOMATED COUNT: 12.4 % (ref 11.6–15.1)
GFR SERPL CREATININE-BSD FRML MDRD: 95 ML/MIN/1.73SQ M
GLUCOSE SERPL-MCNC: 140 MG/DL (ref 65–140)
HCT VFR BLD AUTO: 41.4 % (ref 36.5–49.3)
HGB BLD-MCNC: 14.5 G/DL (ref 12–17)
IMM GRANULOCYTES # BLD AUTO: 0.04 THOUSAND/UL (ref 0–0.2)
IMM GRANULOCYTES NFR BLD AUTO: 1 % (ref 0–2)
LYMPHOCYTES # BLD AUTO: 1 THOUSANDS/ÂΜL (ref 0.6–4.47)
LYMPHOCYTES NFR BLD AUTO: 15 % (ref 14–44)
MAGNESIUM SERPL-MCNC: 1.9 MG/DL (ref 1.9–2.7)
MCH RBC QN AUTO: 31.7 PG (ref 26.8–34.3)
MCHC RBC AUTO-ENTMCNC: 35 G/DL (ref 31.4–37.4)
MCV RBC AUTO: 90 FL (ref 82–98)
MONOCYTES # BLD AUTO: 0.19 THOUSAND/ÂΜL (ref 0.17–1.22)
MONOCYTES NFR BLD AUTO: 3 % (ref 4–12)
NEUTROPHILS # BLD AUTO: 5.28 THOUSANDS/ÂΜL (ref 1.85–7.62)
NEUTS SEG NFR BLD AUTO: 81 % (ref 43–75)
NRBC BLD AUTO-RTO: 0 /100 WBCS
PLATELET # BLD AUTO: 234 THOUSANDS/UL (ref 149–390)
PMV BLD AUTO: 9.5 FL (ref 8.9–12.7)
POTASSIUM SERPL-SCNC: 4.1 MMOL/L (ref 3.5–5.3)
RBC # BLD AUTO: 4.58 MILLION/UL (ref 3.88–5.62)
SODIUM SERPL-SCNC: 137 MMOL/L (ref 135–147)
VIT B12 SERPL-MCNC: 174 PG/ML (ref 180–914)
WBC # BLD AUTO: 6.52 THOUSAND/UL (ref 4.31–10.16)

## 2023-07-25 PROCEDURE — 99232 SBSQ HOSP IP/OBS MODERATE 35: CPT | Performed by: FAMILY MEDICINE

## 2023-07-25 PROCEDURE — 99223 1ST HOSP IP/OBS HIGH 75: CPT | Performed by: INTERNAL MEDICINE

## 2023-07-25 PROCEDURE — 80048 BASIC METABOLIC PNL TOTAL CA: CPT

## 2023-07-25 PROCEDURE — 99024 POSTOP FOLLOW-UP VISIT: CPT | Performed by: PHYSICIAN ASSISTANT

## 2023-07-25 PROCEDURE — 82607 VITAMIN B-12: CPT

## 2023-07-25 PROCEDURE — 85025 COMPLETE CBC W/AUTO DIFF WBC: CPT

## 2023-07-25 PROCEDURE — 97162 PT EVAL MOD COMPLEX 30 MIN: CPT

## 2023-07-25 PROCEDURE — 97110 THERAPEUTIC EXERCISES: CPT

## 2023-07-25 PROCEDURE — 83735 ASSAY OF MAGNESIUM: CPT

## 2023-07-25 RX ORDER — ENOXAPARIN SODIUM 100 MG/ML
40 INJECTION SUBCUTANEOUS
Status: DISCONTINUED | OUTPATIENT
Start: 2023-07-25 | End: 2023-07-26

## 2023-07-25 RX ADMIN — PANTOPRAZOLE SODIUM 40 MG: 40 TABLET, DELAYED RELEASE ORAL at 05:36

## 2023-07-25 RX ADMIN — POLYETHYLENE GLYCOL 3350 17 G: 17 POWDER, FOR SOLUTION ORAL at 09:30

## 2023-07-25 RX ADMIN — ENOXAPARIN SODIUM 40 MG: 40 INJECTION SUBCUTANEOUS at 17:42

## 2023-07-25 RX ADMIN — ACETAMINOPHEN 975 MG: 325 TABLET ORAL at 05:36

## 2023-07-25 RX ADMIN — CYANOCOBALAMIN TAB 500 MCG 1000 MCG: 500 TAB at 09:30

## 2023-07-25 RX ADMIN — ACETAMINOPHEN 975 MG: 325 TABLET ORAL at 14:39

## 2023-07-25 RX ADMIN — ATORVASTATIN CALCIUM 20 MG: 20 TABLET, FILM COATED ORAL at 16:15

## 2023-07-25 RX ADMIN — ACETAMINOPHEN 975 MG: 325 TABLET ORAL at 23:14

## 2023-07-25 RX ADMIN — VANCOMYCIN HYDROCHLORIDE 1250 MG: 10 INJECTION, POWDER, LYOPHILIZED, FOR SOLUTION INTRAVENOUS at 05:36

## 2023-07-25 RX ADMIN — VANCOMYCIN HYDROCHLORIDE 1250 MG: 10 INJECTION, POWDER, LYOPHILIZED, FOR SOLUTION INTRAVENOUS at 17:43

## 2023-07-25 RX ADMIN — SODIUM CHLORIDE, SODIUM LACTATE, POTASSIUM CHLORIDE, AND CALCIUM CHLORIDE 100 ML/HR: .6; .31; .03; .02 INJECTION, SOLUTION INTRAVENOUS at 07:22

## 2023-07-25 RX ADMIN — SODIUM CHLORIDE, SODIUM LACTATE, POTASSIUM CHLORIDE, AND CALCIUM CHLORIDE 100 ML/HR: .6; .31; .03; .02 INJECTION, SOLUTION INTRAVENOUS at 16:15

## 2023-07-25 NOTE — PLAN OF CARE
Problem: INFECTION - ADULT  Goal: Absence or prevention of progression during hospitalization  Description: INTERVENTIONS:  - Assess and monitor for signs and symptoms of infection  - Monitor lab/diagnostic results  - Monitor all insertion sites, i.e. indwelling lines, tubes, and drains  - Monitor endotracheal if appropriate and nasal secretions for changes in amount and color  - [place] appropriate cooling/warming therapies per order  - Administer medications as ordered  - Instruct and encourage patient and family to use good hand hygiene technique  - Identify and instruct in appropriate isolation precautions for identified infection/condition  Outcome: Progressing     Problem: PAIN - ADULT  Goal: Verbalizes/displays adequate comfort level or baseline comfort level  Description: Interventions:  - Encourage patient to monitor pain and request assistance  - Assess pain using appropriate pain scale  - Administer analgesics based on type and severity of pain and evaluate response  - Implement non-pharmacological measures as appropriate and evaluate response  - Consider cultural and social influences on pain and pain management  - Notify physician/advanced practitioner if interventions unsuccessful or patient reports new pain  Outcome: Progressing

## 2023-07-25 NOTE — PLAN OF CARE
Problem: PHYSICAL THERAPY ADULT  Goal: Performs mobility at highest level of function for planned discharge setting. See evaluation for individualized goals. Description: Treatment/Interventions: Elevations, LE strengthening/ROM, Gait training  Equipment Recommended: Other (Comment) (none)       See flowsheet documentation for full assessment, interventions and recommendations. Outcome: Progressing  Note: Prognosis: Good  Problem List: Decreased strength, Decreased range of motion, Decreased mobility, Pain  Assessment: Patient is an 79y.o. year old male seen for Physical Therapy evaluation. Patient admitted with Hematoma of left knee region. Comorbidities affecting patient's physical performance include: lumbar radiculopathy, chronic DVT. Personal factors affecting patient at time of initial evaluation include: inability to perform dynamic tasks in community. Prior to admission, patient was independent with functional mobility without assistive device and independent with ADLS. Please find objective findings from Physical Therapy assessment regarding body systems outlined above with impairments and limitations including decreased ROM, gait deviations, pain and decreased functional mobility tolerance. The Barthel Index was used as a functional outcome tool presenting with a score of barthel index 100 today indicating no limitations of functional mobility and ADLS. Patient's clinical presentation is currently evolving as seen in patient's presentation of changing level of pain, new onset of impairment of functional mobility and new onset of weakness. Pt would benefit from continued Physical Therapy treatment to address deficits as defined above and maximize level of functional mobility. As demonstrated by objective findings, the assigned level of complexity for this evaluation is moderate. The patient's -[facility] Basic Mobility Inpatient Short Form Raw Score is 24.  A Raw score of greater than 16 suggests the patient may benefit from discharge to home. PT Discharge Recommendation: Home with outpatient rehabilitation (if needed and recommended by orthopedic surgeon)    See flowsheet documentation for full assessment.

## 2023-07-25 NOTE — PROGRESS NOTES
Progress Note - Orthopedics   Julio Cesar Monteiro O'Such 79 y.o. male MRN: [id number]  Unit/Bed#: [address] [place] [address] Encounter: [id number]    Assessment:  POD#1 s/p I&D infected left knee hematoma by Dr. Yanique Jones:  Vancomycin per Pharmacy and infectious disease  Wound cultures grew 1+ gram positive cocci and 4+ growth Staph aures, will await final cultures  Analgesia PRN  WBAT LLE  DVT prophylaxis with lovenox 40mg subQ daily. Restart home Eliquis after Hemovac removed  Ortho will follow    Weight bearing: as tolerated    VTE Pharmacologic Prophylaxis: Enoxaparin (Lovenox)  VTE Mechanical Prophylaxis: sequential compression device    Subjective: Patient seen and examined this afternoon. No overnight events. Significant culture growth. Pain controlled. Able to ambulate today. Denies any significant pain in the knee. The Hemovac has not been drained and has mild serosanguineous drainage    Vitals: Blood pressure 123/69, pulse 81, temperature (!) 97.4 °F (36.3 °C), resp. rate 18, height 6' 1" (1.854 m), weight 104 kg (230 lb), SpO2 94 %. ,Body mass index is 30.34 kg/m². Intake/Output Summary (Last 24 hours) at 7/25/2023 1617  Last data filed at 7/25/2023 0601  Gross per 24 hour   Intake 200 ml   Output 600 ml   Net -400 ml       Invasive Devices     Peripheral Intravenous Line  Duration           Peripheral IV 07/24/23 Left Antecubital 1 day    Peripheral IV 07/25/23 Dorsal (posterior); Left Hand <1 day          Drain  Duration           Closed/Suction Drain Anterior; Left Knee Accordion 10 Fr. 1 day                Physical Exam: General: A&Ox3, NAD, resting comfortably  Ortho Exam: LLE: Incisions examined. There is no significant drainage. The Hemovac is in place and does have mild serosanguineous drainage. The incisions were redressed with 4 x 4's, ABD, web roll, and Ace wrap.   Thigh and calf soft and non-tender, +EHL/DF/PF, 2+ pedal pulse    Lab, Imaging and other studies:   I have personally reviewed pertinent lab results.   CBC:   Lab Results   Component Value Date    WBC 6.52 07/25/2023    HGB 14.5 07/25/2023    HCT 41.4 07/25/2023    MCV 90 07/25/2023     07/25/2023    RBC 4.58 07/25/2023    MCH 31.7 07/25/2023    MCHC 35.0 07/25/2023    RDW 12.4 07/25/2023    MPV 9.5 07/25/2023    NRBC 0 07/25/2023     CMP:   Lab Results   Component Value Date    SODIUM 137 07/25/2023     07/25/2023    CO2 26 07/25/2023    BUN 15 07/25/2023    CREATININE 0.76 07/25/2023    CALCIUM 8.8 07/25/2023    EGFR 95 07/25/2023     Navya Pisano PA-C

## 2023-07-25 NOTE — UTILIZATION REVIEW
Initial Clinical Review    Admission: Date/Time/Statement:   Admission Orders (From admission, onward)     Ordered        07/24/23 1505  INPATIENT ADMISSION  Once                      Orders Placed This Encounter   Procedures   • INPATIENT ADMISSION     Standing Status:   Standing     Number of Occurrences:   1     Order Specific Question:   Level of Care     Answer:   Med Surg [16]     Order Specific Question:   Estimated length of stay     Answer:   More than 2 Midnights     Order Specific Question:   Certification     Answer:   I certify that inpatient services are medically necessary for this patient for a duration of greater than two midnights. See H&P and MD Progress Notes for additional information about the patient's course of treatment. ED Arrival Information     Expected   -    Arrival   7/24/2023 10:01    Acuity   Urgent            Means of arrival   Walk-In    Escorted by   Self    Service   Family Medicine    Admission type   Emergency            Arrival complaint   swollen/painful left knee           Chief Complaint   Patient presents with   • Knee Pain     Pt states left knee pain/swelling x1 month. Saw Dr. Alex Harden last Tuesday in office, sent for doppler - clot. Sutures in place for 3+ weeks. Serosanguinous drainage and swelling noted to left knee. No fever. Started keflex yesterday       Initial Presentation:   79 yom to ER from home c/o pain & swelling L knee. Patient reports that approximately 1 month ago he had a traumatic injury to his left knee and developed a hematoma. Pt  seen by orthopedics at that time who recommended natural resorption and did not recommend surgical evacuation due to patient being on Eliquis, OP doppler studies neg for DVT. Patient left for [facility] several days later and while in [facility] had surgical evacuation of the hematoma with 3 sutures placed at the surgical site.  Started with increased swelling & drainage 3 days ago & started on Keflex yesterday with no improvement. Presents with swelling, erythema, decreased ROM, diffuse tenderness, serous drainage from wound. Admission work-up showing superficial fluid collection at the anteromedial aspect of the knee on imagng, elevated CRP, +wound culture/gm stain. Admitted to inpatient status for hematoma L knee. Started on IVABT, ortho consulted. Per ortho: infected hematoma over the left knee  I&D today as he has had this issue now for several weeks and its not resolving. He is on eliquis which puts him at higher risk of bleeding after surgery. However, prefer to proceed now given the obvious purulent drainage. Will use a tourniquet and likely place a drain after to limit the risk of recollection  - Will send cultures in the OR. Recommend starting broad spectrum ABX and narrowing per culture results     To OR for: Left - INCISION AND DRAINAGE (I&D) EXTREMITY  Anesthesia: general  Operative Findings:  Small amount of purulent fluid immediately deep to previous incision. Large serous collection, extraarticular. Tracks from medial side of the patella to several centimeters distal to the knee joint line on the anteromedial aspect of the knee. No necrotic tissue. No active bleeding. Date: 7/25/23   Day 2:   POD#1  Post-op course IVABT in progress, ID consulted. Hemovac to self-suction in place to L knee with scant amount blood noted. IVF maintained. Per ID: Infected hematoma of left knee. D/c Cefepime, continue Vanco per pharmacy dosing. Follow cultures/labs.        ED Triage Vitals   Temperature Pulse Respirations Blood Pressure SpO2   07/24/23 1017 07/24/23 1017 07/24/23 1017 07/24/23 1017 07/24/23 1017   98.3 °F (36.8 °C) 63 18 159/76 98 %      Temp Source Heart Rate Source Patient Position - Orthostatic VS BP Location FiO2 (%)   07/24/23 1017 07/24/23 1017 07/24/23 1245 07/24/23 1245 --   Oral Monitor Sitting Right arm       Pain Score       07/24/23 1620       No Pain          Wt Readings from Last 1 Encounters: 07/24/23 104 kg (230 lb)     Additional Vital Signs:   Date/Time Temp Pulse Resp BP MAP (mmHg) SpO2 O2 Device Cardiac (WDL) Patient Position - Orthostatic VS   07/25/23 0731 -- 69 18 130/73 92 94 % -- -- Lying   07/25/23 02:50:49 97.8 °F (36.6 °C) 62 18 115/61 79 97 % None (Room air) -- Lying   07/24/23 22:59:01 97.7 °F (36.5 °C) 51 Abnormal  18 108/57 74 92 % None (Room air) -- Lying   07/24/23 1932 -- -- -- -- -- 92 % None (Room air) -- --   07/24/23 18:05:28 97.8 °F (36.6 °C) 68 16 122/71 88 92 % None (Room air) -- Lying   07/24/23 1804 97.7 °F (36.5 °C) 85 18 122/71 -- -- -- -- --   07/24/23 17:09:35 97.7 °F (36.5 °C) 54 Abnormal  -- 140/66 91 97 % -- -- --   07/24/23 1649 -- 77 10 Abnormal  143/83 -- 98 % None (Room air) -- --   07/24/23 1641 -- 87 18 143/69 -- 97 % None (Room air) -- --   07/24/23 1630 -- 83 15 161/71 -- 97 % None (Room air) -- --   07/24/23 1620 98.2 °F (36.8 °C) 84 15 140/65 -- 94 % None (Room air) WDL --   07/24/23 1500 -- -- -- 138/76 102 -- -- -- --   07/24/23 1445 -- 88 -- -- -- 98 % -- -- --   07/24/23 1435 -- 84 -- 147/79 104 97 % -- -- --   07/24/23 1405 -- -- -- 144/70 99 -- -- -- --   07/24/23 1348 -- -- -- 152/82 111 -- -- -- --   07/24/23 1333 -- -- -- 148/77 103 -- -- -- --   07/24/23 1315 -- -- -- 146/67 96 -- -- -- --   07/24/23 1300 -- 66 18 134/72 96 98 % None (Room air) -- Sitting   07/24/23 1245 -- 58 18 133/81 103 99 % None (Room air) -- Sitting   07/24/23 1230 -- 56 -- 143/89 111 100 % -- -- --   07/24/23 1215 -- 58 -- 145/82 110 99 % -- -- --   07/24/23 1200 -- 64 -- 138/83 106 98 % -- -- --   07/24/23 1145 -- 72 -- 133/84 105 100 % -- -- --   07/24/23 1130 -- 70 -- 127/71 93 97 % -- -- --   07/24/23 1103 -- 82 -- 136/72 99 95 % -- -- --   07/24/23 1048 -- 80 -- 138/67 96 99 % -- -- --   07/24/23 1045 -- 82 -- 138/57 90 100 % -- -- --   07/24/23 1017 98.3 °F (36.8 °C) 63 18 159/76 -- 98 % -- -- --       Pertinent Labs/Diagnostic Test Results:   XR foot 3+ vw left Final Result (07/25 0724)      No acute osseous abnormality. CT lower extremity w contrast left   Final Result  (07/24 1537)      9.2 x 6.3 x 3 cm superficial fluid collection at the anteromedial aspect of the knee.  No intra-articular extension this may be a hematoma/Angelica Willingham lesion, sequela of degloving injury  however clinical correlation suggested if concern for    infection/abscess      No fracture seen     Results from last 7 days   Lab Units 07/24/23  1520   SARS-COV-2  Negative     Results from last 7 days   Lab Units 07/25/23  0552 07/24/23  1228   WBC Thousand/uL 6.52 8.35   HEMOGLOBIN g/dL 14.5 14.4   HEMATOCRIT % 41.4 41.8   PLATELETS Thousands/uL 234 212   NEUTROS ABS Thousands/µL 5.28 5.58     Results from last 7 days   Lab Units 07/25/23  0552 07/24/23  1228   SODIUM mmol/L 137 138   POTASSIUM mmol/L 4.1 4.0   CHLORIDE mmol/L 104 103   CO2 mmol/L 26 30   ANION GAP mmol/L 7 5   BUN mg/dL 15 11   CREATININE mg/dL 0.76 0.91   EGFR ml/min/1.73sq m 95 87   CALCIUM mg/dL 8.8 9.0   MAGNESIUM mg/dL 1.9 2.0     Results from last 7 days   Lab Units 07/24/23  1228   AST U/L 15   ALT U/L 21   ALK PHOS U/L 93   TOTAL PROTEIN g/dL 7.1   ALBUMIN g/dL 4.0   TOTAL BILIRUBIN mg/dL 0.77         Results from last 7 days   Lab Units 07/25/23  0552 07/24/23  1228   GLUCOSE RANDOM mg/dL 140 84       Results from last 7 days   Lab Units 07/24/23  1228   CRP mg/L 21.4*   SED RATE mm/hour 16       Results from last 7 days   Lab Units 07/24/23  1553   GRAM STAIN RESULT  1+ Gram positive cocci in clusters*  No polys seen*   WOUND CULTURE  4+ Growth of Staphylococcus aureus*     ED Treatment:   Medication Administration from 07/24/2023 1000 to 07/24/2023 1515       Date/Time Order Dose Route Action     07/24/2023 1310 EDT iohexol (OMNIPAQUE) 350 MG/ML injection (SINGLE-DOSE) 100 mL 100 mL Intravenous Given        Past Medical History:   Diagnosis Date   • Calcific tendinitis 02/27/2018   • Cataracts, bilateral    • Chronic deep vein thrombosis (DVT) of left femoral vein (HCC) 3/6/2018   • Chronic pulmonary embolism with acute cor pulmonale (HCC) 3/6/2018   • DVT (deep venous thrombosis) ([address]) 03/2018    left calf-traveled to the lung-PE-given heparin   • GERD (gastroesophageal reflux disease)    • Hyperlipidemia    • Neck pain    • Sacroiliitis ([address]) 3/29/2023   • Wears glasses      Present on Admission:  • Mixed hyperlipidemia  • Chronic deep vein thrombosis (DVT) of left femoral vein (HCC)      Admitting Diagnosis: Knee pain [M25.569]  Left knee pain [M25.562]  Infection of left knee (HCC) [M00.9]  Hematoma of left knee region [S80.02XA]  Infected hematoma [T14. 8XXA, L08.9]  Age/Sex: 79 y.o. male  Admission Orders:  Consult ortho  Pt/ot eval & tx  Drain care  Consult ID    Scheduled Medications:  acetaminophen, 975 mg, Oral, Q8H [address]  atorvastatin, 20 mg, Oral, Daily With Dinner  cefepime, 2,000 mg, Intravenous, Q12H> d/c 7/25  cyanocobalamin, 1,000 mcg, Oral, Daily  [START ON 7/26/2023] multivitamin stress formula, 1 tablet, Oral, Once per day on Mon Wed Fri  pantoprazole, 40 mg, Oral, Early Morning  polyethylene glycol, 17 g, Oral, Daily  vancomycin, 15 mg/kg (Adjusted), Intravenous, Q12H    Continuous IV Infusions:  lactated ringers, 100 mL/hr, Intravenous, Continuous    PRN Meds:  ibuprofen, 800 mg, Oral, Q8H PRN  oxyCODONE, 5 mg, Oral, Q4H PRN    Network Utilization Review Department  ATTENTION: Please call with any questions or concerns to [phone] and carefully listen to the prompts so that you are directed to the right person. All voicemails are confidential.  Angelita Valles all requests for admission clinical reviews, approved or denied determinations and any other requests to dedicated fax number below belonging to the campus where the patient is receiving treatment.  List of dedicated fax numbers for the Facilities:  FACILITY NAME UR FAX NUMBER   ADMISSION DENIALS (Administrative/Medical Necessity) [phone]   PARENT [facility] (Maternity/NICU/Pediatrics) [address] [facility] [phone] 1505 [facility] [address] [address] [facility] [address] [facility] [place] [address] [phone]

## 2023-07-25 NOTE — OP NOTE
OPERATIVE REPORT  PATIENT NAME: Winston Velázquez    :  1955  MRN: [id number]  Pt Location: [facility] OR ROOM 04    SURGERY DATE: 2023    Surgeon(s) and Role:     * Sharon Hughes MD - Primary     * Alecia Mccurdy PA-C - Assisting    Preop Diagnosis:  Infected hematoma [T14. 8XXA, L08.9]    Post-Op Diagnosis Codes:     * Infected hematoma [T14. 8XXA, L08.9]    Procedure(s):  Left - INCISION AND DRAINAGE (I&D) EXTREMITY    Specimen(s):  ID Type Source Tests Collected by Time Destination   A : lt knee wound Wound Wound ANAEROBIC CULTURE AND GRAM STAIN, WOUND CULTURE Sharon Hughes MD 2023 1553        Estimated Blood Loss:   Minimal    Drains:  Closed/Suction Drain Anterior; Left Knee Accordion 10 Fr. (Active)   Site Description Unable to view 23   Dressing Status Clean;Dry; Intact 23   Drainage Appearance Bloody 23   Status Accordion suction 23   Output (mL) 0 mL 23 0601   Number of days: 1       Anesthesia Type:   General    Operative Indications:  78 yo male who presented with pain in the left knee. Initially had an injury while playing soft ball 3+ weeks ago. Takes eliquis and developed a hematoma. I saw him in clinic after that injury and recommended conservative treatment as there was no sign of infection or skin compromise. He then went on vacation and had persistent swelling and pain. He underwent I&D of the hematoma in [facility]. Last week I saw him and the wound was healing well and swelling had improved. Was due to see him again tomorrow for suture removal. Over the last few days he has had worsening pain and swelling. There has also been warmth and drainage from the wound. Drainage is mostly serous there is surrounding warmth and erythema. He underwent a CT scan today in the emergency department that shows a large fluid collection of the anteromedial aspect of his knee.   It is unclear whether this is a hematoma versus abscess or other collection. However there is a small amount of drainage including some visible purulence from the wound at this time. For this reason I recommended I&D as well as drain placement to help limit the risk of return of collection. He is on Eliquis this does place him at higher risk of persistent bleeding or other wound healing complications. However given the obvious purulence on exam today I did recommend I&D at this time. Discussed the risk benefits and alternatives to this procedure. He expressed understanding elected to proceed. Written consent was signed    Operative Findings:  Small amount of purulent fluid immediately deep to previous incision. Large serous collection, extraarticular. Tracks from medial side of the patella to several centimeters distal to the knee joint line on the anteromedial aspect of the knee. No necrotic tissue. No active bleeding. Procedure: In the pre-operative holding area, the patient identified the correct operative extremity and I marked that extremity with my initials. The patient was then brought to the operating room and positioned supine. Following satisfactory induction of anesthesia, the left knee was prepped and draped in the usual sterile fashion. Before any surgical instrumentation was passed to me by the surgical technician, a formalized time-out occurred, which involves the surgeon, circulating nurse, and anesthesia staff all verifying the correct operative extremity. My initials were visible on the prepped and draped operative field. After timeout I evaluated his previous operative site. The previous surgeon made a transverse incision roughly 2 cm in length over the anteromedial aspect of the patella. I opened this previous incision and curved and proceeded longitudinally proximally. I did not believe extending the transverse incision was in the patient's best interest as this had a high risk of wound healing complication over the front of the knee. After extended the incision there was a very small area of purulence encountered immediately deep to the previous incision. This did communicate with a large serous collection that proceeded over the anterior medial aspect of the knee extending several centimeters below the medial joint line. There was no intra-articular extension. There is no necrotic tissue that needed to be debrided within the wound. There was no active bleeding. After evaluation of this fluid collection I then proceeded to perform extensive irrigation with 3 L of normal saline using cystoscopy tubing. After irrigation was completed I evaluated her again and there is no active bleeding and no additional purulent fluid. I placed a drain exiting the skin at the inferior aspect of the collection. I placed a drain stitch to secure the drain in place. I closed the wound interrupted nylon sutures. The drain was placed to suction. The limb was cleaned of dried blood. A sterile dressing was placed including Adaptic 4 x 4 ABD web roll and Ace wrap. The procedure was well-tolerated. And there is no apparent complications. The patient emerged from anesthesia and was taken to the recovery room in stable condition. NOTE:  The presence of a physician assistant was necessary to help with patient positioning, surgical exposure, wound retraction, wound closure, and other key portions of the procedure. No qualified resident was available for this case.          SIGNATURE: David Roberts MD  DATE: July 25, 2023  TIME: 8:42 AM

## 2023-07-25 NOTE — PHYSICAL THERAPY NOTE
PHYSICAL THERAPY EVALUATION/TREATMENT     07/25/23 1001   Note Type   Note type Evaluation   Pain Assessment   Pain Assessment Tool 0-10   Pain Score 2   Pain Location/Orientation Orientation: Left; Location: Knee   Restrictions/Precautions   Weight Bearing Precautions Per Order No   Other Precautions Pain  (hemovac)   Home Living   Type of Home House   Prior Function   Level of [place] Independent with ADLs   General   Additional Pertinent History Pt admitted s/p I&D L knee. Pt reports no trouble ambulating but asked about exercises. Cognition   Overall Cognitive Status WFL   Subjective   Subjective "I can walk OK"   RLE Assessment   RLE Assessment WNL   LLE Assessment   LLE Assessment   (ROM WNL x knee NA but at least 5-70. MMT at least 3+/5.  knee is ace wrapped with a hemovac)   Bed Mobility   Supine to Sit 7  Independent   Sit to Supine 7  Independent   Transfers   Sit to Stand 7  Independent   Stand to Sit 7  Independent   Stand pivot 7  Independent   Ambulation/Elevation   Gait pattern   (little to no antalgia)   Gait Assistance 7  Independent   Assistive Device   (none)   Distance 2x20 feet   Balance   Static Sitting Good   Dynamic Sitting Good   Static Standing Good   Dynamic Standing Good   Ambulatory Good   Activity Tolerance   Activity Tolerance Patient tolerated treatment well   Assessment   Prognosis Good   Problem List Decreased strength;Decreased range of motion;Decreased mobility;Pain   Assessment Patient is an 79y.o. year old male seen for Physical Therapy evaluation. Patient admitted with Hematoma of left knee region. Comorbidities affecting patient's physical performance include: lumbar radiculopathy, chronic DVT. Personal factors affecting patient at time of initial evaluation include: inability to perform dynamic tasks in community. Prior to admission, patient was independent with functional mobility without assistive device and independent with ADLS.   Please find objective findings from Physical Therapy assessment regarding body systems outlined above with impairments and limitations including decreased ROM, gait deviations, pain and decreased functional mobility tolerance. The Barthel Index was used as a functional outcome tool presenting with a score of barthel index 100 today indicating no limitations of functional mobility and ADLS. Patient's clinical presentation is currently evolving as seen in patient's presentation of changing level of pain, new onset of impairment of functional mobility and new onset of weakness. Pt would benefit from continued Physical Therapy treatment to address deficits as defined above and maximize level of functional mobility. As demonstrated by objective findings, the assigned level of complexity for this evaluation is moderate. The patient's [facility] Basic Mobility Inpatient Short Form Raw Score is 24. A Raw score of greater than 16 suggests the patient may benefit from discharge to home. Goals   Patient Goals wound healing   STG Expiration Date 08/01/23   Short Term Goal #1 independent HEP, Pt will ambulate with a normal gait pattern indoor level surfaces 300 feet   LTG Expiration Date 08/08/23   Long Term Goal #1 improve L knee ROM to at least 0-100 and MMT to at least 4+/5   Long Term Goal #2 independent up and down 10 steps with a step over step pattern   Plan   Treatment/Interventions Elevations;LE strengthening/ROM; Gait training   PT Frequency 3-5x/wk   Recommendation   PT Discharge Recommendation Home with outpatient rehabilitation  (if needed and recommended by orthopedic surgeon)   Equipment Recommended Other (Comment)  (none)   AM-PAC Basic Mobility Inpatient   Turning in Flat Bed Without Bedrails 4   Lying on Back to Sitting on Edge of Flat Bed Without Bedrails 4   Moving Bed to Chair 4   Standing Up From Chair Using Arms 4   Walk in Room 4   Climb 3-5 Stairs With Railing 4   Basic Mobility Inpatient Raw Score 24   Basic Mobility Standardized Score 57.68   Highest Level Of Mobility   -HLM Goal 8: Walk 250 feet or more   JH-HLM Achieved 7: Walk 25 feet or more  (Pt declined walking in the hallway. Did not want to walk that far or don socks.)   Barthel Index   Feeding 10   Bathing 5   Grooming Score 5   Dressing Score 10   Bladder Score 10   Bowels Score 10   Toilet Use Score 10   Transfers (Bed/Chair) Score 15   Mobility (Level Surface) Score 15   Stairs Score 10   Barthel Index Score 100   Additional Treatment Session   Start Time 0950   End Time 1000   Treatment Assessment S: "can I do some exercises for my knee?" O:  Pt instructed in and performed x 10 each ankle pumps, quad set, SLR R LE. Pt educated to ambulate ad shyanne. A:  Pt demonstrates a steady non antalgic gait without a device. P:  Recommend pt perform HEP above and ambulate ad shyanne.    Licensure   [place] License Number  Nishada PT [id number]

## 2023-07-25 NOTE — PLAN OF CARE
Problem: PAIN - ADULT  Goal: Verbalizes/displays adequate comfort level or baseline comfort level  Description: Interventions:  - Encourage patient to monitor pain and request assistance  - Assess pain using appropriate pain scale  - Administer analgesics based on type and severity of pain and evaluate response  - Implement non-pharmacological measures as appropriate and evaluate response  - Consider cultural and social influences on pain and pain management  - Notify physician/advanced practitioner if interventions unsuccessful or patient reports new pain  Outcome: Progressing     Problem: INFECTION - ADULT  Goal: Absence or prevention of progression during hospitalization  Description: INTERVENTIONS:  - Assess and monitor for signs and symptoms of infection  - Monitor lab/diagnostic results  - Monitor all insertion sites, i.e. indwelling lines, tubes, and drains  - Monitor endotracheal if appropriate and nasal secretions for changes in amount and color  - [place] appropriate cooling/warming therapies per order  - Administer medications as ordered  - Instruct and encourage patient and family to use good hand hygiene technique  - Identify and instruct in appropriate isolation precautions for identified infection/condition  Outcome: Progressing     Problem: SAFETY ADULT  Goal: Patient will remain free of falls  Description: INTERVENTIONS:  - Educate patient/family on patient safety including physical limitations  - Instruct patient to call for assistance with activity   - Consult OT/PT to assist with strengthening/mobility   - Keep Call bell within reach  - Keep bed low and locked with side rails adjusted as appropriate  - Keep care items and personal belongings within reach  - Initiate and maintain comfort rounds  - Make Fall Risk Sign visible to staff  - Offer Toileting every 2 Hours, in advance of need  - Initiate/Maintain bed alarm  - Obtain necessary fall risk management equipment: yellow socks, yellow bracelet   - Apply yellow socks and bracelet for high fall risk patients  - Consider moving patient to room near nurses station  Outcome: Progressing     Problem: MOBILITY - ADULT  Goal: Maintain or return to baseline ADL function  Description: INTERVENTIONS:  -  Assess patient's ability to carry out ADLs; assess patient's baseline for ADL function and identify physical deficits which impact ability to perform ADLs (bathing, care of mouth/teeth, toileting, grooming, dressing, etc.)  - Assess/evaluate cause of self-care deficits   - Assess range of motion  - Assess patient's mobility; develop plan if impaired  - Assess patient's need for assistive devices and provide as appropriate  - Encourage maximum independence but intervene and supervise when necessary  - Involve family in performance of ADLs  - Assess for home care needs following discharge   - Consider OT consult to assist with ADL evaluation and planning for discharge  - Provide patient education as appropriate  Outcome: Progressing

## 2023-07-25 NOTE — CONSULTS
Consultation - Infectious Disease   Earma Pass 79 y.o. male MRN: [id number]  Unit/Bed#: [address] [address] Encounter: [id number]      IMPRESSION & RECOMMENDATIONS:   1. Infected hematoma of left knee. Patient initially injured his left knee by falling on it while playing softball on 6/29/2023. He developed a left prepatellar hematoma. Treatment was complicated by patient leaving for a two week vacation to [facility] shortly after injury. Patient takes Eliquis for history of dvt and was advised by orthopedics to continue it due to his travel plans. Ortho gave his a 10 day course of Keflex to bring to [facility] with him in case of infection. Now patient presented on 7/24/2023 with sharp pain to his left knee, limited ROM, erythema and warmth. CT of left lower extremity on 7/24/2023 showed 9.2 x 6.3 x 3 cm superficial fluid collection at the anteromedial aspect of the knee. Ortho performed an I&D in OR, sent cultures out and placed a drain. Started patient on Cefepime and Vanco. OR Culture growing 4+ Staph aureus. MRSA culture from 2018 negative   -Discontinue Cefepime   -Continue Vancomycin IV pending final culture   -Pharmacy on consult for Vancomycin trough dosing management  -Monitor temperature  -Serial exams   -Monitor a.m. labs   -Ongoing orthopedic follow-up and drain management    2. Chronic DVT of left femoral vein.    -Resumed Eliquis per primary care team    3. Anterior Cervical fusion 15 years ago. No prior infection complication. No neck pain. 4. GERD. Continuing omeproazole per primary. Have discussed the above management plan in detail with the primary service.   I have spent a total time of 80 minutes on 07/25/23 in caring for this patient including Diagnostic results, Prognosis, Risks and benefits of tx options, Instructions for management, Patient and family education, Importance of tx compliance, Risk factor reductions, Impressions, Counseling / Coordination of care, Documenting in the medical record, Reviewing / ordering tests, medicine, procedures  , Obtaining or reviewing history   and Communicating with other healthcare professionals . HISTORY OF PRESENT ILLNESS:  Reason for Consult: Hematoma of left knee  HPI: Lisandra Abad is a 79y.o. year old male with DVT on Eliquis outpatient, hyperlidpemia and GERD presenting with a hematoma of the left knee. Patient initially injured his left knee by falling on it while playing softball on 6/29/2023. He developed a left prepatellar hematoma. He denied any breaks in the skin at the time of injury. He notes a bee sting on his R hand around the same time. Treatment was complicated by patient leaving for a two week vacation to [facility] shortly after injury. Patient takes Eliquis for prior DVT and was advised by orthopedics to continue it due to his travel plans. Ortho prescribed a 10 day course of Keflex to bring to [facility] with him in case of infection. Patient took the keflex until he arrived here. Now patient presents on 7/24/2023 with sharp pain to his left knee, limited ROM, erythema and warmth. CT of left lower extremity on 7/24/2023 showed 9.2 x 6.3 x 3 cm superficial fluid collection at the anteromedial aspect of the knee. Ortho performed an I&D in OR, sent cultures out and placed a drain. Started patient on Cefepime and Vanco. Cultures showing 1+ gram positive cocci in clusters. MRSA culture from 2018 negative. Infectious disease now being consulted regarding evaluation and management of hematoma of the left knee. Today patient is sitting up in bed and eating lunch while being seen. He states that he is feeling much better after his knee was I&D'ed and that he no longer has sharp/shooting pains in his left leg anymore. He states that he is able to move it and ambulate to the bathroom well. He denies any in or outpatient fever, chills, sweats, nausea, vomiting, diarrhea, chest pain, shortness of breath, cough, or dysuria.     REVIEW OF SYSTEMS:  A complete review of systems is negative other than that noted in the HPI. PAST MEDICAL HISTORY:  Past Medical History:   Diagnosis Date   • Calcific tendinitis 02/27/2018   • Cataracts, bilateral    • Chronic deep vein thrombosis (DVT) of left femoral vein ([address]) 3/6/2018   • Chronic pulmonary embolism with acute cor pulmonale ([address]) 3/6/2018   • DVT (deep venous thrombosis) ([address]) 03/2018    left calf-traveled to the lung-PE-given heparin   • GERD (gastroesophageal reflux disease)    • Hyperlipidemia    • Neck pain    • Sacroiliitis ([address]) 3/29/2023   • Wears glasses      Past Surgical History:   Procedure Laterality Date   • CATARACT EXTRACTION     • CERVICAL FUSION  2007    with bone spur removal also- C5,6,7 fusion plate   • COLONOSCOPY     • EPIDURAL BLOCK INJECTION N/A 11/22/2019    Procedure: C6 C7 Cervical Epidural Steroid Injection (22028); Surgeon: Herlinda Martinez MD;  Location: [facility] MAIN OR;  Service: Pain Management    • HERNIA REPAIR      umbilical   • INCISION AND DRAINAGE OF WOUND Left 7/24/2023    Procedure: INCISION AND DRAINAGE (I&D) EXTREMITY;  Surgeon: Debbie Dietz MD;  Location: [facility];  Service: Orthopedics   • LUMBAR EPIDURAL INJECTION N/A 2/24/2023    Procedure: L5 S1 LUMBAR epidural steroid injection (35509); Surgeon: Mayda Sherman DO;  Location: [facility] MAIN OR;  Service: Pain Management    • MT COLONOSCOPY FLX DX W/COLLJ SPEC WHEN PFRMD N/A 7/31/2018    Procedure: COLONOSCOPY;  Surgeon: Reynold Garcia MD;  Location: [address] [place] [address] GI LAB; Service: Gastroenterology   • MT RPR UMBILICAL HRNA 5 YRS/> REDUCIBLE N/A 12/10/2019    Procedure: REPAIR HERNIA UMBILICAL;  Surgeon: Arsenio Marie MD;  Location: [facility];  Service: General   • MT XCAPSL CTRC RMVL INSJ IO LENS PROSTH W/O ECP Right 3/16/2020    Procedure: EXTRACTION EXTRACAPSULAR CATARACT PHACO INTRAOCULAR LENS (IOL);   Surgeon: Corinna Sanchez MD;  Location: [facility] MAIN OR;  Service: Ophthalmology   • MT Cynthia Diehl LENS PROSTH W/O ECP Left 3/23/2020    Procedure: EXTRACTION EXTRACAPSULAR CATARACT PHACO INTRAOCULAR LENS (IOL); Surgeon: Sanjay Beltran MD;  Location: [facility] MAIN OR;  Service: Ophthalmology   • SHOULDER SURGERY Right     labrum repair, arthritis removal   • ULNAR NERVE TRANSPOSITION Right        FAMILY HISTORY:  Non-contributory    SOCIAL HISTORY:  Social History   Social History     Substance and Sexual Activity   Alcohol Use Yes    Comment: social     Social History     Substance and Sexual Activity   Drug Use No     Social History     Tobacco Use   Smoking Status Never   • Passive exposure: Never   Smokeless Tobacco Never       ALLERGIES:  No Known Allergies    MEDICATIONS:  All current active medications have been reviewed. Vancomycin/Cefepime    PHYSICAL EXAM:  Temp:  [97.7 °F (36.5 °C)-98.2 °F (36.8 °C)] 97.8 °F (36.6 °C)  HR:  [51-87] 69  Resp:  [10-18] 18  BP: (108-161)/(57-83) 130/73  SpO2:  [92 %-98 %] 94 %  Temp (24hrs), Av.8 °F (36.6 °C), Min:97.7 °F (36.5 °C), Max:98.2 °F (36.8 °C)  Current: Temperature: 97.8 °F (36.6 °C)    Intake/Output Summary (Last 24 hours) at 2023 1458  Last data filed at 2023 0601  Gross per 24 hour   Intake 700 ml   Output 600 ml   Net 100 ml       General Appearance:  79year old male, appearing well, nontoxic, and in no distress   Head:  Normocephalic, without obvious abnormality, atraumatic   Eyes:  Conjunctiva pink and sclera anicteric, both eyes   Nose: Nares normal, mucosa normal, no drainage   Throat: Oropharynx moist without lesions   Neck: Supple, symmetrical, no adenopathy, no tenderness/mass/nodules   Back:   Symmetric, no curvature, ROM normal, no CVA tenderness   Lungs:   Clear to auscultation bilaterally, respirations unlabored   Chest Wall:  No tenderness or deformity   Heart:  RRR; no murmur, rub or gallop   Abdomen:   Soft, non-tender, non-distended, positive bowel sounds    Extremities: No cyanosis, clubbing or edema.  Left knee is ace wrapped, dressing is clean and in-tact and CARMELITA drain is in place with serosanguinous drainage in tubing. Skin: No rashes or lesions. No draining wounds noted. Lymph nodes: Cervical, supraclavicular nodes normal   Neurologic: Alert and oriented times 3, extremity strength 5/5 and symmetric       LABS, IMAGING, & OTHER STUDIES:  Lab Results:  I have personally reviewed pertinent labs. Results from last 7 days   Lab Units 07/25/23  0552 07/24/23  1228   WBC Thousand/uL 6.52 8.35   HEMOGLOBIN g/dL 14.5 14.4   PLATELETS Thousands/uL 234 212     Results from last 7 days   Lab Units 07/25/23  0552 07/24/23  1228   SODIUM mmol/L 137 138   POTASSIUM mmol/L 4.1 4.0   CHLORIDE mmol/L 104 103   CO2 mmol/L 26 30   BUN mg/dL 15 11   CREATININE mg/dL 0.76 0.91   EGFR ml/min/1.73sq m 95 87   CALCIUM mg/dL 8.8 9.0   AST U/L  --  15   ALT U/L  --  21   ALK PHOS U/L  --  93     Results from last 7 days   Lab Units 07/24/23  1553   GRAM STAIN RESULT  1+ Gram positive cocci in clusters*  No polys seen*   WOUND CULTURE  4+ Growth of Staphylococcus aureus*         Results from last 7 days   Lab Units 07/24/23  1228   CRP mg/L 21.4*               Imaging Studies:   Imaging study reports and images in PACS reviewed personally. 7/25/2023 left foot x-ray: No acute osseous abnormality  7/24/2023 CT left lower extremity: 0.2 x 6.3 x 3 cm superficial fluid collection at the anterior medial aspect of the knee. No intra-articular extension. Other Studies:   I have personally reviewed pertinent reports.

## 2023-07-25 NOTE — PROGRESS NOTES
[address] [facility] Progress Note - Priscilla Garcia O'Such 79 y.o. male MRN: [id number]    Unit/Bed#: [address] [place] [address] Encounter: [id number]      Assessment/Plan:  * Hematoma of left knee region  Assessment & Plan  Acute    77 yo male w/pmh of DVT on Eliquis presents with left knee pain after injuring it while playing softball in [place]. Pt saw Dr. Bneigno French (orthopedic surgeon) on 6/30 for prepatellar hematoma, given keflex. He received I&D of left hematoma in [facility] 2 weeks ago. Pt presents on 7/24 w/sharp pain to his left knee, limited ROM, erythema and warmth. Likely hematoma versus abscess. Low suspicion of septic arthritis    Ct of Lower Left extremity w/contrast: 9.2 x 6.3 x 3 cm superficial fluid collection at the anteromedial aspect of the knee. · Ortho Recommendations:  · I&D completed in OR on 6/25 w/ no complications   · Wound culture-Staph aureus, gram-positive cocci in clusters. Awaiting identification panel and sensitivities. · Initially started broad-spectrum antibiotics on 7/24: Vancomycin and Cefepime. Discontinued cefepime on 7/25. · Consulted ID; recommendations appreciated  · Hemovac in wound to prevent recollection with scant blood present  · WBAT LLE  · X-ray of the left foot post op due to persistent pain there and negative ankle xray   · Resume eliquis on 7/24 if no episodes of bleeding     Chronic deep vein thrombosis (DVT) of left femoral vein (HCC)  Assessment & Plan  On home Eliquis 5 mg twice daily    · Restart eliquis once no episodes of bleeding     GERD (gastroesophageal reflux disease)  Assessment & Plan  Patient states that he was on 40 mg of omeprazole daily, but then found to have low B12 levels and decreased his omeprazole dose from 40 mg to 20 mg. Epigastric burning controlled with 20 mg of omeprazole daily.   · IV pantoprazole 40 mg during admission  · Continue 20 mg Omeprazole daily  · Discuss trying famotidine outpatient    Mixed hyperlipidemia  Assessment & Plan  Chronic; continue Lipitor 20 mg daily    Subjective:   Patient seen and examined at bedside. Patient states that his pain is well controlled. Reports some tingling on bilateral lower feet and toes, which was present before the I&D. Denies chest pain, shortness of breath, weakness, N/V/D or any other acute complaints at this time. Objective:     Vitals: Blood pressure 130/73, pulse 69, temperature 97.8 °F (36.6 °C), temperature source Oral, resp. rate 18, height 6' 1" (1.854 m), weight 104 kg (230 lb), SpO2 94 %. ,Body mass index is 30.34 kg/m². Wt Readings from Last 3 Encounters:   07/24/23 104 kg (230 lb)   07/18/23 107 kg (235 lb)   06/30/23 107 kg (235 lb)       Intake/Output Summary (Last 24 hours) at 7/25/2023 1457  Last data filed at 7/25/2023 0601  Gross per 24 hour   Intake 700 ml   Output 600 ml   Net 100 ml       Physical Exam  Vitals and nursing note reviewed. Constitutional:       General: He is not in acute distress. Appearance: He is well-developed. HENT:      Head: Normocephalic and atraumatic. Eyes:      Conjunctiva/sclera: Conjunctivae normal.   Cardiovascular:      Rate and Rhythm: Normal rate and regular rhythm. Heart sounds: No murmur heard. Pulmonary:      Effort: Pulmonary effort is normal. No respiratory distress. Breath sounds: Normal breath sounds. Abdominal:      Palpations: Abdomen is soft. Tenderness: There is no abdominal tenderness. Musculoskeletal:         General: Swelling (Mild edema of left foot) present. Cervical back: Neck supple. Skin:     General: Skin is warm and dry. Capillary Refill: Capillary refill takes less than 2 seconds. Comments: Left knee wrapped in bandage with Hemovac. Scant blood noted. Neurological:      General: No focal deficit present. Mental Status: He is alert and oriented to person, place, and time.    Psychiatric:         Mood and Affect: Mood normal.         Behavior: Behavior normal.           Recent Results (from the past 24 hour(s))   COVID only    Collection Time: 07/24/23  3:20 PM    Specimen: Nose; Nares   Result Value Ref Range    SARS-CoV-2 Negative Negative   Anaerobic culture and Gram stain    Collection Time: 07/24/23  3:53 PM    Specimen: Wound   Result Value Ref Range    Anaerobic Culture Culture results to follow.     Wound culture and Gram stain    Collection Time: 07/24/23  3:53 PM    Specimen: Wound   Result Value Ref Range    Wound Culture 4+ Growth of Staphylococcus aureus (A)     Gram Stain Result 1+ Gram positive cocci in clusters (A)     Gram Stain Result No polys seen (A)    Basic metabolic panel    Collection Time: 07/25/23  5:52 AM   Result Value Ref Range    Sodium 137 135 - 147 mmol/L    Potassium 4.1 3.5 - 5.3 mmol/L    Chloride 104 96 - 108 mmol/L    CO2 26 21 - 32 mmol/L    ANION GAP 7 mmol/L    BUN 15 5 - 25 mg/dL    Creatinine 0.76 0.60 - 1.30 mg/dL    Glucose 140 65 - 140 mg/dL    Calcium 8.8 8.4 - 10.2 mg/dL    eGFR 95 ml/min/1.73sq m   CBC and differential    Collection Time: 07/25/23  5:52 AM   Result Value Ref Range    WBC 6.52 4.31 - 10.16 Thousand/uL    RBC 4.58 3.88 - 5.62 Million/uL    Hemoglobin 14.5 12.0 - 17.0 g/dL    Hematocrit 41.4 36.5 - 49.3 %    MCV 90 82 - 98 fL    MCH 31.7 26.8 - 34.3 pg    MCHC 35.0 31.4 - 37.4 g/dL    RDW 12.4 11.6 - 15.1 %    MPV 9.5 8.9 - 12.7 fL    Platelets 661 448 - 809 Thousands/uL    nRBC 0 /100 WBCs    Neutrophils Relative 81 (H) 43 - 75 %    Immat GRANS % 1 0 - 2 %    Lymphocytes Relative 15 14 - 44 %    Monocytes Relative 3 (L) 4 - 12 %    Eosinophils Relative 0 0 - 6 %    Basophils Relative 0 0 - 1 %    Neutrophils Absolute 5.28 1.85 - 7.62 Thousands/µL    Immature Grans Absolute 0.04 0.00 - 0.20 Thousand/uL    Lymphocytes Absolute 1.00 0.60 - 4.47 Thousands/µL    Monocytes Absolute 0.19 0.17 - 1.22 Thousand/µL    Eosinophils Absolute 0.00 0.00 - 0.61 Thousand/µL    Basophils Absolute 0.01 0.00 - 0.10 Thousands/µL   Magnesium Collection Time: 07/25/23  5:52 AM   Result Value Ref Range    Magnesium 1.9 1.9 - 2.7 mg/dL       Current Facility-Administered Medications   Medication Dose Route Frequency Provider Last Rate Last Admin   • acetaminophen (TYLENOL) tablet 975 mg  975 mg Oral Q8H Inez Herrera MD   975 mg at 07/25/23 1439   • atorvastatin (LIPITOR) tablet 20 mg  20 mg Oral Daily With Radha Allen MD       • cyanocobalamin (VITAMIN B-12) tablet 1,000 mcg  1,000 mcg Oral Daily Jose Ramon Beck MD   1,000 mcg at 07/25/23 0930   • ibuprofen (MOTRIN) tablet 800 mg  800 mg Oral Q8H PRN Jose Ramon Beck MD       • lactated ringers infusion  100 mL/hr Intravenous Continuous Lorenzo De La Cruz  mL/hr at 07/25/23 0722 100 mL/hr at 07/25/23 0722   • [START ON 7/26/2023] multivitamin stress formula tablet 1 tablet  1 tablet Oral Once per day on Mon Wed Fri Jose Ramon Beck MD       • oxyCODONE (ROXICODONE) IR tablet 5 mg  5 mg Oral Q4H PRN Jose Ramon Beck MD   5 mg at 07/24/23 1909   • pantoprazole (PROTONIX) EC tablet 40 mg  40 mg Oral Early Morning Hernan Gillespie MD   40 mg at 07/25/23 0536   • polyethylene glycol (MIRALAX) packet 17 g  17 g Oral Daily Jose Ramon Beck MD   17 g at 07/25/23 0930   • vancomycin (VANCOCIN) 1,250 mg in sodium chloride 0.9 % 250 mL IVPB  15 mg/kg (Adjusted) Intravenous Q12H Maricarmen Lobato .7 mL/hr at 07/25/23 0536 1,250 mg at 07/25/23 0536       Invasive Devices     Peripheral Intravenous Line  Duration           Peripheral IV 07/24/23 Left Antecubital 1 day    Peripheral IV 07/25/23 Dorsal (posterior); Left Hand <1 day          Drain  Duration           Closed/Suction Drain Anterior; Left Knee Accordion 10 Fr. <1 day                Lab, Imaging and other studies: I have personally reviewed pertinent reports.     VTE Pharmacologic Prophylaxis: Reason for no pharmacologic prophylaxis Hematoma  VTE Mechanical Prophylaxis: sequential compression device and foot pump applied    Ivonne Betancourt MD

## 2023-07-25 NOTE — OCCUPATIONAL THERAPY NOTE
OT EVALUATION       07/25/23 1245   Note Type   Note type Screen   Additional Comments Patient is independent with ADLS per PT, no skilled OT needs at this time.    Licensure   [place] License Number  Reginold Kimirlande [id number] [facility] OTR/L [id number]

## 2023-07-25 NOTE — PROGRESS NOTES
Sandip Lundberg is a 79 y.o. male who is currently ordered Vancomycin IV with management by the Pharmacy Consult service. Relevant clinical data and objective / subjective history reviewed. Vancomycin Assessment:  Indication and Goal AUC/Trough: Soft tissue (goal -600, trough >10), -600, trough >10  Clinical Status: Stable  Micro:     Renal Function:  SCr: 0.76 mg/dL  CrCl: 120.5 mL/min  Days of Therapy: 2  Current Dose: 1500 mg IV q12h  Vancomycin Plan:  New Dosin mg IV q12h  Estimated AUC: 486 mcg*hr/mL  Estimated Trough: 15.3 mcg/mL  Next Level: 0600 on 23  Renal Function Monitoring: Daily BMP and UOP  Pharmacy will continue to follow closely for s/sx of nephrotoxicity, infusion reactions and appropriateness of therapy. BMP and CBC will be ordered per protocol. We will continue to follow the patient’s culture results and clinical progress daily.     Harjit Clemons, Pharmacist

## 2023-07-26 VITALS
BODY MASS INDEX: 30.48 KG/M2 | RESPIRATION RATE: 18 BRPM | WEIGHT: 230 LBS | TEMPERATURE: 97.8 F | DIASTOLIC BLOOD PRESSURE: 86 MMHG | HEIGHT: 73 IN | OXYGEN SATURATION: 96 % | HEART RATE: 64 BPM | SYSTOLIC BLOOD PRESSURE: 133 MMHG

## 2023-07-26 LAB
ANION GAP SERPL CALCULATED.3IONS-SCNC: 6 MMOL/L
BACTERIA SPEC ANAEROBE CULT: NORMAL
BACTERIA WND AEROBE CULT: ABNORMAL
BASOPHILS # BLD AUTO: 0.03 THOUSANDS/ÂΜL (ref 0–0.1)
BASOPHILS NFR BLD AUTO: 0 % (ref 0–1)
BUN SERPL-MCNC: 15 MG/DL (ref 5–25)
CALCIUM SERPL-MCNC: 8.2 MG/DL (ref 8.4–10.2)
CHLORIDE SERPL-SCNC: 108 MMOL/L (ref 96–108)
CO2 SERPL-SCNC: 26 MMOL/L (ref 21–32)
CREAT SERPL-MCNC: 0.7 MG/DL (ref 0.6–1.3)
EOSINOPHIL # BLD AUTO: 0.06 THOUSAND/ÂΜL (ref 0–0.61)
EOSINOPHIL NFR BLD AUTO: 1 % (ref 0–6)
ERYTHROCYTE [DISTWIDTH] IN BLOOD BY AUTOMATED COUNT: 12.8 % (ref 11.6–15.1)
GFR SERPL CREATININE-BSD FRML MDRD: 98 ML/MIN/1.73SQ M
GLUCOSE SERPL-MCNC: 100 MG/DL (ref 65–140)
GRAM STN SPEC: ABNORMAL
GRAM STN SPEC: ABNORMAL
HCT VFR BLD AUTO: 34.4 % (ref 36.5–49.3)
HGB BLD-MCNC: 11.7 G/DL (ref 12–17)
IMM GRANULOCYTES # BLD AUTO: 0.02 THOUSAND/UL (ref 0–0.2)
IMM GRANULOCYTES NFR BLD AUTO: 0 % (ref 0–2)
LYMPHOCYTES # BLD AUTO: 1.85 THOUSANDS/ÂΜL (ref 0.6–4.47)
LYMPHOCYTES NFR BLD AUTO: 24 % (ref 14–44)
MAGNESIUM SERPL-MCNC: 1.8 MG/DL (ref 1.9–2.7)
MCH RBC QN AUTO: 31 PG (ref 26.8–34.3)
MCHC RBC AUTO-ENTMCNC: 34 G/DL (ref 31.4–37.4)
MCV RBC AUTO: 91 FL (ref 82–98)
MONOCYTES # BLD AUTO: 0.5 THOUSAND/ÂΜL (ref 0.17–1.22)
MONOCYTES NFR BLD AUTO: 7 % (ref 4–12)
NEUTROPHILS # BLD AUTO: 5.17 THOUSANDS/ÂΜL (ref 1.85–7.62)
NEUTS SEG NFR BLD AUTO: 68 % (ref 43–75)
NRBC BLD AUTO-RTO: 0 /100 WBCS
PLATELET # BLD AUTO: 197 THOUSANDS/UL (ref 149–390)
PMV BLD AUTO: 9.6 FL (ref 8.9–12.7)
POTASSIUM SERPL-SCNC: 4.1 MMOL/L (ref 3.5–5.3)
RBC # BLD AUTO: 3.77 MILLION/UL (ref 3.88–5.62)
SODIUM SERPL-SCNC: 140 MMOL/L (ref 135–147)
VANCOMYCIN TROUGH SERPL-MCNC: 7.7 UG/ML (ref 10–20)
WBC # BLD AUTO: 7.63 THOUSAND/UL (ref 4.31–10.16)

## 2023-07-26 PROCEDURE — 99232 SBSQ HOSP IP/OBS MODERATE 35: CPT | Performed by: PHYSICIAN ASSISTANT

## 2023-07-26 PROCEDURE — 80048 BASIC METABOLIC PNL TOTAL CA: CPT

## 2023-07-26 PROCEDURE — 99024 POSTOP FOLLOW-UP VISIT: CPT | Performed by: PHYSICIAN ASSISTANT

## 2023-07-26 PROCEDURE — 83735 ASSAY OF MAGNESIUM: CPT

## 2023-07-26 PROCEDURE — 80202 ASSAY OF VANCOMYCIN: CPT

## 2023-07-26 PROCEDURE — 85025 COMPLETE CBC W/AUTO DIFF WBC: CPT

## 2023-07-26 PROCEDURE — 99239 HOSP IP/OBS DSCHRG MGMT >30: CPT | Performed by: FAMILY MEDICINE

## 2023-07-26 RX ORDER — CEPHALEXIN 500 MG/1
500 CAPSULE ORAL EVERY 8 HOURS SCHEDULED
Qty: 24 CAPSULE | Refills: 0 | Status: SHIPPED | OUTPATIENT
Start: 2023-07-26 | End: 2023-08-03

## 2023-07-26 RX ORDER — MAGNESIUM SULFATE HEPTAHYDRATE 40 MG/ML
2 INJECTION, SOLUTION INTRAVENOUS ONCE
Status: COMPLETED | OUTPATIENT
Start: 2023-07-26 | End: 2023-07-26

## 2023-07-26 RX ADMIN — SODIUM CHLORIDE, SODIUM LACTATE, POTASSIUM CHLORIDE, AND CALCIUM CHLORIDE 100 ML/HR: .6; .31; .03; .02 INJECTION, SOLUTION INTRAVENOUS at 00:47

## 2023-07-26 RX ADMIN — B-COMPLEX W/ C & FOLIC ACID TAB 1 TABLET: TAB at 09:29

## 2023-07-26 RX ADMIN — CYANOCOBALAMIN TAB 500 MCG 1000 MCG: 500 TAB at 09:29

## 2023-07-26 RX ADMIN — ENOXAPARIN SODIUM 40 MG: 40 INJECTION SUBCUTANEOUS at 09:29

## 2023-07-26 RX ADMIN — POLYETHYLENE GLYCOL 3350 17 G: 17 POWDER, FOR SOLUTION ORAL at 09:29

## 2023-07-26 RX ADMIN — ACETAMINOPHEN 975 MG: 325 TABLET ORAL at 13:00

## 2023-07-26 RX ADMIN — VANCOMYCIN HYDROCHLORIDE 1250 MG: 10 INJECTION, POWDER, LYOPHILIZED, FOR SOLUTION INTRAVENOUS at 06:13

## 2023-07-26 RX ADMIN — ACETAMINOPHEN 975 MG: 325 TABLET ORAL at 06:13

## 2023-07-26 RX ADMIN — MAGNESIUM SULFATE HEPTAHYDRATE 2 G: 40 INJECTION, SOLUTION INTRAVENOUS at 09:29

## 2023-07-26 RX ADMIN — PANTOPRAZOLE SODIUM 40 MG: 40 TABLET, DELAYED RELEASE ORAL at 06:13

## 2023-07-26 NOTE — PLAN OF CARE
Problem: PAIN - ADULT  Goal: Verbalizes/displays adequate comfort level or baseline comfort level  Description: Interventions:  - Encourage patient to monitor pain and request assistance  - Assess pain using appropriate pain scale  - Administer analgesics based on type and severity of pain and evaluate response  - Implement non-pharmacological measures as appropriate and evaluate response  - Consider cultural and social influences on pain and pain management  - Notify physician/advanced practitioner if interventions unsuccessful or patient reports new pain  Outcome: Progressing     Problem: INFECTION - ADULT  Goal: Absence or prevention of progression during hospitalization  Description: INTERVENTIONS:  - Assess and monitor for signs and symptoms of infection  - Monitor lab/diagnostic results  - Monitor all insertion sites, i.e. indwelling lines, tubes, and drains  - Monitor endotracheal if appropriate and nasal secretions for changes in amount and color  - [place] appropriate cooling/warming therapies per order  - Administer medications as ordered  - Instruct and encourage patient and family to use good hand hygiene technique  - Identify and instruct in appropriate isolation precautions for identified infection/condition  Outcome: Progressing  Goal: Absence of fever/infection during neutropenic period  Description: INTERVENTIONS:  - Monitor WBC    Outcome: Progressing     Problem: SAFETY ADULT  Goal: Patient will remain free of falls  Description: INTERVENTIONS:  - Educate patient/family on patient safety including physical limitations  - Instruct patient to call for assistance with activity   - Consult OT/PT to assist with strengthening/mobility   - Keep Call bell within reach  - Keep bed low and locked with side rails adjusted as appropriate  - Keep care items and personal belongings within reach  - Initiate and maintain comfort rounds  - Make Fall Risk Sign visible to staff  - Offer Toileting every  Hours, in advance of need  - Initiate/Maintain alarm  - Obtain necessary fall risk management equipment:   - Apply yellow socks and bracelet for high fall risk patients  - Consider moving patient to room near nurses station  Outcome: Progressing  Goal: Maintain or return to baseline ADL function  Description: INTERVENTIONS:  -  Assess patient's ability to carry out ADLs; assess patient's baseline for ADL function and identify physical deficits which impact ability to perform ADLs (bathing, care of mouth/teeth, toileting, grooming, dressing, etc.)  - Assess/evaluate cause of self-care deficits   - Assess range of motion  - Assess patient's mobility; develop plan if impaired  - Assess patient's need for assistive devices and provide as appropriate  - Encourage maximum independence but intervene and supervise when necessary  - Involve family in performance of ADLs  - Assess for home care needs following discharge   - Consider OT consult to assist with ADL evaluation and planning for discharge  - Provide patient education as appropriate  Outcome: Progressing  Goal: Maintains/Returns to pre admission functional level  Description: INTERVENTIONS:  - Perform BMAT or MOVE assessment daily.   - Set and communicate daily mobility goal to care team and patient/family/caregiver. - Collaborate with rehabilitation services on mobility goals if consulted  - Perform Range of Motion  times a day. - Reposition patient every  hours.   - Dangle patient  times a day  - Stand patient  times a day  - Ambulate patient  times a day  - Out of bed to chair  times a day   - Out of bed for meals times a day  - Out of bed for toileting  - Record patient progress and toleration of activity level   Outcome: Progressing     Problem: DISCHARGE PLANNING  Goal: Discharge to home or other facility with appropriate resources  Description: INTERVENTIONS:  - Identify barriers to discharge w/patient and caregiver  - Arrange for needed discharge resources and transportation as appropriate  - Identify discharge learning needs (meds, wound care, etc.)  - Arrange for interpretive services to assist at discharge as needed  - Refer to Case Management Department for coordinating discharge planning if the patient needs post-hospital services based on physician/advanced practitioner order or complex needs related to functional status, cognitive ability, or social support system  Outcome: Progressing     Problem: Knowledge Deficit  Goal: Patient/family/caregiver demonstrates understanding of disease process, treatment plan, medications, and discharge instructions  Description: Complete learning assessment and assess knowledge base. Interventions:  - Provide teaching at level of understanding  - Provide teaching via preferred learning methods  Outcome: Progressing     Problem: MOBILITY - ADULT  Goal: Maintain or return to baseline ADL function  Description: INTERVENTIONS:  -  Assess patient's ability to carry out ADLs; assess patient's baseline for ADL function and identify physical deficits which impact ability to perform ADLs (bathing, care of mouth/teeth, toileting, grooming, dressing, etc.)  - Assess/evaluate cause of self-care deficits   - Assess range of motion  - Assess patient's mobility; develop plan if impaired  - Assess patient's need for assistive devices and provide as appropriate  - Encourage maximum independence but intervene and supervise when necessary  - Involve family in performance of ADLs  - Assess for home care needs following discharge   - Consider OT consult to assist with ADL evaluation and planning for discharge  - Provide patient education as appropriate  Outcome: Progressing  Goal: Maintains/Returns to pre admission functional level  Description: INTERVENTIONS:  - Perform BMAT or MOVE assessment daily.   - Set and communicate daily mobility goal to care team and patient/family/caregiver.    - Collaborate with rehabilitation services on mobility goals if consulted  - Perform Range of Motion  times a day. - Reposition patient every  hours.   - Dangle patient  times a day  - Stand patient  times a day  - Ambulate patient  times a day  - Out of bed to chair  times a day   - Out of bed for meals  times a day  - Out of bed for toileting  - Record patient progress and toleration of activity level   Outcome: Progressing

## 2023-07-26 NOTE — PROGRESS NOTES
Progress Note - Orthopedics   Albina Escobar O'Ventura 79 y.o. male MRN: [id number]  Unit/Bed#: [address] [place] [address] Encounter: [id number]        Subjective: Status post I&D infected left knee hematoma by Dr. Connie Ramirez 2 days ago. Patient notes minimal pain about the knee. He still complains of some swelling into the left lower leg and ankle, though this is improving. He notes good sensation of the left lower extremity. Hemovac has not been emptied. No acute overnight events. Cultures 4+ growth of staph aureus, but no sensitivities available yet. Vitals: Blood pressure 133/86, pulse 64, temperature 97.8 °F (36.6 °C), resp. rate 18, height 6' 1" (1.854 m), weight 104 kg (230 lb), SpO2 96 %. ,Body mass index is 30.34 kg/m². Intake/Output Summary (Last 24 hours) at 7/26/2023 0838  Last data filed at 7/26/2023 0047  Gross per 24 hour   Intake 3393.33 ml   Output 0 ml   Net 3393.33 ml       Invasive Devices     Peripheral Intravenous Line  Duration           Peripheral IV 07/24/23 Left Antecubital 1 day    Peripheral IV 07/25/23 Dorsal (posterior); Left Hand 1 day          Drain  Duration           Closed/Suction Drain Anterior; Left Knee Accordion 10 Fr. 1 day                Ortho Exam: Patient sitting up comfortably in bed in NAD. Left knee: Dressing removed. Incision CDI. No erythema or significant fluid collection about the anterior knee. No tenderness anterior knee. Minimal serosanginous drainge in vac canister, with no active drainage. 1+ pitting edema lower leg and ankle. No calf tenderness. Sensation intact left lower extremity. 2+ DP pulse. Lab, Imaging and other studies: I have personally reviewed pertinent lab results.   CBC:   Lab Results   Component Value Date    WBC 7.63 07/26/2023    HGB 11.7 (L) 07/26/2023    HCT 34.4 (L) 07/26/2023    MCV 91 07/26/2023     07/26/2023    RBC 3.77 (L) 07/26/2023    MCH 31.0 07/26/2023    MCHC 34.0 07/26/2023    RDW 12.8 07/26/2023    MPV 9.6 07/26/2023    NRBC 0 07/26/2023 CMP:   Lab Results   Component Value Date     07/26/2023     04/10/2019    CO2 26 07/26/2023    CO2 25 04/10/2019    BUN 15 07/26/2023    BUN 16 04/10/2019    CREATININE 0.70 07/26/2023    CALCIUM 8.2 (L) 07/26/2023    AST 15 07/24/2023    AST 22 04/10/2019    ALT 21 07/24/2023    ALT 21 04/10/2019    ALKPHOS 93 07/24/2023    EGFR 98 07/26/2023     PT/INR:   Lab Results   Component Value Date    INR 1.07 03/07/2018       Assessment:  POD #2 status post I&D left knee infected prepatellar hematoma. Plan:  Drain removed today. New dressing placed. Patient looks quite well clinically with resolution of anterior knee swelling, erythema, and pain. WBAT. Leave dressing in place. Analgesia prn. May resume Eliquis at this point from orthopedic perspective. Cultures sensitivities still pending. Once infectious disease determines outpatient antibiotic regimen, may discharge to home. Weight bearing: WBAT with assistance.

## 2023-07-26 NOTE — UTILIZATION REVIEW
[facility]: [facility]2023[facility] Has surpassed a 2nd midnight with active treatments and services, which include:  /86   Pulse 64   Temp 97.8 °F (36.6 °C)   Resp 18   Ht 6' 1" (1.854 m)   Wt 104 kg (230 lb)   SpO2 96%   BMI 30.34 kg/m²   Continue Analgesia control scheduled & PRN. VTE PPX:  Lovenox / Fab SCDs. Continue IV flds. Continue IV Abx. WBAT LLE. Hemovac.

## 2023-07-26 NOTE — PLAN OF CARE
Problem: PAIN - ADULT  Goal: Verbalizes/displays adequate comfort level or baseline comfort level  Description: Interventions:  - Encourage patient to monitor pain and request assistance  - Assess pain using appropriate pain scale  - Administer analgesics based on type and severity of pain and evaluate response  - Implement non-pharmacological measures as appropriate and evaluate response  - Consider cultural and social influences on pain and pain management  - Notify physician/advanced practitioner if interventions unsuccessful or patient reports new pain  7/26/2023 1250 by Richard Bustos RN  Outcome: Completed  7/26/2023 1044 by Richard Bustos RN  Outcome: Progressing     Problem: INFECTION - ADULT  Goal: Absence or prevention of progression during hospitalization  Description: INTERVENTIONS:  - Assess and monitor for signs and symptoms of infection  - Monitor lab/diagnostic results  - Monitor all insertion sites, i.e. indwelling lines, tubes, and drains  - Monitor endotracheal if appropriate and nasal secretions for changes in amount and color  - [place] appropriate cooling/warming therapies per order  - Administer medications as ordered  - Instruct and encourage patient and family to use good hand hygiene technique  - Identify and instruct in appropriate isolation precautions for identified infection/condition  7/26/2023 1250 by Richard Bustos RN  Outcome: Completed  7/26/2023 1044 by Richard Bustos RN  Outcome: Progressing  Goal: Absence of fever/infection during neutropenic period  Description: INTERVENTIONS:  - Monitor WBC    7/26/2023 1250 by Richard Bustos RN  Outcome: Completed  7/26/2023 1044 by Richard Bustos RN  Outcome: Progressing     Problem: SAFETY ADULT  Goal: Patient will remain free of falls  Description: INTERVENTIONS:  - Educate patient/family on patient safety including physical limitations  - Instruct patient to call for assistance with activity   - Consult OT/PT to assist with strengthening/mobility   - Keep Call bell within reach  - Keep bed low and locked with side rails adjusted as appropriate  - Keep care items and personal belongings within reach  - Initiate and maintain comfort rounds  - Make Fall Risk Sign visible to staff  - Offer Toileting every  Hours, in advance of need  - Initiate/Maintain alarm  - Obtain necessary fall risk management equipment:   - Apply yellow socks and bracelet for high fall risk patients  - Consider moving patient to room near nurses station  7/26/2023 1250 by Livia Kramer RN  Outcome: Completed  7/26/2023 1044 by Livia Kramer RN  Outcome: Progressing  Goal: Maintain or return to baseline ADL function  Description: INTERVENTIONS:  -  Assess patient's ability to carry out ADLs; assess patient's baseline for ADL function and identify physical deficits which impact ability to perform ADLs (bathing, care of mouth/teeth, toileting, grooming, dressing, etc.)  - Assess/evaluate cause of self-care deficits   - Assess range of motion  - Assess patient's mobility; develop plan if impaired  - Assess patient's need for assistive devices and provide as appropriate  - Encourage maximum independence but intervene and supervise when necessary  - Involve family in performance of ADLs  - Assess for home care needs following discharge   - Consider OT consult to assist with ADL evaluation and planning for discharge  - Provide patient education as appropriate  7/26/2023 1250 by Livia Kramer RN  Outcome: Completed  7/26/2023 1044 by Livia Kramer RN  Outcome: Progressing  Goal: Maintains/Returns to pre admission functional level  Description: INTERVENTIONS:  - Perform BMAT or MOVE assessment daily.   - Set and communicate daily mobility goal to care team and patient/family/caregiver. - Collaborate with rehabilitation services on mobility goals if consulted  - Perform Range of Motion  times a day.   - Reposition patient every  hours. - Dangle patient  times a day  - Stand patient  times a day  - Ambulate patient  times a day  - Out of bed to chair  times a day   - Out of bed for meal times a day  - Out of bed for toileting  - Record patient progress and toleration of activity level   7/26/2023 1250 by Olivier Mullen RN  Outcome: Completed  7/26/2023 1044 by Olivier Mullen RN  Outcome: Progressing     Problem: DISCHARGE PLANNING  Goal: Discharge to home or other facility with appropriate resources  Description: INTERVENTIONS:  - Identify barriers to discharge w/patient and caregiver  - Arrange for needed discharge resources and transportation as appropriate  - Identify discharge learning needs (meds, wound care, etc.)  - Arrange for interpretive services to assist at discharge as needed  - Refer to Case Management Department for coordinating discharge planning if the patient needs post-hospital services based on physician/advanced practitioner order or complex needs related to functional status, cognitive ability, or social support system  7/26/2023 1250 by Olivier Mullen RN  Outcome: Completed  7/26/2023 1044 by Olivier Mullen RN  Outcome: Progressing     Problem: Knowledge Deficit  Goal: Patient/family/caregiver demonstrates understanding of disease process, treatment plan, medications, and discharge instructions  Description: Complete learning assessment and assess knowledge base.   Interventions:  - Provide teaching at level of understanding  - Provide teaching via preferred learning methods  7/26/2023 1250 by Olivier Mullen RN  Outcome: Completed  7/26/2023 1044 by Olivier Mullen RN  Outcome: Progressing     Problem: MOBILITY - ADULT  Goal: Maintain or return to baseline ADL function  Description: INTERVENTIONS:  -  Assess patient's ability to carry out ADLs; assess patient's baseline for ADL function and identify physical deficits which impact ability to perform ADLs (bathing, care of mouth/teeth, toileting, grooming, dressing, etc.)  - Assess/evaluate cause of self-care deficits   - Assess range of motion  - Assess patient's mobility; develop plan if impaired  - Assess patient's need for assistive devices and provide as appropriate  - Encourage maximum independence but intervene and supervise when necessary  - Involve family in performance of ADLs  - Assess for home care needs following discharge   - Consider OT consult to assist with ADL evaluation and planning for discharge  - Provide patient education as appropriate  7/26/2023 1250 by Jody Kelly RN  Outcome: Completed  7/26/2023 1044 by Jody Kelly RN  Outcome: Progressing  Goal: Maintains/Returns to pre admission functional level  Description: INTERVENTIONS:  - Perform BMAT or MOVE assessment daily.   - Set and communicate daily mobility goal to care team and patient/family/caregiver. - Collaborate with rehabilitation services on mobility goals if consulted  - Perform Range of Motion  times a day. - Reposition patient every  hours.   - Dangle patient  times a day  - Stand patient  times a day  - Ambulate patient  times a day  - Out of bed to chair  times a day   - Out of bed for meals  times a day  - Out of bed for toileting  - Record patient progress and toleration of activity level   7/26/2023 1250 by Jody Kelly RN  Outcome: Completed  7/26/2023 1044 by Jody Kelly RN  Outcome: Progressing

## 2023-07-26 NOTE — PROGRESS NOTES
254 [facility] Progress Note - Jun Mail O'Such 79 y.o. male MRN: [id number]    Unit/Bed#: [address] [place] [address] Encounter: [id number]      Assessment/Plan:  * Hematoma of left knee region  Assessment & Plan  Acute    77 yo male w/pmh of DVT on Eliquis presents with left knee pain after injuring it while playing softball in [facility]. Pt saw Dr. Ibrahima Barclay (orthopedic surgeon) on 6/30 for prepatellar hematoma, given keflex. He received I&D of left hematoma in [facility] 2 weeks ago. Pt presents on 7/24 w/sharp pain to his left knee, limited ROM, erythema and warmth. Likely hematoma versus abscess. Low suspicion of septic arthritis    Ct of Lower Left extremity w/contrast: 9.2 x 6.3 x 3 cm superficial fluid collection at the anteromedial aspect of the knee. Xray of left foot: No osseous abnormality    · Ortho Recommendations:  · I&D completed in OR on 1/00 w/ no complications   · Wound culture-Staph aureus, gram-positive cocci in clusters. Awaiting identification panel and sensitivities. · Initially started broad-spectrum antibiotics on 7/24: Vancomycin and Cefepime. Discontinued cefepime on 7/25. · Consulted ID; recommendations appreciated  · Hemovac in wound to prevent recollection  · DVT prophylaxis with lovenox 40mg subQ daily. Restart home Eliquis after Hemovac removed  · WBAT LLE    Chronic deep vein thrombosis (DVT) of left femoral vein (HCC)  Assessment & Plan  On home Eliquis 5 mg twice daily    · Restart eliquis once hemovac is removed    GERD (gastroesophageal reflux disease)  Assessment & Plan  Patient states that he was on 40 mg of omeprazole daily, but then found to have low B12 levels and decreased his omeprazole dose from 40 mg to 20 mg. Epigastric burning controlled with 20 mg of omeprazole daily.   · IV pantoprazole 40 mg during admission  · Continue 20 mg Omeprazole daily  · Discuss trying famotidine outpatient    Mixed hyperlipidemia  Assessment & Plan  Chronic; continue Lipitor 20 mg daily        Subjective:   Pt seen and examined at bedside. Pt states that his pain is well controlled and he has no difficulty walking to the bathroom. Denies numbness, tingling, chest pain, SOB or any other acute complaints at this time. Objective:     Vitals: Blood pressure 133/86, pulse 64, temperature 97.8 °F (36.6 °C), resp. rate 18, height 6' 1" (1.854 m), weight 104 kg (230 lb), SpO2 96 %. ,Body mass index is 30.34 kg/m². Wt Readings from Last 3 Encounters:   07/24/23 104 kg (230 lb)   07/18/23 107 kg (235 lb)   06/30/23 107 kg (235 lb)       Intake/Output Summary (Last 24 hours) at 7/26/2023 0835  Last data filed at 7/26/2023 0047  Gross per 24 hour   Intake 3393.33 ml   Output 0 ml   Net 3393.33 ml     Physical Exam  Constitutional:       Appearance: Normal appearance. HENT:      Head: Normocephalic and atraumatic. Mouth/Throat:      Mouth: Mucous membranes are moist.   Eyes:      General:         Right eye: No discharge. Left eye: No discharge. Conjunctiva/sclera: Conjunctivae normal.   Cardiovascular:      Rate and Rhythm: Normal rate and regular rhythm. Pulses: Normal pulses. Heart sounds: Normal heart sounds. Pulmonary:      Effort: Pulmonary effort is normal. No respiratory distress. Breath sounds: Normal breath sounds. Abdominal:      General: Bowel sounds are normal.      Palpations: Abdomen is soft. Tenderness: There is no abdominal tenderness. Musculoskeletal:      Cervical back: Neck supple. Right lower leg: No edema. Left lower leg: No edema. Skin:     General: Skin is warm and dry. Capillary Refill: Capillary refill takes less than 2 seconds. Comments: Bandage on left knee C/D/I w/ scant blood present in hemovac   Neurological:      Mental Status: He is alert.          Recent Results (from the past 24 hour(s))   Basic metabolic panel    Collection Time: 07/26/23  5:13 AM   Result Value Ref Range    Sodium 140 135 - 147 mmol/L    Potassium 4.1 3.5 - 5.3 mmol/L    Chloride 108 96 - 108 mmol/L    CO2 26 21 - 32 mmol/L    ANION GAP 6 mmol/L    BUN 15 5 - 25 mg/dL    Creatinine 0.70 0.60 - 1.30 mg/dL    Glucose 100 65 - 140 mg/dL    Calcium 8.2 (L) 8.4 - 10.2 mg/dL    eGFR 98 ml/min/1.73sq m   CBC and differential    Collection Time: 07/26/23  5:13 AM   Result Value Ref Range    WBC 7.63 4.31 - 10.16 Thousand/uL    RBC 3.77 (L) 3.88 - 5.62 Million/uL    Hemoglobin 11.7 (L) 12.0 - 17.0 g/dL    Hematocrit 34.4 (L) 36.5 - 49.3 %    MCV 91 82 - 98 fL    MCH 31.0 26.8 - 34.3 pg    MCHC 34.0 31.4 - 37.4 g/dL    RDW 12.8 11.6 - 15.1 %    MPV 9.6 8.9 - 12.7 fL    Platelets 060 282 - 755 Thousands/uL    nRBC 0 /100 WBCs    Neutrophils Relative 68 43 - 75 %    Immat GRANS % 0 0 - 2 %    Lymphocytes Relative 24 14 - 44 %    Monocytes Relative 7 4 - 12 %    Eosinophils Relative 1 0 - 6 %    Basophils Relative 0 0 - 1 %    Neutrophils Absolute 5.17 1.85 - 7.62 Thousands/µL    Immature Grans Absolute 0.02 0.00 - 0.20 Thousand/uL    Lymphocytes Absolute 1.85 0.60 - 4.47 Thousands/µL    Monocytes Absolute 0.50 0.17 - 1.22 Thousand/µL    Eosinophils Absolute 0.06 0.00 - 0.61 Thousand/µL    Basophils Absolute 0.03 0.00 - 0.10 Thousands/µL   Magnesium    Collection Time: 07/26/23  5:13 AM   Result Value Ref Range    Magnesium 1.8 (L) 1.9 - 2.7 mg/dL   Vancomycin, trough Please draw trough with morning labs- give dose of Vanco after lab drawn    Collection Time: 07/26/23  5:13 AM   Result Value Ref Range    Vancomycin Tr 7.7 (L) 10.0 - 20.0 ug/mL       Current Facility-Administered Medications   Medication Dose Route Frequency Provider Last Rate Last Admin   • acetaminophen (TYLENOL) tablet 975 mg  975 mg Oral Q8H [facility] MD Prudence   975 mg at 07/26/23 4919   • atorvastatin (LIPITOR) tablet 20 mg  20 mg Oral Daily With Liz Silver MD   20 mg at 07/25/23 1615   • cyanocobalamin (VITAMIN B-12) tablet 1,000 mcg  1,000 mcg Oral Daily Ivonne MD Asia   1,000 mcg at 07/25/23 0930   • enoxaparin (LOVENOX) subcutaneous injection 40 mg  40 mg Subcutaneous Q24H [facility] Alejandra Jansen PA-C   40 mg at 07/25/23 1742   • ibuprofen (MOTRIN) tablet 800 mg  800 mg Oral Q8H PRN Brittany Robbins MD       • lactated ringers infusion  100 mL/hr Intravenous Continuous Clorinda MD Laura 100 mL/hr at 07/26/23 0047 100 mL/hr at 07/26/23 0047   • multivitamin stress formula tablet 1 tablet  1 tablet Oral Once per day on Mon Wed Fri Brittany Robbins MD       • oxyCODONE (ROXICODONE) IR tablet 5 mg  5 mg Oral Q4H PRN Brittany Robbins MD   5 mg at 07/24/23 1909   • pantoprazole (PROTONIX) EC tablet 40 mg  40 mg Oral Early Morning Teresita Gilmore MD   40 mg at 07/26/23 6775   • polyethylene glycol (MIRALAX) packet 17 g  17 g Oral Daily Brittany Robbins MD   17 g at 07/25/23 0930   • vancomycin (VANCOCIN) 1,250 mg in sodium chloride 0.9 % 250 mL IVPB  15 mg/kg (Adjusted) Intravenous Q12H Ora Baptiste .7 mL/hr at 07/26/23 0613 1,250 mg at 07/26/23 0613       Invasive Devices     Peripheral Intravenous Line  Duration           Peripheral IV 07/24/23 Left Antecubital 1 day    Peripheral IV 07/25/23 Dorsal (posterior); Left Hand 1 day          Drain  Duration           Closed/Suction Drain Anterior; Left Knee Accordion 10 Fr. 1 day                Lab, Imaging and other studies: I have personally reviewed pertinent reports.     VTE Pharmacologic Prophylaxis: Enoxaparin (Lovenox)  VTE Mechanical Prophylaxis: sequential compression device    Brittany Robbins MD

## 2023-07-26 NOTE — DISCHARGE SUMMARY
Discharge Summary - [address] [place] [address] Family Medicine Residency     Patient Information: Sunshine Ward 79 y.o. male MRN: [id number]  Unit/Bed#: [address] [place] [address] Encounter: [id number]     Admitting Physician: Aftab Templeton MD  Discharging Physician/Practitioner: Aftab Templeton MD   PCP: Seymour Tan MD  Admission Date:   Admission Orders (From admission, onward)     Ordered        07/24/23 [facility]                      Discharge Date: 07/26/23      Reason for Admission: Knee pain [M25.569]  Left knee pain [M25.562]  Infection of left knee ([address]) [M00.9]  Hematoma of left knee region [S80.02XA]  Infected hematoma [T14. 8XXA, L08.9]     Discharge Diagnoses:      Principal Problem:    Hematoma of left knee region  Active Problems:    Chronic deep vein thrombosis (DVT) of left femoral vein (HCC)    Mixed hyperlipidemia    GERD (gastroesophageal reflux disease)  Resolved Problems:    * No resolved hospital problems. *       Consultations During Hospital Stay:  ·       Infectious disease          Orthopedic surgery     Procedures Performed:   ·       Incision and drainage of the left knee on 7/24     Significant Findings / Test Results:   7/25: WBC 6.52, Hg 14.5  7/24: WBC 8.35, Hg 14.4, K 138, Na 4.0 Cr 0.91, CRP-21.4 High    Wound culture-Staph aureus, gram-positive cocci in clusters  Ct of Lower Left extremity w/contrast: 9.2 x 6.3 x 3 cm superficial fluid collection at the anteromedial aspect of the knee. Incidental Findings:   ·       none      Test Results Pending at Discharge (will require follow up):   ·       none     Outpatient Tests Requested:  ·       none     Outpatient follow-up Requested:  ·       Orthopedic surgery       Family medicine at [facility]     Complications:  none    Things to address at first visit after hospitalization   Did you complete your antibiotics?  (Keflex 500 mg q8 hours until 8/3)  Any swelling or redness on your left knee? (I&D on 7/24)  How is Results for review in Epic  US Venous right leg swelling   your pain? How is ambulating? Did you follow up with orthopedic surgery and family medicine? Discuss transitioning PPI to maybe famotidine. B12 levels were low, consider increasing home B12 dose. Hospital Course:      aMycol Macedo is a 79 y.o. male patient w/ PMH DVT on eliquis who originally presented to the hospital on 7/24/2023 due to left knee edema, erythema and pain s/p I&D in [facility]. Admitted to ortho for I&D (7/24) and fluid culture. * Hematoma of left knee region  Pt initially presented with left knee pain after injuring it while playing softball in [facility]. Pt saw Dr. Susan Heart (orthopedic surgeon) on 6/30 for prepatellar hematoma, given keflex. He received I&D of left hematoma in [facility] 2 weeks ago. Pt presents on 7/24 w/sharp pain to his left knee, limited ROM, erythema and warmth. · CT of left lower leg showed 9.2 x 6.3 x 3 cm superficial fluid collection at the anteromedial aspect of the knee  · I&D completed in OR on 7/12 w/ no complications and hemovac removed with scant blood. · Pt had lovenox on for DVT prophylaxis and then converted to eliquis after hemovac was removed. · Wound culture-Staph aureus, gram-positive cocci in clusters. · Completed 1 day of Cefepime and two days of vancomycin. · ID recommended discharging pt on Keflex every 8 hours for 10 days after I&D until 8/3 and WBAT. · Follow-up with Dr. Susan Heart (orthopedic surgery) 10-14 days after surgery. Chronic deep vein thrombosis (DVT) of left femoral vein (HCC)  On home Eliquis 5 mg twice daily  · Lovenox for DVT prophylaxis after procedure and retsarted eliquis once hemovac was removed on 7/27    GERD (gastroesophageal reflux disease)  Patient states that he was on 40 mg of omeprazole daily, but then found to have low B12 levels and decreased his omeprazole dose from 40 mg to 20 mg. Epigastric burning controlled with 20 mg of omeprazole daily.   · IV pantoprazole 40 mg during admission  · Continue 20 mg Omeprazole daily after discharge  · Discuss trying famotidine outpatient FM visit    Mixed hyperlipidemia  Chronic; continue Lipitor 20 mg daily     Condition at Discharge: stable      Discharge Day Visit / Exam:      Vitals: Blood Pressure: 133/86 (07/26/23 0807)  Pulse: 64 (07/26/23 0807)  Temperature: 97.8 °F (36.6 °C) (07/26/23 0807)  Temp Source: Oral (07/25/23 2300)  Respirations: 18 (07/25/23 2300)  Height: 6' 1" (185.4 cm) (07/24/23 1804)  Weight - Scale: 104 kg (230 lb) (07/24/23 1804)  SpO2: 96 % (07/26/23 0807)  Exam:   Physical Exam  Constitutional:       Appearance: Normal appearance. HENT:      Head: Normocephalic and atraumatic. Eyes:      General:         Right eye: No discharge. Left eye: No discharge. Conjunctiva/sclera: Conjunctivae normal.   Cardiovascular:      Rate and Rhythm: Normal rate and regular rhythm. Pulses: Normal pulses. Heart sounds: Normal heart sounds. Pulmonary:      Effort: Pulmonary effort is normal. No respiratory distress. Breath sounds: Normal breath sounds. Abdominal:      General: Bowel sounds are normal.      Palpations: Abdomen is soft. Tenderness: There is no abdominal tenderness. Musculoskeletal:      Cervical back: Normal range of motion and neck supple. Comments: Left knee wrapped in bandage that is C/D/I. Hemovac removed. Skin:     General: Skin is warm and dry. Capillary Refill: Capillary refill takes less than 2 seconds. Neurological:      General: No focal deficit present. Mental Status: He is alert and oriented to person, place, and time. Psychiatric:         Mood and Affect: Mood normal.         Behavior: Behavior normal.          Discharge instructions/Information to patient and family:   See after visit summary for information provided to patient and family. Discharge Medications:     Medication List      START taking these medications    • cephalexin 500 mg capsule; Commonly known as: Shilo Dixon;  Take 1 capsule   (500 mg total) by mouth every 8 (eight) hours for 8 days     CONTINUE taking these medications    • apixaban 5 mg; Commonly known as: Eliquis; Take 1 tablet (5 mg total) by   mouth 2 (two) times a day  • atorvastatin 20 mg tablet; Commonly known as: LIPITOR; Take 1 tablet (20   mg total) by mouth daily  • B-12 1000 MCG Subl; Place 1 tablet (1,000 mcg total) under the tongue in   the morning For a month, then once a week for maintenance  • multivitamin tablet        Disposition:   Home     Discharge Statement:  I spent 30 minutes discharging the patient. This time was spent on the day of discharge. I had direct contact with the patient on the day of discharge. Greater than 50% of the total time was spent examining patient, answering all patient questions, arranging and discussing plan of care with patient as well as directly providing post-discharge instructions. Additional time then spent on discharge activities.      ** Please Note: This note has been constructed using a voice recognition system Stephon Oliva MD   07/26/23  2:49 PM

## 2023-07-26 NOTE — PROGRESS NOTES
Progress Note - Infectious Disease   Liliana Jones 79 y.o. male MRN: [id number]  Unit/Bed#: [address] [place] [address] Encounter: [id number]      Impression/Plan:  1. Infected hematoma of left knee. Patient initially injured his left knee by falling on it while playing softball on 6/29/2023. He developed a left prepatellar hematoma. Treatment was complicated by patient leaving for a two week vacation to [facility] shortly after injury. Patient takes Eliquis for history of dvt and was advised by orthopedics to continue it due to his travel plans. Ortho gave his a 10 day course of Keflex to bring to [facility] with him in case of infection. Now patient presented on 7/24/2023 with sharp pain to his left knee, limited ROM, erythema and warmth. CT of left lower extremity on 7/24/2023 showed 9.2 x 6.3 x 3 cm superficial fluid collection at the anteromedial aspect of the knee. Ortho performed an I&D in OR, sent cultures out and placed a drain. Started patient on Cefepime and Vanco. OR Culture growing 4+ MSSA. MRSA culture from 2018 negative              -Discontinue Vancomycin.   -Transition to Keflex 500 mg PO q 8 hours to complete 10 day course from  I&D through 8/3/23   -patient has enough keflex script filled from preadmission to complete course   -orthopedic follow-up      2. Chronic DVT of left femoral vein.               -Resumed Eliquis per primary care team     3. Anterior Cervical fusion 15 years ago. No prior infection complication. No neck pain.     4. GERD. Continuing omeproazole per primary.      Above impression and plan discussed in detail with patient, RN, and primary care team.    Antibiotics:  Vancomycin - post op D2    Subjective:  Patient has no fever, chills, sweats; no nausea, vomiting, diarrhea; no cough, shortness of breath; no knee pain. No new symptoms.     Objective:  Vitals:  Temp:  [97.4 °F (36.3 °C)-97.8 °F (36.6 °C)] 97.8 °F (36.6 °C)  HR:  [64-81] 64  Resp:  [16-19] 18  BP: (123-133)/(69-93) 133/86  SpO2:  [94 %-96 %] 96 %  Temp (24hrs), Av.6 °F (36.4 °C), Min:97.4 °F (36.3 °C), Max:97.8 °F (36.6 °C)  Current: Temperature: 97.8 °F (36.6 °C)    Physical Exam:   General Appearance:  79year old male, alert, nontoxic, no acute distress sitting at bedside   HEENT: Atraumatic normocephalic   Throat: Oropharynx moist.    Pulmonary:   Normal respiratory excursion without accessory muscle use   Cardiac:  RRR   Abdomen:   Soft, non-tender, non-distended   Extremities: No edema, right knee ace wrap intact, drain pulled. : No [place], no SPT   Psychiatric: Awake, cooperative   Skin: No new rashes. IV site nontender.         Labs, Imaging, & Other studies:   All pertinent labs and imaging studies were personally reviewed  Results from last 7 days   Lab Units 23  0513 23  0552 23  1228   WBC Thousand/uL 7.63 6.52 8.35   HEMOGLOBIN g/dL 11.7* 14.5 14.4   PLATELETS Thousands/uL 197 234 212     Results from last 7 days   Lab Units 23  0513 23  0552 23  1228   SODIUM mmol/L 140 137 138   POTASSIUM mmol/L 4.1 4.1 4.0   CHLORIDE mmol/L 108 104 103   CO2 mmol/L 26 26 30   BUN mg/dL 15 15 11   CREATININE mg/dL 0.70 0.76 0.91   EGFR ml/min/1.73sq m 98 95 87   CALCIUM mg/dL 8.2* 8.8 9.0   AST U/L  --   --  15   ALT U/L  --   --  21   ALK PHOS U/L  --   --  93     Results from last 7 days   Lab Units 23  1553   GRAM STAIN RESULT  1+ Gram positive cocci in clusters*  No polys seen*   WOUND CULTURE  4+ Growth of Staphylococcus aureus*         Results from last 7 days   Lab Units 23  1228   CRP mg/L 21.4*

## 2023-07-26 NOTE — PLAN OF CARE
Problem: PAIN - ADULT  Goal: Verbalizes/displays adequate comfort level or baseline comfort level  Description: Interventions:  - Encourage patient to monitor pain and request assistance  - Assess pain using appropriate pain scale  - Administer analgesics based on type and severity of pain and evaluate response  - Implement non-pharmacological measures as appropriate and evaluate response  - Consider cultural and social influences on pain and pain management  - Notify physician/advanced practitioner if interventions unsuccessful or patient reports new pain  Outcome: Progressing     Problem: INFECTION - ADULT  Goal: Absence or prevention of progression during hospitalization  Description: INTERVENTIONS:  - Assess and monitor for signs and symptoms of infection  - Monitor lab/diagnostic results  - Monitor all insertion sites, i.e. indwelling lines, tubes, and drains  - Monitor endotracheal if appropriate and nasal secretions for changes in amount and color  - [place] appropriate cooling/warming therapies per order  - Administer medications as ordered  - Instruct and encourage patient and family to use good hand hygiene technique  - Identify and instruct in appropriate isolation precautions for identified infection/condition  Outcome: Progressing     Problem: SAFETY ADULT  Goal: Patient will remain free of falls  Description: INTERVENTIONS:  - Educate patient/family on patient safety including physical limitations  - Instruct patient to call for assistance with activity   - Consult OT/PT to assist with strengthening/mobility   - Keep Call bell within reach  - Keep bed low and locked with side rails adjusted as appropriate  - Keep care items and personal belongings within reach  - Initiate and maintain comfort rounds  - Make Fall Risk Sign visible to staff  - Offer Toileting every 2  Hours, in advance of need  - Initiate/Maintain bed alarm  - Obtain necessary fall risk management equipment: yellow socks, yellow bracelet   - Apply yellow socks and bracelet for high fall risk patients  - Consider moving patient to room near nurses station  Outcome: Progressing     Problem: MOBILITY - ADULT  Goal: Maintain or return to baseline ADL function  Description: INTERVENTIONS:  -  Assess patient's ability to carry out ADLs; assess patient's baseline for ADL function and identify physical deficits which impact ability to perform ADLs (bathing, care of mouth/teeth, toileting, grooming, dressing, etc.)  - Assess/evaluate cause of self-care deficits   - Assess range of motion  - Assess patient's mobility; develop plan if impaired  - Assess patient's need for assistive devices and provide as appropriate  - Encourage maximum independence but intervene and supervise when necessary  - Involve family in performance of ADLs  - Assess for home care needs following discharge   - Consider OT consult to assist with ADL evaluation and planning for discharge  - Provide patient education as appropriate  Outcome: Progressing     Problem: Knowledge Deficit  Goal: Patient/family/caregiver demonstrates understanding of disease process, treatment plan, medications, and discharge instructions  Description: Complete learning assessment and assess knowledge base.   Interventions:  - Provide teaching at level of understanding  - Provide teaching via preferred learning methods  Outcome: Progressing

## 2023-07-28 ENCOUNTER — TELEPHONE (OUTPATIENT)
Dept: FAMILY MEDICINE CLINIC | Facility: CLINIC | Age: 68
End: 2023-07-28

## 2023-07-28 ENCOUNTER — TRANSITIONAL CARE MANAGEMENT (OUTPATIENT)
Dept: FAMILY MEDICINE CLINIC | Facility: CLINIC | Age: 68
End: 2023-07-28

## 2023-07-28 NOTE — UTILIZATION REVIEW
NOTIFICATION OF ADMISSION DISCHARGE   This is a Notification of Discharge from [address] [place] [address]. Please be advised that this patient has been discharge from our facility. Below you will find the admission and discharge date and time including the patient’s disposition. UTILIZATION REVIEW CONTACT:  Bruna Gorman  Utilization   Network Utilization Review Department  Phone: [phone] x carefully listen to the prompts. All voicemails are confidential.  Email: [email or web address]     ADMISSION INFORMATION  PRESENTATION DATE: 7/24/2023 10:19 AM  OBERVATION ADMISSION DATE:   INPATIENT ADMISSION DATE: 7/24/23  3:05 PM   DISCHARGE DATE: 7/26/2023  4:04 PM   DISPOSITION:Home/Self Care    IMPORTANT INFORMATION:  Send all requests for admission clinical reviews, approved or denied determinations and any other requests to dedicated fax number below belonging to the campus where the patient is receiving treatment.  List of dedicated fax numbers:  [place] DENIALS (Administrative/Medical Necessity) [phone] [address] [facility] (Maternity/NICU/Pediatrics) [phone]   [facility] [phone]   [facility] [phone] [facility] [phone]   [address] [place] [address] [phone]   [facility] [phone]   [address] [place] [address] [facility] [phone]   [address] [place] [address] [phone]   [address] [phone] [address] [facility] [address] [facility] [phone]

## 2023-07-28 NOTE — TELEPHONE ENCOUNTER
Called patient for TCM, he declined at this time to see his PCP but would like to hear from the ortho office regarding the instructions and care for his knee after the procedure that was done. He stated he never got any instructions when d/c'd.   He would like a call back from one of your staff

## 2023-08-01 ENCOUNTER — TELEPHONE (OUTPATIENT)
Age: 68
End: 2023-08-01

## 2023-08-01 NOTE — TELEPHONE ENCOUNTER
I had messaged Dr Miryam Heart to see if the post op appointment on 08-09 was okay, as it looks like it was scheduled thru [facility]. Dr Miryam Heart said it would be okay but this Friday would be better, if patient wishes. I called patient & left a voicemail to call back. I did not go into detail in voicemail , only advised message was in regards to an appointment , if he can give us a call that would be great.     Please offer this Friday , if patient elects, please send force on, okay Dr Miryam Heart

## 2023-08-01 NOTE — TELEPHONE ENCOUNTER
Caller: Patient     Doctor: June Maier    Reason for call: Returning call re: changing appt. He is not available this Friday, only Tues or Wed next week.  Please advise     Call back#: [phone]

## 2023-08-09 ENCOUNTER — OFFICE VISIT (OUTPATIENT)
Dept: OBGYN CLINIC | Facility: CLINIC | Age: 68
End: 2023-08-09

## 2023-08-09 VITALS
HEIGHT: 73 IN | SYSTOLIC BLOOD PRESSURE: 128 MMHG | WEIGHT: 230 LBS | DIASTOLIC BLOOD PRESSURE: 84 MMHG | HEART RATE: 84 BPM | BODY MASS INDEX: 30.48 KG/M2

## 2023-08-09 DIAGNOSIS — T14.8XXA INFECTED HEMATOMA: ICD-10-CM

## 2023-08-09 DIAGNOSIS — M00.9 INFECTION OF LEFT KNEE (HCC): ICD-10-CM

## 2023-08-09 DIAGNOSIS — L08.9 INFECTED HEMATOMA: ICD-10-CM

## 2023-08-09 PROCEDURE — 99024 POSTOP FOLLOW-UP VISIT: CPT | Performed by: ORTHOPAEDIC SURGERY

## 2023-08-09 NOTE — PROGRESS NOTES
SUBJECTIVE:  Patient is a 79y.o. year old male who presents for follow up now 16 days s/p  Incision And Drainage (i&d) Extremity - Left performed on  7/24/2023. Today patient has no concerns. He notes he has finished his antibiotic. He hasn't experienced any warmth, pain, or drainage. He feels much better than before surgery. No fever or chills. VITALS:  Vitals:    08/09/23 1303   BP: 128/84   Pulse: 84     PHYSICAL EXAMINATION:  General/Constitutional: NAD, well developed, well nourished  HENT: Normocephalic, atraumatic  CV: Intact distal pulses, regular rate  Resp: No respiratory distress or labored breathing  Abdomen: soft, nondistended   Lymphatic: No lymphadenopathy palpated  Neuro: Alert and Oriented x 3, no focal deficits  Psych: Normal mood, normal affect  Skin: Warm, dry, no rashes, no erythema    Incision: Clean, dry, and intact  No warmth, no erythema, no edema  Sutures removed   No sign of infection  Range of Motion: 0-130  Neurovascular status: decreased sensation in small area just lateral to the incision. Otherwise motor and sensation intact     PLAN:    The wound is healing very nicely there is no further evidence of infection at this time. He completed the antibiotics per ID recommendations. He can continue activities as tolerated I offered physical therapy to help patient strengthen the quads and work on ROM. Patient would prefer to work on his own at this time. I advised he can do gym work as tolerated. Over the counter pain medication, ice/heat, rest as needed for pain control. Follow up in 2 weeks for recheck.

## 2023-08-22 ENCOUNTER — OFFICE VISIT (OUTPATIENT)
Dept: OBGYN CLINIC | Facility: CLINIC | Age: 68
End: 2023-08-22

## 2023-08-22 VITALS
HEIGHT: 73 IN | BODY MASS INDEX: 30.48 KG/M2 | WEIGHT: 230 LBS | SYSTOLIC BLOOD PRESSURE: 121 MMHG | DIASTOLIC BLOOD PRESSURE: 69 MMHG | HEART RATE: 80 BPM

## 2023-08-22 DIAGNOSIS — S80.02XA TRAUMATIC HEMATOMA OF LEFT KNEE, INITIAL ENCOUNTER: ICD-10-CM

## 2023-08-22 DIAGNOSIS — T14.8XXA INFECTED HEMATOMA: Primary | ICD-10-CM

## 2023-08-22 DIAGNOSIS — L08.9 INFECTED HEMATOMA: Primary | ICD-10-CM

## 2023-08-22 PROCEDURE — 99024 POSTOP FOLLOW-UP VISIT: CPT | Performed by: ORTHOPAEDIC SURGERY

## 2023-08-22 NOTE — PROGRESS NOTES
SUBJECTIVE:  Patient is a 79y.o. year old male who presents for follow up now 29 days s/p  Incision And Drainage (i&d) Extremity - Left performed on  7/24/2023. Today patient has no concerns. He hasn't experienced any warmth, pain, or drainage. He feels much better than before surgery. No fever or chills. Well Healed incision and no evidense of infections       VITALS:  Vitals:    08/22/23 1015   BP: 121/69   Pulse: 80     PHYSICAL EXAMINATION:  General/Constitutional: NAD, well developed, well nourished  HENT: Normocephalic, atraumatic  CV: Intact distal pulses, regular rate  Resp: No respiratory distress or labored breathing  Abdomen: soft, nondistended   Lymphatic: No lymphadenopathy palpated  Neuro: Alert and Oriented x 3, no focal deficits  Psych: Normal mood, normal affect  Skin: Warm, dry, no rashes, no erythema    Incision: Well-healed  No warmth, no erythema, no edema  No sign of infection  Range of Motion: Just short of full extension-130 (this is his baseline)  Neurovascular status: decreased sensation in small area just lateral to the incision. Otherwise motor and sensation intact     PLAN:    The wound is healing very nicely there is no further evidence of infection at this time. He can continue activities as tolerated. Provided Physical therapy to work on range of motion. Patient would prefer to work on his own at this time. I advised he can do gym work as tolerated. Add stretching and strengthening quad and hamstrings. Encouraged core exercise programs at home. Over the counter pain medication, ice/heat, rest as needed for pain control.

## 2023-12-12 ENCOUNTER — TELEPHONE (OUTPATIENT)
Dept: SLEEP CENTER | Facility: CLINIC | Age: 68
End: 2023-12-12

## 2023-12-12 NOTE — TELEPHONE ENCOUNTER
----- Message from Jennifer Mejia DO sent at 12/11/2023  2:33 PM EST -----  approved  ----- Message -----  From: Luana Celis  Sent: 11/28/2023   9:09 AM EST  To: Sleep Medicine Nettie Irizarry Provider    This Diagnostic sleep study needs approval.     If approved please sign and return to clerical pool. If denied please include reasons why. Also provide alternative testing if warranted. Please sign and return to clerical pool.

## 2024-01-09 ENCOUNTER — OFFICE VISIT (OUTPATIENT)
Dept: OBGYN CLINIC | Facility: CLINIC | Age: 69
End: 2024-01-09
Payer: COMMERCIAL

## 2024-01-09 ENCOUNTER — APPOINTMENT (OUTPATIENT)
Dept: RADIOLOGY | Facility: CLINIC | Age: 69
End: 2024-01-09
Payer: COMMERCIAL

## 2024-01-09 VITALS
DIASTOLIC BLOOD PRESSURE: 90 MMHG | HEIGHT: 73 IN | HEART RATE: 85 BPM | BODY MASS INDEX: 30.48 KG/M2 | WEIGHT: 230 LBS | SYSTOLIC BLOOD PRESSURE: 160 MMHG

## 2024-01-09 DIAGNOSIS — M25.562 ACUTE PAIN OF LEFT KNEE: Primary | ICD-10-CM

## 2024-01-09 DIAGNOSIS — R60.0 LOWER EXTREMITY EDEMA: ICD-10-CM

## 2024-01-09 DIAGNOSIS — S86.812A STRAIN OF LEFT CALF MUSCLE: ICD-10-CM

## 2024-01-09 DIAGNOSIS — M25.561 ACUTE PAIN OF RIGHT KNEE: ICD-10-CM

## 2024-01-09 DIAGNOSIS — M25.562 ACUTE PAIN OF LEFT KNEE: ICD-10-CM

## 2024-01-09 DIAGNOSIS — M17.12 ARTHRITIS OF KNEE, LEFT: ICD-10-CM

## 2024-01-09 DIAGNOSIS — M17.11 ARTHRITIS OF RIGHT KNEE: ICD-10-CM

## 2024-01-09 PROCEDURE — 99213 OFFICE O/P EST LOW 20 MIN: CPT | Performed by: ORTHOPAEDIC SURGERY

## 2024-01-09 PROCEDURE — 73564 X-RAY EXAM KNEE 4 OR MORE: CPT

## 2024-01-12 NOTE — PROGRESS NOTES
SUBJECTIVE:  Patient is a 68 y.o. year old male who presents for follow up now 5.5 months s/p  Incision And Drainage (i&d) Extremity - Left performed on  7/24/2023.  The previous infected area is completely healed.  He continues to have left lower extremity swelling.  Does have a history of a DVT on that side and is on a blood thinner.  He did feel that the swelling worsened recently after he strained his calf while doing exercises.  This pain is slowly been improving but it is still painful.  Both knees are also sore and achy.  Mostly on the anterior and medial aspect of the knee.  He has not any locking catching or significant joint effusion.  He denies any numbness or tingling.      VITALS:  Vitals:    01/09/24 0920   BP: 160/90   Pulse: 85     PHYSICAL EXAMINATION:  General/Constitutional: NAD, well developed, well nourished  HENT: Normocephalic, atraumatic  CV: Intact distal pulses, regular rate  Resp: No respiratory distress or labored breathing  Abdomen: soft, nondistended   Lymphatic: No lymphadenopathy palpated  Neuro: Alert and Oriented x 3, no focal deficits  Psych: Normal mood, normal affect  Skin: Warm, dry, no rashes, no erythema    Incision: Well-healed  No warmth, no erythema, no edema  No sign of infection  Range of Motion: Just short of full extension-130 (this is his baseline)  Neurovascular status: decreased sensation in small area just lateral to the incision. Otherwise motor and sensation intact   Calf is tender in the medial gastroc  Pain with resisted plantarflexion  No defect in the Achilles  There is edema present throughout the left lower extremity    PLAN:    I believe the patient is dealing with multiple different issues.  Including mild degenerative joint disease bilaterally, dynamic quad weakness bilaterally, calf strain, and chronic DVT swelling.  He continues to have lower extremity swelling related to a chronic DVT.  I believe this is also worsened recently with a new calf strain.   He has been on blood thinners but has not followed up about his DVT with his primary care provider recently.  I recommend he discuss this further with his primary care provider to ensure there is not an additional reason for him to have lower extremity swelling that there is not additional intervention that would be indicated.  For his bilateral knee DJD I recommended physical therapy with primary focus on quad strengthening as well as hip and core strengthening, along with hamstring and IT band stretching.  There would also be effective to work on his calf as active recovery after his calf strain would improve his return to function.  I would like to see him back in 2 to 3 months for repeat evaluation or sooner if there is any new issues.

## 2024-02-01 NOTE — PROGRESS NOTES
Daily Note     Today's date: 2022  Patient name: Renan Serrato  : 1955  MRN: [id number]  Referring provider: Ena Torres MD  Dx:   Encounter Diagnosis   Name Primary?  Sacroiliitis (HCC) Yes                  Subjective: Pt reports 3/10 pain currently in right hamstring  Pain can increase to 7/10 with prolonged sitting/driving  Objective: See treatment diary below      Assessment: Pt responds well to prone progression to centralize symptoms  Instructed to add to HEP and perform after prolonged sitting to minimize peripheralization of symptoms  Post treatment pain rated 0/10  Plan: Continue with plan of care to decrease pain, improve mobility, strength, and function            Precautions standard       Manuals     STM   Right QL     mobilization  LS PA gr IIII      Nerve mobilization   Left sciatic Left sciatic             Neuro Re-Ed         TA x10 x10      TA Add  Hold 5, 2x10  Hold 5, 2x10      TA clam  Blue, Hold 5, 2x10  Black Hold 5, 2x10      glute sets  Hold 5, 2x10  Hold 5, 2x10  Hold 5, 2x10    bridges   Off bolster, Hold 5, 2x10  2x5 and Off bolster, Hold 5, x10    Supine marches    With hold 2x10            Ther Ex        LTR x10 Hold 5, 2x10  Hold 5, 2x10  Hold 5, 2x10     Figure 4 stretch 30 sec x2   glute 30 sec x2    Standing ext x10       SL TS rot  Hold 5, x15 ea Hold 5, x15 ea Hold 5, x15 ea    Prone progression    x5 min    Prone press ups    3x10                    Ther Activity                        Gait Training                        Modalities
Carlos

## 2024-02-04 NOTE — ANESTHESIA PREPROCEDURE EVALUATION
Review of Systems/Medical History  Patient summary reviewed  Chart reviewed  No history of anesthetic complications     Cardiovascular  EKG reviewed, Hyperlipidemia, Past MI , DVT  PE,  Pulmonary       GI/Hepatic    GERD well controlled,             Endo/Other    Obesity    GYN       Hematology  Anemia iron deficiency anemia,  Coagulation disorder (eliquis therapy last dose saturday) currently taking oral anticoagulants,    Musculoskeletal  Back pain (s/p anterior cervical diskectomy) , cervical pain and spinal stenosis,        Neurology   Psychology           Physical Exam    Airway    Mallampati score: II  TM Distance: >3 FB  Neck ROM: full     Dental       Cardiovascular  Rhythm: regular, Rate: normal,     Pulmonary  Breath sounds clear to auscultation,     Other Findings  Implant post left lower molar site      Anesthesia Plan  ASA Score- 2     Anesthesia Type- general with ASA Monitors  Additional Monitors:   Airway Plan: LMA  Plan Factors-    Induction- intravenous  Postoperative Plan- Plan for postoperative opioid use  Planned trial extubation    Informed Consent- Anesthetic plan and risks discussed with patient  I personally reviewed this patient with the CRNA  Discussed and agreed on the Anesthesia Plan with the CRNA  [employer] [place] show

## 2024-04-02 ENCOUNTER — HOSPITAL ENCOUNTER (OUTPATIENT)
Dept: SLEEP CENTER | Facility: CLINIC | Age: 69
Discharge: HOME | End: 2024-04-02
Payer: COMMERCIAL

## 2024-04-02 DIAGNOSIS — R06.83 SNORING: ICD-10-CM

## 2024-04-02 PROCEDURE — 95810 POLYSOM 6/> YRS 4/> PARAM: CPT

## 2024-04-02 PROCEDURE — 95810 POLYSOM 6/> YRS 4/> PARAM: CPT | Performed by: INTERNAL MEDICINE

## 2024-04-03 PROBLEM — G47.33 OSA (OBSTRUCTIVE SLEEP APNEA): Status: ACTIVE | Noted: 2024-04-03

## 2024-04-03 NOTE — PROGRESS NOTES
Sleep Study Documentation    Pre-Sleep Study       Sleep testing procedure explained to patient:YES    Patient napped prior to study:NO    Caffeine:Dayshift worker after 12PM.  Caffeine use:YES- coffee  18 ounces or more    Alcohol:Dayshift workers after 5PM: Alcohol use:NO    Typical day for patient:YES       Study Documentation    Sleep Study Indications: Snoring, BMI>30, Unrefreshing sleep, EDS    Sleep Study: Diagnostic   Snore:Moderate  Supplemental O2: no    O2 flow rate (L/min) range   O2 flow rate (L/min) final   Minimum SaO2 81%  Baseline SaO2 93.6%    Mode of Therapy:    EKG abnormalities: yes:  EPOCH example and comments: cardia arrhythmia    EEG abnormalities: no    Were abnormal behaviors in sleep observed:NO    Is Total Sleep Study Recording Time < 2 hours: N/A    Is Total Sleep Study Recording Time > 2 hours but study is incomplete: N/A    Is Total Sleep Study Recording Time 6 hours or more but sleep was not obtained: NO    Patient classification: retired       Post-Sleep Study    Medication used at bedtime or during sleep study:YES other prescription medications    Patient reports time it took to fall asleep:20 to 30 minutes    Patient reports waking up during study:3 or more times.  Patient reports returning to sleep in greater than 30 minutes.    Patient reports sleeping 4 to 6 hours with dreaming.    Does the Patient feel this is a typical night of sleep:typical    Patient rated sleepiness: Somewhat sleepy or tired    PAP treatment:no.

## 2024-05-14 ENCOUNTER — TELEPHONE (OUTPATIENT)
Dept: SLEEP CENTER | Facility: CLINIC | Age: 69
End: 2024-05-14

## 2024-05-14 NOTE — TELEPHONE ENCOUNTER
Sleep study resulted and shows moderate sleep apnea.     Per study order Dr. Jose requests a follow up with sleep specialist, needs to schedule a consult.       Called patient and left message advising I will send a Peeriot message with the sleep study results and next steps.  Provided nurse line number to call if any questions.

## 2024-06-05 DIAGNOSIS — I82.512 CHRONIC DEEP VEIN THROMBOSIS (DVT) OF LEFT FEMORAL VEIN (HCC): Chronic | ICD-10-CM

## 2024-06-05 DIAGNOSIS — E78.5 DYSLIPIDEMIA: ICD-10-CM

## 2024-06-05 DIAGNOSIS — I26.09 OTHER CHRONIC PULMONARY EMBOLISM WITH ACUTE COR PULMONALE (HCC): ICD-10-CM

## 2024-06-05 DIAGNOSIS — I27.82 OTHER CHRONIC PULMONARY EMBOLISM WITH ACUTE COR PULMONALE (HCC): ICD-10-CM

## 2024-06-07 RX ORDER — APIXABAN 5 MG/1
5 TABLET, FILM COATED ORAL 2 TIMES DAILY
Qty: 180 TABLET | Refills: 3 | Status: SHIPPED | OUTPATIENT
Start: 2024-06-07

## 2024-06-07 RX ORDER — ATORVASTATIN CALCIUM 20 MG/1
20 TABLET, FILM COATED ORAL DAILY
Qty: 90 TABLET | Refills: 0 | Status: SHIPPED | OUTPATIENT
Start: 2024-06-07

## 2024-06-26 ENCOUNTER — OFFICE VISIT (OUTPATIENT)
Age: 69
End: 2024-06-26

## 2024-06-26 VITALS
DIASTOLIC BLOOD PRESSURE: 92 MMHG | HEART RATE: 62 BPM | RESPIRATION RATE: 18 BRPM | TEMPERATURE: 97.9 F | OXYGEN SATURATION: 96 % | HEIGHT: 73 IN | SYSTOLIC BLOOD PRESSURE: 145 MMHG | WEIGHT: 234.7 LBS | BODY MASS INDEX: 31.11 KG/M2

## 2024-06-26 DIAGNOSIS — I82.512 CHRONIC DEEP VEIN THROMBOSIS (DVT) OF LEFT FEMORAL VEIN (HCC): Chronic | ICD-10-CM

## 2024-06-26 DIAGNOSIS — K21.00 GASTROESOPHAGEAL REFLUX DISEASE WITH ESOPHAGITIS, UNSPECIFIED WHETHER HEMORRHAGE: ICD-10-CM

## 2024-06-26 DIAGNOSIS — E78.2 MIXED HYPERLIPIDEMIA: ICD-10-CM

## 2024-06-26 DIAGNOSIS — G47.33 OSA (OBSTRUCTIVE SLEEP APNEA): ICD-10-CM

## 2024-06-26 DIAGNOSIS — Z79.01 CURRENT USE OF LONG TERM ANTICOAGULATION: ICD-10-CM

## 2024-06-26 DIAGNOSIS — Z00.00 MEDICARE ANNUAL WELLNESS VISIT, SUBSEQUENT: Primary | ICD-10-CM

## 2024-06-26 PROCEDURE — G0439 PPPS, SUBSEQ VISIT: HCPCS | Performed by: FAMILY MEDICINE

## 2024-06-26 RX ORDER — OMEPRAZOLE 20 MG/1
20 TABLET, DELAYED RELEASE ORAL DAILY
COMMUNITY

## 2024-06-26 NOTE — PROGRESS NOTES
Pt reports no acute issues  Eating pattern:    Breakfast: light quinonez or pork roll and eggs. During week: toast and fruit  Lunch: varies: hamburger. Typical: sandwich like ham cheese  Dinner: varies. Homemade pizza. Chicken or red meat.    1 serving veggie  Beverages: water, occasional beer. Ave. 1 or 2.     Plays golf and softball.   Gym in morning: stationary bike 1/2 hr. Light toning.     Sleep:  Does wake up 1 - 2X nocturia.       Low stress.  Retired.      Goals are eat more veggies, give us updated vaccine record and update vaccines as needed.       Ambulatory Visit  Name: Flaquito Brannon      : 1955      MRN: [id number]  Encounter Provider: Sebastián Altamirano MD  Encounter Date: 2024   Encounter department: [facility]    Assessment & Plan   1. Medicare annual wellness visit, subsequent  -     Comprehensive metabolic panel; Future  -     CBC and differential; Future  2. COLLEEN (obstructive sleep apnea)  3. Chronic deep vein thrombosis (DVT) of left femoral vein (HCC)  4. Gastroesophageal reflux disease with esophagitis, unspecified whether hemorrhage  5. Mixed hyperlipidemia  -     Comprehensive metabolic panel; Future  -     Lipid panel; Future  6. Current use of long term anticoagulation  -     CBC and differential; Future    BMI Counseling: Body mass index is 30.96 kg/m². The BMI is above normal. Nutrition recommendations include encouraging healthy choices of fruits and vegetables. Exercise recommendations include moderate physical activity 150 minutes/week. Rationale for BMI follow-up plan is due to patient being overweight or obese.     Depression Screening and Follow-up Plan: Patient was screened for depression during today's encounter. They screened negative with a PHQ-2 score of 0.      Preventive health issues were discussed with patient, and age appropriate screening tests were ordered as noted in patient's After Visit Summary. Personalized health advice  and appropriate referrals for health education or preventive services given if needed, as noted in patient's After Visit Summary.    History of Present Illness     HPI   Patient Care Team:  Sebastián Altamirano MD as PCP - General (Family Medicine)  Sebastián Altamirano MD as PCP - PCP-[facility]  Chris Harmon MD (Vascular Surgery)  Frandy Mckeon MD (Gastroenterology)  Frandy Mckeon MD as Endoscopist    Review of Systems  Medical History Reviewed by provider this encounter:       Annual Wellness Visit Questionnaire   Flaquito is here for his Subsequent Wellness visit. Last Medicare Wellness visit information reviewed, patient interviewed and updates made to the record today.      Health Risk Assessment:   Patient rates overall health as good. Patient feels that their physical health rating is same. Patient is satisfied with their life. Eyesight was rated as same. Hearing was rated as same. Patient feels that their emotional and mental health rating is same. Patients states they are never, rarely angry. Patient states they are never, rarely unusually tired/fatigued. Pain experienced in the last 7 days has been none. Patient states that he has experienced no weight loss or gain in last 6 months.     Depression Screening:   PHQ-2 Score: 0      Fall Risk Screening:   In the past year, patient has experienced: no history of falling in past year      Home Safety:  Patient does not have trouble with stairs inside or outside of their home. Patient has working smoke alarms and has working carbon monoxide detector. Home safety hazards include: none.     Nutrition:   Current diet is Regular and Limited junk food.     Medications:   Patient is not currently taking any over-the-counter supplements. Patient is able to manage medications.     Activities of Daily Living (ADLs)/Instrumental Activities of Daily Living (IADLs):   Walk and transfer into and out of bed and chair?: Yes  Dress and groom yourself?: Yes    Bathe or  shower yourself?: Yes    Feed yourself? Yes  Do your laundry/housekeeping?: Yes  Manage your money, pay your bills and track your expenses?: Yes  Make your own meals?: Yes    Do your own shopping?: Yes    Previous Hospitalizations:   Any hospitalizations or ED visits within the last 12 months?: No      Advance Care Planning:   Living will: Yes    Durable POA for healthcare: Yes    Advanced directive: Yes      PREVENTIVE SCREENINGS      Cardiovascular Screening:    General: Screening Not Indicated and History Lipid Disorder      Diabetes Screening:     General: Screening Current      Colorectal Cancer Screening:     General: Screening Current      Abdominal Aortic Aneurysm (AAA) Screening:    Risk factors include: age between 65-74 yo        Lung Cancer Screening:     General: Screening Not Indicated      Hepatitis C Screening:    General: Screening Current    Screening, Brief Intervention, and Referral to Treatment (SBIRT)    Screening  Typical number of drinks in a day: 1  Typical number of drinks in a week: 7  Interpretation: Low risk drinking behavior.    AUDIT-C Screenin) How often did you have a drink containing alcohol in the past year? 4 or more times a week  2) How many drinks did you have on a typical day when you were drinking in the past year? 1 to 2  3) How often did you have 6 or more drinks on one occasion in the past year? never    AUDIT-C Score: 4  Interpretation: Score 4-12 (male): POSITIVE screen for alcohol misuse    AUDIT Screenin) How often during the last year have you found that you were not able to stop drinking once you had started? 0 - never  5) How often during the last year have you failed to do what was normally expected from you because of drinking? 0 - never  6) How often during the last year have you needed a first drink in the morning to get yourself going after a heavy drinking session? 0 - never  7) How often during the last year have you had a feeling of guilt or remorse  "after drinking? 0 - never  8) How often during the last year have you been unable to remember what happened the night before because you had been drinking? 0 - never  9) Have you or someone else been injured as a result of your drinking? 0 - no  10) Has a relative or friend or a doctor or another health worker been concerned about your drinking or suggested you cut down? 0 - no    AUDIT Score: 4  Interpretation: Low risk alcohol consumption    Single Item Drug Screening:  How often have you used an illegal drug (including marijuana) or a prescription medication for non-medical reasons in the past year? never    Single Item Drug Screen Score: 0  Interpretation: Negative screen for possible drug use disorder    Social Determinants of Health     Financial Resource Strain: Low Risk  (6/25/2024)    Overall Financial Resource Strain (CARDIA)     Difficulty of Paying Living Expenses: Not hard at all   Food Insecurity: No Food Insecurity (6/25/2024)    Hunger Vital Sign     Worried About Running Out of Food in the Last Year: Never true     Ran Out of Food in the Last Year: Never true   Transportation Needs: No Transportation Needs (6/25/2024)    PRAPARE - Transportation     Lack of Transportation (Medical): No     Lack of Transportation (Non-Medical): No   Housing Stability: Low Risk  (6/25/2024)    Housing Stability Vital Sign     Unable to Pay for Housing in the Last Year: No     Number of Times Moved in the Last Year: 0     Homeless in the Last Year: No   Utilities: Not At Risk (6/25/2024)    [facility] Utilities     Threatened with loss of utilities: No     No results found.    Objective     /92 (BP Location: Right arm, Patient Position: Sitting, Cuff Size: Adult)   Pulse 62   Temp 97.9 °F (36.6 °C) (Tympanic)   Resp 18   Ht 6' 1\" (1.854 m)   Wt 106 kg (234 lb 11.2 oz)   SpO2 96%   BMI 30.96 kg/m²     Physical Exam  Administrative Statements           "

## 2024-06-26 NOTE — PROGRESS NOTES
Pt reports no acute issues  Eating pattern:    Breakfast: light quinonez or pork roll and eggs. During week: toast and fruit  Lunch: varies: hamburger. Typical: sandwich like ham cheese  Dinner: varies. Homemade pizza. Chicken or red meat.    1 serving veggie  Beverages: water, occasional beer. Ave. 1 or 2.     Plays golf and softball.   Gym in morning: stationary bike 1/2 hr. Light toning.     Sleep:  Does wake up 1 - 2X nocturia.       Low stress.  Retired.      Goals are eat more veggies, give us updated vaccine record and update vaccines as needed.       Ambulatory Visit  Name: Flaquito Brannon      : 1955      MRN: [id number]  Encounter Provider: Sebastián Altamirano MD  Encounter Date: 2024   Encounter department: [facility]    Assessment & Plan        Preventive health issues were discussed with patient, and age appropriate screening tests were ordered as noted in patient's After Visit Summary. Personalized health advice and appropriate referrals for health education or preventive services given if needed, as noted in patient's After Visit Summary.    History of Present Illness     HPI   Patient Care Team:  Sebastián Altamirano MD as PCP - General (Family Medicine)  Sebastián Altamirano MD as PCP [facility]  Chris Harmon MD (Vascular Surgery)  Frandy Mckeon MD (Gastroenterology)  Frandy Mckeon MD as Endoscopist    Review of Systems  Medical History Reviewed by provider this encounter:       Annual Wellness Visit Questionnaire   Flaquito is here for his Subsequent Wellness visit. Last Medicare Wellness visit information reviewed, patient interviewed and updates made to the record today.      Health Risk Assessment:   Patient rates overall health as good. Patient feels that their physical health rating is same. Patient is satisfied with their life. Eyesight was rated as same. Hearing was rated as same. Patient feels that their emotional and mental health  rating is same. Patients states they are never, rarely angry. Patient states they are never, rarely unusually tired/fatigued. Pain experienced in the last 7 days has been none. Patient states that he has experienced no weight loss or gain in last 6 months.     Depression Screening:   PHQ-2 Score: 0      Fall Risk Screening:   In the past year, patient has experienced: no history of falling in past year      Home Safety:  Patient does not have trouble with stairs inside or outside of their home. Patient has working smoke alarms and has working carbon monoxide detector. Home safety hazards include: none.     Nutrition:   Current diet is Regular and Limited junk food.     Medications:   Patient is not currently taking any over-the-counter supplements. Patient is able to manage medications.     Activities of Daily Living (ADLs)/Instrumental Activities of Daily Living (IADLs):   Walk and transfer into and out of bed and chair?: Yes  Dress and groom yourself?: Yes    Bathe or shower yourself?: Yes    Feed yourself? Yes  Do your laundry/housekeeping?: Yes  Manage your money, pay your bills and track your expenses?: Yes  Make your own meals?: Yes    Do your own shopping?: Yes    Previous Hospitalizations:   Any hospitalizations or ED visits within the last 12 months?: No      Advance Care Planning:   Living will: Yes    Durable POA for healthcare: Yes    Advanced directive: Yes      PREVENTIVE SCREENINGS      Cardiovascular Screening:    General: Screening Not Indicated and History Lipid Disorder      Diabetes Screening:     General: Screening Current      Colorectal Cancer Screening:     General: Screening Current      Abdominal Aortic Aneurysm (AAA) Screening:    Risk factors include: age between 65-74 yo        Lung Cancer Screening:     General: Screening Not Indicated      Hepatitis C Screening:    General: Screening Current    Screening, Brief Intervention, and Referral to Treatment (SBIRT)    Screening  Typical  number of drinks in a day: 1  Typical number of drinks in a week: 7  Interpretation: Low risk drinking behavior.    AUDIT-C Screenin) How often did you have a drink containing alcohol in the past year? 4 or more times a week  2) How many drinks did you have on a typical day when you were drinking in the past year? 1 to 2  3) How often did you have 6 or more drinks on one occasion in the past year? never    AUDIT-C Score: 4  Interpretation: Score 4-12 (male): POSITIVE screen for alcohol misuse    AUDIT Screenin) How often during the last year have you found that you were not able to stop drinking once you had started? 0 - never  5) How often during the last year have you failed to do what was normally expected from you because of drinking? 0 - never  6) How often during the last year have you needed a first drink in the morning to get yourself going after a heavy drinking session? 0 - never  7) How often during the last year have you had a feeling of guilt or remorse after drinking? 0 - never  8) How often during the last year have you been unable to remember what happened the night before because you had been drinking? 0 - never  9) Have you or someone else been injured as a result of your drinking? 0 - no  10) Has a relative or friend or a doctor or another health worker been concerned about your drinking or suggested you cut down? 0 - no    AUDIT Score: 4  Interpretation: Low risk alcohol consumption    Single Item Drug Screening:  How often have you used an illegal drug (including marijuana) or a prescription medication for non-medical reasons in the past year? never    Single Item Drug Screen Score: 0  Interpretation: Negative screen for possible drug use disorder    Social Determinants of Health     Financial Resource Strain: Low Risk  (2024)    Overall Financial Resource Strain ([facility])     Difficulty of Paying Living Expenses: Not hard at all   Food Insecurity: No Food Insecurity (2024)     Hunger Vital Sign     Worried About Running Out of Food in the Last Year: Never true     Ran Out of Food in the Last Year: Never true   Transportation Needs: No Transportation Needs (6/25/2024)    PRAPARE - Transportation     Lack of Transportation (Medical): No     Lack of Transportation (Non-Medical): No   Housing Stability: Low Risk  (6/25/2024)    Housing Stability Vital Sign     Unable to Pay for Housing in the Last Year: No     Number of Times Moved in the Last Year: 0     Homeless in the Last Year: No   Utilities: Not At Risk (6/25/2024)    [facility] Utilities     Threatened with loss of utilities: No     No results found.    Objective     There were no vitals taken for this visit.    Physical Exam  Administrative Statements

## 2024-06-26 NOTE — PATIENT INSTRUCTIONS
Follow up on sleep study    My heart healthy plate:  [email or web address]-[email or web address]-[email or web address]-[email or web address]-program/[email or web address]-[email or web address]        Medicare Preventive Visit Patient Instructions  Thank you for completing your Welcome to Medicare Visit or Medicare Annual Wellness Visit today. Your next wellness visit will be due in one year (6/27/2025).  The screening/preventive services that you may require over the next 5-10 years are detailed below. Some tests may not apply to you based off risk factors and/or age. Screening tests ordered at today's visit but not completed yet may show as past due. Also, please note that scanned in results may not display below.  Preventive Screenings:  Service Recommendations Previous Testing/Comments   Colorectal Cancer Screening  Colonoscopy    Fecal Occult Blood Test (FOBT)/Fecal Immunochemical Test (FIT)  Fecal DNA/Cologuard Test  Flexible Sigmoidoscopy Age: 45-75 years old   Colonoscopy: every 10 years (May be performed more frequently if at higher risk)  OR  FOBT/FIT: every 1 year  OR  Cologuard: every 3 years  OR  Sigmoidoscopy: every 5 years  Screening may be recommended earlier than age 45 if at higher risk for colorectal cancer. Also, an individualized decision between you and your healthcare provider will decide whether screening between the ages of 76-85 would be appropriate. Colonoscopy: 12/21/2022  FOBT/FIT: Not on file  Cologuard: Not on file  Sigmoidoscopy: Not on file    Screening Current     Prostate Cancer Screening Individualized decision between patient and health care provider in men between ages of 55-69   Medicare will cover every 12 months beginning on the day after your 50th birthday PSA: No results in last 5 years           Hepatitis C Screening Once for adults born between 1945 and 1965  More frequently in patients at high risk for Hepatitis C Hep C Antibody:  05/31/2022    Screening Current   Diabetes Screening 1-2 times per year if you're at risk for diabetes or have pre-diabetes Fasting glucose: 95 mg/dL (6/21/2023)  A1C: No results in last 5 years (No results in last 5 years)  Screening Current   Cholesterol Screening Once every 5 years if you don't have a lipid disorder. May order more often based on risk factors. Lipid panel: 06/21/2023  Screening Not Indicated  History Lipid Disorder      Other Preventive Screenings Covered by Medicare:  Abdominal Aortic Aneurysm (AAA) Screening: covered once if your at risk. You're considered to be at risk if you have a family history of AAA or a male between the age of 65-75 who smoking at least 100 cigarettes in your lifetime.  Lung Cancer Screening: covers low dose CT scan once per year if you meet all of the following conditions: (1) Age 55-77; (2) No signs or symptoms of lung cancer; (3) Current smoker or have quit smoking within the last 15 years; (4) You have a tobacco smoking history of at least 20 pack years (packs per day x number of years you smoked); (5) You get a written order from a healthcare provider.  Glaucoma Screening: covered annually if you're considered high risk: (1) You have diabetes OR (2) Family history of glaucoma OR (3)  aged 50 and older OR (4)  American aged 65 and older  Osteoporosis Screening: covered every 2 years if you meet one of the following conditions: (1) Have a vertebral abnormality; (2) On glucocorticoid therapy for more than 3 months; (3) Have primary hyperparathyroidism; (4) On osteoporosis medications and need to assess response to drug therapy.  HIV Screening: covered annually if you're between the age of 15-65. Also covered annually if you are younger than 15 and older than 65 with risk factors for HIV infection. For pregnant patients, it is covered up to 3 times per pregnancy.    Immunizations:  Immunization Recommendations   Influenza Vaccine Annual influenza  vaccination during flu season is recommended for all persons aged >= 6 months who do not have contraindications   Pneumococcal Vaccine   * Pneumococcal conjugate vaccine = PCV13 (Prevnar 13), PCV15 (Vaxneuvance), PCV20 (Prevnar 20)  * Pneumococcal polysaccharide vaccine = PPSV23 (Pneumovax) Adults 19-63 yo with certain risk factors or if 65+ yo  If never received any pneumonia vaccine: recommend Prevnar 20 (PCV20)  Give PCV20 if previously received 1 dose of PCV13 or PPSV23   Hepatitis B Vaccine 3 dose series if at intermediate or high risk (ex: diabetes, end stage renal disease, liver disease)   Respiratory syncytial virus (RSV) Vaccine - COVERED BY MEDICARE PART D  * RSVPreF3 (Arexvy) CDC recommends that adults 60 years of age and older may receive a single dose of RSV vaccine using shared clinical decision-making (SCDM)   Tetanus (Td) Vaccine - COST NOT COVERED BY MEDICARE PART B Following completion of primary series, a booster dose should be given every 10 years to maintain immunity against tetanus. Td may also be given as tetanus wound prophylaxis.   Tdap Vaccine - COST NOT COVERED BY MEDICARE PART B Recommended at least once for all adults. For pregnant patients, recommended with each pregnancy.   Shingles Vaccine (Shingrix) - COST NOT COVERED BY MEDICARE PART B  2 shot series recommended in those 19 years and older who have or will have weakened immune systems or those 50 years and older     Health Maintenance Due:      Topic Date Due    Colorectal Cancer Screening  12/20/2027    Hepatitis C Screening  Completed     Immunizations Due:      Topic Date Due    COVID-19 Vaccine (4 - 2023-24 season) 09/01/2023     Advance Directives   What are advance directives?  Advance directives are legal documents that state your wishes and plans for medical care. These plans are made ahead of time in case you lose your ability to make decisions for yourself. Advance directives can apply to any medical decision, such as the  treatments you want, and if you want to donate organs.   What are the types of advance directives?  There are many types of advance directives, and each state has rules about how to use them. You may choose a combination of any of the following:  Living will:  This is a written record of the treatment you want. You can also choose which treatments you do not want, which to limit, and which to stop at a certain time. This includes surgery, medicine, IV fluid, and tube feedings.   Durable power of  for healthcare (DPAHC):  This is a written record that states who you want to make healthcare choices for you when you are unable to make them for yourself. This person, called a proxy, is usually a family member or a friend. You may choose more than 1 proxy.  Do not resuscitate (DNR) order:  A DNR order is used in case your heart stops beating or you stop breathing. It is a request not to have certain forms of treatment, such as CPR. A DNR order may be included in other types of advance directives.  Medical directive:  This covers the care that you want if you are in a coma, near death, or unable to make decisions for yourself. You can list the treatments you want for each condition. Treatment may include pain medicine, surgery, blood transfusions, dialysis, IV or tube feedings, and a ventilator (breathing machine).  Values history:  This document has questions about your views, beliefs, and how you feel and think about life. This information can help others choose the care that you would choose.  Why are advance directives important?  An advance directive helps you control your care. Although spoken wishes may be used, it is better to have your wishes written down. Spoken wishes can be misunderstood, or not followed. Treatments may be given even if you do not want them. An advance directive may make it easier for your family to make difficult choices about your care.   Weight Management   Why it is important to  manage your weight:  Being overweight increases your risk of health conditions such as heart disease, high blood pressure, type 2 diabetes, and certain types of cancer. It can also increase your risk for osteoarthritis, sleep apnea, and other respiratory problems. Aim for a slow, steady weight loss. Even a small amount of weight loss can lower your risk of health problems.  How to lose weight safely:  A safe and healthy way to lose weight is to eat fewer calories and get regular exercise. You can lose up about 1 pound a week by decreasing the number of calories you eat by 500 calories each day.   Healthy meal plan for weight management:  A healthy meal plan includes a variety of foods, contains fewer calories, and helps you stay healthy. A healthy meal plan includes the following:  Eat whole-grain foods more often.  A healthy meal plan should contain fiber. Fiber is the part of grains, fruits, and vegetables that is not broken down by your body. Whole-grain foods are healthy and provide extra fiber in your diet. Some examples of whole-grain foods are whole-wheat breads and pastas, oatmeal, brown rice, and bulgur.  Eat a variety of vegetables every day.  Include dark, leafy greens such as spinach, kale, daljit greens, and mustard greens. Eat yellow and orange vegetables such as carrots, sweet potatoes, and winter squash.   Eat a variety of fruits every day.  Choose fresh or canned fruit (canned in its own juice or light syrup) instead of juice. Fruit juice has very little or no fiber.  Eat low-fat dairy foods.  Drink fat-free (skim) milk or 1% milk. Eat fat-free yogurt and low-fat cottage cheese. Try low-fat cheeses such as mozzarella and other reduced-fat cheeses.  Choose meat and other protein foods that are low in fat.  Choose beans or other legumes such as split peas or lentils. Choose fish, skinless poultry (chicken or turkey), or lean cuts of red meat (beef or pork). Before you cook meat or poultry, cut off  "any visible fat.   Use less fat and oil.  Try baking foods instead of frying them. Add less fat, such as margarine, sour cream, regular salad dressing and mayonnaise to foods. Eat fewer high-fat foods. Some examples of high-fat foods include french fries, doughnuts, ice cream, and cakes.  Eat fewer sweets.  Limit foods and drinks that are high in sugar. This includes candy, cookies, regular soda, and sweetened drinks.  Exercise:  Exercise at least 30 minutes per day on most days of the week. Some examples of exercise include walking, biking, dancing, and swimming. You can also fit in more physical activity by taking the stairs instead of the elevator or parking farther away from stores. Ask your healthcare provider about the best exercise plan for you.   Alcohol Use and Your Health    Drinking too much can harm your health.  Excessive alcohol use leads to about 88,000 death in the [place] each year, and shortens the life of those who diet by almost 30 years.  Further, excessive drinking cost the economy $249 billion in 2010.  Most excessive drinkers are not alcohol dependent.    Excessive alcohol use has immediate effects that increase the risk of many harmful health conditions.  These are most often the result of binge drinking.  Over time, excessive alcohol use can lead to the development of chronic diseases and other series health problems.    What is considered a \"drink\"?        Excessive alcohol use includes:  Binge Drinking: For women, 4 or more drinks consumed on one occasion. For men, 5 or more drinks consumed on one occasion.  Heavy Drinking: For women, 8 or more drinks per week. For men, 15 or more drinks per week  Any alcohol used by pregnant women  Any alcohol used by those under the age of 21 years    If you choose to drink, do so in moderation:  Do not drink at all if you are under the age of 21, or if you are or may be pregnant, or have health problems that could be made worse by drinking.  For " women, up to 1 drink per day  For men, up to 2 drinks a day    No one should begin drinking or drink more frequently based on potential health benefits    Short-Term Health Risks:  Injuries: motor vehicle crashes, falls, drownings, burns  Violence: homicide, suicide, sexual assault, intimate partner violence  Alcohol poisoning  Reproductive health: risky sexual behaviors, unintended prengnacy, sexually transmitted diseases, miscarriage, stillbirth, fetal alcohol syndrome    Long-Term Health Risks:  Chronic diseases: high blood pressure, heart disease, stroke, liver disease, digestive problems  Cancers: breast, mouth and throat, liver, colon  Learning and memory problems: dementia, poor school performance  Mental health: depression, anxiety, insomnia  Social problems: lost productivity, family problems, unemployment  Alcohol dependence    For support and more information:  Substance Abuse and Mental Health Services Administration  [address]  [place] [address]  Web Address: http://www.[facility].gov    Alcoholics Anonymous        Web Address: http://www.aa.org    https://www.cdc.gov/alcohol/fact-sheets/alcohol-use.htm     © Copyright [employer] 2018 Information is for End User's use only and may not be sold, redistributed or otherwise used for commercial purposes. All illustrations and images included in CareNotes® are the copyrighted property of A.D.A.M., Inc. or [employer]

## 2024-07-16 ENCOUNTER — OFFICE VISIT (OUTPATIENT)
Age: 69
End: 2024-07-16

## 2024-07-16 VITALS
BODY MASS INDEX: 30.61 KG/M2 | OXYGEN SATURATION: 97 % | WEIGHT: 232 LBS | DIASTOLIC BLOOD PRESSURE: 78 MMHG | TEMPERATURE: 98 F | SYSTOLIC BLOOD PRESSURE: 114 MMHG | HEART RATE: 80 BPM

## 2024-07-16 DIAGNOSIS — G89.29 CHRONIC PAIN OF RIGHT KNEE: ICD-10-CM

## 2024-07-16 DIAGNOSIS — M25.561 CHRONIC PAIN OF RIGHT KNEE: ICD-10-CM

## 2024-07-16 DIAGNOSIS — I82.512 CHRONIC DEEP VEIN THROMBOSIS (DVT) OF LEFT FEMORAL VEIN (HCC): Primary | Chronic | ICD-10-CM

## 2024-07-16 PROBLEM — M25.562 CHRONIC PAIN OF BOTH KNEES: Status: ACTIVE | Noted: 2024-07-16

## 2024-07-16 PROCEDURE — 99213 OFFICE O/P EST LOW 20 MIN: CPT | Performed by: FAMILY MEDICINE

## 2024-07-16 PROCEDURE — G2211 COMPLEX E/M VISIT ADD ON: HCPCS | Performed by: FAMILY MEDICINE

## 2024-07-16 NOTE — ASSESSMENT & PLAN NOTE
Patient has chronic pain of bilateral knees and went to orthopedic in January. Had imaging done on bilateral knees in January which showed degenerative changes and physical therapy was recommended. Patient has not completed physical therapy but states that he does multiple exercises at the gym that strengthens quads etc, does not use heavy weights. Denies pain with exercising. On physical exam, pt has pain on medial aspect of knee with valgus strain. No tenderness with palpation or edema elicited.     Advised patient to go to orthopedic for further evaluation   Do light exercises as tolerated until ortho follow-up

## 2024-07-16 NOTE — PROGRESS NOTES
[facility] Office visit    Assessment/Plan:     1. Chronic deep vein thrombosis (DVT) of left femoral vein (HCC)  Assessment & Plan:  Continue Eliquis 5 mg twice daily. Denies calf pain.   2. Chronic pain of right knee  Assessment & Plan:  Patient has chronic pain of bilateral knees and went to orthopedic in January. Had imaging done on bilateral knees in January which showed degenerative changes and physical therapy was recommended. Patient has not completed physical therapy but states that he does multiple exercises at the gym that strengthens quads etc, does not use heavy weights. Denies pain with exercising. On physical exam, pt has pain on medial aspect of knee with valgus strain. No tenderness with palpation or edema elicited.     Advised patient to go to orthopedic for further evaluation   Do light exercises as tolerated until ortho follow-up   Orders:  -     Diclofenac Sodium (VOLTAREN) 1 %; Apply 2 g topically 4 (four) times a day        No follow-ups on file.     Subjective:   ALVIN Brannon is a 69 y.o. male follow-up right knee pain. No other complaints.      Review of Systems   Constitutional:  Negative for chills and fever.   HENT:  Negative for ear pain and sore throat.    Eyes:  Negative for pain and visual disturbance.   Respiratory:  Negative for cough and shortness of breath.    Cardiovascular:  Negative for chest pain and palpitations.   Gastrointestinal:  Negative for abdominal pain and vomiting.   Genitourinary:  Negative for dysuria and hematuria.   Musculoskeletal:  Negative for arthralgias and back pain.        Right knee pain    Skin:  Negative for color change and rash.   Neurological:  Negative for seizures and syncope.   All other systems reviewed and are negative.       Objective:     /78 (BP Location: Left arm, Patient Position: Sitting, Cuff Size: Standard)   Pulse 80   Temp 98 °F (36.7 °C) (Tympanic)   Wt 105 kg (232 lb)   SpO2 97%   BMI 30.61 kg/m²       Physical Exam  Constitutional:       Appearance: Normal appearance.   HENT:      Head: Normocephalic and atraumatic.   Eyes:      General:         Right eye: No discharge.         Left eye: No discharge.      Conjunctiva/sclera: Conjunctivae normal.   Cardiovascular:      Rate and Rhythm: Normal rate and regular rhythm.      Pulses: Normal pulses.      Heart sounds: Normal heart sounds. No murmur heard.  Pulmonary:      Effort: Pulmonary effort is normal. No respiratory distress.      Breath sounds: Normal breath sounds.   Abdominal:      General: Abdomen is flat.      Palpations: Abdomen is soft.      Tenderness: There is no abdominal tenderness.   Musculoskeletal:         General: No swelling, tenderness, deformity or signs of injury.      Cervical back: Normal range of motion and neck supple.      Comments: Negative Viviane test on right knee. Pain on medial aspect with valgus strain. No tenderness on palpation or edema noted.   Skin:     General: Skin is warm and dry.      Capillary Refill: Capillary refill takes less than 2 seconds.   Neurological:      Mental Status: He is alert.          ** Please Note: This note has been constructed using a voice recognition system **     Ivonne Betancourt MD  07/16/24  1:49 PM

## 2024-08-07 DIAGNOSIS — E78.5 DYSLIPIDEMIA: ICD-10-CM

## 2024-08-08 RX ORDER — ATORVASTATIN CALCIUM 20 MG/1
20 TABLET, FILM COATED ORAL DAILY
Qty: 90 TABLET | Refills: 3 | Status: SHIPPED | OUTPATIENT
Start: 2024-08-08

## 2024-10-01 ENCOUNTER — APPOINTMENT (OUTPATIENT)
Dept: LAB | Facility: HOSPITAL | Age: 69
End: 2024-10-01
Attending: FAMILY MEDICINE
Payer: COMMERCIAL

## 2024-10-01 DIAGNOSIS — Z00.00 MEDICARE ANNUAL WELLNESS VISIT, SUBSEQUENT: ICD-10-CM

## 2024-10-01 DIAGNOSIS — E78.2 MIXED HYPERLIPIDEMIA: ICD-10-CM

## 2024-10-01 DIAGNOSIS — Z79.01 CURRENT USE OF LONG TERM ANTICOAGULATION: ICD-10-CM

## 2024-10-01 LAB
ALBUMIN SERPL BCG-MCNC: 4.2 G/DL (ref 3.5–5)
ALP SERPL-CCNC: 54 U/L (ref 34–104)
ALT SERPL W P-5'-P-CCNC: 28 U/L (ref 7–52)
ANION GAP SERPL CALCULATED.3IONS-SCNC: 7 MMOL/L (ref 4–13)
AST SERPL W P-5'-P-CCNC: 17 U/L (ref 13–39)
BASOPHILS # BLD AUTO: 0.04 THOUSANDS/ΜL (ref 0–0.1)
BASOPHILS NFR BLD AUTO: 0 % (ref 0–1)
BILIRUB SERPL-MCNC: 0.96 MG/DL (ref 0.2–1)
BUN SERPL-MCNC: 15 MG/DL (ref 5–25)
CALCIUM SERPL-MCNC: 9.2 MG/DL (ref 8.4–10.2)
CHLORIDE SERPL-SCNC: 103 MMOL/L (ref 96–108)
CHOLEST SERPL-MCNC: 176 MG/DL
CO2 SERPL-SCNC: 30 MMOL/L (ref 21–32)
CREAT SERPL-MCNC: 1.09 MG/DL (ref 0.6–1.3)
EOSINOPHIL # BLD AUTO: 0.09 THOUSAND/ΜL (ref 0–0.61)
EOSINOPHIL NFR BLD AUTO: 1 % (ref 0–6)
ERYTHROCYTE [DISTWIDTH] IN BLOOD BY AUTOMATED COUNT: 12.8 % (ref 11.6–15.1)
GFR SERPL CREATININE-BSD FRML MDRD: 69 ML/MIN/1.73SQ M
GLUCOSE P FAST SERPL-MCNC: 109 MG/DL (ref 65–99)
HCT VFR BLD AUTO: 44.6 % (ref 36.5–49.3)
HDLC SERPL-MCNC: 56 MG/DL
HGB BLD-MCNC: 15.1 G/DL (ref 12–17)
IMM GRANULOCYTES # BLD AUTO: 0.02 THOUSAND/UL (ref 0–0.2)
IMM GRANULOCYTES NFR BLD AUTO: 0 % (ref 0–2)
LDLC SERPL CALC-MCNC: 96 MG/DL (ref 0–100)
LYMPHOCYTES # BLD AUTO: 1.75 THOUSANDS/ΜL (ref 0.6–4.47)
LYMPHOCYTES NFR BLD AUTO: 16 % (ref 14–44)
MCH RBC QN AUTO: 31.3 PG (ref 26.8–34.3)
MCHC RBC AUTO-ENTMCNC: 33.9 G/DL (ref 31.4–37.4)
MCV RBC AUTO: 93 FL (ref 82–98)
MONOCYTES # BLD AUTO: 0.86 THOUSAND/ΜL (ref 0.17–1.22)
MONOCYTES NFR BLD AUTO: 8 % (ref 4–12)
NEUTROPHILS # BLD AUTO: 8.45 THOUSANDS/ΜL (ref 1.85–7.62)
NEUTS SEG NFR BLD AUTO: 75 % (ref 43–75)
NONHDLC SERPL-MCNC: 120 MG/DL
NRBC BLD AUTO-RTO: 0 /100 WBCS
PLATELET # BLD AUTO: 206 THOUSANDS/UL (ref 149–390)
PMV BLD AUTO: 10.1 FL (ref 8.9–12.7)
POTASSIUM SERPL-SCNC: 4 MMOL/L (ref 3.5–5.3)
PROT SERPL-MCNC: 6.8 G/DL (ref 6.4–8.4)
RBC # BLD AUTO: 4.82 MILLION/UL (ref 3.88–5.62)
SODIUM SERPL-SCNC: 140 MMOL/L (ref 135–147)
TRIGL SERPL-MCNC: 120 MG/DL
WBC # BLD AUTO: 11.21 THOUSAND/UL (ref 4.31–10.16)

## 2024-10-01 PROCEDURE — 80053 COMPREHEN METABOLIC PANEL: CPT

## 2024-10-01 PROCEDURE — 85025 COMPLETE CBC W/AUTO DIFF WBC: CPT

## 2024-10-01 PROCEDURE — 36415 COLL VENOUS BLD VENIPUNCTURE: CPT

## 2024-10-01 PROCEDURE — 80061 LIPID PANEL: CPT

## 2024-11-14 ENCOUNTER — OFFICE VISIT (OUTPATIENT)
Dept: SLEEP CENTER | Facility: CLINIC | Age: 69
End: 2024-11-14
Payer: COMMERCIAL

## 2024-11-14 VITALS
DIASTOLIC BLOOD PRESSURE: 76 MMHG | HEART RATE: 107 BPM | BODY MASS INDEX: 31.41 KG/M2 | SYSTOLIC BLOOD PRESSURE: 128 MMHG | OXYGEN SATURATION: 96 % | HEIGHT: 73 IN | WEIGHT: 237 LBS

## 2024-11-14 DIAGNOSIS — G47.8 NON-RESTORATIVE SLEEP: ICD-10-CM

## 2024-11-14 DIAGNOSIS — K21.00 GASTROESOPHAGEAL REFLUX DISEASE WITH ESOPHAGITIS WITHOUT HEMORRHAGE: ICD-10-CM

## 2024-11-14 DIAGNOSIS — I25.2 HISTORY OF NON-ST ELEVATION MYOCARDIAL INFARCTION (NSTEMI): ICD-10-CM

## 2024-11-14 DIAGNOSIS — E66.9 OBESITY (BMI 30-39.9): ICD-10-CM

## 2024-11-14 DIAGNOSIS — G47.33 OSA (OBSTRUCTIVE SLEEP APNEA): Primary | ICD-10-CM

## 2024-11-14 DIAGNOSIS — R40.0 DAYTIME SLEEPINESS: ICD-10-CM

## 2024-11-14 DIAGNOSIS — I82.512 CHRONIC DEEP VEIN THROMBOSIS (DVT) OF LEFT FEMORAL VEIN (HCC): Chronic | ICD-10-CM

## 2024-11-14 DIAGNOSIS — M96.1 CERVICAL POST-LAMINECTOMY SYNDROME: ICD-10-CM

## 2024-11-14 PROCEDURE — 99204 OFFICE O/P NEW MOD 45 MIN: CPT | Performed by: INTERNAL MEDICINE

## 2024-11-14 NOTE — PATIENT INSTRUCTIONS
What is COLLEEN?   Obstructive sleep apnea is a common and serious sleep disorder that causes you to stop breathing during sleep. The airway repeatedly becomes blocked, limiting the amount of air that reaches your lungs. When this happens, you may snore loudly or making choking noises as you try to breathe. Your brain and body becomes oxygen deprived and you may wake up. This may happen a few times a night, or in more severe cases, several hundred times a night.   Sleep apnea can make you wake up in the morning feeling tired or unrefreshed even though you have had a full night of sleep. During the day, you may feel fatigued, have difficulty concentrating or you may even unintentionally fall asleep. This is because your body is waking up numerous times throughout the night, even though you might not be conscious of each awakening.  The lack of oxygen your body receives can have negative long-term consequences for your health. This includes:  High blood pressure  Heart disease  Irregular heart rhythms  Stroke  Pre-diabetes and diabetes  Depression  Testing  An objective evaluation of your sleep may be needed before your board certified sleep physician can make a diagnosis. Options include:   In-lab overnight sleep study  This type of sleep study requires you to stay overnight at a sleep center, in a bed that may resemble a hotel room. You will sleep with sensors hooked up to various parts of your body. These sensors record your brain waves, heartbeat, breathing and movement. An overnight sleep study also provides your doctor with the most complete information about your sleep. Learn more about an overnight sleep study.   Home sleep apnea test  Some patients with high risk factors for obstructive sleep apnea and no other medical disorders may be candidates for a home sleep apnea test. The testing equipment differs in that it is less complicated than what is used in an overnight sleep study. As such, does not give all the  data an in-lab will and if negative, may not mean you do not have the problem.  Treatment for sleep apnea includes using a continuous positive airway pressure (CPAP) machine to keep your airway open during sleep. A mask is placed over your nose and mouth, or just your nose. The mask is hooked to the CPAP machine that blows a gentle stream of air into the mask when you breathe. This helps keep your airway open so you can breathe more regularly. Extra oxygen may be given to you through the machine. You may be given a mouth device. It looks like a mouth guard or dental retainer and stops your tongue and mouth tissues from blocking your throat while you sleep. Surgery may be needed to remove extra tissues that block your mouth, throat, or nose.  Manage sleep apnea:   Do not smoke. Nicotine and other chemicals in cigarettes and cigars can cause lung damage. Ask your healthcare provider for information if you currently smoke and need help to quit. E-cigarettes or smokeless tobacco still contain nicotine. Talk to your healthcare provider before you use these products.  Do not drink alcohol or take sedative medicine before you go to sleep. Alcohol and sedatives can relax the muscles and tissues around your throat. This can block the airflow to your lungs.  Maintain a healthy weight. Excess tissue around your throat may restrict your breathing. Ask your healthcare provider for information if you need to lose weight.  Sleep on your side or use pillows designed to prevent sleep apnea. This prevents your tongue or other tissues from blocking your throat. You can also raise the head of your bed.  Driving Safety. Refrain from driving when drowsy.   Follow up with your healthcare provider as directed: Write down your questions so you remember to ask them during your visits.  Go to AASM website for more information: Sleepeducation.org  What is COLLEEN?   Obstructive sleep apnea is a common and serious sleep disorder that causes you to  stop breathing during sleep. The airway repeatedly becomes blocked, limiting the amount of air that reaches your lungs. When this happens, you may snore loudly or making choking noises as you try to breathe. Your brain and body becomes oxygen deprived and you may wake up. This may happen a few times a night, or in more severe cases, several hundred times a night.   Sleep apnea can make you wake up in the morning feeling tired or unrefreshed even though you have had a full night of sleep. During the day, you may feel fatigued, have difficulty concentrating or you may even unintentionally fall asleep. This is because your body is waking up numerous times throughout the night, even though you might not be conscious of each awakening.  The lack of oxygen your body receives can have negative long-term consequences for your health. This includes:  High blood pressure  Heart disease  Irregular heart rhythms  Stroke  Pre-diabetes and diabetes  Depression  Testing  An objective evaluation of your sleep may be needed before your board certified sleep physician can make a diagnosis. Options include:   In-lab overnight sleep study  This type of sleep study requires you to stay overnight at a sleep center, in a bed that may resemble a hotel room. You will sleep with sensors hooked up to various parts of your body. These sensors record your brain waves, heartbeat, breathing and movement. An overnight sleep study also provides your doctor with the most complete information about your sleep. Learn more about an overnight sleep study.   Home sleep apnea test  Some patients with high risk factors for obstructive sleep apnea and no other medical disorders may be candidates for a home sleep apnea test. The testing equipment differs in that it is less complicated than what is used in an overnight sleep study. As such, does not give all the data an in-lab will and if negative, may not mean you do not have the problem.  Treatment for  sleep apnea includes using a continuous positive airway pressure (CPAP) machine to keep your airway open during sleep. A mask is placed over your nose and mouth, or just your nose. The mask is hooked to the CPAP machine that blows a gentle stream of air into the mask when you breathe. This helps keep your airway open so you can breathe more regularly. Extra oxygen may be given to you through the machine. You may be given a mouth device. It looks like a mouth guard or dental retainer and stops your tongue and mouth tissues from blocking your throat while you sleep. Surgery may be needed to remove extra tissues that block your mouth, throat, or nose.  Manage sleep apnea:   Do not smoke. Nicotine and other chemicals in cigarettes and cigars can cause lung damage. Ask your healthcare provider for information if you currently smoke and need help to quit. E-cigarettes or smokeless tobacco still contain nicotine. Talk to your healthcare provider before you use these products.  Do not drink alcohol or take sedative medicine before you go to sleep. Alcohol and sedatives can relax the muscles and tissues around your throat. This can block the airflow to your lungs.  Maintain a healthy weight. Excess tissue around your throat may restrict your breathing. Ask your healthcare provider for information if you need to lose weight.  Sleep on your side or use pillows designed to prevent sleep apnea. This prevents your tongue or other tissues from blocking your throat. You can also raise the head of your bed.  Driving Safety. Refrain from driving when drowsy.   Follow up with your healthcare provider as directed: Write down your questions so you remember to ask them during your visits.  Go to AASM website for more information: Sleepeducation.org      Nursing Support:  When: Monday through Friday 7:30A-4:30PM except holidays  Where: Our direct line is [phone]  *3  *1.      If you are having a true emergency please call 911.  In  the event that the line is busy or it is after hours please leave a voice message and we will return your call.  Please speak clearly, leaving your full name, birth date, best number to reach you and the reason for your call.   Medication refills: We will need the name of the medication, the dosage, the ordering provider, whether you get a 30 or 90 day refill, and the pharmacy name and address.  Medications will be ordered by the provider only.  Nurses cannot call in prescriptions.  Please allow 7 days for medication refills.  Physician requested updates: If your provider requested that you call with an update after starting medication, please be ready to provide us the medication and dosage, what time you take your medication, the time you attempt to fall asleep, time you fall asleep, when you wake up, and what time you get out of bed.  Sleep Study Results: We will contact you with sleep study results and/or next steps after the physician has reviewed your testing.

## 2024-11-14 NOTE — PROGRESS NOTES
Consultation - Sleep Center   Flaquito Nguyen  69 y.o. male  :1955  MRN:[id number]  DOS:2024    Physician Requesting Consult: Sebastián Altamirano MD             Reason for Consult : At your kind request I saw Flaquito Nguyen for initial sleep evaluation today.  Patient recently had a diagnostic sleep study and is here to review results / treatment options.      The study demonstrated obstructive sleep apnea: AHI of 15.6 events per hour of sleep. The AHI in the supine position was 42.5. The AHI during REM sleep was 21.5. The amount of sleep time less than or equal to 89% was 18.1 minutes. The lowest oxygen saturation was 81%.    PFSH, Problem List, Medications & Allergies were reviewed in EMR.    Flaquito  has a past medical history of Calcific tendinitis (2018), Cataracts, bilateral, Chronic deep vein thrombosis (DVT) of left femoral vein ([facility]) (3/6/2018), Chronic pulmonary embolism with acute cor pulmonale ([facility]) (3/6/2018), DVT (deep venous thrombosis) ([facility]) (2018), GERD (gastroesophageal reflux disease), Hyperlipidemia, Neck pain, Sacroiliitis ([facility]) (3/29/2023), and Wears glasses.      He has a current medication list which includes the following prescription(s): atorvastatin, b-12, eliquis, multivitamin, omeprazole, and diclofenac sodium.      HPI: His study was undertaken for family's complaints of loud snoring and witnessed apnea of several years duration Flaquito is un aware of these symptoms  Other complaints:(Patient-Rptd) No symptoms . Restless Leg Syndrome: Reports no suggestive symptoms.  .  Parasomnia: Dentist reports teeth grinding during sleep;    Sleep Routine (averaged): Typical Bedtime: (Patient-Rptd) 10:30 pm.  Gets OOB: (Patient-Rptd) 6:30 am. TIB:8 hrs.   Sleep latency:< 15 minutes; Sleep Interruptions: (Patient-Rptd) 1 to 2 times per night, because of nocturia and struggles to fall back asleep. Awakens: Spontaneously  Upon awakening: rarely feels refreshed .  He estimates getting  "(Patient-Rptd) 7hrs sleep.  Daytime Function:Flaquito reports episodic excessive daytime sleepiness feels like napping but does not have the opportunity. He rated himself at Total score: (Patient-Rptd) 7 /24 on the [place] Sleepiness Scale.     Habits:   reports that he has never smoked. He has never been exposed to tobacco smoke. He has never used smokeless tobacco.;  reports current alcohol use.; Reports no history of drug use.;  E-Cigarette/Vaping  Never User; Caffeine use:limited until (Patient-Rptd) Before 12:00 pm; Exercise routine: regular.    Occupation: (Patient-Rptd) Retired [employer] License: (Patient-Rptd) No  Family History: Father had obstructive sleep apnea  ROS: Significant for weight has been stable..     EXAM:  /76   Pulse (!) 107   Ht 6' 1\" (1.854 m)   Wt 108 kg (237 lb)   SpO2 96%   BMI 31.27 kg/m²    General: Well groomed male, well appearing, in no apparent distress.   Neurological: Alert and orientated; cooperative; Cranial nerves intact;    Psychiatric: Speech: Clear and coherent; normal mood, affect & thought   Skin: Warm and dry; Color& Hydration good; no facial rashes or lesions   HEENT:  Craniofacial anatomy: normal Sinuses: Non-tender. TMJ: Normal    Eyes: EOM's intact; conjunctiva/corneas clear   Ears: Appear normal     Nasal Airway: assymetric nares Septum: Slightly deviated; Turbinates: Normal; Rhinorrhea: None  Mouth: Lips: Normal posture; Dentition: worn down and irregular. Mucosa: Moist; Hard Palate:normal    Oropharryx: crowded and AP narrowing Tongue: Mallampati:Class IV, Mobile, and MacroglossiaSoft Palate:  redundant  Tonsils: absent  Neck: Is thick; neck Circumference: 18 \"; supple; no abnormal masses; Thyroid: Normal. Trachea: Central.    Lymph: No cervical Lymhadenopathy  Heart: S1,S2 normal; RRR; no gallop; no murmur   Lungs: Respiratory Effort: Normal. Air entry good bilaterally.  No wheezes.  No rales  Abdomen: Obese, soft & non-tender    Extremities: No pedal edema.  " "No clubbing or cyanosis.    Musculoskeletal:  Motor normal; Gait: Normal.       Serial CMP's have demonstrated CO2 levels between 26 and 30 mmol/L, marginally elevated fasting glucose and otherwise unremarkable.  Last CBC was normal    IMPRESSION: Primary/Secondary Sleep Diagnoses (to Medical or Psych conditions) & Comorbidities   1. COLLEEN (obstructive sleep apnea)  Cpap DME      2. Non-restorative sleep        3. Daytime sleepiness        4. Gastroesophageal reflux disease with esophagitis without hemorrhage        5. Cervical post-laminectomy syndrome        6. History of non-ST elevation myocardial infarction (NSTEMI)        7. Chronic deep vein thrombosis (DVT) of left femoral vein (HCC)        8. Obesity (BMI 30-39.9)             PLAN:   1. I reviewed results of the Sleep study with the patient.   2. With respect to above conditions, I counseled on pathophysiology, diagnosis, treatment options, risks and benefits; inter-relationship and effects on symptoms and comorbidities; risks of no treatment; costs and insurance aspects.   3. Patient elected positive airway pressure therapy but declined a titration study.  Pressure settin-18 cm H2O using a fullface mask.  He will consider an in-lab titration study based on his progress.  4. I also advised on weight reduction.  5. Follow-up to be scheduled after the study to review results and to initiate therapy.    Sincerely,      Authenticated electronically on 24   Board Certified Specialist     Portions of the record may have been created with voice recognition software. Occasional wrong word or \"sound a like\" substitutions may have occurred due to the inherent limitations of voice recognition software. There may also be notations and random deletions of words or characters from malfunctioning software. Read the chart carefully and recognize, using context, where substitutions/deletions have occurred.     "

## 2024-11-19 ENCOUNTER — TELEPHONE (OUTPATIENT)
Dept: SLEEP CENTER | Facility: CLINIC | Age: 69
End: 2024-11-19

## 2024-11-22 LAB

## 2024-11-26 ENCOUNTER — DOCUMENTATION (OUTPATIENT)
Dept: SLEEP CENTER | Facility: CLINIC | Age: 69
End: 2024-11-26

## 2024-11-26 LAB

## 2024-11-26 NOTE — PROGRESS NOTES
Patient scheduled for DME set up today at [facility].   MACHINE: Airsense 11 auto cpap  PRESSURE: [id number]  MASK: AirFit F40 medium  TOLERATION: well  PROVIDED INFORMATION: Welcome guide emailed to patient, DME numbers to contact 24/7, cleaning and replacement schedules.   PATIENT STATUS: NEW  MOBILE ROD PAIRED: Yes

## 2024-12-21 ENCOUNTER — HOSPITAL ENCOUNTER (OUTPATIENT)
Dept: CT IMAGING | Facility: HOSPITAL | Age: 69
Discharge: HOME | End: 2024-12-21
Payer: COMMERCIAL

## 2024-12-21 DIAGNOSIS — I48.0 PAROXYSMAL ATRIAL FIBRILLATION (HCC): ICD-10-CM

## 2024-12-21 PROCEDURE — 75571 CT HRT W/O DYE W/CA TEST: CPT

## 2025-02-23 NOTE — PROGRESS NOTES
Name: Flaquito Nguyen      : 1955      MRN: [id number]  Encounter Provider: Justin Mora MD  Encounter Date: 2025   Encounter department: [facility]    :  Assessment & Plan  COLLEEN (obstructive sleep apnea)  Moderate Obstructive Sleep Apnea - Discussed pathophysiology of COLLEEN, consequences of untreated COLLEEN and treatment options including PAP therapy. - Discussed risks of sleepy driving and mitigation strategies (napping). Patient agrees to not drive if tired or sleepy. - Advised avoidance of alcohol and centrally acting medications as these can worsen COLLEEN.  -Patient presents for initial CPAP compliance visit for moderate obstructive sleep apnea.  He has excellent compliance with regular use of PAP therapy.  CPAP remains medically necessary to treat his sleep disordered breathing.  -I have asked him to return to the office to see me within 12 months or sooner as needed for CPAP follow-up.  CPAP resupply order has been placed today in the office for a fullface mask interface.  -I have logged onto [employer] and adjusted his humidification setting due to continued dryness with CPAP use.  Orders:    PAP DME Resupply/Reorder    Paroxysmal atrial fibrillation (HCC)  Patient with reported paroxysmal atrial fibrillation.  He is continued on metoprolol for rate control as well as Eliquis for stroke prevention.  He is following regularly with his cardiologist.  He is scheduled to undergo a PETER with cardioversion in 2025.  We discussed increased risk of recurrent cardiac arrhythmia such as atrial fibrillation if sleep disordered breathing is not treated.  I have encouraged the patient to continue CPAP with all periods of sleep including naps.       Gastroesophageal reflux disease, unspecified whether esophagitis present  Reviewed the association between sleep and gastroesophageal reflux disease. Encouraged patient to consume last large meal at least 3 hours before bedtime.   "Reviewed optimal sleeping position to minimize acid reflux episodes.  Continued use of CPAP therapy can reduce risk of heartburn and GERD symptoms.           History of Present Illness         Sitting and reading: (Patient-Rptd) (P) Slight chance of dozing  Watching TV: (Patient-Rptd) (P) Slight chance of dozing  Sitting, inactive in a public place (e.g. a theatre or a meeting): (Patient-Rptd) (P) Moderate chance of dozing  As a passenger in a car for an hour without a break: (Patient-Rptd) (P) Slight chance of dozing  Lying down to rest in the afternoon when circumstances permit: (Patient-Rptd) (P) Slight chance of dozing  Sitting and talking to someone: (Patient-Rptd) (P) Would never doze  Sitting quietly after a lunch without alcohol: (Patient-Rptd) (P) Would never doze  In a car, while stopped for a few minutes in traffic: (Patient-Rptd) (P) Would never doze  Total score: (Patient-Rptd) (P) 6       Pertinent positives/negatives included in HPI and also as noted:     Objective   /76   Pulse 87   Ht 6' 1\" (1.854 m)   Wt 109 kg (240 lb)   SpO2 95%   BMI 31.66 kg/m²        [facility]  Follow-up Visit    Patient Name: Flaquito Nguyen  Date of Service: 02/24/25  Date of Last Visit: 11/26/2024      PATIENT PROFILE  Mr. Flaquito Nguyen is a 69 y.o. male with past medical history significant for:  Hyperlipidemia, GERD, moderate obstructive sleep apnea, paroxysmal atrial fibrillation, prior DVT.    I have reviewed the 7/16/2024 family medicine office note with Ivonne Betancourt MD.     I have reviewed the 6/26/2024 family medicine office note with Sebastián Altamirano MD.     Interval History:  He presents today for follow-up of moderate obstructive sleep apnea on auto CPAP.    He underwent a diagnostic sleep study on 4/2/2024 at a weight of 230 pounds.  This sleep study revealed moderate obstructive sleep apnea with an AHI of 15.6 events per hour.  O2 diego of 81%.    We reviewed prior " diagnostic sleep study today in the office.    ESS today is 6/24 (normal is < 10).     Since his initial sleep medicine consult he has been diagnosed with paroxysmal atrial fibrillation.  He is continued on metoprolol for rate control and Eliquis for stroke prevention.  We discussed the impact of undiagnosed and untreated sleep disordered breathing on increased risk of cardiac arrhythmia such as atrial fibrillation.    He reports that he is scheduled to undergo a PETER with cardioversion in the beginning of March.    He is continued on CPAP therapy.  He notes mild improvement in his overall sleep symptoms.  We discussed importance of continued use of CPAP therapy with all periods of sleep including any naps.    Sleep-Wake Schedule:   Bedtime: 10:30 PM  Time to fall alseep: Less than 15 minutes  Waketime: 6:30 AM  Total Sleep Time: Approximately 7  Naps: Denied, but would have given the opportunity    On weekends, sleep times are approximately similar to weeknights.      CPAP Compliance  DME: [employer]  Machine Type and Settings: AutoCPAP 8-18 cmH2O   Mask Type: Fullface mask  Compliance Reviewed (1/19/2025-2/17/2025)  97% Usage (29 out of 30 days).   Used > 4 hours a Night For 87% of nights.  AutoCPAP Median Pressure -10.3 cmH2O, 95% Pressure -13.5 cmH20   Median Leak -0.1 L/min, 95% Leak -5.8 L/min  Residual AHI 1.3 events/hour    He reports the following problems with the mask/CPAP:   Mask leaks   Skin irritation or breakdown     Frequent mask adjustment   Eye irritation     Difficulty tolerating air pressure   Nose bleeds    X Generalized discomfort   Noisy     Difficulty applying or adjusting   Condensation in mask/tubing     Causes frequent awakenings   Complaints from bed partner     Pressure on nasal bridge or nostrils   Dry mouth     Wrong size   Dry nose     Nasal congestion   Using humidifier: Y or N     Other:   Other:      He reports the following improvements in his sleep-related symptoms on PAP therapy:   X  Decreased/abolished snoring   Improving - More refreshed in AM     Decreased gasping/choking   Decreased/abolished AM headaches     Decreased nocturia   Decreased daytime sleepiness    X Improved sleep quality   Decreased napping/dozing     Decreased awakenings  X Increased energy     Decreased restlessness  X Improved alertness     Dry mouth improved   Improved mood     Improved nasal congestion/allergies   Improved memory/concentration     Improved GERD   Other:     Improved blood pressure   Other:      Patient's, past medical, surgical, social and family histories were reviewed and updated as appropriate.    No Known Allergies    Current Outpatient Medications on File Prior to Visit   Medication Sig    atorvastatin (LIPITOR) 20 mg tablet TAKE 1 TABLET BY MOUTH ONCE  DAILY    Cyanocobalamin (B-12) 1000 MCG SUBL Place 1 tablet (1,000 mcg total) under the tongue in the morning For a month, then once a week for maintenance    Eliquis 5 MG TAKE 1 TABLET BY MOUTH TWICE  DAILY    metoprolol succinate (TOPROL-XL) 25 mg 24 hr tablet Take 12.5 mg by mouth daily    Multiple Vitamin (MULTIVITAMIN) tablet Take 1 tablet by mouth 3 (three) times a week    omeprazole (PriLOSEC OTC) 20 MG tablet Take 20 mg by mouth daily 1/2 tablet dailly    Diclofenac Sodium (VOLTAREN) 1 % Apply 2 g topically 4 (four) times a day     No current facility-administered medications on file prior to visit.       Physical Examination:  Gen: No acute distress, not visibly anxious, speaking comfortably  H&N: MM III; no retro/micrognathia; no macroglossia; no visible thyromegaly  Neuro: Alert and oriented x3, interactive  Psych: Pleasant, normal affect  Skin: No visible rashes  Respiratory: No accessory muscle use, breathing comfortably  Cardiac: No visible edema of extremities  Abdomen: Soft, NT, non-distended  Musculoskeletal: Normal ROM of extremities       Lab Results   Component Value Date    SODIUM 140 10/01/2024    K 4.0 10/01/2024      "10/01/2024    CO2 30 10/01/2024    BUN 15 10/01/2024    CREATININE 1.09 10/01/2024    GLUC 100 07/26/2023    CALCIUM 9.2 10/01/2024       Lab Results   Component Value Date    WBC 11.21 (H) 10/01/2024    HGB 15.1 10/01/2024    HCT 44.6 10/01/2024    MCV 93 10/01/2024     10/01/2024       Lab Results   Component Value Date    BEQ7YAVQRPIX 0.895 06/21/2023       Lab Results   Component Value Date    FERRITIN 17 03/06/2018         Recent Weights: Body mass index is 31.66 kg/m².    Results/Data:  I have reviewed the results and report from the 12/21/2024 CT coronary calcium score.    Independent Findings Reviewed: Coronary calcium score of 20.    I have reviewed the results and report from the 3/29/2023 chest x-ray.    Independent Findings Reviewed: No acute cardiopulmonary disease.    Follow-up will be in 12 months, or sooner as needed. I encouraged him to contact me with any questions, problems or concerns in the interim.  We will continue with longitudinal follow-up for management of sleep disordered breathing.    Justin Mora MD  Sleep Medicine and Internal Medicine  [facility]  02/24/25      Portions of the record may have been created with voice recognition software.  Occasional wrong word or \"sound a like\" substitutions may have occurred due to the inherent limitations of voice recognition software.  Read the chart carefully and recognize, using context, where substitutions have occurred.       "

## 2025-02-24 ENCOUNTER — OFFICE VISIT (OUTPATIENT)
Dept: SLEEP CENTER | Facility: CLINIC | Age: 70
End: 2025-02-24
Payer: COMMERCIAL

## 2025-02-24 VITALS
BODY MASS INDEX: 31.81 KG/M2 | HEIGHT: 73 IN | SYSTOLIC BLOOD PRESSURE: 116 MMHG | OXYGEN SATURATION: 95 % | WEIGHT: 240 LBS | HEART RATE: 87 BPM | DIASTOLIC BLOOD PRESSURE: 76 MMHG

## 2025-02-24 DIAGNOSIS — G47.33 OSA (OBSTRUCTIVE SLEEP APNEA): Primary | ICD-10-CM

## 2025-02-24 DIAGNOSIS — K21.9 GASTROESOPHAGEAL REFLUX DISEASE, UNSPECIFIED WHETHER ESOPHAGITIS PRESENT: ICD-10-CM

## 2025-02-24 DIAGNOSIS — I48.0 PAROXYSMAL ATRIAL FIBRILLATION (HCC): ICD-10-CM

## 2025-02-24 PROCEDURE — 99214 OFFICE O/P EST MOD 30 MIN: CPT | Performed by: STUDENT IN AN ORGANIZED HEALTH CARE EDUCATION/TRAINING PROGRAM

## 2025-02-24 PROCEDURE — G2211 COMPLEX E/M VISIT ADD ON: HCPCS | Performed by: STUDENT IN AN ORGANIZED HEALTH CARE EDUCATION/TRAINING PROGRAM

## 2025-02-24 RX ORDER — METOPROLOL SUCCINATE 25 MG/1
12.5 TABLET, EXTENDED RELEASE ORAL DAILY
COMMUNITY
Start: 2025-01-17 | End: 2025-07-16

## 2025-02-24 NOTE — ASSESSMENT & PLAN NOTE
Moderate Obstructive Sleep Apnea - Discussed pathophysiology of COLLEEN, consequences of untreated COLLEEN and treatment options including PAP therapy. - Discussed risks of sleepy driving and mitigation strategies (napping). Patient agrees to not drive if tired or sleepy. - Advised avoidance of alcohol and centrally acting medications as these can worsen COLLEEN.  -Patient presents for initial CPAP compliance visit for moderate obstructive sleep apnea.  He has excellent compliance with regular use of PAP therapy.  CPAP remains medically necessary to treat his sleep disordered breathing.  -I have asked him to return to the office to see me within 12 months or sooner as needed for CPAP follow-up.  CPAP resupply order has been placed today in the office for a fullface mask interface.  -I have logged onto [employer] and adjusted his humidification setting due to continued dryness with CPAP use.  Orders:    PAP DME Resupply/Reorder

## 2025-02-24 NOTE — ASSESSMENT & PLAN NOTE
Patient with reported paroxysmal atrial fibrillation.  He is continued on metoprolol for rate control as well as Eliquis for stroke prevention.  He is following regularly with his cardiologist.  He is scheduled to undergo a PETER with cardioversion in March 2025.  We discussed increased risk of recurrent cardiac arrhythmia such as atrial fibrillation if sleep disordered breathing is not treated.  I have encouraged the patient to continue CPAP with all periods of sleep including naps.

## 2025-02-24 NOTE — ASSESSMENT & PLAN NOTE
Reviewed the association between sleep and gastroesophageal reflux disease. Encouraged patient to consume last large meal at least 3 hours before bedtime.  Reviewed optimal sleeping position to minimize acid reflux episodes.  Continued use of CPAP therapy can reduce risk of heartburn and GERD symptoms.

## 2025-02-24 NOTE — PATIENT INSTRUCTIONS
"Patient Education     Sleep apnea in adults   The Basics   Written by the doctors and editors at [facility]   What is sleep apnea? -- Sleep apnea is a condition that makes you stop breathing for short periods while you are asleep. There are 2 types of sleep apnea. One is called \"obstructive sleep apnea.\" The other is called \"central sleep apnea.\"  In obstructive sleep apnea, you stop breathing because your throat narrows or closes (figure 1). In central sleep apnea, you stop breathing because your brain does not send the right signals to your muscles to make you breathe. When people talk about sleep apnea, they are usually referring to obstructive sleep apnea, which is what this article is about.  People with sleep apnea do not know that they stop breathing when they are asleep. But they do sometimes wake up startled or gasping for breath. They also often hear from loved ones that they snore.  What are the symptoms of sleep apnea? -- The main symptoms of sleep apnea are loud snoring, tiredness, and daytime sleepiness. Other symptoms can include:   Restless sleep   Waking up choking or gasping   Morning headaches, dry mouth, or sore throat   Waking up often to urinate   Waking up feeling unrested or groggy   Trouble thinking clearly or remembering things  Some people with sleep apnea don't have symptoms, or don't realize that they have them.  Should I see a doctor or nurse? -- Yes. If you think that you might have sleep apnea, see your doctor.  Is there a test for sleep apnea? -- Yes. First, your doctor or nurse will ask about your symptoms. If you have a bed partner, they might also ask that person if you snore or gasp in your sleep. If the doctor or nurse suspects that you have sleep apnea, they might send you for a \"sleep study.\"  Sleep studies can sometimes be done at home, but they are usually done in a sleep lab. For the study, you spend the night in the lab, and you are hooked up to different machines that " "monitor your heart rate, breathing, and other body functions. The results of the test tell your doctor or nurse if you have the disorder.  Is there anything I can do on my own to help my sleep apnea? -- Yes. Some things that might help:   Try to avoid sleeping on your back, if possible. This might help some people.   Lose weight, if you have excess body weight.   Get regular physical activity. This might help you lose weight. But even if it doesn't, being active is good for your health.   Avoid alcohol, especially in the evening. Alcohol can make sleep apnea worse.  How is sleep apnea treated? -- Treatment can include:   Weight loss - As mentioned above, weight loss can help if you have excess weight or obesity. But losing weight can be challenging, and it takes time to lose enough weight to help with your sleep apnea. Most people need other treatment while they work on losing weight.   CPAP - The most effective treatment for sleep apnea is a device that keeps your airway open while you sleep. Treatment with this device is called \"continuous positive airway pressure\" (\"CPAP\"). People getting CPAP wear a face mask at night that keeps them breathing (figure 2).  If your doctor or nurse recommends a CPAP machine, be patient about using it. The mask might seem uncomfortable to wear at first, and the machine might seem noisy, but using the machine can really help you. People with sleep apnea who use a CPAP machine feel more rested and generally feel better.  If CPAP does not work, your doctor might suggest other treatment. Options might include:   An oral device - This is a device that you wear in your mouth. It is called an \"oral appliance\" or \"mandibular advancement device.\" It helps keep your airway open while you sleep.   Hypoglossal nerve stimulation - This involves a procedure to implant a small device into your chest. The device has a wire that connects to the nerve under your tongue. While you are sleeping, it " sends an electrical signal that causes the tongue to push forward. This helps open up your airway.   Surgery to widen your airway - This might involve removing your tonsils or other tissue that blocks the airway.  Is sleep apnea dangerous? -- It can be. Risks include:   Accidents - People with sleep apnea do not get good-quality sleep, so they are often tired and not alert. This puts them at risk for car accidents and other types of accidents.   Other health problems - Studies show that people with sleep apnea are more likely than others to have high blood pressure, heart attacks, and other serious heart problems. Some people also have mood changes or depression.  In people with severe sleep apnea, getting treated (for example, with CPAP) can help lower these risks.  All topics are updated as new evidence becomes available and our peer review process is complete.  This topic retrieved from [employer] on: Feb 28, 2024.  Topic 97130 Version 18.0  Release: 32.2.4 - C32.58  © 2024 UpToDate, Inc. and/or its affiliates. All rights reserved.  figure 1: Airway in a person with sleep apnea     Normally, when a person sleeps, the airway remains open, and air can pass from the nose and mouth to the lungs. In a person with sleep apnea, parts of the throat and mouth drop into the airway and block off the flow of air. This can cause loud snoring and interrupt breathing for short periods.  Graphic 52345 Version 6.0  figure 2: Continuous positive airway pressure (CPAP) for sleep apnea     The CPAP mask gently blows air into your nose while you sleep. It puts just enough pressure on your airway to keep it from closing. The mask in this picture fits over just the nose. Other CPAP devices have masks that fit over the nose and mouth.  Graphic 51885 Version 5.0  Consumer Information Use and Disclaimer   Disclaimer: This generalized information is a limited summary of diagnosis, treatment, and/or medication information. It is not meant to  be comprehensive and should be used as a tool to help the user understand and/or assess potential diagnostic and treatment options. It does NOT include all information about conditions, treatments, medications, side effects, or risks that may apply to a specific patient. It is not intended to be medical advice or a substitute for the medical advice, diagnosis, or treatment of a health care provider based on the health care provider's examination and assessment of a patient's specific and unique circumstances. Patients must speak with a health care provider for complete information about their health, medical questions, and treatment options, including any risks or benefits regarding use of medications. This information does not endorse any treatments or medications as safe, effective, or approved for treating a specific patient. UpToDate, Inc. and its affiliates disclaim any warranty or liability relating to this information or the use thereof.The use of this information is governed by the Terms of Use, available at https://www.woltersKiddifyuwer.com/en/know/clinical-effectiveness-terms. 2024© UpToDate, Inc. and its affiliates and/or licensors. All rights reserved.  Copyright   © 2024 UpToDate, Inc. and/or its affiliates. All rights reserved.

## 2025-02-25 ENCOUNTER — TELEPHONE (OUTPATIENT)
Dept: SLEEP CENTER | Facility: CLINIC | Age: 70
End: 2025-02-25

## 2025-02-26 LAB
DME PARACHUTE DELIVERY DATE REQUESTED: NORMAL
DME PARACHUTE ITEM DESCRIPTION: NORMAL
DME PARACHUTE ORDER STATUS: NORMAL
DME PARACHUTE SUPPLIER NAME: NORMAL
DME PARACHUTE SUPPLIER PHONE: NORMAL

## 2025-03-05 DIAGNOSIS — I26.09 OTHER CHRONIC PULMONARY EMBOLISM WITH ACUTE COR PULMONALE (HCC): ICD-10-CM

## 2025-03-05 DIAGNOSIS — I27.82 OTHER CHRONIC PULMONARY EMBOLISM WITH ACUTE COR PULMONALE (HCC): ICD-10-CM

## 2025-03-05 DIAGNOSIS — I82.512 CHRONIC DEEP VEIN THROMBOSIS (DVT) OF LEFT FEMORAL VEIN (HCC): Chronic | ICD-10-CM

## 2025-03-26 DIAGNOSIS — I26.09 OTHER CHRONIC PULMONARY EMBOLISM WITH ACUTE COR PULMONALE (HCC): ICD-10-CM

## 2025-03-26 DIAGNOSIS — I82.512 CHRONIC DEEP VEIN THROMBOSIS (DVT) OF LEFT FEMORAL VEIN (HCC): Chronic | ICD-10-CM

## 2025-03-26 DIAGNOSIS — I27.82 OTHER CHRONIC PULMONARY EMBOLISM WITH ACUTE COR PULMONALE (HCC): ICD-10-CM

## 2025-03-27 DIAGNOSIS — I26.09 OTHER CHRONIC PULMONARY EMBOLISM WITH ACUTE COR PULMONALE (HCC): ICD-10-CM

## 2025-03-27 DIAGNOSIS — I82.512 CHRONIC DEEP VEIN THROMBOSIS (DVT) OF LEFT FEMORAL VEIN (HCC): Chronic | ICD-10-CM

## 2025-03-27 DIAGNOSIS — E78.5 DYSLIPIDEMIA: ICD-10-CM

## 2025-03-27 DIAGNOSIS — I27.82 OTHER CHRONIC PULMONARY EMBOLISM WITH ACUTE COR PULMONALE (HCC): ICD-10-CM

## 2025-03-27 RX ORDER — ATORVASTATIN CALCIUM 40 MG/1
40 TABLET, FILM COATED ORAL DAILY
Qty: 90 TABLET | Refills: 1 | Status: SHIPPED | OUTPATIENT
Start: 2025-03-27

## 2025-03-27 NOTE — TELEPHONE ENCOUNTER
PT called and is requesting this RX to be 90 day supply , going to [facility], Thank you     Dr. Bonilla- Cardiologist in [facility] adv PT ATOVASTATIN  be increased to 40 mg 90 day supply.   Thank you

## 2025-04-16 ENCOUNTER — APPOINTMENT (OUTPATIENT)
Dept: LAB | Facility: HOSPITAL | Age: 70
End: 2025-04-16
Payer: COMMERCIAL

## 2025-04-16 DIAGNOSIS — I48.0 PAROXYSMAL ATRIAL FIBRILLATION (HCC): ICD-10-CM

## 2025-04-16 LAB
ALBUMIN SERPL BCG-MCNC: 4.3 G/DL (ref 3.5–5)
ALP SERPL-CCNC: 62 U/L (ref 34–104)
ALT SERPL W P-5'-P-CCNC: 36 U/L (ref 7–52)
ANION GAP SERPL CALCULATED.3IONS-SCNC: 10 MMOL/L (ref 4–13)
AST SERPL W P-5'-P-CCNC: 21 U/L (ref 13–39)
BILIRUB SERPL-MCNC: 0.86 MG/DL (ref 0.2–1)
BUN SERPL-MCNC: 17 MG/DL (ref 5–25)
CALCIUM SERPL-MCNC: 9.3 MG/DL (ref 8.4–10.2)
CHLORIDE SERPL-SCNC: 106 MMOL/L (ref 96–108)
CHOLEST SERPL-MCNC: 155 MG/DL (ref ?–200)
CO2 SERPL-SCNC: 26 MMOL/L (ref 21–32)
CREAT SERPL-MCNC: 0.89 MG/DL (ref 0.6–1.3)
GFR SERPL CREATININE-BSD FRML MDRD: 87 ML/MIN/1.73SQ M
GLUCOSE P FAST SERPL-MCNC: 96 MG/DL (ref 65–99)
HDLC SERPL-MCNC: 54 MG/DL
LDLC SERPL CALC-MCNC: 86 MG/DL (ref 0–100)
NONHDLC SERPL-MCNC: 101 MG/DL
POTASSIUM SERPL-SCNC: 4.4 MMOL/L (ref 3.5–5.3)
PROT SERPL-MCNC: 7.2 G/DL (ref 6.4–8.4)
SODIUM SERPL-SCNC: 142 MMOL/L (ref 135–147)
TRIGL SERPL-MCNC: 77 MG/DL (ref ?–150)

## 2025-04-16 PROCEDURE — 80053 COMPREHEN METABOLIC PANEL: CPT

## 2025-04-16 PROCEDURE — 80061 LIPID PANEL: CPT

## 2025-04-16 PROCEDURE — 36415 COLL VENOUS BLD VENIPUNCTURE: CPT

## 2025-06-10 ENCOUNTER — APPOINTMENT (OUTPATIENT)
Dept: RADIOLOGY | Facility: CLINIC | Age: 70
End: 2025-06-10
Attending: ORTHOPAEDIC SURGERY
Payer: COMMERCIAL

## 2025-06-10 ENCOUNTER — OFFICE VISIT (OUTPATIENT)
Dept: OBGYN CLINIC | Facility: CLINIC | Age: 70
End: 2025-06-10
Payer: COMMERCIAL

## 2025-06-10 VITALS — HEIGHT: 73 IN | WEIGHT: 235 LBS | BODY MASS INDEX: 31.14 KG/M2

## 2025-06-10 DIAGNOSIS — M25.562 ACUTE PAIN OF LEFT KNEE: ICD-10-CM

## 2025-06-10 DIAGNOSIS — M25.561 ACUTE PAIN OF RIGHT KNEE: ICD-10-CM

## 2025-06-10 DIAGNOSIS — M17.11 ARTHRITIS OF RIGHT KNEE: ICD-10-CM

## 2025-06-10 DIAGNOSIS — M17.11 PRIMARY OSTEOARTHRITIS OF RIGHT KNEE: ICD-10-CM

## 2025-06-10 DIAGNOSIS — M23.91 ACUTE INTERNAL DERANGEMENT OF RIGHT KNEE: Primary | ICD-10-CM

## 2025-06-10 DIAGNOSIS — M17.12 ARTHRITIS OF KNEE, LEFT: ICD-10-CM

## 2025-06-10 PROCEDURE — 73564 X-RAY EXAM KNEE 4 OR MORE: CPT

## 2025-06-10 PROCEDURE — 99214 OFFICE O/P EST MOD 30 MIN: CPT | Performed by: ORTHOPAEDIC SURGERY

## 2025-06-10 NOTE — PROGRESS NOTES
Patient Name: Flaquito Nguyen      : 1955       MRN: [id number]   Encounter Provider: Ramos Bteancourt MD   Encounter Date: 06/10/25  Encounter department: [facility]         Assessment & Plan  Acute internal derangement of right knee    Upon examination today, I am concerned for acute right knee internal derangement, including medial meniscus tear. The patient does have some extent of medial compartment degenerative changes, though they are mild changes. With his acute injury during softball with unrelenting symptoms poorly controlled with OTC medications and ice/heat. The patient demonstrates moderate medial joint line tenderness with positive Micah's. As such, I recommended an MRI for further evaluation. We will plan to follow up with the patient after the MRI to discuss next treatment steps. In the meantime, the patient can continue using Tylenol and ice/heat as needed for pain control. He can continue activities as tolerated, using pain as a guide. I also recommend he begin formal PT leading up to MRI review as I believe this may be beneficial regardless of next treatment steps.    Orders:    Ambulatory Referral to Physical Therapy; Future    MRI knee right  wo contrast; Future       _____________________________________________________  CHIEF COMPLAINT:  Chief Complaint   Patient presents with    Right Knee - Follow-up     3 weeks ago, Pt felt pain after stretching his knee.    Left Knee - Follow-up         SUBJECTIVE:  Flaquito Nguyen is a 70 y.o. male who presents for initial evaluation of acute right knee pain for 3 weeks.  The patient was playing softball when he was acting as a cut off and noticed immediate pain in the knee. The patient primarily localizes pain to the medial aspect, though he notes pain is sometimes diffuse and hard to localize.  The pain is typically sharp stabbing and is constant.  Devante states he has been limping secondary to pain, which  "has maybe slightly improved over the past 3 weeks, but not significantly. Devante also notes nighttime pain, such that he has tried to put a pillow in between his knees. He denies swelling, popping sensation, bruising, instability, locking episodes, calf pain, and numbness/tingling.  The patient tried icing acutely following his injury and has since been using Tylenol and sparingly Mobic for pain control as needed. He is aware that since he is on Eliquis, he should avoid NSAID use. Devante states he has been continuing to play golf, but requires use of the cart between holes.    Right knee surgical History:  None    PAST MEDICAL HISTORY:  Past Medical History[1]    PAST SURGICAL HISTORY:  Past Surgical History[2]    FAMILY HISTORY:  Family History[3]    SOCIAL HISTORY:  Social History[4]    MEDICATIONS:  Current Medications[5]    ALLERGIES:  Allergies[6]    LABS:  HgA1c: No results found for: \"HGBA1C\"  BMP:   Lab Results   Component Value Date    CALCIUM 9.3 04/16/2025    K 4.4 04/16/2025    CO2 26 04/16/2025     04/16/2025    BUN 17 04/16/2025    CREATININE 0.89 04/16/2025     CBC: No components found for: \"CBC\"    _____________________________________________________  Review of systems: ROS is negative other than that noted in the HPI.  Constitutional: Negative for fatigue and fever.   HENT: Negative for sore throat.    Respiratory: Negative for shortness of breath.    Cardiovascular: Negative for chest pain.   Gastrointestinal: Negative for abdominal pain.   Endocrine: Negative for cold intolerance and heat intolerance.   Genitourinary: Negative for flank pain.   Musculoskeletal: Negative for back pain.   Skin: Negative for rash.   Allergic/Immunologic: Negative for immunocompromised state.   Neurological: Negative for dizziness.   Psychiatric/Behavioral: Negative for agitation.     Knee Exam:   No significant skin lesions or deformity  No significant joint effusion  Able to straight leg raise without extensor " lag  Range of motion from 0 to 120+  Posterior medial joint line tenderness.  No lateral joint line or extensor mechanism tenderness  Positive Micah's  Knee is stable to varus stress, valgus stress, Lachman, and posterior drawer.    Calf compartments are soft and supple  Negative José Luis's sign  2+ DP and PT pulses with brisk capillary refill to the toes  Sural, saphenous, tibial, superficial, and deep peroneal motor and sensory distributions intact  Sensation light touch intact distally      Physical exam:  General/Constitutional: NAD, well developed, well nourished  HENT: Normocephalic, atraumatic  CV: Intact distal pulses, regular rate  Resp: No respiratory distress or labored breathing  Abdomen: soft, nondistended   Lymphatic: No lymphadenopathy palpated  Neuro: Alert and Oriented x 3, no focal deficits  Psych: Normal mood, normal affect  Skin: Warm, dry, no rashes, no erythema  _____________________________________________________  STUDIES REVIEWED:  X-rays of the right knee reviewed and interpreted today. These show no acute osseous abnormalities. Mild-moderate medial compartment degenerative changes, including joint space narrowing. The patella is well-centered on the trochlea.        PROCEDURES PERFORMED:    No procedures performed today.    Scribe Attestation      I,:  Felicity Vaughan PA-C am acting as a scribe while in the presence of the attending physician.:       I,:  Ramos Betancourt MD personally performed the services described in this documentation    as scribed in my presence.:                    [1]   Past Medical History:  Diagnosis Date    Calcific tendinitis 02/27/2018    Cataracts, bilateral     Chronic deep vein thrombosis (DVT) of left femoral vein (HCC) 3/6/2018    Chronic pulmonary embolism with acute cor pulmonale (HCC) 3/6/2018    DVT (deep venous thrombosis) (HCC) 03/2018    left calf-traveled to the lung-PE-given heparin    GERD (gastroesophageal reflux disease)      Hyperlipidemia     Neck pain     Sacroiliitis (HCC) 3/29/2023    Wears glasses    [2]   Past Surgical History:  Procedure Laterality Date    CATARACT EXTRACTION      CERVICAL FUSION  2007    with bone spur removal also- C5,6,7 fusion plate    COLONOSCOPY      EPIDURAL BLOCK INJECTION N/A 11/22/2019    Procedure: C6 C7 Cervical Epidural Steroid Injection (86668);  Surgeon: Fredi Wei MD;  Location: [facility] MAIN OR;  Service: Pain Management     HERNIA REPAIR      umbilical    INCISION AND DRAINAGE OF WOUND Left 07/24/2023    Procedure: INCISION AND DRAINAGE (I&D) EXTREMITY;  Surgeon: Ramos Betancourt MD;  Location: [facility] MAIN OR;  Service: Orthopedics    LUMBAR EPIDURAL INJECTION N/A 02/24/2023    Procedure: L5 S1 LUMBAR epidural steroid injection (08822);  Surgeon: Cristofer Felipe DO;  Location: [facility] MAIN OR;  Service: Pain Management     NECK SURGERY  2007    TX COLONOSCOPY FLX DX W/COLLJ SPEC WHEN PFRMD N/A 07/31/2018    Procedure: COLONOSCOPY;  Surgeon: Frandy Mckeon MD;  Location: [facility] GI LAB;  Service: Gastroenterology    TX RPR UMBILICAL HRNA 5 YRS/> REDUCIBLE N/A 12/10/2019    Procedure: REPAIR HERNIA UMBILICAL;  Surgeon: Salvatore Ricci MD;  Location: [facility] MAIN OR;  Service: General    TX XCAPSL CTRC RMVL INSJ IO LENS PROSTH W/O ECP Right 03/16/2020    Procedure: EXTRACTION EXTRACAPSULAR CATARACT PHACO INTRAOCULAR LENS (IOL);  Surgeon: Bj Richard MD;  Location: [facility] MAIN OR;  Service: Ophthalmology    TX XCAPSL CTRC RMVL INSJ IO LENS PROSTH W/O ECP Left 03/23/2020    Procedure: EXTRACTION EXTRACAPSULAR CATARACT PHACO INTRAOCULAR LENS (IOL);  Surgeon: Bj Richard MD;  Location: [facility] MAIN OR;  Service: Ophthalmology    SHOULDER SURGERY Right     labrum repair, arthritis removal    ULNAR NERVE TRANSPOSITION Right    [3]   Family History  Problem Relation Name Age of Onset    Fibromyalgia Mother      Thyroid disease unspecified Mother      Diabetes Father Flaquito     Heart disease Father  Flaquito         CHF    No Known Problems Sister      No Known Problems Brother      No Known Problems Daughter      No Known Problems Son      No Known Problems Maternal Aunt      No Known Problems Maternal Uncle      No Known Problems Paternal Aunt      No Known Problems Paternal Uncle      Cancer Maternal Grandmother Gerri Olson     Cancer Maternal Grandfather Conrad Olson     No Known Problems Paternal Grandmother      No Known Problems Paternal Grandfather      Colon cancer Neg Hx      Liver disease Neg Hx     [4]   Social History  Tobacco Use    Smoking status: Never     Passive exposure: Never    Smokeless tobacco: Never   Vaping Use    Vaping status: Never Used   Substance Use Topics    Alcohol use: Yes     Comment: social    Drug use: No   [5]   Current Outpatient Medications:     apixaban (Eliquis) 5 mg, Take 1 tablet (5 mg total) by mouth 2 (two) times a day, Disp: 180 tablet, Rfl: 1    atorvastatin (LIPITOR) 40 mg tablet, Take 1 tablet (40 mg total) by mouth daily, Disp: 90 tablet, Rfl: 1    Cyanocobalamin (B-12) 1000 MCG SUBL, Place 1 tablet (1,000 mcg total) under the tongue in the morning For a month, then once a week for maintenance, Disp: 30 tablet, Rfl: 1    Diclofenac Sodium (VOLTAREN) 1 %, Apply 2 g topically 4 (four) times a day (Patient taking differently: Apply 2 g topically in the morning and 2 g at noon and 2 g in the evening and 2 g before bedtime. As needed.), Disp: 100 g, Rfl: 0    metoprolol succinate (TOPROL-XL) 25 mg 24 hr tablet, Take 12.5 mg by mouth in the morning., Disp: , Rfl:     Multiple Vitamin (MULTIVITAMIN) tablet, Take 1 tablet by mouth 3 (three) times a week, Disp: , Rfl:     omeprazole (PriLOSEC OTC) 20 MG tablet, Take 20 mg by mouth in the morning. 1/2 tablet dailly., Disp: , Rfl:   [6] No Known Allergies     flank pain

## 2025-06-18 ENCOUNTER — HOSPITAL ENCOUNTER (OUTPATIENT)
Dept: RADIOLOGY | Facility: HOSPITAL | Age: 70
Discharge: HOME | End: 2025-06-18
Payer: COMMERCIAL

## 2025-06-18 DIAGNOSIS — M23.91 ACUTE INTERNAL DERANGEMENT OF RIGHT KNEE: ICD-10-CM

## 2025-06-18 PROCEDURE — 73721 MRI JNT OF LWR EXTRE W/O DYE: CPT

## 2025-06-24 ENCOUNTER — OFFICE VISIT (OUTPATIENT)
Dept: OBGYN CLINIC | Facility: CLINIC | Age: 70
End: 2025-06-24
Payer: COMMERCIAL

## 2025-06-24 VITALS — HEIGHT: 73 IN | WEIGHT: 235 LBS | BODY MASS INDEX: 31.14 KG/M2

## 2025-06-24 DIAGNOSIS — S83.231D COMPLEX TEAR OF MEDIAL MENISCUS OF RIGHT KNEE AS CURRENT INJURY, SUBSEQUENT ENCOUNTER: Primary | ICD-10-CM

## 2025-06-24 PROCEDURE — 20610 DRAIN/INJ JOINT/BURSA W/O US: CPT | Performed by: ORTHOPAEDIC SURGERY

## 2025-06-24 PROCEDURE — 99214 OFFICE O/P EST MOD 30 MIN: CPT | Performed by: ORTHOPAEDIC SURGERY

## 2025-06-24 RX ADMIN — BUPIVACAINE HYDROCHLORIDE 4 ML: 5 INJECTION, SOLUTION EPIDURAL; INTRACAUDAL; PERINEURAL at 11:15

## 2025-06-24 RX ADMIN — TRIAMCINOLONE ACETONIDE 40 MG: 40 INJECTION, SUSPENSION INTRA-ARTICULAR; INTRAMUSCULAR at 11:15

## 2025-06-27 RX ORDER — TRIAMCINOLONE ACETONIDE 40 MG/ML
40 INJECTION, SUSPENSION INTRA-ARTICULAR; INTRAMUSCULAR
Status: COMPLETED | OUTPATIENT
Start: 2025-06-24 | End: 2025-06-24

## 2025-06-27 RX ORDER — BUPIVACAINE HYDROCHLORIDE 5 MG/ML
4 INJECTION, SOLUTION EPIDURAL; INTRACAUDAL; PERINEURAL
Status: COMPLETED | OUTPATIENT
Start: 2025-06-24 | End: 2025-06-24

## 2025-06-27 NOTE — PROGRESS NOTES
"Patient Name: Flaquito Nguyen      : 1955       MRN: [id number]   Encounter Provider: Ramos Betancourt MD   Encounter Date: 25  Encounter department: [facility]         Assessment & Plan  Complex tear of medial meniscus of right knee as current injury, subsequent encounter  Patient has a degenerative tear in the setting of mild arthritis.  We discussed operative and nonoperative treatment.  Operatively, I would recommend an arthroscopic partial medial meniscectomy.  He does have a foot fragment in the medial gutter.  I explained that arthroscopic surgery for this type of meniscus tear even in the setting osteoarthritis has a good chance of relieving some of the symptoms.  I did emphasize the fact that he is likely to still have some degree of knee pain even after the surgery due to his underlying arthritis which is likely progressed in the future with or without surgery.  He understood this well.  We also discussed nonoperative treatment which includes physical therapy, oral medications, activity modification bracing and injections.  At this time he opted improve proceed with a corticosteroid injection and physical therapy.  If this is not effective at relieving his symptoms we will revisit arthroscopic option in 6 weeks.  Orders:    Ambulatory Referral to Physical Therapy; Future      Large joint arthrocentesis: R knee    Performed by: Ramos Betancourt MD  Authorized by: Ramos Betancourt MD    Universal Protocol:  procedure performed by consultantConsent: Verbal consent obtained  Risks and benefits: risks, benefits and alternatives were discussed  Consent given by: patient  Time out: Immediately prior to procedure a \"time out\" was called to verify the correct patient, procedure, equipment, support staff and site/side marked as required.  Patient identity confirmed: verbally with patient  Supporting Documentation  Indications: pain     Is this a " "Visco injection? NoProcedure Details  Location: knee - R knee  Needle size: 22 G  Ultrasound guidance: no  Approach: superior  Medications administered: 4 mL bupivacaine (PF) 0.5 %; 40 mg triamcinolone acetonide 40 mg/mL    Patient tolerance: patient tolerated the procedure well with no immediate complications  Dressing:  Sterile dressing applied           _____________________________________________________  CHIEF COMPLAINT:  Chief Complaint   Patient presents with    Right Knee - Follow-up         SUBJECTIVE:  Flaquito Nguyen is a 70 y.o. male who returns for evaluation of his knee pain and review of his MRI.  No significant changes since his last visit    Right knee surgical History:  None    PAST MEDICAL HISTORY:  Past Medical History[1]    PAST SURGICAL HISTORY:  Past Surgical History[2]    FAMILY HISTORY:  Family History[3]    SOCIAL HISTORY:  Social History[4]    MEDICATIONS:  Current Medications[5]    ALLERGIES:  Allergies[6]    LABS:  HgA1c: No results found for: \"HGBA1C\"  BMP:   Lab Results   Component Value Date    CALCIUM 9.3 04/16/2025    K 4.4 04/16/2025    CO2 26 04/16/2025     04/16/2025    BUN 17 04/16/2025    CREATININE 0.89 04/16/2025     CBC: No components found for: \"CBC\"    _____________________________________________________  Review of systems: ROS is negative other than that noted in the HPI.  Constitutional: Negative for fatigue and fever.   HENT: Negative for sore throat.    Respiratory: Negative for shortness of breath.    Cardiovascular: Negative for chest pain.   Gastrointestinal: Negative for abdominal pain.   Endocrine: Negative for cold intolerance and heat intolerance.   Genitourinary: Negative for flank pain.   Musculoskeletal: Negative for back pain.   Skin: Negative for rash.   Allergic/Immunologic: Negative for immunocompromised state.   Neurological: Negative for dizziness.   Psychiatric/Behavioral: Negative for agitation.     Knee Exam:   No significant skin lesions or " deformity  No significant joint effusion  Able to straight leg raise without extensor lag  Range of motion from 0 to 120+  Posterior medial joint line tenderness.  No lateral joint line or extensor mechanism tenderness  Positive Micah's  Knee is stable to varus stress, valgus stress, Lachman, and posterior drawer.    Calf compartments are soft and supple  Negative José Luis's sign  2+ DP and PT pulses with brisk capillary refill to the toes  Sural, saphenous, tibial, superficial, and deep peroneal motor and sensory distributions intact  Sensation light touch intact distally      Physical exam:  General/Constitutional: NAD, well developed, well nourished  HENT: Normocephalic, atraumatic  CV: Intact distal pulses, regular rate  Resp: No respiratory distress or labored breathing  Abdomen: soft, nondistended   Lymphatic: No lymphadenopathy palpated  Neuro: Alert and Oriented x 3, no focal deficits  Psych: Normal mood, normal affect  Skin: Warm, dry, no rashes, no erythema  _____________________________________________________  STUDIES REVIEWED:  X-rays of the right knee reviewed and interpreted today. These show no acute osseous abnormalities. Mild-moderate medial compartment degenerative changes, including joint space narrowing. The patella is well-centered on the trochlea.      MRI reviewed interpreted showing a complex tear of the medial meniscus with flipped fragment in the medial gutter.  There is also mild medial compartment osteoarthritis.    PROCEDURES PERFORMED:    No procedures performed today.    Scribe Attestation      I,:   am acting as a scribe while in the presence of the attending physician.:       I,:   personally performed the services described in this documentation    as scribed in my presence.:                    [1]   Past Medical History:  Diagnosis Date    Calcific tendinitis 02/27/2018    Cataracts, bilateral     Chronic deep vein thrombosis (DVT) of left femoral vein (HCC) 3/6/2018    Chronic  pulmonary embolism with acute cor pulmonale (HCC) 3/6/2018    DVT (deep venous thrombosis) ([facility]) 03/2018    left calf-traveled to the lung-PE-given heparin    GERD (gastroesophageal reflux disease)     Hyperlipidemia     Neck pain     Sacroiliitis ([facility]) 3/29/2023    Wears glasses    [2]   Past Surgical History:  Procedure Laterality Date    CATARACT EXTRACTION      CERVICAL FUSION  2007    with bone spur removal also- C5,6,7 fusion plate    COLONOSCOPY      EPIDURAL BLOCK INJECTION N/A 11/22/2019    Procedure: C6 C7 Cervical Epidural Steroid Injection (36436);  Surgeon: Fredi Wei MD;  Location: [facility] MAIN OR;  Service: Pain Management     HERNIA REPAIR      umbilical    INCISION AND DRAINAGE OF WOUND Left 07/24/2023    Procedure: INCISION AND DRAINAGE (I&D) EXTREMITY;  Surgeon: Ramos Betancourt MD;  Location: [facility] MAIN OR;  Service: Orthopedics    LUMBAR EPIDURAL INJECTION N/A 02/24/2023    Procedure: L5 S1 LUMBAR epidural steroid injection (89863);  Surgeon: Cristofer Felipe DO;  Location: [facility] MAIN OR;  Service: Pain Management     NECK SURGERY  2007    [place] COLONOSCOPY FLX DX W/COLLJ SPEC WHEN PFRMD N/A 07/31/2018    Procedure: COLONOSCOPY;  Surgeon: Frandy Mckeon MD;  Location: [facility] GI LAB;  Service: Gastroenterology    WV RPR UMBILICAL HRNA 5 YRS/> REDUCIBLE N/A 12/10/2019    Procedure: REPAIR HERNIA UMBILICAL;  Surgeon: Salvatore Ricci MD;  Location: [facility] MAIN OR;  Service: General    [place] XCAPSL CTRC RMVL INSJ IO LENS PROSTH W/O ECP Right 03/16/2020    Procedure: EXTRACTION EXTRACAPSULAR CATARACT PHACO INTRAOCULAR LENS (IOL);  Surgeon: Bj Richard MD;  Location: [facility] MAIN OR;  Service: Ophthalmology    [place] XCAPSL CTRC RMVL INSJ IO LENS PROSTH W/O ECP Left 03/23/2020    Procedure: EXTRACTION EXTRACAPSULAR CATARACT PHACO INTRAOCULAR LENS (IOL);  Surgeon: Bj Richard MD;  Location: [facility] MAIN OR;  Service: Ophthalmology    SHOULDER SURGERY Right     labrum repair, arthritis removal    ULNAR  NERVE TRANSPOSITION Right    [3]   Family History  Problem Relation Name Age of Onset    Fibromyalgia Mother      Thyroid disease unspecified Mother      Diabetes Father Flaquito     Heart disease Father Flaquito         CHF    No Known Problems Sister      No Known Problems Brother      No Known Problems Daughter      No Known Problems Son      No Known Problems Maternal Aunt      No Known Problems Maternal Uncle      No Known Problems Paternal Aunt      No Known Problems Paternal Uncle      Cancer Maternal Grandmother Gerri Olson     Cancer Maternal Grandfather Conrad Olson     No Known Problems Paternal Grandmother      No Known Problems Paternal Grandfather      Colon cancer Neg Hx      Liver disease Neg Hx     [4]   Social History  Tobacco Use    Smoking status: Never     Passive exposure: Never    Smokeless tobacco: Never   Vaping Use    Vaping status: Never Used   Substance Use Topics    Alcohol use: Yes     Comment: social    Drug use: No   [5]   Current Outpatient Medications:     apixaban (Eliquis) 5 mg, Take 1 tablet (5 mg total) by mouth 2 (two) times a day, Disp: 180 tablet, Rfl: 1    atorvastatin (LIPITOR) 40 mg tablet, Take 1 tablet (40 mg total) by mouth daily, Disp: 90 tablet, Rfl: 1    Cyanocobalamin (B-12) 1000 MCG SUBL, Place 1 tablet (1,000 mcg total) under the tongue in the morning For a month, then once a week for maintenance, Disp: 30 tablet, Rfl: 1    metoprolol succinate (TOPROL-XL) 25 mg 24 hr tablet, Take 12.5 mg by mouth in the morning., Disp: , Rfl:     Multiple Vitamin (MULTIVITAMIN) tablet, Take 1 tablet by mouth 3 (three) times a week, Disp: , Rfl:     omeprazole (PriLOSEC OTC) 20 MG tablet, Take 20 mg by mouth in the morning. 1/2 tablet cheri., Disp: , Rfl:     Diclofenac Sodium (VOLTAREN) 1 %, Apply 2 g topically 4 (four) times a day (Patient taking differently: Apply 2 g topically in the morning and 2 g at noon and 2 g in the evening and 2 g before bedtime. As needed.),  Disp: 100 g, Rfl: 0  [6] No Known Allergies

## 2025-07-02 ENCOUNTER — OFFICE VISIT (OUTPATIENT)
Age: 70
End: 2025-07-02

## 2025-07-02 VITALS
SYSTOLIC BLOOD PRESSURE: 133 MMHG | HEART RATE: 70 BPM | RESPIRATION RATE: 18 BRPM | WEIGHT: 232 LBS | DIASTOLIC BLOOD PRESSURE: 86 MMHG | TEMPERATURE: 98.6 F | OXYGEN SATURATION: 97 % | BODY MASS INDEX: 30.75 KG/M2 | HEIGHT: 73 IN

## 2025-07-02 DIAGNOSIS — M25.561 CHRONIC PAIN OF BOTH KNEES: ICD-10-CM

## 2025-07-02 DIAGNOSIS — I48.0 PAROXYSMAL ATRIAL FIBRILLATION (HCC): Primary | ICD-10-CM

## 2025-07-02 DIAGNOSIS — M48.02 SPINAL STENOSIS IN CERVICAL REGION: ICD-10-CM

## 2025-07-02 DIAGNOSIS — G89.29 CHRONIC PAIN OF BOTH KNEES: ICD-10-CM

## 2025-07-02 DIAGNOSIS — M25.562 CHRONIC PAIN OF BOTH KNEES: ICD-10-CM

## 2025-07-02 DIAGNOSIS — G47.33 OSA (OBSTRUCTIVE SLEEP APNEA): ICD-10-CM

## 2025-07-02 PROBLEM — I82.512 CHRONIC DEEP VEIN THROMBOSIS (DVT) OF LEFT FEMORAL VEIN (HCC): Chronic | Status: RESOLVED | Noted: 2018-03-06 | Resolved: 2025-07-02

## 2025-07-02 PROBLEM — K42.9 UMBILICAL HERNIA WITHOUT OBSTRUCTION AND WITHOUT GANGRENE: Status: RESOLVED | Noted: 2019-11-01 | Resolved: 2025-07-02

## 2025-07-02 PROBLEM — K21.00 GASTROESOPHAGEAL REFLUX DISEASE WITH ESOPHAGITIS: Status: RESOLVED | Noted: 2018-02-26 | Resolved: 2025-07-02

## 2025-07-02 PROBLEM — S80.02XA HEMATOMA OF LEFT KNEE REGION: Status: RESOLVED | Noted: 2023-07-24 | Resolved: 2025-07-02

## 2025-07-02 PROBLEM — M96.1 CERVICAL POST-LAMINECTOMY SYNDROME: Status: RESOLVED | Noted: 2019-11-07 | Resolved: 2025-07-02

## 2025-07-02 PROCEDURE — G0439 PPPS, SUBSEQ VISIT: HCPCS | Performed by: FAMILY MEDICINE

## 2025-07-02 NOTE — PROGRESS NOTES
Name: Flaquito Nguyen      : 1955      MRN: [id number]  Encounter Provider: Sebastián Altamirano MD  Encounter Date: 2025   Encounter department: [facility]  :  Assessment & Plan       Preventive health issues were discussed with patient, and age appropriate screening tests were ordered as noted in patient's After Visit Summary. Personalized health advice and appropriate referrals for health education or preventive services given if needed, as noted in patient's After Visit Summary.    History of Present Illness     HPI   Patient Care Team:  Sebastián Altamirano MD as PCP - General (Family Medicine)  Sebastián Altamirano MD as PCP - PCP-[facility] ([facility])  Chris Harmon MD (Vascular Surgery)  Frandy Mckeon MD (Gastroenterology)  Frandy Mckeon MD as Endoscopist    Review of Systems  Medical History Reviewed by provider this encounter:       Annual Wellness Visit Questionnaire   Flaquito is here for his Subsequent Wellness visit.     Health Risk Assessment:   Patient rates overall health as very good. Patient feels that their physical health rating is same. Patient is very satisfied with their life. Eyesight was rated as slightly worse. Hearing was rated as same. Patient feels that their emotional and mental health rating is same. Patients states they are never, rarely angry. Patient states they are sometimes unusually tired/fatigued. Pain experienced in the last 7 days has been some. Patient's pain rating has been 6/10. Patient states that he has experienced no weight loss or gain in last 6 months.     Depression Screening:   PHQ-2 Score: 0      Fall Risk Screening:   In the past year, patient has experienced: no history of falling in past year      Home Safety:  Patient does not have trouble with stairs inside or outside of their home. Patient has working smoke alarms and has working carbon monoxide detector. Home safety hazards include: none.     Nutrition:   Current diet is  Regular.     Medications:   Patient is not currently taking any over-the-counter supplements. Patient is able to manage medications.     Activities of Daily Living (ADLs)/Instrumental Activities of Daily Living (IADLs):   Walk and transfer into and out of bed and chair?: Yes  Dress and groom yourself?: Yes    Bathe or shower yourself?: Yes    Feed yourself? Yes  Do your laundry/housekeeping?: Yes  Manage your money, pay your bills and track your expenses?: Yes  Make your own meals?: Yes    Do your own shopping?: Yes    Previous Hospitalizations:   Any hospitalizations or ED visits within the last 12 months?: No      Advance Care Planning:   Living will: Yes    Durable POA for healthcare: Yes    Advanced directive: Yes      Preventive Screenings      Cardiovascular Screening:    General: Screening Not Indicated and History Lipid Disorder      Diabetes Screening:     General: Screening Current      Colorectal Cancer Screening:     General: Screening Current      Abdominal Aortic Aneurysm (AAA) Screening:    Risk factors include: age between 65-76 yo        Lung Cancer Screening:     General: Screening Not Indicated      Hepatitis C Screening:    General: Screening Current    Screening, Brief Intervention, and Referral to Treatment (SBIRT)     Screening  Typical number of drinks in a day: 1  Typical number of drinks in a week: 7  Interpretation: Low risk drinking behavior.    AUDIT-C Screenin) How often did you have a drink containing alcohol in the past year? 4 or more times a week  2) How many drinks did you have on a typical day when you were drinking in the past year? 1 to 2  3) How often did you have 6 or more drinks on one occasion in the past year? less than monthly    AUDIT-C Score: 5  Interpretation: Score 4-12 (male): POSITIVE screen for alcohol misuse    AUDIT Screenin) How often during the last year have you found that you were not able to stop drinking once you had started? 0 - never  5) How  often during the last year have you failed to do what was normally expected from you because of drinking? 0 - never  6) How often during the last year have you needed a first drink in the morning to get yourself going after a heavy drinking session? 0 - never  7) How often during the last year have you had a feeling of guilt or remorse after drinking? 0 - never  8) How often during the last year have you been unable to remember what happened the night before because you had been drinking? 0 - never  9) Have you or someone else been injured as a result of your drinking? 0 - no  10) Has a relative or friend or a doctor or another health worker been concerned about your drinking or suggested you cut down? 0 - no    AUDIT Score: 5  Interpretation: Low risk alcohol consumption    Single Item Drug Screening:  How often have you used an illegal drug (including marijuana) or a prescription medication for non-medical reasons in the past year? never    Single Item Drug Screen Score: 0  Interpretation: Negative screen for possible drug use disorder    Social Drivers of Health     Financial Resource Strain: Low Risk  (7/2/2025)    Overall Financial Resource Strain (CARDIA)     Difficulty of Paying Living Expenses: Not hard at all   Food Insecurity: No Food Insecurity (7/2/2025)    Nursing - Inadequate Food Risk Classification     Worried About Running Out of Food in the Last Year: Never true     Ran Out of Food in the Last Year: Never true   Transportation Needs: No Transportation Needs (7/2/2025)    PRAPARE - Transportation     Lack of Transportation (Medical): No     Lack of Transportation (Non-Medical): No   Housing Stability: Low Risk  (7/2/2025)    Housing Stability Vital Sign     Unable to Pay for Housing in the Last Year: No     Number of Times Moved in the Last Year: 0     Homeless in the Last Year: No   Utilities: Not At Risk (7/2/2025)    [facility] Utilities     Threatened with loss of utilities: No     No results  "found.    Objective   /86 (BP Location: Left arm, Patient Position: Sitting, Cuff Size: Standard)   Pulse 70   Temp 98.6 °F (37 °C) (Tympanic)   Resp 18   Ht 6' 1\" (1.854 m)   Wt 105 kg (232 lb)   SpO2 97%   BMI 30.61 kg/m²     Physical Exam    "

## 2025-07-02 NOTE — PATIENT INSTRUCTIONS
Medicare Preventive Visit Patient Instructions  Thank you for completing your Welcome to Medicare Visit or Medicare Annual Wellness Visit today. Your next wellness visit will be due in one year (7/3/2026).  The screening/preventive services that you may require over the next 5-10 years are detailed below. Some tests may not apply to you based off risk factors and/or age. Screening tests ordered at today's visit but not completed yet may show as past due. Also, please note that scanned in results may not display below.  Preventive Screenings:  Service Recommendations Previous Testing/Comments   Colorectal Cancer Screening  Colonoscopy    Fecal Occult Blood Test (FOBT)/Fecal Immunochemical Test (FIT)  Fecal DNA/Cologuard Test  Flexible Sigmoidoscopy Age: 45-75 years old   Colonoscopy: every 10 years (May be performed more frequently if at higher risk)  OR  FOBT/FIT: every 1 year  OR  Cologuard: every 3 years  OR  Sigmoidoscopy: every 5 years  Screening may be recommended earlier than age 45 if at higher risk for colorectal cancer. Also, an individualized decision between you and your healthcare provider will decide whether screening between the ages of 76-85 would be appropriate. Colonoscopy: 12/21/2022  FOBT/FIT: Not on file  Cologuard: Not on file  Sigmoidoscopy: Not on file    Screening Current     Prostate Cancer Screening Individualized decision between patient and health care provider in men between ages of 55-69   Medicare will cover every 12 months beginning on the day after your 50th birthday PSA: No results in last 5 years           Hepatitis C Screening Once for adults born between 1945 and 1965  More frequently in patients at high risk for Hepatitis C Hep C Antibody: 05/31/2022    Screening Current   Diabetes Screening 1-2 times per year if you're at risk for diabetes or have pre-diabetes Fasting glucose: 96 mg/dL (4/16/2025)  A1C: No results in last 5 years (No results in last 5 years)  Screening Current    Cholesterol Screening Once every 5 years if you don't have a lipid disorder. May order more often based on risk factors. Lipid panel: 04/16/2025  Screening Not Indicated  History Lipid Disorder      Other Preventive Screenings Covered by Medicare:  Abdominal Aortic Aneurysm (AAA) Screening: covered once if your at risk. You're considered to be at risk if you have a family history of AAA or a male between the age of 65-75 who smoking at least 100 cigarettes in your lifetime.  Lung Cancer Screening: covers low dose CT scan once per year if you meet all of the following conditions: (1) Age 55-77; (2) No signs or symptoms of lung cancer; (3) Current smoker or have quit smoking within the last 15 years; (4) You have a tobacco smoking history of at least 20 pack years (packs per day x number of years you smoked); (5) You get a written order from a healthcare provider.  Glaucoma Screening: covered annually if you're considered high risk: (1) You have diabetes OR (2) Family history of glaucoma OR (3)  aged 50 and older OR (4)  American aged 65 and older  Osteoporosis Screening: covered every 2 years if you meet one of the following conditions: (1) Have a vertebral abnormality; (2) On glucocorticoid therapy for more than 3 months; (3) Have primary hyperparathyroidism; (4) On osteoporosis medications and need to assess response to drug therapy.  HIV Screening: covered annually if you're between the age of 15-65. Also covered annually if you are younger than 15 and older than 65 with risk factors for HIV infection. For pregnant patients, it is covered up to 3 times per pregnancy.    Immunizations:  Immunization Recommendations   Influenza Vaccine Annual influenza vaccination during flu season is recommended for all persons aged >= 6 months who do not have contraindications   Pneumococcal Vaccine   * Pneumococcal conjugate vaccine = PCV13 (Prevnar 13), PCV15 (Vaxneuvance), PCV20 (Prevnar 20)  *  Pneumococcal polysaccharide vaccine = PPSV23 (Pneumovax) Adults 19-63 yo with certain risk factors or if 65+ yo  If never received any pneumonia vaccine: recommend Prevnar 20 (PCV20)  Give PCV20 if previously received 1 dose of PCV13 or PPSV23   Hepatitis B Vaccine 3 dose series if at intermediate or high risk (ex: diabetes, end stage renal disease, liver disease)   Respiratory syncytial virus (RSV) Vaccine - COVERED BY MEDICARE PART D  * RSVPreF3 (Arexvy) CDC recommends that adults 60 years of age and older may receive a single dose of RSV vaccine using shared clinical decision-making (SCDM)   Tetanus (Td) Vaccine - COST NOT COVERED BY MEDICARE PART B Following completion of primary series, a booster dose should be given every 10 years to maintain immunity against tetanus. Td may also be given as tetanus wound prophylaxis.   Tdap Vaccine - COST NOT COVERED BY MEDICARE PART B Recommended at least once for all adults. For pregnant patients, recommended with each pregnancy.   Shingles Vaccine (Shingrix) - COST NOT COVERED BY MEDICARE PART B  2 shot series recommended in those 19 years and older who have or will have weakened immune systems or those 50 years and older     Health Maintenance Due:      Topic Date Due   • Colorectal Cancer Screening  12/20/2027   • Hepatitis C Screening  Completed     Immunizations Due:      Topic Date Due   • COVID-19 Vaccine (7 - 2024-25 season) 09/01/2024   • Influenza Vaccine (1) 09/01/2025     Advance Directives   What are advance directives?  Advance directives are legal documents that state your wishes and plans for medical care. These plans are made ahead of time in case you lose your ability to make decisions for yourself. Advance directives can apply to any medical decision, such as the treatments you want, and if you want to donate organs.   What are the types of advance directives?  There are many types of advance directives, and each state has rules about how to use them. You  may choose a combination of any of the following:  Living will:  This is a written record of the treatment you want. You can also choose which treatments you do not want, which to limit, and which to stop at a certain time. This includes surgery, medicine, IV fluid, and tube feedings.   Durable power of  for healthcare (DPAHC):  This is a written record that states who you want to make healthcare choices for you when you are unable to make them for yourself. This person, called a proxy, is usually a family member or a friend. You may choose more than 1 proxy.  Do not resuscitate (DNR) order:  A DNR order is used in case your heart stops beating or you stop breathing. It is a request not to have certain forms of treatment, such as CPR. A DNR order may be included in other types of advance directives.  Medical directive:  This covers the care that you want if you are in a coma, near death, or unable to make decisions for yourself. You can list the treatments you want for each condition. Treatment may include pain medicine, surgery, blood transfusions, dialysis, IV or tube feedings, and a ventilator (breathing machine).  Values history:  This document has questions about your views, beliefs, and how you feel and think about life. This information can help others choose the care that you would choose.  Why are advance directives important?  An advance directive helps you control your care. Although spoken wishes may be used, it is better to have your wishes written down. Spoken wishes can be misunderstood, or not followed. Treatments may be given even if you do not want them. An advance directive may make it easier for your family to make difficult choices about your care.   Weight Management   Why it is important to manage your weight:  Being overweight increases your risk of health conditions such as heart disease, high blood pressure, type 2 diabetes, and certain types of cancer. It can also increase your  risk for osteoarthritis, sleep apnea, and other respiratory problems. Aim for a slow, steady weight loss. Even a small amount of weight loss can lower your risk of health problems.  How to lose weight safely:  A safe and healthy way to lose weight is to eat fewer calories and get regular exercise. You can lose up about 1 pound a week by decreasing the number of calories you eat by 500 calories each day.   Healthy meal plan for weight management:  A healthy meal plan includes a variety of foods, contains fewer calories, and helps you stay healthy. A healthy meal plan includes the following:  Eat whole-grain foods more often.  A healthy meal plan should contain fiber. Fiber is the part of grains, fruits, and vegetables that is not broken down by your body. Whole-grain foods are healthy and provide extra fiber in your diet. Some examples of whole-grain foods are whole-wheat breads and pastas, oatmeal, brown rice, and bulgur.  Eat a variety of vegetables every day.  Include dark, leafy greens such as spinach, kale, daljit greens, and mustard greens. Eat yellow and orange vegetables such as carrots, sweet potatoes, and winter squash.   Eat a variety of fruits every day.  Choose fresh or canned fruit (canned in its own juice or light syrup) instead of juice. Fruit juice has very little or no fiber.  Eat low-fat dairy foods.  Drink fat-free (skim) milk or 1% milk. Eat fat-free yogurt and low-fat cottage cheese. Try low-fat cheeses such as mozzarella and other reduced-fat cheeses.  Choose meat and other protein foods that are low in fat.  Choose beans or other legumes such as split peas or lentils. Choose fish, skinless poultry (chicken or turkey), or lean cuts of red meat (beef or pork). Before you cook meat or poultry, cut off any visible fat.   Use less fat and oil.  Try baking foods instead of frying them. Add less fat, such as margarine, sour cream, regular salad dressing and mayonnaise to foods. Eat fewer high-fat  "foods. Some examples of high-fat foods include french fries, doughnuts, ice cream, and cakes.  Eat fewer sweets.  Limit foods and drinks that are high in sugar. This includes candy, cookies, regular soda, and sweetened drinks.  Exercise:  Exercise at least 30 minutes per day on most days of the week. Some examples of exercise include walking, biking, dancing, and swimming. You can also fit in more physical activity by taking the stairs instead of the elevator or parking farther away from stores. Ask your healthcare provider about the best exercise plan for you.   Alcohol Use and Your Health    Drinking too much can harm your health.  Excessive alcohol use leads to about 88,000 death in the [place] each year, and shortens the life of those who diet by almost 30 years.  Further, excessive drinking cost the economy $249 billion in 2010.  Most excessive drinkers are not alcohol dependent.    Excessive alcohol use has immediate effects that increase the risk of many harmful health conditions.  These are most often the result of binge drinking.  Over time, excessive alcohol use can lead to the development of chronic diseases and other series health problems.    What is considered a \"drink\"?        Excessive alcohol use includes:  Binge Drinking: For women, 4 or more drinks consumed on one occasion. For men, 5 or more drinks consumed on one occasion.  Heavy Drinking: For women, 8 or more drinks per week. For men, 15 or more drinks per week  Any alcohol used by pregnant women  Any alcohol used by those under the age of 21 years    If you choose to drink, do so in moderation:  Do not drink at all if you are under the age of 21, or if you are or may be pregnant, or have health problems that could be made worse by drinking.  For women, up to 1 drink per day  For men, up to 2 drinks a day    No one should begin drinking or drink more frequently based on potential health benefits    Short-Term Health Risks:  Injuries: " motor vehicle crashes, falls, drownings, burns  Violence: homicide, suicide, sexual assault, intimate partner violence  Alcohol poisoning  Reproductive health: risky sexual behaviors, unintended prengnacy, sexually transmitted diseases, miscarriage, stillbirth, fetal alcohol syndrome    Long-Term Health Risks:  Chronic diseases: high blood pressure, heart disease, stroke, liver disease, digestive problems  Cancers: breast, mouth and throat, liver, colon  Learning and memory problems: dementia, poor school performance  Mental health: depression, anxiety, insomnia  Social problems: lost productivity, family problems, unemployment  Alcohol dependence    For support and more information:  Substance Abuse and Mental Health Services Administration  [address]  [place] [address]  Web Address: http://www.samhsa.gov    Alcoholics Anonymous        Web Address: http://www.aa.org    https://www.cdc.gov/alcohol/fact-sheets/alcohol-use.htm   © Copyright [employer] 2018 Information is for End User's use only and may not be sold, redistributed or otherwise used for commercial purposes. All illustrations and images included in CareNotes® are the copyrighted property of A.D.A.M., Inc. or [employer]

## 2025-07-03 ENCOUNTER — PATIENT OUTREACH (OUTPATIENT)
Age: 70
End: 2025-07-03

## 2025-07-03 NOTE — PROGRESS NOTES
Outpatient Care Management Note:    Re: chronic care management     I received referral and reviewed chart. Devante has history of atrial fibrillation, PETER cardioversion , NSTEMI and knee pain . I called Devante and left a voice message.  I provided my name, role and contact information . I requested a return call . I will follow up next week for second outreach.

## 2025-07-07 ENCOUNTER — PATIENT OUTREACH (OUTPATIENT)
Age: 70
End: 2025-07-07

## 2025-07-07 NOTE — LETTER
Flaquito Wendy  [address] [address]    Dear Mr. Nguyen,    [facility] would like to invite you to participate in our Chronic Care Management program. Our goal is to ensure you have the tools and information you need to make healthy healthcare decisions.   We will pair you with a Care Manager who will reach out to you via telephone and help you to create and achieve health goals for yourself.   Please review the flyer on the Chronic Care Management program. If you wish to enroll in the program or have further questions please call the office’s Care Manager at [phone]  We are happy to assist you in becoming a healthier you.  Sincerely,      Your Care Team        Chronic Care Management Program:  [facility]’s Chronic Care Management program is a way to ensure you are receiving the most comprehensive care possible. You will have a dedicated team that includes your Primary Care Provider, office staff, and care management staff who will work with you to develop a care plan that meets your health care goals.  Chronic Care Management (CCM) is defined as the non-face-to-face services provided to Medicare recipients who have multiple (two or more), significant chronic conditions.  Chronic conditions are ongoing medical problems that must be managed effectively in a partnership between you and your health care team to maintain the best possible health.  Examples of chronic conditions include but are not limited to:  Arthritis High Blood Pressure   Asthma Heart Disease   Chronic Obstructive Pulmonary Disease (COPD) Obesity   Depression Osteoporosis   Diabetes      What is Chronic Care Management?  By participating in a call each month, your physician and primary wellness team will carefully monitor and provide comprehensive care for your chronic health conditions in a thoughtful and personalized way.  This call will be in addition to the care received during your regular office visits.   Federal regulations now enable Medicare to pay for chronic care management.    What are the benefits of Chronic Care Management?  Enhanced access to your primary care team  Close monitoring of your medication  Personalized, comprehensive care plan for all of your health needs  Coordinated care by your primary care wellness team and other health providers, including care you may receive at other locations, such as the hospital, other care facilities or your home.    What do you need to know before signing up?  Based on your current insurance plan, this type of personalized care may require you to pay  (your Medicare co-pay amount) to your primary care practice each month that you receive chronic care management.  The service is also subject to your Medicare deductible.  You must also sign an agreement to receive this type of chronic care management.  You may withdraw from this program at any time.  [facility] provides comprehensive care, excellent service, along with the compassion and personal attention you deserve.  Managing your chronic conditions is an important part of your health care, at any age.

## 2025-07-07 NOTE — PROGRESS NOTES
Outpatient Care Management Note:    Re: chronic care management     I attempted second outreach. I left a voice message and requested a return call . I also sent CCM information through my chart. I will close this referral . If assistance needed in future please put in new referral .

## (undated) DEVICE — DISPOSABLE BIOPSY VALVE MAJ-1555: Brand: SINGLE USE BIOPSY VALVE (STERILE)

## (undated) DEVICE — BRUSH ENDO CLEANING DBL-HEADER

## (undated) DEVICE — AIR INJECT CANNULA 27GA: Brand: OPHTHALMIC CANNULA

## (undated) DEVICE — ANTIBACTERIAL VIOLET BRAIDED (POLYGLACTIN 910), SYNTHETIC ABSORBABLE SUTURE: Brand: COATED VICRYL

## (undated) DEVICE — GAUZE SPONGES,16 PLY: Brand: CURITY

## (undated) DEVICE — TUBING AUX CHANNEL

## (undated) DEVICE — GLOVE SRG BIOGEL 7

## (undated) DEVICE — CLEARCUT® SLIT KNIFE INTREPID MICRO-COAXIAL SYSTEM 2.4 SB: Brand: CLEARCUT®; INTREPID

## (undated) DEVICE — DRAPE SHEET THREE QUARTER

## (undated) DEVICE — INTREPID® TRANSFORMER IA HP: Brand: INTREPID®

## (undated) DEVICE — SMALL NEEDLE COUNTER NEST

## (undated) DEVICE — GLOVE SRG BIOGEL 8

## (undated) DEVICE — BRACHIAL PLEXUS SET

## (undated) DEVICE — TIBURON LAPAROTOMY DRAPE: Brand: CONVERTORS

## (undated) DEVICE — "MAJ-901 WATER CONTAINER SET CV-160/140": Brand: WATER CONTAINER

## (undated) DEVICE — GLOVE SRG BIOGEL 7.5

## (undated) DEVICE — LUBRICANT SURGILUBE TUBE 4 OZ  FLIP TOP

## (undated) DEVICE — GLOVE INDICATOR PI UNDERGLOVE SZ 8 BLUE

## (undated) DEVICE — CHLORAPREP APPLICATOR TINTED 10.5ML ONE-STEP

## (undated) DEVICE — BASIC DOUBLE BASIN 2-LF: Brand: MEDLINE INDUSTRIES, INC.

## (undated) DEVICE — FABRIC REINFORCED, SURGICAL GOWN, XL: Brand: CONVERTORS

## (undated) DEVICE — COBAN 4 IN STERILE

## (undated) DEVICE — PADDING CAST 4 IN  COTTON STRL

## (undated) DEVICE — NEEDLE BLUNT 18 G X 1 1/2 W FILTER

## (undated) DEVICE — RADIOLOGY STERILE LABELS: Brand: CENTURION

## (undated) DEVICE — IV SET EXT SM BORE CARESITE 8IN

## (undated) DEVICE — "MH-438 A/W VLVE F/140 EVIS-140": Brand: AIR/WATER VALVE

## (undated) DEVICE — MICROSURGICAL INSTRUMENT IRR. CYSTITOME 25GA STRAIGHT-REVERSE CUTTING: Brand: ALCON

## (undated) DEVICE — STOCKINETTE,IMPERVIOUS,12X48,STERILE: Brand: MEDLINE

## (undated) DEVICE — INTENDED FOR TISSUE SEPARATION, AND OTHER PROCEDURES THAT REQUIRE A SHARP SURGICAL BLADE TO PUNCTURE OR CUT.: Brand: BARD-PARKER SAFETY BLADES SIZE 10, STERILE

## (undated) DEVICE — SOLIDIFIER FLUID WASTE CONTROL 1500ML

## (undated) DEVICE — EYE PACK CUSTOM -FINNEGAN

## (undated) DEVICE — GLOVE SRG BIOGEL 6.5

## (undated) DEVICE — CHLORAPREP HI-LITE 26ML ORANGE

## (undated) DEVICE — CULTURE TUBE AEROBIC

## (undated) DEVICE — 3M™ TEGADERM™ TRANSPARENT FILM DRESSING FRAME STYLE, 1627, 4 IN X 10 IN (10 CM X 25 CM), 20/CT 4CT/CASE: Brand: 3M™ TEGADERM™

## (undated) DEVICE — TOWEL SET X-RAY

## (undated) DEVICE — INTENDED FOR TISSUE SEPARATION, AND OTHER PROCEDURES THAT REQUIRE A SHARP SURGICAL BLADE TO PUNCTURE OR CUT.: Brand: BARD-PARKER SAFETY BLADES SIZE 15, STERILE

## (undated) DEVICE — SUT MONOCRYL 4-0 PS-2 27 IN Y426H

## (undated) DEVICE — DRAPE UTILITY

## (undated) DEVICE — SYRINGE 5ML LL

## (undated) DEVICE — CULTURE TUBE ANAEROBIC

## (undated) DEVICE — B-H IRRIGATING CAN 19GA FLAT ANGLED 8MM: Brand: OPHTHALMIC CANNULA

## (undated) DEVICE — 3M™ STERI-DRAPE™ U-DRAPE 1015: Brand: STERI-DRAPE™

## (undated) DEVICE — PLASTIC ADHESIVE BANDAGE: Brand: CURITY

## (undated) DEVICE — 3M™ TEGADERM™ TRANSPARENT FILM DRESSING FRAME STYLE, 1626W, 4 IN X 4-3/4 IN (10 CM X 12 CM), 50/CT 4CT/CASE: Brand: 3M™ TEGADERM™

## (undated) DEVICE — GLOVE INDICATOR PI UNDERGLOVE SZ 7.5 BLUE

## (undated) DEVICE — TUBING BUBBLE CLEAR 5MM X 100 FT NS

## (undated) DEVICE — SYRINGE EPI 8ML LUER SLIP LOSS OF RESISTANCE PLASTIC PERFIX

## (undated) DEVICE — CURITY NON-ADHERENT STRIPS: Brand: CURITY

## (undated) DEVICE — GLOVE EXAM NON-STRL NTRL PLUS LRG PF

## (undated) DEVICE — WEBRIL 6 IN UNSTERILE

## (undated) DEVICE — GENERAL/ PLASTIC TRAY STANDARD: Brand: DEROYAL

## (undated) DEVICE — SUT ETHILON 3-0 INFS-1 30 IN 669H

## (undated) DEVICE — "MH-443 SUCTION VALVE F/EVIS140 EVIS160": Brand: SUCTION VALVE

## (undated) DEVICE — ABDOMINAL PAD: Brand: DERMACEA

## (undated) DEVICE — TRAY EPIDURAL PERIFIX 20GA X 3.5IN TUOHY 8ML

## (undated) DEVICE — PAD CAST 4 IN COTTON NON STERILE

## (undated) DEVICE — GLOVE INDICATOR PI UNDERGLOVE SZ 6.5 BLUE

## (undated) DEVICE — SPONGE LAP 18 X 18 IN STRL RFD

## (undated) DEVICE — BIPOLAR CORD DISP

## (undated) DEVICE — THE MONARCH® "D" CARTRIDGE IS A SINGLE-USE POLYPROPYLENE CARTRIDGE FOR POSTERIOR CHAMBER IOL DELIVERY: Brand: MONARCH® III

## (undated) DEVICE — ASTOUND SURGICAL GOWN, XXX LARGE, X-LONG: Brand: CONVERTORS

## (undated) DEVICE — SUT CHROMIC 0 CT 36 IN 914H

## (undated) DEVICE — SPONGE GAUZE 4 X 9

## (undated) DEVICE — ACE WRAP 4 IN STERILE

## (undated) DEVICE — SUT PDS II 0 CT-2 27 IN Z334H

## (undated) DEVICE — ACTIVE FMS W/ INTREPID* ULTRA SLEEVES, 0.9MM 45° ABS* INTREPID* BALANCED TIP: Brand: ALCON

## (undated) DEVICE — ASTOUND STANDARD SURGICAL GOWN, XL: Brand: CONVERTORS

## (undated) DEVICE — SINGLE-USE BIOPSY FORCEPS: Brand: RADIAL JAW 4

## (undated) DEVICE — AIRLIFE™  ADULT CUSHION NASAL CANNULA WITH 7 FOOT (2.1 M) CRUSH-RESISTANT OXYGEN TUBING, AND U/CONNECT-IT ADAPTER: Brand: AIRLIFE™

## (undated) DEVICE — TIBURON EXTREMITY SHEET: Brand: CONVERTORS

## (undated) DEVICE — CYSTO TUBING SINGLE IRRIGATION

## (undated) DEVICE — 1200CC GUARDIAN II: Brand: GUARDIAN

## (undated) DEVICE — 3M™ STERI-STRIP™ REINFORCED ADHESIVE SKIN CLOSURES, R1547, 1/2 IN X 4 IN (12 MM X 100 MM), 6 STRIPS/ENVELOPE: Brand: 3M™ STERI-STRIP™

## (undated) DEVICE — TRAY PAIN SUPPORT

## (undated) DEVICE — BAG DECANTER

## (undated) DEVICE — SUT CHROMIC 2-0 CT-1 27 IN 811H

## (undated) DEVICE — PACK GENERAL LF

## (undated) DEVICE — ACE WRAP 4 IN UNSTERILE

## (undated) DEVICE — MEDI-VAC YANKAUER SUCTION HANDLE: Brand: CARDINAL HEALTH